# Patient Record
Sex: MALE | Race: BLACK OR AFRICAN AMERICAN | Employment: UNEMPLOYED | URBAN - NONMETROPOLITAN AREA
[De-identification: names, ages, dates, MRNs, and addresses within clinical notes are randomized per-mention and may not be internally consistent; named-entity substitution may affect disease eponyms.]

---

## 2021-03-21 ENCOUNTER — HOSPITAL ENCOUNTER (EMERGENCY)
Age: 61
Discharge: HOME | End: 2021-03-21
Attending: EMERGENCY MEDICINE

## 2021-03-21 VITALS
TEMPERATURE: 98.4 F | OXYGEN SATURATION: 99 % | DIASTOLIC BLOOD PRESSURE: 73 MMHG | WEIGHT: 176 LBS | BODY MASS INDEX: 26.07 KG/M2 | HEIGHT: 69 IN | HEART RATE: 72 BPM | RESPIRATION RATE: 18 BRPM | SYSTOLIC BLOOD PRESSURE: 110 MMHG

## 2021-03-21 DIAGNOSIS — H53.8 BLURRED VISION, BILATERAL: Primary | ICD-10-CM

## 2021-03-21 LAB
GLUCOSE BLD STRIP.AUTO-MCNC: 164 MG/DL (ref 70–110)
PERFORMED BY, TECHID: ABNORMAL

## 2021-03-21 PROCEDURE — 99283 EMERGENCY DEPT VISIT LOW MDM: CPT

## 2021-03-21 PROCEDURE — 82962 GLUCOSE BLOOD TEST: CPT

## 2021-03-21 NOTE — ED PROVIDER NOTES
HPI   Patient reports experiencing a spontaneous, brief, and completely resolved episode of bilateral blurred vision. It happened approximately 30 minutes ago and lasted for several seconds and resolved completely. Patient reports that he became very nervous about it and was briefly diaphoretic which also has resolved. He denies any vision complaints since then. He denies headache, focal weakness, paresthesias, change in speech, ambulation, thought process. There are no other complaints. History reviewed. No pertinent past medical history. History reviewed. No pertinent surgical history. History reviewed. No pertinent family history.     Social History     Socioeconomic History    Marital status: Not on file     Spouse name: Not on file    Number of children: Not on file    Years of education: Not on file    Highest education level: Not on file   Occupational History    Not on file   Social Needs    Financial resource strain: Not on file    Food insecurity     Worry: Not on file     Inability: Not on file    Transportation needs     Medical: Not on file     Non-medical: Not on file   Tobacco Use    Smoking status: Never Smoker    Smokeless tobacco: Never Used   Substance and Sexual Activity    Alcohol use: Not Currently    Drug use: Not Currently    Sexual activity: Not on file   Lifestyle    Physical activity     Days per week: Not on file     Minutes per session: Not on file    Stress: Not on file   Relationships    Social connections     Talks on phone: Not on file     Gets together: Not on file     Attends [employer] service: Not on file     Active member of club or organization: Not on file     Attends meetings of clubs or organizations: Not on file     Relationship status: Not on file    Intimate partner violence     Fear of current or ex partner: Not on file     Emotionally abused: Not on file     Physically abused: Not on file     Forced sexual activity: Not on file   Other Topics Concern    Not on file   Social History Narrative    Not on file         ALLERGIES: Patient has no known allergies. Review of Systems   Constitutional: Negative. HENT: Negative. Eyes: Positive for visual disturbance. Respiratory: Negative. Cardiovascular: Negative. Gastrointestinal: Negative. Endocrine: Negative. Genitourinary: Negative. Musculoskeletal: Negative. Allergic/Immunologic: Negative. Neurological: Negative. Hematological: Negative. Psychiatric/Behavioral: Negative. All other systems reviewed and are negative. Vitals:    03/21/21 1503   BP: 110/73   Pulse: 72   Resp: 18   SpO2: 99%   Weight: 79.8 kg (176 lb)   Height: 5' 9\" (1.753 m)            Physical Exam  Vitals signs and nursing note reviewed. Constitutional:       General: He is not in acute distress. Appearance: Normal appearance. He is normal weight. He is not ill-appearing, toxic-appearing or diaphoretic. HENT:      Head: Normocephalic and atraumatic. Mouth/Throat:      Mouth: Mucous membranes are moist.   Eyes:      Extraocular Movements: Extraocular movements intact. Conjunctiva/sclera: Conjunctivae normal.      Pupils: Pupils are equal, round, and reactive to light. Comments: Bilateral field of vision intact in all 4 quadrants   Neck:      Musculoskeletal: Normal range of motion and neck supple. No neck rigidity. Pulmonary:      Effort: Pulmonary effort is normal. No respiratory distress. Musculoskeletal: Normal range of motion. General: No swelling or tenderness. Skin:     General: Skin is warm and dry. Neurological:      General: No focal deficit present. Mental Status: He is alert and oriented to person, place, and time. Mental status is at baseline. Cranial Nerves: No cranial nerve deficit. Sensory: No sensory deficit. Motor: No weakness.       Coordination: Coordination normal.      Gait: Gait normal.   Psychiatric: Mood and Affect: Mood normal.         Behavior: Behavior normal.          MDM       Unclear source of a brief and resolved episode of bilateral visual blurriness. There are no symptoms or signs to suggest an acute CNS process such as TIA or CVA, mass on the optic chiasm, occipital lobe disturbance. Patient's blood pressure is good and bedside blood glucose is negative for hyperglycemia.   Patient urged to monitor symptoms and if they return consult an optometrist.  Procedures

## 2021-03-21 NOTE — ED TRIAGE NOTES
Patient states he had a blurry vision spell approximately 40minutes ago but has subsided. Denies c/o chest pain, SOB, or dizziness.

## 2021-03-21 NOTE — ED TRIAGE NOTES
Patient states he has not had a comprehensive eye exam in approximately 3 years but will schedule an appointment tomorrow with his eye care provider.

## 2023-07-27 ENCOUNTER — HOSPITAL ENCOUNTER (EMERGENCY)
Age: 63
Discharge: HOME | End: 2023-07-27
Attending: EMERGENCY MEDICINE
Payer: COMMERCIAL

## 2023-07-27 VITALS
OXYGEN SATURATION: 99 % | HEART RATE: 79 BPM | RESPIRATION RATE: 14 BRPM | HEIGHT: 72 IN | DIASTOLIC BLOOD PRESSURE: 90 MMHG | WEIGHT: 198 LBS | TEMPERATURE: 98 F | SYSTOLIC BLOOD PRESSURE: 161 MMHG | BODY MASS INDEX: 26.82 KG/M2

## 2023-07-27 DIAGNOSIS — Z00.00 GENERAL MEDICAL EXAMINATION: Primary | ICD-10-CM

## 2023-07-27 PROCEDURE — 99282 EMERGENCY DEPT VISIT SF MDM: CPT

## 2023-07-27 NOTE — ED PROVIDER NOTES
[facility] EMERGENCY DEPT  EMERGENCY DEPARTMENT ENCOUNTER      Pt Name: Celina Lopez  MRN: [id number]  [address] [facility] [place] 1960  Date of evaluation: 7/27/2023  Provider: Jackie Carpenter MD    [address] [facility]       Chief Complaint   Patient presents with    Fatigue         HISTORY OF PRESENT ILLNESS   (Location/Symptom, Timing/Onset, Context/Setting, Quality, Duration, Modifying Factors, Severity)  Note limiting factors. Celina Lopez is a 70 y.o. male who presents to the emergency department for evaluation of now resolved fatigue. Patient was drinking soda and working in the yard sweating a lot. Patient felt tired after which his wife told him to drink water. He drank water and felt resolution of symptoms. No other alleviating factors attempted. Patient had no subjective medical complaints other than fatigue. The history is provided by the patient and medical records. Nursing Notes were reviewed. REVIEW OF SYSTEMS    (2-9 systems for level 4, 10 or more for level 5)     Review of Systems   All other systems reviewed and are negative. Except as noted above the remainder of the review of systems was reviewed and negative. PAST MEDICAL HISTORY   History reviewed. No pertinent past medical history. SURGICAL HISTORY     History reviewed. No pertinent surgical history. CURRENT MEDICATIONS       Previous Medications    No medications on file       ALLERGIES     Patient has no known allergies. FAMILY HISTORY     History reviewed. No pertinent family history.        SOCIAL HISTORY       Social History     Socioeconomic History    Marital status:      Spouse name: None    Number of children: None    Years of education: None    Highest education level: None   Tobacco Use    Smoking status: Never    Smokeless tobacco: Never   Substance and Sexual Activity    Alcohol use: Not Currently    Drug use: Not Currently       SCREENINGS                               WA Assessment  BP: (!) 161/90  Pulse: 79

## 2023-07-27 NOTE — ED TRIAGE NOTES
States that \" I got dizzy yesterday and I think I got dehydrated\". Has no complaints today. States \" I just want my blood pressure checked\".

## 2023-08-16 ENCOUNTER — HOSPITAL ENCOUNTER (OUTPATIENT)
Age: 63
Discharge: HOME | End: 2023-08-19

## 2023-08-16 ENCOUNTER — OFFICE VISIT (OUTPATIENT)
Facility: CLINIC | Age: 63
End: 2023-08-16
Payer: COMMERCIAL

## 2023-08-16 VITALS
SYSTOLIC BLOOD PRESSURE: 156 MMHG | HEIGHT: 72 IN | BODY MASS INDEX: 27.67 KG/M2 | WEIGHT: 204.3 LBS | HEART RATE: 85 BPM | DIASTOLIC BLOOD PRESSURE: 94 MMHG | TEMPERATURE: 97.4 F | OXYGEN SATURATION: 98 %

## 2023-08-16 DIAGNOSIS — E66.3 OVERWEIGHT (BMI 25.0-29.9): ICD-10-CM

## 2023-08-16 DIAGNOSIS — Z12.11 COLON CANCER SCREENING: ICD-10-CM

## 2023-08-16 DIAGNOSIS — Z11.59 NEED FOR HEPATITIS C SCREENING TEST: ICD-10-CM

## 2023-08-16 DIAGNOSIS — Z11.3 ROUTINE SCREENING FOR STI (SEXUALLY TRANSMITTED INFECTION): ICD-10-CM

## 2023-08-16 DIAGNOSIS — Z00.00 ENCOUNTER FOR HEALTH MAINTENANCE EXAMINATION IN ADULT: Primary | ICD-10-CM

## 2023-08-16 DIAGNOSIS — R73.9 HYPERGLYCEMIA: ICD-10-CM

## 2023-08-16 DIAGNOSIS — R03.0 ELEVATED BLOOD PRESSURE READING: ICD-10-CM

## 2023-08-16 LAB — LABCORP DRAW FEE: NORMAL

## 2023-08-16 PROCEDURE — 99387 INIT PM E/M NEW PAT 65+ YRS: CPT | Performed by: STUDENT IN AN ORGANIZED HEALTH CARE EDUCATION/TRAINING PROGRAM

## 2023-08-16 SDOH — ECONOMIC STABILITY: FOOD INSECURITY: WITHIN THE PAST 12 MONTHS, YOU WORRIED THAT YOUR FOOD WOULD RUN OUT BEFORE YOU GOT MONEY TO BUY MORE.: PATIENT DECLINED

## 2023-08-16 SDOH — ECONOMIC STABILITY: INCOME INSECURITY: HOW HARD IS IT FOR YOU TO PAY FOR THE VERY BASICS LIKE FOOD, HOUSING, MEDICAL CARE, AND HEATING?: PATIENT DECLINED

## 2023-08-16 SDOH — ECONOMIC STABILITY: FOOD INSECURITY: WITHIN THE PAST 12 MONTHS, THE FOOD YOU BOUGHT JUST DIDN'T LAST AND YOU DIDN'T HAVE MONEY TO GET MORE.: PATIENT DECLINED

## 2023-08-16 SDOH — ECONOMIC STABILITY: HOUSING INSECURITY
IN THE LAST 12 MONTHS, WAS THERE A TIME WHEN YOU DID NOT HAVE A STEADY PLACE TO SLEEP OR SLEPT IN A SHELTER (INCLUDING NOW)?: PATIENT REFUSED

## 2023-08-16 ASSESSMENT — PATIENT HEALTH QUESTIONNAIRE - PHQ9
1. LITTLE INTEREST OR PLEASURE IN DOING THINGS: 0
SUM OF ALL RESPONSES TO PHQ QUESTIONS 1-9: 0
SUM OF ALL RESPONSES TO PHQ QUESTIONS 1-9: 0
SUM OF ALL RESPONSES TO PHQ9 QUESTIONS 1 & 2: 0
2. FEELING DOWN, DEPRESSED OR HOPELESS: 0
SUM OF ALL RESPONSES TO PHQ QUESTIONS 1-9: 0
SUM OF ALL RESPONSES TO PHQ QUESTIONS 1-9: 0

## 2023-08-16 NOTE — PATIENT INSTRUCTIONS
- check blood pressure every day  - follow up in 1 month  - find out GI doc name or office  - get labwork

## 2023-08-16 NOTE — PROGRESS NOTES
Mague Oneill presents today for   Chief Complaint   Patient presents with    Establish Care     No concerns at this time. Is someone accompanying this pt? No    Is the patient using any DME equipment during OV? No     Health Maintenance reviewed and discussed and ordered per Provider. Health Maintenance Due   Topic Date Due    COVID-19 Vaccine (1) Never done    Depression Screen  Never done    HIV screen  Never done    Hepatitis C screen  Never done    DTaP/Tdap/Td vaccine (1 - Tdap) Never done    Diabetes screen  Never done    Lipids  Never done    Colorectal Cancer Screen  Never done    Shingles vaccine (1 of 2) Never done    Pneumococcal 65+ years Vaccine (1 - PCV) Never done    Flu vaccine (1) Never done   . Coordination of Care:  1. \"Have you been to the ER, urgent care clinic since your last visit? Hospitalized since your last visit? \" No     2. \"Have you seen or consulted any other health care providers outside of the [address] [place] [address] Avenue since your last visit? \" No    3. For patients aged 43-73: Has the patient had a colonoscopy? Says he had one done 5 years ago but he does not remember where he got it done at or who did it.

## 2023-08-17 LAB
ALBUMIN SERPL-MCNC: 4.3 G/DL (ref 3.9–4.9)
ALBUMIN/GLOB SERPL: 1.8 {RATIO} (ref 1.2–2.2)
ALP SERPL-CCNC: 72 IU/L (ref 44–121)
ALT SERPL-CCNC: 11 IU/L (ref 0–44)
AST SERPL-CCNC: 14 IU/L (ref 0–40)
BASOPHILS # BLD AUTO: 0.1 X10E3/UL (ref 0–0.2)
BASOPHILS NFR BLD AUTO: 1 %
BILIRUB SERPL-MCNC: 0.3 MG/DL (ref 0–1.2)
BUN SERPL-MCNC: 13 MG/DL (ref 8–27)
BUN/CREAT SERPL: 16 (ref 10–24)
CALCIUM SERPL-MCNC: 8.8 MG/DL (ref 8.6–10.2)
CHLORIDE SERPL-SCNC: 103 MMOL/L (ref 96–106)
CHOLEST SERPL-MCNC: 188 MG/DL (ref 100–199)
CO2 SERPL-SCNC: 23 MMOL/L (ref 20–29)
CREAT SERPL-MCNC: 0.83 MG/DL (ref 0.76–1.27)
EGFRCR SERPLBLD CKD-EPI 2021: 97 ML/MIN/1.73
EOSINOPHIL # BLD AUTO: 0.1 X10E3/UL (ref 0–0.4)
EOSINOPHIL NFR BLD AUTO: 1 %
ERYTHROCYTE [DISTWIDTH] IN BLOOD BY AUTOMATED COUNT: 14.5 % (ref 11.6–15.4)
GLOBULIN SER CALC-MCNC: 2.4 G/DL (ref 1.5–4.5)
GLUCOSE SERPL-MCNC: 177 MG/DL (ref 70–99)
HBA1C MFR BLD: 8.2 % (ref 4.8–5.6)
HCT VFR BLD AUTO: 36 % (ref 37.5–51)
HCV IGG SERPL QL IA: NON REACTIVE
HDLC SERPL-MCNC: 38 MG/DL
HGB BLD-MCNC: 11.5 G/DL (ref 13–17.7)
HIV 1+2 AB+HIV1 P24 AG SERPL QL IA: NON REACTIVE
IMM GRANULOCYTES # BLD AUTO: 0 X10E3/UL (ref 0–0.1)
IMM GRANULOCYTES NFR BLD AUTO: 0 %
LDLC SERPL CALC-MCNC: 139 MG/DL (ref 0–99)
LYMPHOCYTES # BLD AUTO: 2.1 X10E3/UL (ref 0.7–3.1)
LYMPHOCYTES NFR BLD AUTO: 24 %
MCH RBC QN AUTO: 23 PG (ref 26.6–33)
MCHC RBC AUTO-ENTMCNC: 31.9 G/DL (ref 31.5–35.7)
MCV RBC AUTO: 72 FL (ref 79–97)
MONOCYTES # BLD AUTO: 0.6 X10E3/UL (ref 0.1–0.9)
MONOCYTES NFR BLD AUTO: 7 %
NEUTROPHILS # BLD AUTO: 6.1 X10E3/UL (ref 1.4–7)
NEUTROPHILS NFR BLD AUTO: 67 %
PLATELET # BLD AUTO: 413 X10E3/UL (ref 150–450)
POTASSIUM SERPL-SCNC: 4.6 MMOL/L (ref 3.5–5.2)
PROT SERPL-MCNC: 6.7 G/DL (ref 6–8.5)
RBC # BLD AUTO: 4.99 X10E6/UL (ref 4.14–5.8)
SODIUM SERPL-SCNC: 140 MMOL/L (ref 134–144)
SPECIMEN STATUS REPORT: NORMAL
TRIGL SERPL-MCNC: 61 MG/DL (ref 0–149)
VLDLC SERPL CALC-MCNC: 11 MG/DL (ref 5–40)
WBC # BLD AUTO: 9.1 X10E3/UL (ref 3.4–10.8)

## 2023-08-18 ENCOUNTER — TELEPHONE (OUTPATIENT)
Facility: CLINIC | Age: 63
End: 2023-08-18

## 2023-08-18 NOTE — TELEPHONE ENCOUNTER
----- Message from Omid Russell MD sent at 8/17/2023  1:16 PM EDT -----  Denise Parry make him a sooner appointment to talk about his new diabetes, preferably tomorrow  ----- Message -----  From: Kobi Gutierrez Incoming Amb Ref Lab Orders To [facility]  Sent: 8/17/2023   6:38 AM EDT  To: Omid Russell MD    Patient is coming in today for an appointment to discuss labs.

## 2023-09-18 ENCOUNTER — OFFICE VISIT (OUTPATIENT)
Facility: CLINIC | Age: 63
End: 2023-09-18
Payer: COMMERCIAL

## 2023-09-18 VITALS
SYSTOLIC BLOOD PRESSURE: 118 MMHG | HEIGHT: 72 IN | DIASTOLIC BLOOD PRESSURE: 81 MMHG | OXYGEN SATURATION: 100 % | BODY MASS INDEX: 26.56 KG/M2 | TEMPERATURE: 98.2 F | WEIGHT: 196.1 LBS | HEART RATE: 81 BPM

## 2023-09-18 DIAGNOSIS — R03.0 WHITE COAT SYNDROME WITHOUT DIAGNOSIS OF HYPERTENSION: ICD-10-CM

## 2023-09-18 DIAGNOSIS — E11.9 TYPE 2 DIABETES MELLITUS WITHOUT COMPLICATION, WITHOUT LONG-TERM CURRENT USE OF INSULIN (HCC): Primary | ICD-10-CM

## 2023-09-18 DIAGNOSIS — Z13.6 ENCOUNTER FOR ABDOMINAL AORTIC ANEURYSM (AAA) SCREENING: ICD-10-CM

## 2023-09-18 DIAGNOSIS — D50.8 IRON DEFICIENCY ANEMIA SECONDARY TO INADEQUATE DIETARY IRON INTAKE: ICD-10-CM

## 2023-09-18 PROCEDURE — 3052F HG A1C>EQUAL 8.0%<EQUAL 9.0%: CPT | Performed by: STUDENT IN AN ORGANIZED HEALTH CARE EDUCATION/TRAINING PROGRAM

## 2023-09-18 PROCEDURE — 99214 OFFICE O/P EST MOD 30 MIN: CPT | Performed by: STUDENT IN AN ORGANIZED HEALTH CARE EDUCATION/TRAINING PROGRAM

## 2023-09-18 PROCEDURE — 1123F ACP DISCUSS/DSCN MKR DOCD: CPT | Performed by: STUDENT IN AN ORGANIZED HEALTH CARE EDUCATION/TRAINING PROGRAM

## 2023-09-18 ASSESSMENT — PATIENT HEALTH QUESTIONNAIRE - PHQ9
1. LITTLE INTEREST OR PLEASURE IN DOING THINGS: 0
SUM OF ALL RESPONSES TO PHQ QUESTIONS 1-9: 0
2. FEELING DOWN, DEPRESSED OR HOPELESS: 0
SUM OF ALL RESPONSES TO PHQ9 QUESTIONS 1 & 2: 0

## 2023-09-18 NOTE — PROGRESS NOTES
Katt Valencia presents today for   Chief Complaint   Patient presents with    1 Month Follow-Up     Blood pressure   Diabetes         Is someone accompanying this pt? No   Is the patient using any DME equipment during OV? No     Health Maintenance reviewed and discussed and ordered per Provider. Health Maintenance Due   Topic Date Due    COVID-19 Vaccine (1) Never done    DTaP/Tdap/Td vaccine (1 - Tdap) Never done    Colorectal Cancer Screen  Never done    Shingles vaccine (1 of 2) Never done    Pneumococcal 65+ years Vaccine (1 - PCV) Never done    AAA screen  Never done    Flu vaccine (1) Never done   . Coordination of Care:  1. \"Have you been to the ER, urgent care clinic since your last visit? Hospitalized since your last visit? \" No    2. \"Have you seen or consulted any other health care providers outside of the [address] [place] [address] since your last visit? \" No    3. For patients aged 43-73: Has the patient had a colonoscopy?  No

## 2024-03-22 ENCOUNTER — APPOINTMENT (OUTPATIENT)
Age: 64
End: 2024-03-22
Attending: EMERGENCY MEDICINE
Payer: COMMERCIAL

## 2024-03-22 ENCOUNTER — HOSPITAL ENCOUNTER (EMERGENCY)
Age: 64
Discharge: ANOTHER HOSPITAL | End: 2024-03-23
Attending: EMERGENCY MEDICINE
Payer: COMMERCIAL

## 2024-03-22 ENCOUNTER — APPOINTMENT (OUTPATIENT)
Age: 64
End: 2024-03-22
Payer: COMMERCIAL

## 2024-03-22 ENCOUNTER — OFFICE VISIT (OUTPATIENT)
Facility: CLINIC | Age: 64
End: 2024-03-22

## 2024-03-22 VITALS — DIASTOLIC BLOOD PRESSURE: 73 MMHG | HEART RATE: 134 BPM | SYSTOLIC BLOOD PRESSURE: 119 MMHG | OXYGEN SATURATION: 85 %

## 2024-03-22 DIAGNOSIS — J96.01 ACUTE RESPIRATORY FAILURE WITH HYPOXIA (HCC): ICD-10-CM

## 2024-03-22 DIAGNOSIS — J96.01 ACUTE HYPOXIC RESPIRATORY FAILURE (HCC): ICD-10-CM

## 2024-03-22 DIAGNOSIS — R00.0 TACHYCARDIA: ICD-10-CM

## 2024-03-22 DIAGNOSIS — R65.21 SEPTIC SHOCK (HCC): ICD-10-CM

## 2024-03-22 DIAGNOSIS — A41.9 SEPTIC SHOCK (HCC): ICD-10-CM

## 2024-03-22 DIAGNOSIS — J18.9 MULTIFOCAL PNEUMONIA: ICD-10-CM

## 2024-03-22 DIAGNOSIS — R06.03 ACUTE RESPIRATORY DISTRESS: Primary | ICD-10-CM

## 2024-03-22 DIAGNOSIS — I42.9 CARDIOMYOPATHY (HCC): Primary | ICD-10-CM

## 2024-03-22 LAB
ALBUMIN SERPL-MCNC: 2.9 G/DL (ref 3.4–5)
ALBUMIN/GLOB SERPL: 0.7 (ref 0.8–1.7)
ALP SERPL-CCNC: 74 U/L (ref 45–117)
ALT SERPL-CCNC: 19 U/L (ref 16–61)
ANION GAP SERPL CALC-SCNC: 11 MMOL/L (ref 3–18)
APPEARANCE UR: CLEAR
APTT PPP: 33.3 SEC (ref 23–36.4)
ARTERIAL PATENCY WRIST A: YES
ARTERIAL PATENCY WRIST A: YES
AST SERPL W P-5'-P-CCNC: 10 U/L (ref 10–38)
BACTERIA URNS QL MICRO: ABNORMAL /HPF
BASE DEFICIT BLDA-SCNC: 5.3 MMOL/L
BASE DEFICIT BLDA-SCNC: 8 MMOL/L
BASOPHILS # BLD: 0 K/UL (ref 0–0.1)
BASOPHILS NFR BLD: 0 % (ref 0–2)
BDY SITE: ABNORMAL
BDY SITE: ABNORMAL
BILIRUB SERPL-MCNC: 1.1 MG/DL (ref 0.2–1)
BILIRUB UR QL: NEGATIVE
BNP SERPL-MCNC: 3945 PG/ML (ref 0–900)
BUN SERPL-MCNC: 14 MG/DL (ref 7–18)
BUN/CREAT SERPL: 14 (ref 12–20)
CA-I BLD-MCNC: 8.8 MG/DL (ref 8.5–10.1)
CHLORIDE SERPL-SCNC: 105 MMOL/L (ref 100–111)
CO2 SERPL-SCNC: 23 MMOL/L (ref 21–32)
COHGB MFR BLD: 0.6 % (ref 1–2)
COHGB MFR BLD: 1.2 % (ref 1–2)
COLOR UR: YELLOW
CREAT SERPL-MCNC: 1.03 MG/DL (ref 0.6–1.3)
DIFFERENTIAL METHOD BLD: ABNORMAL
ECHO AO ROOT DIAM: 3.2 CM
ECHO AO ROOT INDEX: 1.48 CM/M2
ECHO AV AREA PEAK VELOCITY: 1.1 CM2
ECHO AV AREA VTI: 0.8 CM2
ECHO AV AREA/BSA PEAK VELOCITY: 0.5 CM2/M2
ECHO AV AREA/BSA VTI: 0.4 CM2/M2
ECHO AV MEAN GRADIENT: 26 MMHG
ECHO AV MEAN VELOCITY: 2.4 M/S
ECHO AV PEAK GRADIENT: 47 MMHG
ECHO AV PEAK VELOCITY: 3.4 M/S
ECHO AV VELOCITY RATIO: 0.32
ECHO AV VTI: 58.8 CM
ECHO BSA: 2.17 M2
ECHO LA DIAMETER INDEX: 1.99 CM/M2
ECHO LA DIAMETER: 4.3 CM
ECHO LA TO AORTIC ROOT RATIO: 1.34
ECHO LV FRACTIONAL SHORTENING: 18 % (ref 28–44)
ECHO LV INTERNAL DIMENSION DIASTOLE INDEX: 2.36 CM/M2
ECHO LV INTERNAL DIMENSION DIASTOLIC: 5.1 CM (ref 4.2–5.9)
ECHO LV INTERNAL DIMENSION SYSTOLIC INDEX: 1.94 CM/M2
ECHO LV INTERNAL DIMENSION SYSTOLIC: 4.2 CM
ECHO LV IVSD: 0.7 CM (ref 0.6–1)
ECHO LV MASS 2D: 140.5 G (ref 88–224)
ECHO LV MASS INDEX 2D: 65 G/M2 (ref 49–115)
ECHO LV POSTERIOR WALL DIASTOLIC: 0.9 CM (ref 0.6–1)
ECHO LV RELATIVE WALL THICKNESS RATIO: 0.35
ECHO LVOT AREA: 3.1 CM2
ECHO LVOT AV VTI INDEX: 0.26
ECHO LVOT DIAM: 2 CM
ECHO LVOT MEAN GRADIENT: 2 MMHG
ECHO LVOT PEAK GRADIENT: 5 MMHG
ECHO LVOT PEAK VELOCITY: 1.1 M/S
ECHO LVOT STROKE VOLUME INDEX: 21.8 ML/M2
ECHO LVOT SV: 47.1 ML
ECHO LVOT VTI: 15 CM
ECHO MV AREA VTI: 2.2 CM2
ECHO MV LVOT VTI INDEX: 1.43
ECHO MV MAX VELOCITY: 1.4 M/S
ECHO MV MEAN GRADIENT: 3 MMHG
ECHO MV MEAN VELOCITY: 0.8 M/S
ECHO MV PEAK GRADIENT: 7 MMHG
ECHO MV VTI: 21.4 CM
EOSINOPHIL # BLD: 0 K/UL (ref 0–0.4)
EOSINOPHIL NFR BLD: 0 % (ref 0–5)
EPITH CASTS URNS QL MICRO: ABNORMAL /LPF (ref 0–20)
ERYTHROCYTE [DISTWIDTH] IN BLOOD BY AUTOMATED COUNT: 19.4 % (ref 11.6–14.5)
ERYTHROCYTE [DISTWIDTH] IN BLOOD BY AUTOMATED COUNT: 19.6 % (ref 11.6–14.5)
FIO2 ON VENT: 70 %
FIO2 ON VENT: 80 %
FLUAV AG NPH QL IA: NEGATIVE
FLUBV AG NOSE QL IA: NEGATIVE
GAS FLOW.O2 SETTING OXYMISER: 20
GLOBULIN SER CALC-MCNC: 4.2 G/DL (ref 2–4)
GLUCOSE SERPL-MCNC: 244 MG/DL (ref 74–99)
GLUCOSE UR STRIP.AUTO-MCNC: 500 MG/DL
HCO3 BLDA-SCNC: 19 MMOL/L (ref 22–26)
HCO3 BLDA-SCNC: 19 MMOL/L (ref 22–26)
HCT VFR BLD AUTO: 27.7 % (ref 36–48)
HCT VFR BLD AUTO: 29.1 % (ref 36–48)
HGB BLD-MCNC: 8 G/DL (ref 13–16)
HGB BLD-MCNC: 8.5 G/DL (ref 13–16)
HGB UR QL STRIP: NEGATIVE
HYALINE CASTS URNS QL MICRO: ABNORMAL /LPF
IMM GRANULOCYTES # BLD AUTO: 0.2 K/UL (ref 0–0.04)
IMM GRANULOCYTES NFR BLD AUTO: 1 % (ref 0–0.5)
KETONES UR QL STRIP.AUTO: NEGATIVE MG/DL
LACTATE SERPL-SCNC: 2.3 MMOL/L (ref 0.4–2)
LACTATE SERPL-SCNC: 4.9 MMOL/L (ref 0.4–2)
LACTATE SERPL-SCNC: 6.6 MMOL/L (ref 0.4–2)
LEUKOCYTE ESTERASE UR QL STRIP.AUTO: NEGATIVE
LYMPHOCYTES # BLD: 1.1 K/UL (ref 0.9–3.6)
LYMPHOCYTES NFR BLD: 7 % (ref 21–52)
MCH RBC QN AUTO: 17.7 PG (ref 24–34)
MCH RBC QN AUTO: 17.9 PG (ref 24–34)
MCHC RBC AUTO-ENTMCNC: 28.9 G/DL (ref 31–37)
MCHC RBC AUTO-ENTMCNC: 29.2 G/DL (ref 31–37)
MCV RBC AUTO: 61.3 FL (ref 78–100)
MCV RBC AUTO: 61.3 FL (ref 78–100)
METHGB MFR BLD: 0.2 % (ref 0–1.4)
METHGB MFR BLD: 0.2 % (ref 0–1.4)
MONOCYTES # BLD: 1 K/UL (ref 0.05–1.2)
MONOCYTES NFR BLD: 6 % (ref 3–10)
NEUTS SEG # BLD: 14.1 K/UL (ref 1.8–8)
NEUTS SEG NFR BLD: 86 % (ref 40–73)
NITRITE UR QL STRIP.AUTO: NEGATIVE
NRBC # BLD: 0 K/UL (ref 0–0.01)
NRBC # BLD: 0.02 K/UL (ref 0–0.01)
NRBC BLD-RTO: 0 PER 100 WBC
NRBC BLD-RTO: 0.1 PER 100 WBC
OXYHGB MFR BLD: 94.8 % (ref 95–99)
OXYHGB MFR BLD: 98.3 % (ref 95–99)
PCO2 BLDA: 33 MMHG (ref 35–45)
PCO2 BLDA: 42 MMHG (ref 35–45)
PEEP RESPIRATORY: 10
PERFORMED BY:: ABNORMAL
PERFORMED BY:: ABNORMAL
PH BLDA: 7.26 (ref 7.35–7.45)
PH BLDA: 7.38 (ref 7.35–7.45)
PH UR STRIP: 5.5 (ref 5–8)
PLATELET # BLD AUTO: 482 K/UL (ref 135–420)
PLATELET # BLD AUTO: 509 K/UL (ref 135–420)
PMV BLD AUTO: 10.2 FL (ref 9.2–11.8)
PMV BLD AUTO: 10.4 FL (ref 9.2–11.8)
PO2 BLDA: 103 MMHG (ref 80–100)
PO2 BLDA: 171 MMHG (ref 80–100)
POTASSIUM SERPL-SCNC: 3.4 MMOL/L (ref 3.5–5.5)
PROCALCITONIN SERPL-MCNC: 0.07 NG/ML
PROT SERPL-MCNC: 7.1 G/DL (ref 6.4–8.2)
PROT UR STRIP-MCNC: 30 MG/DL
RBC # BLD AUTO: 4.52 M/UL (ref 4.35–5.65)
RBC # BLD AUTO: 4.75 M/UL (ref 4.35–5.65)
RBC #/AREA URNS HPF: ABNORMAL /HPF (ref 0–2)
RBC MORPH BLD: ABNORMAL
RBC MORPH BLD: ABNORMAL
SAO2 % BLD: 96 % (ref 95–99)
SAO2 % BLD: 99 % (ref 95–99)
SAO2% DEVICE SAO2% SENSOR NAME: ABNORMAL
SAO2% DEVICE SAO2% SENSOR NAME: ABNORMAL
SARS-COV-2 RDRP RESP QL NAA+PROBE: NOT DETECTED
SODIUM SERPL-SCNC: 139 MMOL/L (ref 136–145)
SP GR UR REFRACTOMETRY: 1.02 (ref 1–1.03)
SPECIMEN SITE: ABNORMAL
SPECIMEN SITE: ABNORMAL
THERAPEUTIC RANGE: NORMAL SEC (ref 82–109)
TOTAL RATE: 20
TROPONIN I SERPL HS-MCNC: 279 NG/L (ref 0–78)
TROPONIN I SERPL HS-MCNC: 291 NG/L (ref 0–78)
UROBILINOGEN UR QL STRIP.AUTO: 1 EU/DL (ref 0.2–1)
VENTILATION MODE VENT: ABNORMAL
VT SETTING VENT: 500
WBC # BLD AUTO: 16.4 K/UL (ref 4.6–13.2)
WBC # BLD AUTO: 19.7 K/UL (ref 4.6–13.2)
WBC URNS QL MICRO: ABNORMAL /HPF (ref 0–4)

## 2024-03-22 PROCEDURE — 85027 COMPLETE CBC AUTOMATED: CPT

## 2024-03-22 PROCEDURE — 2580000003 HC RX 258: Performed by: EMERGENCY MEDICINE

## 2024-03-22 PROCEDURE — 96367 TX/PROPH/DG ADDL SEQ IV INF: CPT

## 2024-03-22 PROCEDURE — 51702 INSERT TEMP BLADDER CATH: CPT

## 2024-03-22 PROCEDURE — 81001 URINALYSIS AUTO W/SCOPE: CPT

## 2024-03-22 PROCEDURE — 71275 CT ANGIOGRAPHY CHEST: CPT

## 2024-03-22 PROCEDURE — 85730 THROMBOPLASTIN TIME PARTIAL: CPT

## 2024-03-22 PROCEDURE — 85025 COMPLETE CBC W/AUTO DIFF WBC: CPT

## 2024-03-22 PROCEDURE — 93005 ELECTROCARDIOGRAM TRACING: CPT | Performed by: EMERGENCY MEDICINE

## 2024-03-22 PROCEDURE — 80053 COMPREHEN METABOLIC PANEL: CPT

## 2024-03-22 PROCEDURE — 96365 THER/PROPH/DIAG IV INF INIT: CPT

## 2024-03-22 PROCEDURE — 2500000003 HC RX 250 WO HCPCS: Performed by: EMERGENCY MEDICINE

## 2024-03-22 PROCEDURE — 71045 X-RAY EXAM CHEST 1 VIEW: CPT

## 2024-03-22 PROCEDURE — 96366 THER/PROPH/DIAG IV INF ADDON: CPT

## 2024-03-22 PROCEDURE — 6360000002 HC RX W HCPCS

## 2024-03-22 PROCEDURE — 96375 TX/PRO/DX INJ NEW DRUG ADDON: CPT

## 2024-03-22 PROCEDURE — 82803 BLOOD GASES ANY COMBINATION: CPT

## 2024-03-22 PROCEDURE — 2500000003 HC RX 250 WO HCPCS

## 2024-03-22 PROCEDURE — 87635 SARS-COV-2 COVID-19 AMP PRB: CPT

## 2024-03-22 PROCEDURE — 6360000004 HC RX CONTRAST MEDICATION: Performed by: EMERGENCY MEDICINE

## 2024-03-22 PROCEDURE — 84484 ASSAY OF TROPONIN QUANT: CPT

## 2024-03-22 PROCEDURE — 6370000000 HC RX 637 (ALT 250 FOR IP): Performed by: EMERGENCY MEDICINE

## 2024-03-22 PROCEDURE — 6360000002 HC RX W HCPCS: Performed by: EMERGENCY MEDICINE

## 2024-03-22 PROCEDURE — 87804 INFLUENZA ASSAY W/OPTIC: CPT

## 2024-03-22 PROCEDURE — 83605 ASSAY OF LACTIC ACID: CPT

## 2024-03-22 PROCEDURE — 31500 INSERT EMERGENCY AIRWAY: CPT

## 2024-03-22 PROCEDURE — 84145 PROCALCITONIN (PCT): CPT

## 2024-03-22 PROCEDURE — 96376 TX/PRO/DX INJ SAME DRUG ADON: CPT

## 2024-03-22 PROCEDURE — 36600 WITHDRAWAL OF ARTERIAL BLOOD: CPT

## 2024-03-22 PROCEDURE — 94660 CPAP INITIATION&MGMT: CPT

## 2024-03-22 PROCEDURE — 99285 EMERGENCY DEPT VISIT HI MDM: CPT

## 2024-03-22 PROCEDURE — 31720 CLEARANCE OF AIRWAYS: CPT

## 2024-03-22 PROCEDURE — 94640 AIRWAY INHALATION TREATMENT: CPT

## 2024-03-22 PROCEDURE — 87040 BLOOD CULTURE FOR BACTERIA: CPT

## 2024-03-22 PROCEDURE — 96361 HYDRATE IV INFUSION ADD-ON: CPT

## 2024-03-22 PROCEDURE — 94761 N-INVAS EAR/PLS OXIMETRY MLT: CPT

## 2024-03-22 PROCEDURE — 2700000000 HC OXYGEN THERAPY PER DAY

## 2024-03-22 PROCEDURE — 83880 ASSAY OF NATRIURETIC PEPTIDE: CPT

## 2024-03-22 PROCEDURE — 93306 TTE W/DOPPLER COMPLETE: CPT

## 2024-03-22 RX ORDER — HEPARIN SODIUM 1000 [USP'U]/ML
2000 INJECTION, SOLUTION INTRAVENOUS; SUBCUTANEOUS PRN
Status: DISCONTINUED | OUTPATIENT
Start: 2024-03-22 | End: 2024-03-22

## 2024-03-22 RX ORDER — LORAZEPAM 2 MG/ML
1 INJECTION INTRAMUSCULAR ONCE
Status: COMPLETED | OUTPATIENT
Start: 2024-03-22 | End: 2024-03-22

## 2024-03-22 RX ORDER — DEXMEDETOMIDINE HYDROCHLORIDE 4 UG/ML
INJECTION, SOLUTION INTRAVENOUS
Status: COMPLETED
Start: 2024-03-22 | End: 2024-03-22

## 2024-03-22 RX ORDER — IPRATROPIUM BROMIDE AND ALBUTEROL SULFATE 2.5; .5 MG/3ML; MG/3ML
1 SOLUTION RESPIRATORY (INHALATION)
Status: COMPLETED | OUTPATIENT
Start: 2024-03-22 | End: 2024-03-22

## 2024-03-22 RX ORDER — PROPOFOL 10 MG/ML
5-50 INJECTION, EMULSION INTRAVENOUS
Status: COMPLETED | OUTPATIENT
Start: 2024-03-22 | End: 2024-03-23

## 2024-03-22 RX ORDER — ETOMIDATE 2 MG/ML
20 INJECTION INTRAVENOUS
Status: COMPLETED | OUTPATIENT
Start: 2024-03-22 | End: 2024-03-22

## 2024-03-22 RX ORDER — DEXMEDETOMIDINE HYDROCHLORIDE 4 UG/ML
.1-1.5 INJECTION, SOLUTION INTRAVENOUS CONTINUOUS
Status: DISCONTINUED | OUTPATIENT
Start: 2024-03-22 | End: 2024-03-23 | Stop reason: HOSPADM

## 2024-03-22 RX ORDER — ROCURONIUM BROMIDE 10 MG/ML
100 INJECTION, SOLUTION INTRAVENOUS
Status: COMPLETED | OUTPATIENT
Start: 2024-03-22 | End: 2024-03-22

## 2024-03-22 RX ORDER — HEPARIN SODIUM 1000 [USP'U]/ML
4000 INJECTION, SOLUTION INTRAVENOUS; SUBCUTANEOUS ONCE
Status: COMPLETED | OUTPATIENT
Start: 2024-03-22 | End: 2024-03-22

## 2024-03-22 RX ORDER — 0.9 % SODIUM CHLORIDE 0.9 %
30 INTRAVENOUS SOLUTION INTRAVENOUS ONCE
Status: COMPLETED | OUTPATIENT
Start: 2024-03-22 | End: 2024-03-22

## 2024-03-22 RX ORDER — FUROSEMIDE 10 MG/ML
40 INJECTION INTRAMUSCULAR; INTRAVENOUS
Status: COMPLETED | OUTPATIENT
Start: 2024-03-22 | End: 2024-03-22

## 2024-03-22 RX ORDER — ETOMIDATE 2 MG/ML
INJECTION INTRAVENOUS
Status: COMPLETED
Start: 2024-03-22 | End: 2024-03-22

## 2024-03-22 RX ORDER — HEPARIN SODIUM 10000 [USP'U]/100ML
5-30 INJECTION, SOLUTION INTRAVENOUS CONTINUOUS
Status: DISCONTINUED | OUTPATIENT
Start: 2024-03-22 | End: 2024-03-23 | Stop reason: HOSPADM

## 2024-03-22 RX ORDER — 0.9 % SODIUM CHLORIDE 0.9 %
250 INTRAVENOUS SOLUTION INTRAVENOUS ONCE
Status: COMPLETED | OUTPATIENT
Start: 2024-03-22 | End: 2024-03-22

## 2024-03-22 RX ORDER — ROCURONIUM BROMIDE 10 MG/ML
INJECTION, SOLUTION INTRAVENOUS
Status: COMPLETED
Start: 2024-03-22 | End: 2024-03-22

## 2024-03-22 RX ORDER — LORAZEPAM 2 MG/ML
2 INJECTION INTRAMUSCULAR ONCE
Status: COMPLETED | OUTPATIENT
Start: 2024-03-22 | End: 2024-03-22

## 2024-03-22 RX ORDER — PROPOFOL 10 MG/ML
INJECTION, EMULSION INTRAVENOUS
Status: COMPLETED
Start: 2024-03-22 | End: 2024-03-22

## 2024-03-22 RX ORDER — 0.9 % SODIUM CHLORIDE 0.9 %
1000 INTRAVENOUS SOLUTION INTRAVENOUS ONCE
Status: DISCONTINUED | OUTPATIENT
Start: 2024-03-22 | End: 2024-03-22

## 2024-03-22 RX ORDER — DEXMEDETOMIDINE HYDROCHLORIDE 4 UG/ML
.1-1.5 INJECTION, SOLUTION INTRAVENOUS CONTINUOUS
Status: DISCONTINUED | OUTPATIENT
Start: 2024-03-22 | End: 2024-03-22

## 2024-03-22 RX ORDER — LORAZEPAM 2 MG/ML
INJECTION INTRAMUSCULAR
Status: COMPLETED
Start: 2024-03-22 | End: 2024-03-22

## 2024-03-22 RX ORDER — NOREPINEPHRINE BITARTRATE 1 MG/ML
INJECTION, SOLUTION INTRAVENOUS
Status: DISCONTINUED
Start: 2024-03-22 | End: 2024-03-23 | Stop reason: HOSPADM

## 2024-03-22 RX ORDER — HEPARIN SODIUM 1000 [USP'U]/ML
4000 INJECTION, SOLUTION INTRAVENOUS; SUBCUTANEOUS PRN
Status: DISCONTINUED | OUTPATIENT
Start: 2024-03-22 | End: 2024-03-22

## 2024-03-22 RX ADMIN — FUROSEMIDE 40 MG: 10 INJECTION, SOLUTION INTRAMUSCULAR; INTRAVENOUS at 16:01

## 2024-03-22 RX ADMIN — HEPARIN SODIUM 4000 UNITS: 1000 INJECTION INTRAVENOUS; SUBCUTANEOUS at 17:09

## 2024-03-22 RX ADMIN — WATER 125 MG: 1 INJECTION INTRAMUSCULAR; INTRAVENOUS; SUBCUTANEOUS at 14:58

## 2024-03-22 RX ADMIN — ETOMIDATE 20 MG: 2 INJECTION INTRAVENOUS at 19:39

## 2024-03-22 RX ADMIN — PROPOFOL 20 MCG/KG/MIN: 10 INJECTION, EMULSION INTRAVENOUS at 19:53

## 2024-03-22 RX ADMIN — IPRATROPIUM BROMIDE AND ALBUTEROL SULFATE 1 DOSE: .5; 3 SOLUTION RESPIRATORY (INHALATION) at 16:31

## 2024-03-22 RX ADMIN — ETOMIDATE 20 MG: 2 INJECTION, SOLUTION INTRAVENOUS at 19:39

## 2024-03-22 RX ADMIN — DEXMEDETOMIDINE HYDROCHLORIDE 0.2 MCG/KG/HR: 400 INJECTION, SOLUTION INTRAVENOUS at 22:23

## 2024-03-22 RX ADMIN — SODIUM CHLORIDE 1500 MG: 9 INJECTION, SOLUTION INTRAVENOUS at 17:06

## 2024-03-22 RX ADMIN — DEXMEDETOMIDINE HYDROCHLORIDE 0.2 MCG/KG/HR: 4 INJECTION, SOLUTION INTRAVENOUS at 22:23

## 2024-03-22 RX ADMIN — ROCURONIUM BROMIDE 100 MG: 10 INJECTION, SOLUTION INTRAVENOUS at 19:39

## 2024-03-22 RX ADMIN — PIPERACILLIN AND TAZOBACTAM 4500 MG: 4; .5 INJECTION, POWDER, FOR SOLUTION INTRAVENOUS at 16:03

## 2024-03-22 RX ADMIN — LORAZEPAM 1 MG: 2 INJECTION INTRAMUSCULAR at 23:07

## 2024-03-22 RX ADMIN — SODIUM CHLORIDE 2736 ML: 9 INJECTION, SOLUTION INTRAVENOUS at 15:01

## 2024-03-22 RX ADMIN — SODIUM CHLORIDE 5 MCG/MIN: 9 INJECTION, SOLUTION INTRAVENOUS at 23:16

## 2024-03-22 RX ADMIN — IPRATROPIUM BROMIDE AND ALBUTEROL SULFATE 1 DOSE: .5; 3 SOLUTION RESPIRATORY (INHALATION) at 14:41

## 2024-03-22 RX ADMIN — SODIUM CHLORIDE 250 ML: 9 INJECTION, SOLUTION INTRAVENOUS at 20:37

## 2024-03-22 RX ADMIN — LORAZEPAM 1 MG: 2 INJECTION INTRAMUSCULAR; INTRAVENOUS at 23:07

## 2024-03-22 RX ADMIN — LORAZEPAM 2 MG: 2 INJECTION INTRAMUSCULAR at 18:04

## 2024-03-22 RX ADMIN — IOPAMIDOL 95 ML: 755 INJECTION, SOLUTION INTRAVENOUS at 18:31

## 2024-03-22 RX ADMIN — HEPARIN SODIUM 10 UNITS/KG/HR: 10000 INJECTION, SOLUTION INTRAVENOUS at 17:13

## 2024-03-22 ASSESSMENT — PAIN - FUNCTIONAL ASSESSMENT: PAIN_FUNCTIONAL_ASSESSMENT: NONE - DENIES PAIN

## 2024-03-22 ASSESSMENT — PULMONARY FUNCTION TESTS: PIF_VALUE: 27

## 2024-03-22 ASSESSMENT — LIFESTYLE VARIABLES
HOW MANY STANDARD DRINKS CONTAINING ALCOHOL DO YOU HAVE ON A TYPICAL DAY: PATIENT DOES NOT DRINK
HOW OFTEN DO YOU HAVE A DRINK CONTAINING ALCOHOL: NEVER

## 2024-03-22 NOTE — PROGRESS NOTES
aware the patient is being referred to the ED for further evaluation management.  Medical history includes uncontrolled type 2 diabetes with a hemoglobin A1c of 8.2 on blood work 8/16/2023 and normocytic anemia with a hemoglobin of 11 at that time as well.  Had had a colonoscopy approximately 6 years ago but I do not have those results.  Does not endorse any bleeding.  My biggest concern would be acute PE given abrupt onset of symptoms with out any medical history otherwise.  Also consider ACS, malignancy. EKG in office does show ST depression but not in continuous leads, although some artifact present.       I have discussed the diagnosis with the patient and the intended plan as seen in the above orders.  The patient has received an after-visit summary and questions were answered concerning future plans.  I have discussed medication side effects and warnings with the patient as well. I have reviewed the plan of care with the patient, accepted their input and they are in agreement with the treatment goals. Previous lab and imaging results were reviewed by me.       Kamlesh Biggs MD  March 22, 2024

## 2024-03-22 NOTE — PROGRESS NOTES
HHN given with unit dose duoneb x 1 TX.  SPO2 was has increased from 90% to 95% post TX.  Increased air movement with scattered crackles noted post TX.  Patient states breathing \"feels easier\".       03/22/24 1442   Treatment   Treatment Type HHN   $Treatment Type $Inhaled Therapy/Meds   Medications Albuterol/Ipratropium   Pre-Tx Pulse 122   Pre-Tx Resps 20   Breath Sounds Pre-Tx ADIN Diminished   Breath Sounds Pre-Tx LLL Diminished;Expiratory wheezes   Breath Sounds Pre-Tx RUL Diminished   Breath Sounds Pre-Tx RML Diminished   Breath Sounds Pre-Tx RLL Diminished;Expiratory wheezes   Breath Sounds Post-Tx ADIN Diminished   Breath Sounds Post-Tx LLL Diminished;Crackles   Breath Sounds Post-Tx RUL Diminished   Breath Sounds Post-Tx RML Diminished   Breath Sounds Post-Tx RLL Diminished;Crackles   Post-Tx Pulse 121   Post-Tx Resps 24   Delivery Source Mouthpiece   Position Sitting   Treatment Tolerance Well   Duration 8   Is patient on O2? Y   Breath Sounds   Breath Sounds Bilateral Diminished;Expiratory wheezing   Oxygen Therapy/Pulse Ox   O2 Therapy Oxygen   O2 Device Nasal cannula  (4L)   O2 Flow Rate (L/min) 4 L/min   Pulse (!) 122   Respirations 20   SpO2 92 %

## 2024-03-22 NOTE — PROGRESS NOTES
Called to bedside for low SPO2 and increased WOB.  Patient found on NRB mask and diaphoretic.  Patient placed on BIPAP for hypoxemia and increased WOB.  Currently tolerating.  HHN TX given inline.  ABG obtained. CXR reviewed and d/w Dr. Mcqueen. Will continue to monitor.         Latest Reference Range & Units 03/22/24 16:51   Daniel Test -   YES   Oxyhemoglobin 95 - 99 % 94.8 (L)   pH, Arterial 7.35 - 7.45   7.26 (L)   pCO2, Arterial 35 - 45 mmHg 42   pO2, Arterial 80 - 100 mmHg 103 (H)   HCO3, Arterial 22 - 26 mmol/L 19 (L)   O2 Sat, Arterial 95 - 99 % 96   Methemoglobin, Arterial 0 - 1.4 % 0.2   Carboxyhgb, Arterial 1 - 2 % 1.2   FIO2 Arterial % 70   Site -   Right Brachial   (L): Data is abnormally low  (H): Data is abnormally high

## 2024-03-22 NOTE — ED NOTES
Bedside and Verbal shift change report given to Omar Wynne Missy (oncoming nurse) by Porsche (offgoing nurse). Report included the following information Nurse Handoff Report, ED Encounter Summary, ED SBAR, MAR, and Recent Results.

## 2024-03-22 NOTE — ED TRIAGE NOTES
Patient with shortness of breath x 1 week. Seen at PCP and was sent to ED for low oxygen levels       89 % on 4 lpm via NC  MD at bedside  Respiratory notified

## 2024-03-22 NOTE — ED PROVIDER NOTES
[facility] EMERGENCY DEPT  EMERGENCY DEPARTMENT HISTORY AND PHYSICAL EXAM      Date: 3/22/2024  Patient Name: Gigi cMneal  MRN: [id number]  Birthdate 1960  Date of evaluation: 3/22/2024  Provider: Claudio Mcqueen MD   Note Started: 7:54 PM EDT 3/22/24    HISTORY OF PRESENT ILLNESS     Chief Complaint   Patient presents with    Shortness of Breath       History Provided By: Patient    HPI: Gigi Mcneal is a 64 y.o. male presented to the emergency department in acute respiratory distress from his primary care office.  On 4 L nasal cannula tripoding difficulty breathing Limited air movement.  According to patient and primary care provider he has been having shortness of breath cough flulike illness for the past several weeks today he followed up in the office was noted to have oxygen saturations in the 80s and was working to breathe hard so they referred him to the emergency department.    Shortly after arrival patient received a DuoNeb and steroids with improvement.  Activated sepsis protocol given findings and concerns for possible pneumonia.  Unfortunately he also has Rales in bilateral bases so there is also concerns for volume overload so limited fluids initially ordered 30 cc/kg but have limited that to 1 L.    Fortunately initial lactic was obtained but is 6.6.  Initial troponin also elevated there is some ischemic findings on the EKG but likely rate related he has no cardiac history denying charly chest pain initial troponin is 276.  Discussed with cardiology, bedside echo showing reduced EF recommended obtaining formal echo which confirmed EF of 30 to 35% no focal wall motion abnormalities.    Repeat troponin is 294 there are concerns for potential for PE so we \started heparin    Patient work of breathing increased again he became diaphoretic he was placed on nonrebreather, lung sounds severely diminished again additional breathing treatments were given with relief.      After period of doing better he has

## 2024-03-23 ENCOUNTER — APPOINTMENT (OUTPATIENT)
Facility: HOSPITAL | Age: 64
End: 2024-03-23
Attending: INTERNAL MEDICINE
Payer: COMMERCIAL

## 2024-03-23 ENCOUNTER — HOSPITAL ENCOUNTER (INPATIENT)
Facility: HOSPITAL | Age: 64
LOS: 7 days | Discharge: HOME | End: 2024-03-30
Attending: INTERNAL MEDICINE | Admitting: INTERNAL MEDICINE
Payer: COMMERCIAL

## 2024-03-23 VITALS
BODY MASS INDEX: 26.64 KG/M2 | SYSTOLIC BLOOD PRESSURE: 117 MMHG | HEIGHT: 73 IN | DIASTOLIC BLOOD PRESSURE: 89 MMHG | WEIGHT: 201 LBS | TEMPERATURE: 98.8 F | RESPIRATION RATE: 20 BRPM | HEART RATE: 88 BPM | OXYGEN SATURATION: 100 %

## 2024-03-23 DIAGNOSIS — I50.20 HFREF (HEART FAILURE WITH REDUCED EJECTION FRACTION) (HCC): Primary | ICD-10-CM

## 2024-03-23 DIAGNOSIS — I21.4 NSTEMI (NON-ST ELEVATED MYOCARDIAL INFARCTION) (HCC): ICD-10-CM

## 2024-03-23 DIAGNOSIS — I25.10 CORONARY ARTERY DISEASE INVOLVING NATIVE CORONARY ARTERY OF NATIVE HEART WITHOUT ANGINA PECTORIS: ICD-10-CM

## 2024-03-23 DIAGNOSIS — I25.10 CAD (CORONARY ARTERY DISEASE): ICD-10-CM

## 2024-03-23 PROBLEM — J96.01 ACUTE HYPOXIC RESPIRATORY FAILURE (HCC): Status: ACTIVE | Noted: 2024-03-23

## 2024-03-23 PROBLEM — J18.9 MULTIFOCAL PNEUMONIA: Status: ACTIVE | Noted: 2024-03-23

## 2024-03-23 LAB
ANION GAP SERPL CALC-SCNC: 10 MMOL/L (ref 3–18)
APTT PPP: 30.8 SEC (ref 23–36.4)
APTT PPP: 37.1 SEC (ref 23–36.4)
APTT PPP: 46.8 SEC (ref 23–36.4)
B PERT DNA SPEC QL NAA+PROBE: NOT DETECTED
BASOPHILS # BLD: 0 K/UL (ref 0–0.1)
BASOPHILS NFR BLD: 0 % (ref 0–2)
BORDETELLA PARAPERTUSSIS BY PCR: NOT DETECTED
BUN SERPL-MCNC: 18 MG/DL (ref 7–18)
BUN/CREAT SERPL: 17 (ref 12–20)
C PNEUM DNA SPEC QL NAA+PROBE: NOT DETECTED
CALCIUM SERPL-MCNC: 8.4 MG/DL (ref 8.5–10.1)
CHLORIDE SERPL-SCNC: 104 MMOL/L (ref 100–111)
CO2 SERPL-SCNC: 25 MMOL/L (ref 21–32)
CREAT SERPL-MCNC: 1.03 MG/DL (ref 0.6–1.3)
DIFFERENTIAL METHOD BLD: ABNORMAL
EOSINOPHIL # BLD: 0 K/UL (ref 0–0.4)
EOSINOPHIL NFR BLD: 0 % (ref 0–5)
ERYTHROCYTE [DISTWIDTH] IN BLOOD BY AUTOMATED COUNT: 18.7 % (ref 11.6–14.5)
ERYTHROCYTE [DISTWIDTH] IN BLOOD BY AUTOMATED COUNT: 19.1 % (ref 11.6–14.5)
FLUAV SUBTYP SPEC NAA+PROBE: NOT DETECTED
FLUBV RNA SPEC QL NAA+PROBE: NOT DETECTED
GLUCOSE BLD STRIP.AUTO-MCNC: 174 MG/DL (ref 70–110)
GLUCOSE BLD STRIP.AUTO-MCNC: 193 MG/DL (ref 70–110)
GLUCOSE BLD STRIP.AUTO-MCNC: 254 MG/DL (ref 70–110)
GLUCOSE SERPL-MCNC: 249 MG/DL (ref 74–99)
HADV DNA SPEC QL NAA+PROBE: NOT DETECTED
HCOV 229E RNA SPEC QL NAA+PROBE: NOT DETECTED
HCOV HKU1 RNA SPEC QL NAA+PROBE: NOT DETECTED
HCOV NL63 RNA SPEC QL NAA+PROBE: NOT DETECTED
HCOV OC43 RNA SPEC QL NAA+PROBE: NOT DETECTED
HCT VFR BLD AUTO: 24.1 % (ref 36–48)
HCT VFR BLD AUTO: 24.8 % (ref 36–48)
HGB BLD-MCNC: 7 G/DL (ref 13–16)
HGB BLD-MCNC: 7.4 G/DL (ref 13–16)
HMPV RNA SPEC QL NAA+PROBE: NOT DETECTED
HPIV1 RNA SPEC QL NAA+PROBE: NOT DETECTED
HPIV2 RNA SPEC QL NAA+PROBE: NOT DETECTED
HPIV3 RNA SPEC QL NAA+PROBE: NOT DETECTED
HPIV4 RNA SPEC QL NAA+PROBE: NOT DETECTED
IMM GRANULOCYTES # BLD AUTO: 0.1 K/UL (ref 0–0.04)
IMM GRANULOCYTES NFR BLD AUTO: 1 % (ref 0–0.5)
LACTATE SERPL-SCNC: 1.9 MMOL/L (ref 0.4–2)
LACTATE SERPL-SCNC: 2.1 MMOL/L (ref 0.4–2)
LYMPHOCYTES # BLD: 1.1 K/UL (ref 0.9–3.6)
LYMPHOCYTES NFR BLD: 7 % (ref 21–52)
M PNEUMO DNA SPEC QL NAA+PROBE: NOT DETECTED
MAGNESIUM SERPL-MCNC: 2.3 MG/DL (ref 1.6–2.6)
MCH RBC QN AUTO: 17.6 PG (ref 24–34)
MCH RBC QN AUTO: 18.2 PG (ref 24–34)
MCHC RBC AUTO-ENTMCNC: 29 G/DL (ref 31–37)
MCHC RBC AUTO-ENTMCNC: 29.8 G/DL (ref 31–37)
MCV RBC AUTO: 60.6 FL (ref 78–100)
MCV RBC AUTO: 60.9 FL (ref 78–100)
MONOCYTES # BLD: 0.7 K/UL (ref 0.05–1.2)
MONOCYTES NFR BLD: 5 % (ref 3–10)
NEUTS SEG # BLD: 12.9 K/UL (ref 1.8–8)
NEUTS SEG NFR BLD: 88 % (ref 40–73)
NRBC # BLD: 0 K/UL (ref 0–0.01)
NRBC # BLD: 0.02 K/UL (ref 0–0.01)
NRBC BLD-RTO: 0 PER 100 WBC
NRBC BLD-RTO: 0.2 PER 100 WBC
PHOSPHATE SERPL-MCNC: 4.1 MG/DL (ref 2.5–4.9)
PLATELET # BLD AUTO: 352 K/UL (ref 135–420)
PLATELET # BLD AUTO: 394 K/UL (ref 135–420)
PMV BLD AUTO: 10.7 FL (ref 9.2–11.8)
PMV BLD AUTO: 11.3 FL (ref 9.2–11.8)
POTASSIUM SERPL-SCNC: 3.9 MMOL/L (ref 3.5–5.5)
PROCALCITONIN SERPL-MCNC: 2.07 NG/ML
RBC # BLD AUTO: 3.98 M/UL (ref 4.35–5.65)
RBC # BLD AUTO: 4.07 M/UL (ref 4.35–5.65)
RSV RNA SPEC QL NAA+PROBE: NOT DETECTED
RV+EV RNA SPEC QL NAA+PROBE: NOT DETECTED
SARS-COV-2 RNA RESP QL NAA+PROBE: NOT DETECTED
SODIUM SERPL-SCNC: 139 MMOL/L (ref 136–145)
T4 FREE SERPL-MCNC: 1.5 NG/DL (ref 0.7–1.5)
THERAPEUTIC RANGE: ABNORMAL SEC (ref 82–109)
TROPONIN I SERPL HS-MCNC: 2359 NG/L (ref 0–78)
TROPONIN I SERPL HS-MCNC: 2943 NG/L (ref 0–78)
TROPONIN I SERPL HS-MCNC: 3470 NG/L (ref 0–78)
TSH SERPL DL<=0.05 MIU/L-ACNC: 0.17 UIU/ML (ref 0.36–3.74)
WBC # BLD AUTO: 11.5 K/UL (ref 4.6–13.2)
WBC # BLD AUTO: 14.7 K/UL (ref 4.6–13.2)

## 2024-03-23 PROCEDURE — 5A1945Z RESPIRATORY VENTILATION, 24-96 CONSECUTIVE HOURS: ICD-10-PCS | Performed by: INTERNAL MEDICINE

## 2024-03-23 PROCEDURE — 94761 N-INVAS EAR/PLS OXIMETRY MLT: CPT

## 2024-03-23 PROCEDURE — 99291 CRITICAL CARE FIRST HOUR: CPT | Performed by: INTERNAL MEDICINE

## 2024-03-23 PROCEDURE — 84100 ASSAY OF PHOSPHORUS: CPT

## 2024-03-23 PROCEDURE — 6360000002 HC RX W HCPCS: Performed by: PHYSICIAN ASSISTANT

## 2024-03-23 PROCEDURE — 3E043XZ INTRODUCTION OF VASOPRESSOR INTO CENTRAL VEIN, PERCUTANEOUS APPROACH: ICD-10-PCS | Performed by: INTERNAL MEDICINE

## 2024-03-23 PROCEDURE — 2700000000 HC OXYGEN THERAPY PER DAY

## 2024-03-23 PROCEDURE — 0202U NFCT DS 22 TRGT SARS-COV-2: CPT

## 2024-03-23 PROCEDURE — 82962 GLUCOSE BLOOD TEST: CPT

## 2024-03-23 PROCEDURE — 83735 ASSAY OF MAGNESIUM: CPT

## 2024-03-23 PROCEDURE — 80048 BASIC METABOLIC PNL TOTAL CA: CPT

## 2024-03-23 PROCEDURE — 94002 VENT MGMT INPAT INIT DAY: CPT

## 2024-03-23 PROCEDURE — 2000000000 HC ICU R&B

## 2024-03-23 PROCEDURE — 84439 ASSAY OF FREE THYROXINE: CPT

## 2024-03-23 PROCEDURE — 2580000003 HC RX 258: Performed by: PHYSICIAN ASSISTANT

## 2024-03-23 PROCEDURE — 87205 SMEAR GRAM STAIN: CPT

## 2024-03-23 PROCEDURE — 2500000003 HC RX 250 WO HCPCS: Performed by: INTERNAL MEDICINE

## 2024-03-23 PROCEDURE — 82803 BLOOD GASES ANY COMBINATION: CPT

## 2024-03-23 PROCEDURE — 84443 ASSAY THYROID STIM HORMONE: CPT

## 2024-03-23 PROCEDURE — 36592 COLLECT BLOOD FROM PICC: CPT

## 2024-03-23 PROCEDURE — 2500000003 HC RX 250 WO HCPCS: Performed by: PHYSICIAN ASSISTANT

## 2024-03-23 PROCEDURE — 85027 COMPLETE CBC AUTOMATED: CPT

## 2024-03-23 PROCEDURE — 85730 THROMBOPLASTIN TIME PARTIAL: CPT

## 2024-03-23 PROCEDURE — 36600 WITHDRAWAL OF ARTERIAL BLOOD: CPT

## 2024-03-23 PROCEDURE — 84145 PROCALCITONIN (PCT): CPT

## 2024-03-23 PROCEDURE — 84484 ASSAY OF TROPONIN QUANT: CPT

## 2024-03-23 PROCEDURE — 83605 ASSAY OF LACTIC ACID: CPT

## 2024-03-23 PROCEDURE — 85025 COMPLETE CBC W/AUTO DIFF WBC: CPT

## 2024-03-23 PROCEDURE — 6370000000 HC RX 637 (ALT 250 FOR IP): Performed by: PHYSICIAN ASSISTANT

## 2024-03-23 PROCEDURE — 87070 CULTURE OTHR SPECIMN AEROBIC: CPT

## 2024-03-23 PROCEDURE — A4216 STERILE WATER/SALINE, 10 ML: HCPCS | Performed by: PHYSICIAN ASSISTANT

## 2024-03-23 PROCEDURE — 71045 X-RAY EXAM CHEST 1 VIEW: CPT

## 2024-03-23 RX ORDER — DEXMEDETOMIDINE HYDROCHLORIDE 4 UG/ML
.1-1.5 INJECTION, SOLUTION INTRAVENOUS CONTINUOUS
Status: DISCONTINUED | OUTPATIENT
Start: 2024-03-23 | End: 2024-03-25

## 2024-03-23 RX ORDER — SODIUM CHLORIDE 0.9 % (FLUSH) 0.9 %
5-40 SYRINGE (ML) INJECTION PRN
Status: DISCONTINUED | OUTPATIENT
Start: 2024-03-23 | End: 2024-03-30 | Stop reason: HOSPADM

## 2024-03-23 RX ORDER — ACETAMINOPHEN 325 MG/1
650 TABLET ORAL EVERY 6 HOURS PRN
Status: DISCONTINUED | OUTPATIENT
Start: 2024-03-23 | End: 2024-03-30 | Stop reason: HOSPADM

## 2024-03-23 RX ORDER — HEPARIN SODIUM 10000 [USP'U]/100ML
5-30 INJECTION, SOLUTION INTRAVENOUS CONTINUOUS
Status: DISCONTINUED | OUTPATIENT
Start: 2024-03-23 | End: 2024-03-28

## 2024-03-23 RX ORDER — ONDANSETRON 4 MG/1
4 TABLET, ORALLY DISINTEGRATING ORAL EVERY 8 HOURS PRN
Status: DISCONTINUED | OUTPATIENT
Start: 2024-03-23 | End: 2024-03-30 | Stop reason: HOSPADM

## 2024-03-23 RX ORDER — POTASSIUM CHLORIDE 7.45 MG/ML
10 INJECTION INTRAVENOUS PRN
Status: DISCONTINUED | OUTPATIENT
Start: 2024-03-23 | End: 2024-03-30 | Stop reason: HOSPADM

## 2024-03-23 RX ORDER — NOREPINEPHRINE BITARTRATE 0.06 MG/ML
1-100 INJECTION, SOLUTION INTRAVENOUS CONTINUOUS
Status: DISCONTINUED | OUTPATIENT
Start: 2024-03-23 | End: 2024-03-25

## 2024-03-23 RX ORDER — SODIUM CHLORIDE 9 MG/ML
INJECTION, SOLUTION INTRAVENOUS PRN
Status: DISCONTINUED | OUTPATIENT
Start: 2024-03-23 | End: 2024-03-30 | Stop reason: HOSPADM

## 2024-03-23 RX ORDER — ONDANSETRON 2 MG/ML
4 INJECTION INTRAMUSCULAR; INTRAVENOUS EVERY 6 HOURS PRN
Status: DISCONTINUED | OUTPATIENT
Start: 2024-03-23 | End: 2024-03-30 | Stop reason: HOSPADM

## 2024-03-23 RX ORDER — HEPARIN SODIUM 1000 [USP'U]/ML
4000 INJECTION, SOLUTION INTRAVENOUS; SUBCUTANEOUS PRN
Status: DISCONTINUED | OUTPATIENT
Start: 2024-03-23 | End: 2024-03-28

## 2024-03-23 RX ORDER — HEPARIN SODIUM 1000 [USP'U]/ML
2000 INJECTION, SOLUTION INTRAVENOUS; SUBCUTANEOUS PRN
Status: DISCONTINUED | OUTPATIENT
Start: 2024-03-23 | End: 2024-03-28

## 2024-03-23 RX ORDER — ACETAMINOPHEN 650 MG/1
650 SUPPOSITORY RECTAL EVERY 6 HOURS PRN
Status: DISCONTINUED | OUTPATIENT
Start: 2024-03-23 | End: 2024-03-30 | Stop reason: HOSPADM

## 2024-03-23 RX ORDER — FUROSEMIDE 10 MG/ML
40 INJECTION INTRAMUSCULAR; INTRAVENOUS ONCE
Status: COMPLETED | OUTPATIENT
Start: 2024-03-23 | End: 2024-03-23

## 2024-03-23 RX ORDER — SODIUM CHLORIDE 0.9 % (FLUSH) 0.9 %
5-40 SYRINGE (ML) INJECTION EVERY 12 HOURS SCHEDULED
Status: DISCONTINUED | OUTPATIENT
Start: 2024-03-23 | End: 2024-03-30 | Stop reason: HOSPADM

## 2024-03-23 RX ORDER — INSULIN LISPRO 100 [IU]/ML
0-4 INJECTION, SOLUTION INTRAVENOUS; SUBCUTANEOUS EVERY 6 HOURS
Status: DISCONTINUED | OUTPATIENT
Start: 2024-03-23 | End: 2024-03-27

## 2024-03-23 RX ORDER — FENTANYL CITRATE 50 UG/ML
100 INJECTION, SOLUTION INTRAMUSCULAR; INTRAVENOUS
Status: DISCONTINUED | OUTPATIENT
Start: 2024-03-23 | End: 2024-03-25

## 2024-03-23 RX ORDER — MAGNESIUM SULFATE IN WATER 40 MG/ML
2000 INJECTION, SOLUTION INTRAVENOUS PRN
Status: DISCONTINUED | OUTPATIENT
Start: 2024-03-23 | End: 2024-03-30 | Stop reason: HOSPADM

## 2024-03-23 RX ORDER — HEPARIN SODIUM 1000 [USP'U]/ML
4000 INJECTION, SOLUTION INTRAVENOUS; SUBCUTANEOUS ONCE
Status: DISCONTINUED | OUTPATIENT
Start: 2024-03-23 | End: 2024-03-24

## 2024-03-23 RX ORDER — PROPOFOL 10 MG/ML
5-50 INJECTION, EMULSION INTRAVENOUS CONTINUOUS
Status: DISCONTINUED | OUTPATIENT
Start: 2024-03-23 | End: 2024-03-23

## 2024-03-23 RX ORDER — CHLORHEXIDINE GLUCONATE ORAL RINSE 1.2 MG/ML
15 SOLUTION DENTAL 2 TIMES DAILY
Status: DISCONTINUED | OUTPATIENT
Start: 2024-03-23 | End: 2024-03-25

## 2024-03-23 RX ORDER — MIDAZOLAM HYDROCHLORIDE 2 MG/2ML
2 INJECTION, SOLUTION INTRAMUSCULAR; INTRAVENOUS
Status: DISCONTINUED | OUTPATIENT
Start: 2024-03-23 | End: 2024-03-25

## 2024-03-23 RX ORDER — POTASSIUM CHLORIDE 20 MEQ/1
40 TABLET, EXTENDED RELEASE ORAL PRN
Status: DISCONTINUED | OUTPATIENT
Start: 2024-03-23 | End: 2024-03-30 | Stop reason: HOSPADM

## 2024-03-23 RX ORDER — PROPOFOL 10 MG/ML
INJECTION, EMULSION INTRAVENOUS
Status: DISCONTINUED
Start: 2024-03-23 | End: 2024-03-23 | Stop reason: HOSPADM

## 2024-03-23 RX ADMIN — FUROSEMIDE 40 MG: 10 INJECTION, SOLUTION INTRAMUSCULAR; INTRAVENOUS at 03:24

## 2024-03-23 RX ADMIN — DEXMEDETOMIDINE HYDROCHLORIDE 1 MCG/KG/HR: 4 INJECTION, SOLUTION INTRAVENOUS at 23:59

## 2024-03-23 RX ADMIN — VANCOMYCIN HYDROCHLORIDE 1000 MG: 1 INJECTION, POWDER, LYOPHILIZED, FOR SOLUTION INTRAVENOUS at 06:01

## 2024-03-23 RX ADMIN — PIPERACILLIN AND TAZOBACTAM 3375 MG: 3; .375 INJECTION, POWDER, FOR SOLUTION INTRAVENOUS; PARENTERAL at 03:24

## 2024-03-23 RX ADMIN — HEPARIN SODIUM 4000 UNITS: 1000 INJECTION INTRAVENOUS; SUBCUTANEOUS at 09:44

## 2024-03-23 RX ADMIN — SODIUM CHLORIDE, PRESERVATIVE FREE 10 ML: 5 INJECTION INTRAVENOUS at 08:46

## 2024-03-23 RX ADMIN — FAMOTIDINE 20 MG: 10 INJECTION, SOLUTION INTRAVENOUS at 08:24

## 2024-03-23 RX ADMIN — 0.12% CHLORHEXIDINE GLUCONATE 15 ML: 1.2 RINSE ORAL at 08:24

## 2024-03-23 RX ADMIN — INSULIN LISPRO 2 UNITS: 100 INJECTION, SOLUTION INTRAVENOUS; SUBCUTANEOUS at 05:59

## 2024-03-23 RX ADMIN — PROPOFOL 35 MCG/KG/MIN: 10 INJECTION, EMULSION INTRAVENOUS at 06:39

## 2024-03-23 RX ADMIN — PIPERACILLIN AND TAZOBACTAM 3375 MG: 3; .375 INJECTION, POWDER, FOR SOLUTION INTRAVENOUS; PARENTERAL at 11:10

## 2024-03-23 RX ADMIN — 0.12% CHLORHEXIDINE GLUCONATE 15 ML: 1.2 RINSE ORAL at 21:43

## 2024-03-23 RX ADMIN — SODIUM CHLORIDE, PRESERVATIVE FREE 10 ML: 5 INJECTION INTRAVENOUS at 21:39

## 2024-03-23 RX ADMIN — Medication 9 MCG/MIN: at 03:22

## 2024-03-23 RX ADMIN — HEPARIN SODIUM 4000 UNITS: 1000 INJECTION INTRAVENOUS; SUBCUTANEOUS at 17:25

## 2024-03-23 RX ADMIN — HEPARIN SODIUM 13 UNITS/KG/HR: 10000 INJECTION, SOLUTION INTRAVENOUS at 17:20

## 2024-03-23 RX ADMIN — DEXMEDETOMIDINE HYDROCHLORIDE 0.8 MCG/KG/HR: 4 INJECTION, SOLUTION INTRAVENOUS at 19:12

## 2024-03-23 RX ADMIN — PIPERACILLIN AND TAZOBACTAM 3375 MG: 3; .375 INJECTION, POWDER, FOR SOLUTION INTRAVENOUS; PARENTERAL at 19:28

## 2024-03-23 RX ADMIN — DEXMEDETOMIDINE HYDROCHLORIDE 0.2 MCG/KG/HR: 4 INJECTION, SOLUTION INTRAVENOUS at 12:14

## 2024-03-23 RX ADMIN — FAMOTIDINE 20 MG: 10 INJECTION, SOLUTION INTRAVENOUS at 20:53

## 2024-03-23 RX ADMIN — PROPOFOL 35 MCG/KG/MIN: 10 INJECTION, EMULSION INTRAVENOUS at 12:13

## 2024-03-23 RX ADMIN — VANCOMYCIN HYDROCHLORIDE 1000 MG: 1 INJECTION, POWDER, LYOPHILIZED, FOR SOLUTION INTRAVENOUS at 18:04

## 2024-03-23 ASSESSMENT — PULMONARY FUNCTION TESTS
PIF_VALUE: 24
PIF_VALUE: 25
PIF_VALUE: 24
PIF_VALUE: 28

## 2024-03-23 NOTE — PROGRESS NOTES
No SBT at this time-     03/23/24 0729   Weaning Parameters   Spontaneous Breathing Trial Complete No (comment)  (No SBT at this time- High PEEP and FiO2)

## 2024-03-23 NOTE — H&P
[facility]  Pulmonary, Critical Care, and Sleep Medicine    Name: Gigi Mcneal MRN: [id number]   : 1960 Hospital: [facility]   Date: 3/23/2024        Critical Care History and Physical      IMPRESSION:   Acute hypoxic respiratory failure - requiring mechanical ventilation, likely 2/2 multifocal pneumonia vs CHF exacerbation  Septic shock - likely 2/2 multifocal pneumonia  Acute decompensated systolic heart failure - EF 35-40%, elevated proBNP  Acute toxic/metabolic encephalopathy  Severe multifocal pneumonia involving both upper lobes - as seen on CTA chest  Moderate to large bilateral pleural effusions   Mildly elevated troponin - likely demand ischemia  Lactic acidosis - improving  Hx of tobacco abuse  Poor medical compliance      Patient Active Problem List   Diagnosis    Acute hypoxic respiratory failure (HCC)        RECOMMENDATIONS:   Neuro: Titrate sedation to RASS of 0 to -1. PRN for breakthrough sedation needs.  Pulm: Titrate FiO2 for goal SPO2> 90%,VAP prevention bundle, head of the bed at 30' all times.Daily sedation holiday and assessment for weaning with SBT as tolerated.    PRN duonebs. Bronchodilators, pulmonary hygiene care, Aspiration precautions, Keep HOB >30 degrees. CTA chest revealed multifocal pneumonia, (-)PE.  CVS :Actively titrate vasopressors aim MAP >65mmHg, Check cardiac panel, ECHO 3/22/24 revealed EF 35-40%. Lasix 40mg x1  Fluids: Pt received 3L IVF in the ED.  GI: SUP, Trend LFTs, Zofran PRN for N/V, NPO  Renal:  Trend Renal indices, Strict Is/Os, Uribe (+)  Hem/Onc: Monitor for s/o active bleeding.   I/D:Sepsis bundle per hospital protocol, Blood, sputum and Urine cultures drawn and will be followed. Lactic acid ordered- initial and repeat Q4hrs till normalized. Antibiotics:Vancomycin and zosyn. Trend WBCs and temperature curve. RVP pending.  Endocrine: Q6 glucoses, SSI. Check TSH level  Metabolic:  Daily BMP, mag, phos. Trend lytes, replace as

## 2024-03-23 NOTE — PROGRESS NOTES
[facility]   Pharmacy Pharmacokinetic Monitoring Service - Vancomycin     Gigi Mcneal is a 64 y.o. male starting on vancomycin therapy for Sepsis of Unknown Etiology. Pharmacy consulted for monitoring and adjustment.    Target Concentration: Goal AUC/GLORIA 400-600 mg*hr/L    Additional Antimicrobials: Piperacillin/Tazobactam    Pertinent Laboratory Values:      Recent Labs     03/22/24  1432 03/22/24  1640   CREATININE 1.03  --    BUN 14  --    WBC 16.4* 19.7*   PROCAL 0.07  --      Estimated Creatinine Clearance: 80 mL/min (based on SCr of 1.03 mg/dL).    Pertinent Cultures:  Culture Date Source Results   03/22/24 Blood Pending   MRSA Nasal Swab: N/A. Non-respiratory infection    Plan:  Dosing recommendations based on Bayesian software  Start vancomycin 1000 mg IV q12h (Loading dose given 03/22 at [facility])  Anticipated AUC of 513 and trough concentration of 15.3 at steady state  Renal labs as indicated   Vancomycin concentration ordered for  03/25 @ 0400  Pharmacy will continue to monitor patient and adjust therapy as indicated    Thank you for the consult,  HUY LERMA [facility]  3/23/2024

## 2024-03-23 NOTE — ED NOTES
Patient's wife called and updated on patient status. Wife provided [facility] ICU phone number for updates since she is unable to drive this evening.

## 2024-03-23 NOTE — PROGRESS NOTES
attempted to visit  with Gigi Mcneal, who is a 64 y.o., male.   The reason patientcame to hospital is:   Patient Active Problem List    Diagnosis Date Noted    Acute hypoxemic respiratory failure (HCC) 03/23/2024    Multifocal pneumonia 03/23/2024    HFrEF (heart failure with reduced ejection fraction) ([facility]) 03/23/2024   .    Patient is unable to communicate at this time.  offered silent prayer near bedside and left Spiritual Care brochure. Chaplains will continue to follow and will provide pastoral care on an as needed/requested basis.     Pool Quiroz   Staff    Spiritual Health   [phone]

## 2024-03-23 NOTE — PROGRESS NOTES
Pt intubated via glydescope without complication using 8 ETT. Positive color change displayed via CO2 detector, BBS heard upon auscultation, direct visualization of cords achieved. ETT secured at 24 lipline via tube corbett. Pt placed on mechanical ventilation( see flowsheet ). Will continue to monitor and wean as able.ETT LOT NUMBER [id number]

## 2024-03-23 NOTE — ED NOTES
Dr. Mcqueen made aware of drop in patient's blood pressure. Propofol drip decreased to 10 mcg/kg/min. Fluid bolus of 250 mL given per MD. Will continue to monitor BP for improvement post fluid resuscitation.

## 2024-03-23 NOTE — PROGRESS NOTES
2146 received report on the telephone by ABIGAIL Wynne, included SBAR< MAR< Ed summary and transport information.  Patient will be arriving shortly.    65 Y/o male arrived [facility] with acute SOB, gradual onset with nasal congestion and dry cough.   History: uncontrolled diabetes, anemia, ACS malignancy  Admitted 03/23/24   Diagnosis: acute hypoxic respiratory failure on room air was 85%.  Initial lactic acid was 6.6 . Initial troponin was 276, bilateral pleural effusions, hypotension, acute coronary syndrome, multifocal pneumonia, septic shock    Neuro restraints d/t intubation  Respiratory intubated size 8.0 ETT and  24 at the lip, began to have frothy sputum  Cardiac on levophed  GI NPO  OG tube   forte    Lines central line R femoral :  22 G Left AC, 20 G Left fore arm

## 2024-03-23 NOTE — ED NOTES
Assumed care of this patient from Dr. Mtz pending transfer to [facility].  Patient is intubated, his septic shock, multifocal pneumonia, and went into respiratory failure.  Patient on heparin for possible non-STEMI, is sedated and on propofol, and has been given vancomycin.  Blood pressure dropped due to titration of propofol.  I asked RN to change to predex.  But the blood pressure continued to increase and patient needed pressors.  Central line placed right femoral due to possible code and patient not well sedated due to low blood pressures.  Consent given by family.  Levophed added with good improvement in both sedation and blood pressure.  Central Line    Date/Time: 3/23/2024 12:17 AM    Performed by: Ravindra Mccarthy MD  Authorized by: Claudio Mcuqeen MD    Consent:     Consent obtained:  Verbal    Consent given by:  Guardian    Risks, benefits, and alternatives were discussed: yes      Risks discussed:  Arterial puncture, incorrect placement, nerve damage, infection and bleeding    Alternatives discussed:  Alternative treatment  Universal protocol:     Procedure explained and questions answered to patient or proxy's satisfaction: yes      Relevant documents present and verified: yes      Test results available: yes      Imaging studies available: yes      Required blood products, implants, devices, and special equipment available: yes      Site/side marked: yes      Immediately prior to procedure, a time out was called: yes      Patient identity confirmed:  Arm band  Pre-procedure details:     Indication(s): insufficient peripheral access      Hand hygiene: Hand hygiene performed prior to insertion      Sterile barrier technique: All elements of maximal sterile technique followed      Skin preparation:  Chlorhexidine    Skin preparation agent: Skin preparation agent completely dried prior to procedure    Sedation:     Sedation type:  Deep  Anesthesia:     Anesthesia method:

## 2024-03-23 NOTE — PROGRESS NOTES
03/23/24 0306   Ventilator Settings   Resp Rate (Set) (S)  16 bpm   PEEP/CPAP (cmH2O) (S)  8   FiO2  (S)  50 %   Insp Time (sec) 0.9 sec     Changes made post ABG, per BRIE Rich

## 2024-03-23 NOTE — ED NOTES
Patient starting to move tongue around tube. Dr. Mccarthy to the bedside to discuss further sedation with blood pressure where it is currently at. Verbal order given for additional 250 mL NS bolus and increased propofol to 15mcg/kg/min. Will continue to monitor blood pressure, and if it does not maintain above 90 systolic will switch sedation.

## 2024-03-23 NOTE — PROGRESS NOTES
ETT repositioned-     03/23/24 1119   ETT    Placement Date: 03/22/24   Present on Admission/Arrival: No  Placed By: In ED  Placement Verified By: Auscultation;Colorimetric ETCO2 device;Direct visualization  Preoxygenation: Yes  Mask Ventilation: Ventilated by mask (1)  Technique: Video laryngos...   Secured At (S)  28 cm  (Advanced per CXR)   Measured From Lips   ETT Placement Left   Secured By Commercial tube lee   Site Assessment Cool;Dry   Cuff Pressure 27 cm H2O   Tie/Lee Changed No

## 2024-03-24 ENCOUNTER — APPOINTMENT (OUTPATIENT)
Facility: HOSPITAL | Age: 64
End: 2024-03-24
Attending: INTERNAL MEDICINE
Payer: COMMERCIAL

## 2024-03-24 LAB
ANION GAP SERPL CALC-SCNC: 6 MMOL/L (ref 3–18)
APTT PPP: 41.5 SEC (ref 23–36.4)
APTT PPP: 63.7 SEC (ref 23–36.4)
APTT PPP: 78.2 SEC (ref 23–36.4)
APTT PPP: 86.7 SEC (ref 23–36.4)
ARTERIAL PATENCY WRIST A: POSITIVE
ARTERIAL PATENCY WRIST A: POSITIVE
BACTERIA SPEC CULT: NORMAL
BACTERIA SPEC CULT: NORMAL
BASE DEFICIT BLD-SCNC: 0.7 MMOL/L
BASE EXCESS BLD CALC-SCNC: 4 MMOL/L
BASOPHILS # BLD: 0 K/UL (ref 0–0.1)
BASOPHILS NFR BLD: 0 % (ref 0–2)
BDY SITE: ABNORMAL
BDY SITE: ABNORMAL
BUN SERPL-MCNC: 18 MG/DL (ref 7–18)
BUN/CREAT SERPL: 18 (ref 12–20)
CALCIUM SERPL-MCNC: 7.9 MG/DL (ref 8.5–10.1)
CHLORIDE SERPL-SCNC: 107 MMOL/L (ref 100–111)
CO2 SERPL-SCNC: 28 MMOL/L (ref 21–32)
CREAT SERPL-MCNC: 0.98 MG/DL (ref 0.6–1.3)
DIFFERENTIAL METHOD BLD: ABNORMAL
EKG ATRIAL RATE: 119 BPM
EKG ATRIAL RATE: 127 BPM
EKG DIAGNOSIS: NORMAL
EKG DIAGNOSIS: NORMAL
EKG P AXIS: 39 DEGREES
EKG P AXIS: 63 DEGREES
EKG P-R INTERVAL: 120 MS
EKG P-R INTERVAL: 122 MS
EKG Q-T INTERVAL: 320 MS
EKG Q-T INTERVAL: 324 MS
EKG QRS DURATION: 78 MS
EKG QRS DURATION: 88 MS
EKG QTC CALCULATION (BAZETT): 450 MS
EKG QTC CALCULATION (BAZETT): 470 MS
EKG R AXIS: -8 DEGREES
EKG R AXIS: 77 DEGREES
EKG T AXIS: 113 DEGREES
EKG T AXIS: 244 DEGREES
EKG VENTRICULAR RATE: 119 BPM
EKG VENTRICULAR RATE: 127 BPM
EOSINOPHIL # BLD: 0 K/UL (ref 0–0.4)
EOSINOPHIL NFR BLD: 0 % (ref 0–5)
ERYTHROCYTE [DISTWIDTH] IN BLOOD BY AUTOMATED COUNT: 18.6 % (ref 11.6–14.5)
GAS FLOW.O2 O2 DELIVERY SYS: ABNORMAL
GAS FLOW.O2 O2 DELIVERY SYS: ABNORMAL
GAS FLOW.O2 SETTING OXYMISER: 16 BPM
GAS FLOW.O2 SETTING OXYMISER: 20 BPM
GLUCOSE BLD STRIP.AUTO-MCNC: 132 MG/DL (ref 70–110)
GLUCOSE BLD STRIP.AUTO-MCNC: 194 MG/DL (ref 70–110)
GLUCOSE BLD STRIP.AUTO-MCNC: 200 MG/DL (ref 70–110)
GLUCOSE BLD STRIP.AUTO-MCNC: 208 MG/DL (ref 70–110)
GLUCOSE SERPL-MCNC: 205 MG/DL (ref 74–99)
HCO3 BLD-SCNC: 22.3 MMOL/L (ref 22–26)
HCO3 BLD-SCNC: 26.8 MMOL/L (ref 22–26)
HCT VFR BLD AUTO: 22.9 % (ref 36–48)
HCT VFR BLD AUTO: 27 % (ref 36–48)
HGB BLD-MCNC: 6.9 G/DL (ref 13–16)
HGB BLD-MCNC: 8.2 G/DL (ref 13–16)
HISTORY CHECK: NORMAL
IMM GRANULOCYTES # BLD AUTO: 0.1 K/UL (ref 0–0.04)
IMM GRANULOCYTES NFR BLD AUTO: 1 % (ref 0–0.5)
LYMPHOCYTES # BLD: 1.4 K/UL (ref 0.9–3.6)
LYMPHOCYTES NFR BLD: 14 % (ref 21–52)
Lab: NORMAL
Lab: NORMAL
MAGNESIUM SERPL-MCNC: 2.3 MG/DL (ref 1.6–2.6)
MCH RBC QN AUTO: 18.3 PG (ref 24–34)
MCHC RBC AUTO-ENTMCNC: 30.1 G/DL (ref 31–37)
MCV RBC AUTO: 60.7 FL (ref 78–100)
MONOCYTES # BLD: 0.7 K/UL (ref 0.05–1.2)
MONOCYTES NFR BLD: 7 % (ref 3–10)
NEUTS SEG # BLD: 7.8 K/UL (ref 1.8–8)
NEUTS SEG NFR BLD: 78 % (ref 40–73)
NRBC # BLD: 0 K/UL (ref 0–0.01)
NRBC BLD-RTO: 0 PER 100 WBC
O2/TOTAL GAS SETTING VFR VENT: 100 %
O2/TOTAL GAS SETTING VFR VENT: 50 %
PCO2 BLD: 29.7 MMHG (ref 35–45)
PCO2 BLD: 31.8 MMHG (ref 35–45)
PEEP RESPIRATORY: 10 CMH2O
PEEP RESPIRATORY: 8 CMH2O
PH BLD: 7.48 (ref 7.35–7.45)
PH BLD: 7.53 (ref 7.35–7.45)
PHOSPHATE SERPL-MCNC: 2.4 MG/DL (ref 2.5–4.9)
PLATELET # BLD AUTO: 306 K/UL (ref 135–420)
PLATELET COMMENT: ABNORMAL
PMV BLD AUTO: 11.1 FL (ref 9.2–11.8)
PO2 BLD: 204 MMHG (ref 80–100)
PO2 BLD: 407 MMHG (ref 80–100)
POTASSIUM SERPL-SCNC: 3.3 MMOL/L (ref 3.5–5.5)
POTASSIUM SERPL-SCNC: 3.5 MMOL/L (ref 3.5–5.5)
POTASSIUM SERPL-SCNC: 3.7 MMOL/L (ref 3.5–5.5)
RBC # BLD AUTO: 3.77 M/UL (ref 4.35–5.65)
RBC MORPH BLD: ABNORMAL
SAO2 % BLD: 100 % (ref 92–97)
SAO2 % BLD: 99.8 % (ref 92–97)
SERVICE CMNT-IMP: ABNORMAL
SERVICE CMNT-IMP: ABNORMAL
SODIUM SERPL-SCNC: 141 MMOL/L (ref 136–145)
SPECIMEN TYPE: ABNORMAL
SPECIMEN TYPE: ABNORMAL
TROPONIN I SERPL HS-MCNC: 1679 NG/L (ref 0–78)
TROPONIN I SERPL HS-MCNC: 1924 NG/L (ref 0–78)
VENTILATION MODE VENT: ABNORMAL
VENTILATION MODE VENT: ABNORMAL
VT SETTING VENT: 500 ML
VT SETTING VENT: 500 ML
WBC # BLD AUTO: 10 K/UL (ref 4.6–13.2)

## 2024-03-24 PROCEDURE — 94003 VENT MGMT INPAT SUBQ DAY: CPT

## 2024-03-24 PROCEDURE — 85014 HEMATOCRIT: CPT

## 2024-03-24 PROCEDURE — 2000000000 HC ICU R&B

## 2024-03-24 PROCEDURE — 2700000000 HC OXYGEN THERAPY PER DAY

## 2024-03-24 PROCEDURE — 99291 CRITICAL CARE FIRST HOUR: CPT | Performed by: INTERNAL MEDICINE

## 2024-03-24 PROCEDURE — 86900 BLOOD TYPING SEROLOGIC ABO: CPT

## 2024-03-24 PROCEDURE — 6360000002 HC RX W HCPCS: Performed by: PHYSICIAN ASSISTANT

## 2024-03-24 PROCEDURE — 86901 BLOOD TYPING SEROLOGIC RH(D): CPT

## 2024-03-24 PROCEDURE — 2500000003 HC RX 250 WO HCPCS: Performed by: PHYSICIAN ASSISTANT

## 2024-03-24 PROCEDURE — 85018 HEMOGLOBIN: CPT

## 2024-03-24 PROCEDURE — A4216 STERILE WATER/SALINE, 10 ML: HCPCS | Performed by: PHYSICIAN ASSISTANT

## 2024-03-24 PROCEDURE — 94761 N-INVAS EAR/PLS OXIMETRY MLT: CPT

## 2024-03-24 PROCEDURE — 2500000003 HC RX 250 WO HCPCS: Performed by: INTERNAL MEDICINE

## 2024-03-24 PROCEDURE — 85730 THROMBOPLASTIN TIME PARTIAL: CPT

## 2024-03-24 PROCEDURE — P9016 RBC LEUKOCYTES REDUCED: HCPCS

## 2024-03-24 PROCEDURE — 30233N1 TRANSFUSION OF NONAUTOLOGOUS RED BLOOD CELLS INTO PERIPHERAL VEIN, PERCUTANEOUS APPROACH: ICD-10-PCS | Performed by: INTERNAL MEDICINE

## 2024-03-24 PROCEDURE — 84100 ASSAY OF PHOSPHORUS: CPT

## 2024-03-24 PROCEDURE — 80048 BASIC METABOLIC PNL TOTAL CA: CPT

## 2024-03-24 PROCEDURE — 2580000003 HC RX 258: Performed by: PHYSICIAN ASSISTANT

## 2024-03-24 PROCEDURE — 82962 GLUCOSE BLOOD TEST: CPT

## 2024-03-24 PROCEDURE — 86923 COMPATIBILITY TEST ELECTRIC: CPT

## 2024-03-24 PROCEDURE — 84132 ASSAY OF SERUM POTASSIUM: CPT

## 2024-03-24 PROCEDURE — 85025 COMPLETE CBC W/AUTO DIFF WBC: CPT

## 2024-03-24 PROCEDURE — 86850 RBC ANTIBODY SCREEN: CPT

## 2024-03-24 PROCEDURE — 71045 X-RAY EXAM CHEST 1 VIEW: CPT

## 2024-03-24 PROCEDURE — 84484 ASSAY OF TROPONIN QUANT: CPT

## 2024-03-24 PROCEDURE — 36600 WITHDRAWAL OF ARTERIAL BLOOD: CPT

## 2024-03-24 PROCEDURE — 83735 ASSAY OF MAGNESIUM: CPT

## 2024-03-24 PROCEDURE — 36430 TRANSFUSION BLD/BLD COMPNT: CPT

## 2024-03-24 PROCEDURE — 6370000000 HC RX 637 (ALT 250 FOR IP): Performed by: PHYSICIAN ASSISTANT

## 2024-03-24 RX ORDER — SODIUM CHLORIDE 9 MG/ML
INJECTION, SOLUTION INTRAVENOUS PRN
Status: DISCONTINUED | OUTPATIENT
Start: 2024-03-24 | End: 2024-03-26

## 2024-03-24 RX ADMIN — SODIUM CHLORIDE, PRESERVATIVE FREE 10 ML: 5 INJECTION INTRAVENOUS at 20:52

## 2024-03-24 RX ADMIN — HEPARIN SODIUM 4000 UNITS: 1000 INJECTION INTRAVENOUS; SUBCUTANEOUS at 08:18

## 2024-03-24 RX ADMIN — DEXMEDETOMIDINE HYDROCHLORIDE 1 MCG/KG/HR: 4 INJECTION, SOLUTION INTRAVENOUS at 04:10

## 2024-03-24 RX ADMIN — POTASSIUM BICARBONATE 40 MEQ: 782 TABLET, EFFERVESCENT ORAL at 14:40

## 2024-03-24 RX ADMIN — FAMOTIDINE 20 MG: 10 INJECTION, SOLUTION INTRAVENOUS at 20:50

## 2024-03-24 RX ADMIN — MIDAZOLAM 2 MG: 1 INJECTION INTRAMUSCULAR; INTRAVENOUS at 01:13

## 2024-03-24 RX ADMIN — HEPARIN SODIUM 17 UNITS/KG/HR: 10000 INJECTION, SOLUTION INTRAVENOUS at 11:37

## 2024-03-24 RX ADMIN — DEXMEDETOMIDINE HYDROCHLORIDE 1 MCG/KG/HR: 4 INJECTION, SOLUTION INTRAVENOUS at 12:44

## 2024-03-24 RX ADMIN — PIPERACILLIN AND TAZOBACTAM 3375 MG: 3; .375 INJECTION, POWDER, FOR SOLUTION INTRAVENOUS; PARENTERAL at 19:49

## 2024-03-24 RX ADMIN — FAMOTIDINE 20 MG: 10 INJECTION, SOLUTION INTRAVENOUS at 08:41

## 2024-03-24 RX ADMIN — HEPARIN SODIUM 4000 UNITS: 1000 INJECTION INTRAVENOUS; SUBCUTANEOUS at 01:03

## 2024-03-24 RX ADMIN — SODIUM CHLORIDE, PRESERVATIVE FREE 10 ML: 5 INJECTION INTRAVENOUS at 08:42

## 2024-03-24 RX ADMIN — ACETAMINOPHEN 650 MG: 325 TABLET ORAL at 11:49

## 2024-03-24 RX ADMIN — PIPERACILLIN AND TAZOBACTAM 3375 MG: 3; .375 INJECTION, POWDER, FOR SOLUTION INTRAVENOUS; PARENTERAL at 03:45

## 2024-03-24 RX ADMIN — DEXMEDETOMIDINE HYDROCHLORIDE 1 MCG/KG/HR: 4 INJECTION, SOLUTION INTRAVENOUS at 08:24

## 2024-03-24 RX ADMIN — INSULIN LISPRO 1 UNITS: 100 INJECTION, SOLUTION INTRAVENOUS; SUBCUTANEOUS at 19:02

## 2024-03-24 RX ADMIN — DEXMEDETOMIDINE HYDROCHLORIDE 0.8 MCG/KG/HR: 4 INJECTION, SOLUTION INTRAVENOUS at 22:38

## 2024-03-24 RX ADMIN — MIDAZOLAM 2 MG: 1 INJECTION INTRAMUSCULAR; INTRAVENOUS at 06:26

## 2024-03-24 RX ADMIN — VANCOMYCIN HYDROCHLORIDE 1000 MG: 1 INJECTION, POWDER, LYOPHILIZED, FOR SOLUTION INTRAVENOUS at 17:32

## 2024-03-24 RX ADMIN — POTASSIUM BICARBONATE 40 MEQ: 782 TABLET, EFFERVESCENT ORAL at 08:39

## 2024-03-24 RX ADMIN — DEXMEDETOMIDINE HYDROCHLORIDE 1 MCG/KG/HR: 4 INJECTION, SOLUTION INTRAVENOUS at 16:52

## 2024-03-24 RX ADMIN — 0.12% CHLORHEXIDINE GLUCONATE 15 ML: 1.2 RINSE ORAL at 08:42

## 2024-03-24 RX ADMIN — 0.12% CHLORHEXIDINE GLUCONATE 15 ML: 1.2 RINSE ORAL at 20:50

## 2024-03-24 RX ADMIN — PIPERACILLIN AND TAZOBACTAM 3375 MG: 3; .375 INJECTION, POWDER, FOR SOLUTION INTRAVENOUS; PARENTERAL at 11:32

## 2024-03-24 RX ADMIN — VANCOMYCIN HYDROCHLORIDE 1000 MG: 1 INJECTION, POWDER, LYOPHILIZED, FOR SOLUTION INTRAVENOUS at 06:02

## 2024-03-24 ASSESSMENT — PULMONARY FUNCTION TESTS
PIF_VALUE: 22
PIF_VALUE: 21
PIF_VALUE: 18
PIF_VALUE: 24

## 2024-03-24 NOTE — CONSULTS
Comprehensive Nutrition Assessment    Type and Reason for Visit:  Initial, Consult, NPO/Clear Liquid    Nutrition Recommendations/Plan:   Initiate tube feeding regimen:   Formula: Glucerna 1.5  Start at 20 ml/hr  Advance as tolerated by 10 ml q 8 hours  Goal Rate: 65 ml/hr x 20 hours (for anticipation of holding tube feeds for standard nursing care)  Water Flushes: 50 mL q 4 hours (300 mL total)  Modular(s): Prosource once daily  Goal Regimen Provides (with modulars):  2030 kcal, 127g protein, 1288 ml free water, 100% RDIs  Continue to monitor tolerance of EN, weight, labs, and plan of care during admission.      Malnutrition Assessment:  Malnutrition Status:  Insufficient data (at risk - NPO, vent) (03/24/24 0924)    Context:  Acute Illness       Nutrition History and Allergies:   PMHx: tobacco abuse. Wt hx: 196 lb (9/18/23), 201 lb (3/22/24), 202 lb (3/24/24). Limited hx available, remote visit. Photo ID: [id number]". Will attempt NFPE as able. NKFA per chart.    Nutrition Assessment:    Admitted for SOB, resp failure likely due to CHF decompensation; sepsis. Intubated 3/23. Discussed care with Dr. Marci baker to initiate TF. TF consult placed. Will order TF.    Nutrition Related Findings:    Last BM (including prior to admit):  (PTA)  +edema. Pertinent Meds: pepcid, humalog, precedex gtt, heparin Pertinent Labs: POC Glucose 174-194 mg/dl x 24 hrs, K 3.3 L, Phos 2.4 L; 8/16/23: HgA1C 8.2 H Wound Type: None       Current Nutrition Intake & Therapies:    Average Meal Intake: NPO     Diet NPO    Anthropometric Measures:  Height: 182.9 cm (6') (Photo ID)  Ideal Body Weight (IBW): 178 lbs (81 kg)    Admission Body Weight: 91.6 kg (201 lb 15.1 oz)  Current Body Weight: 91.6 kg (201 lb 15.1 oz), 113.5 % IBW.    Current BMI (kg/m2): 27.4  Weight Adjustment For: No Adjustment  BMI Categories: Overweight (BMI 25.0-29.9)    Estimated Daily Nutrient Needs:  Energy Requirements Based On: Formula  Weight Used for Energy Requirements:

## 2024-03-24 NOTE — PROGRESS NOTES
No SBT at this time-     03/24/24 0715   Weaning Parameters   Spontaneous Breathing Trial Complete (S)  No (comment)  (Sedated)

## 2024-03-24 NOTE — PLAN OF CARE
Problem: Safety - Medical Restraint  Goal: Remains free of injury from restraints (Restraint for Interference with Medical Device)  Description: INTERVENTIONS:  1. Determine that other, less restrictive measures have been tried or would not be effective before applying the restraint  2. Evaluate the patient's condition at the time of restraint application  3. Inform patient/family regarding the reason for restraint  4. Q2H: Monitor safety, psychosocial status, comfort, nutrition and hydration  Outcome: Progressing  Flowsheets  Taken 3/24/2024 0800 by Soni Amaro RN  Remains free of injury from restraints (restraint for interference with medical device):   Determine that other, less restrictive measures have been tried or would not be effective before applying the restraint   Evaluate the patient's condition at the time of restraint application   Inform patient/family regarding the reason for restraint   Every 2 hours: Monitor safety, psychosocial status, comfort, nutrition and hydration  Taken 3/24/2024 0600 by Myles Man RN  Remains free of injury from restraints (restraint for interference with medical device): Every 2 hours: Monitor safety, psychosocial status, comfort, nutrition and hydration  Taken 3/24/2024 0400 by Myles Man RN  Remains free of injury from restraints (restraint for interference with medical device): Every 2 hours: Monitor safety, psychosocial status, comfort, nutrition and hydration  Taken 3/24/2024 0200 by Myles Man RN  Remains free of injury from restraints (restraint for interference with medical device): Every 2 hours: Monitor safety, psychosocial status, comfort, nutrition and hydration  Taken 3/24/2024 0000 by Myles Man RN  Remains free of injury from restraints (restraint for interference with medical device): Every 2 hours: Monitor safety, psychosocial status, comfort, nutrition and hydration  Taken 3/23/2024 2200 by Myles Man  RN  Remains free of injury from restraints (restraint for interference with medical device): Every 2 hours: Monitor safety, psychosocial status, comfort, nutrition and hydration  Taken 3/23/2024 2000 by Myles Man RN  Remains free of injury from restraints (restraint for interference with medical device): Every 2 hours: Monitor safety, psychosocial status, comfort, nutrition and hydration     Problem: Discharge Planning  Goal: Discharge to home or other facility with appropriate resources  Outcome: Progressing     Problem: Pain  Goal: Verbalizes/displays adequate comfort level or baseline comfort level  Outcome: Progressing     Problem: Safety - Adult  Goal: Free from fall injury  Outcome: Progressing     Problem: Nutrition Deficit:  Goal: Optimize nutritional status  Outcome: Progressing

## 2024-03-24 NOTE — PLAN OF CARE
Problem: Safety - Medical Restraint  Goal: Remains free of injury from restraints (Restraint for Interference with Medical Device)  Description: INTERVENTIONS:  1. Determine that other, less restrictive measures have been tried or would not be effective before applying the restraint  2. Evaluate the patient's condition at the time of restraint application  3. Inform patient/family regarding the reason for restraint  4. Q2H: Monitor safety, psychosocial status, comfort, nutrition and hydration  3/24/2024 1940 by Soni Amaro RN  Outcome: Progressing  Flowsheets  Taken 3/24/2024 1800  Remains free of injury from restraints (restraint for interference with medical device):   Determine that other, less restrictive measures have been tried or would not be effective before applying the restraint   Evaluate the patient's condition at the time of restraint application   Inform patient/family regarding the reason for restraint   Every 2 hours: Monitor safety, psychosocial status, comfort, nutrition and hydration  Taken 3/24/2024 1600  Remains free of injury from restraints (restraint for interference with medical device):   Determine that other, less restrictive measures have been tried or would not be effective before applying the restraint   Evaluate the patient's condition at the time of restraint application   Inform patient/family regarding the reason for restraint   Every 2 hours: Monitor safety, psychosocial status, comfort, nutrition and hydration  Taken 3/24/2024 1400  Remains free of injury from restraints (restraint for interference with medical device):   Determine that other, less restrictive measures have been tried or would not be effective before applying the restraint   Evaluate the patient's condition at the time of restraint application   Inform patient/family regarding the reason for restraint   Every 2 hours: Monitor safety, psychosocial status, comfort, nutrition and hydration  Taken

## 2024-03-24 NOTE — PROGRESS NOTES
[facility].  Pulmonary, Critical Care, and Sleep Medicine    Name: Gigi Mcneal MRN: [id number]   : 1960 Hospital: [facility]   Date: 3/24/2024  Admission Date: 3/23/2024     Chart and notes reviewed. Data reviewed. I have evaluated all findings.    [x]I have reviewed the flowsheet and previous day’s notes.    [x]The patient is unable to give any meaningful history or review of systems because the patient is:  [x]Intubated [x]Sedated   []Unresponsive      [x]The patient is critically ill on      [x]Mechanical ventilation []Pressors   []BiPAP []         Interval HPI: 64 year old male with a past medical history of tobacco abuse, medical non-compliance, admitted to the unit with septic and/or cardiogenic shock, acute hypoxic respiratory failure requiring mechanical ventilation likely 2/2 multifocal pneumonia vs acute decompensated systolic heart failure. Echo 3/22/24 revealed EF 35-40%. Pt also has an NSTEMI - on heparin gtt.     Subjective 24  Hospital Day: 2  Vent Day: 2  Overnight events: No significant events noted overnight  Mentation/Activity: sedated  Respiratory/ Secretions: stable  Hemodynamics: stable   Urine output, bowel: UOP adequate  Diet: NPO  Need for procedures: None              ROS:Review of systems not obtained due to patient factors.    Events, medications, imaging, and notes from last 24 hours reviewed.     Patient Active Problem List   Diagnosis    Acute hypoxemic respiratory failure (HCC)    Multifocal pneumonia    HFrEF (heart failure with reduced ejection fraction) ([facility])       Vital Signs:  /80   Pulse 86   Temp 98.4 °F (36.9 °C) (Axillary)   Resp 16   Ht 1.854 m (6' 1\")   Wt 91.6 kg (202 lb)   SpO2 100%   BMI 26.65 kg/m²             Temp (24hrs), Av.4 °F (36.9 °C), Min:97.6 °F (36.4 °C), Max:99 °F (37.2 °C)       Intake/Output:   Last shift:      No intake/output data recorded.  Last 3 shifts:  0701 -  1900  In:  prophylaxis.   Pepcid  DVT prophylaxis.   Heparin gtt  Need for Lines, forte assessed.  Palliative care evaluation.  Restraints need.  Attending Non-violent Restraint Reevaluation     I have reevaluated the patient one hour after initiation of intervention. The patient is comfortable, uninjured, but continues to pose an imminent risk of injury to self to themselves and/or serious disruption of medical treatment required to keep patient stable.     The patient's current medical and behavioral conditions that warrant the use intervention include Poor safety judgment:  Poor balance or unsteady gait attempting to get out of bed without assistance and Pulling out devices:  Pulling lines.  Restraint or seclusion will be discontinued at the earliest possible time, regardless of the scheduled expiration of the order.    Based on my evaluation, restraints will be continued: Yes         Total of 11 minutes were spent with the patient at the bedside. This care involved high complexity decision making to assess, manipulate, and support vital system functions, to treat this degreee vital organ system failure and to prevent further life threatening deterioration of the patient’s condition  The services I provided to this patient were to treat and/or prevent clinically significant deterioration that could result in the failure of one or more body systems and/or organ systems due to respiratory distress, hypoxia, cardiac dysrhythmia.       Layla Goff PA-C  03/24/24  Pulmonary, Critical Care Medicine  [facility]

## 2024-03-24 NOTE — PROGRESS NOTES
Hgb 6.9. Type and screen sent.   1200pm PRBC started.  1400pm Rpt K level after K replacement. K-3.5  1500pm prbc completed.  1830pm HR emigdio down to high 30's. Wean Precedex IV gtt to 0.8mcg/kgmin

## 2024-03-24 NOTE — PROGRESS NOTES
03/24/24 0516   Ventilator Settings   Target Vt (S)  480   FiO2  (S)  35 %     Changes made post ABG    Latest Reference Range & Units 03/24/24 05:14   POC pH 7.35 - 7.45   7.53 (H)   POC pCO2 35.0 - 45.0 MMHG 31.8 (L)   POC PO2 80 - 100 MMHG 204 (H)   POC HCO3 22 - 26 MMOL/L 26.8 (H)   POC O2 SAT 92 - 97 % 99.8 (H)   POC Daniel's Test -   Positive   (H): Data is abnormally high  (L): Data is abnormally low

## 2024-03-25 ENCOUNTER — APPOINTMENT (OUTPATIENT)
Facility: HOSPITAL | Age: 64
End: 2024-03-25
Attending: INTERNAL MEDICINE
Payer: COMMERCIAL

## 2024-03-25 LAB
ABO + RH BLD: NORMAL
ANION GAP SERPL CALC-SCNC: 7 MMOL/L (ref 3–18)
APTT PPP: 33.5 SEC (ref 23–36.4)
APTT PPP: 49.4 SEC (ref 23–36.4)
APTT PPP: 65.9 SEC (ref 23–36.4)
ARTERIAL PATENCY WRIST A: POSITIVE
BACTERIA SPEC CULT: NORMAL
BASE EXCESS BLD CALC-SCNC: 1.8 MMOL/L
BASOPHILS # BLD: 0 K/UL (ref 0–0.1)
BASOPHILS NFR BLD: 0 % (ref 0–2)
BDY SITE: ABNORMAL
BLD PROD TYP BPU: NORMAL
BLOOD BANK BLOOD PRODUCT EXPIRATION DATE: NORMAL
BLOOD BANK DISPENSE STATUS: NORMAL
BLOOD BANK ISBT PRODUCT BLOOD TYPE: 5100
BLOOD BANK PRODUCT CODE: NORMAL
BLOOD BANK UNIT TYPE AND RH: NORMAL
BLOOD GROUP ANTIBODIES SERPL: NORMAL
BPU ID: NORMAL
BUN SERPL-MCNC: 17 MG/DL (ref 7–18)
BUN/CREAT SERPL: 20 (ref 12–20)
CALCIUM SERPL-MCNC: 7.7 MG/DL (ref 8.5–10.1)
CALLED TO: NORMAL
CHLORIDE SERPL-SCNC: 108 MMOL/L (ref 100–111)
CK SERPL-CCNC: 165 U/L (ref 39–308)
CO2 SERPL-SCNC: 27 MMOL/L (ref 21–32)
CREAT SERPL-MCNC: 0.87 MG/DL (ref 0.6–1.3)
CROSSMATCH RESULT: NORMAL
DIFFERENTIAL METHOD BLD: ABNORMAL
EOSINOPHIL # BLD: 0 K/UL (ref 0–0.4)
EOSINOPHIL NFR BLD: 0 % (ref 0–5)
ERYTHROCYTE [DISTWIDTH] IN BLOOD BY AUTOMATED COUNT: 21.1 % (ref 11.6–14.5)
GAS FLOW.O2 O2 DELIVERY SYS: ABNORMAL
GAS FLOW.O2 SETTING OXYMISER: 16 BPM
GLUCOSE BLD STRIP.AUTO-MCNC: 186 MG/DL (ref 70–110)
GLUCOSE BLD STRIP.AUTO-MCNC: 192 MG/DL (ref 70–110)
GLUCOSE BLD STRIP.AUTO-MCNC: 221 MG/DL (ref 70–110)
GLUCOSE SERPL-MCNC: 142 MG/DL (ref 74–99)
GRAM STN SPEC: NORMAL
HCO3 BLD-SCNC: 23.1 MMOL/L (ref 22–26)
HCT VFR BLD AUTO: 27.6 % (ref 36–48)
HGB BLD-MCNC: 8.3 G/DL (ref 13–16)
IMM GRANULOCYTES # BLD AUTO: 0 K/UL (ref 0–0.04)
IMM GRANULOCYTES NFR BLD AUTO: 0 % (ref 0–0.5)
IPAP/PIP/HIGH PEEP: 17
LYMPHOCYTES # BLD: 1.5 K/UL (ref 0.9–3.6)
LYMPHOCYTES NFR BLD: 13 % (ref 21–52)
MAGNESIUM SERPL-MCNC: 2.3 MG/DL (ref 1.6–2.6)
MCH RBC QN AUTO: 18.7 PG (ref 24–34)
MCHC RBC AUTO-ENTMCNC: 30.1 G/DL (ref 31–37)
MCV RBC AUTO: 62.3 FL (ref 78–100)
MONOCYTES # BLD: 0.8 K/UL (ref 0.05–1.2)
MONOCYTES NFR BLD: 7 % (ref 3–10)
NEUTS SEG # BLD: 8.8 K/UL (ref 1.8–8)
NEUTS SEG NFR BLD: 79 % (ref 40–73)
NRBC # BLD: 0 K/UL (ref 0–0.01)
NRBC BLD-RTO: 0 PER 100 WBC
O2/TOTAL GAS SETTING VFR VENT: 28 %
PAW @ MEAN EXP FLOW ON VENT: 12 CMH2O
PCO2 BLD: 25 MMHG (ref 35–45)
PEEP RESPIRATORY: 5 CMH2O
PH BLD: 7.57 (ref 7.35–7.45)
PHOSPHATE SERPL-MCNC: 2.3 MG/DL (ref 2.5–4.9)
PLATELET # BLD AUTO: 375 K/UL (ref 135–420)
PMV BLD AUTO: 11.1 FL (ref 9.2–11.8)
PO2 BLD: 94 MMHG (ref 80–100)
POTASSIUM SERPL-SCNC: 3.7 MMOL/L (ref 3.5–5.5)
RBC # BLD AUTO: 4.43 M/UL (ref 4.35–5.65)
RESPIRATORY RATE, POC: 20 (ref 5–40)
SAO2 % BLD: 98.5 % (ref 92–97)
SERVICE CMNT-IMP: ABNORMAL
SERVICE CMNT-IMP: NORMAL
SODIUM SERPL-SCNC: 142 MMOL/L (ref 136–145)
SPECIMEN EXP DATE BLD: NORMAL
SPECIMEN TYPE: ABNORMAL
TROPONIN I SERPL HS-MCNC: 1262 NG/L (ref 0–78)
TROPONIN I SERPL HS-MCNC: 729 NG/L (ref 0–78)
UNIT DIVISION: 0
UNIT ISSUE DATE/TIME: NORMAL
VANCOMYCIN SERPL-MCNC: 9.3 UG/ML (ref 5–40)
VENTILATION MODE VENT: ABNORMAL
VT SETTING VENT: 480 ML
WBC # BLD AUTO: 11.1 K/UL (ref 4.6–13.2)

## 2024-03-25 PROCEDURE — 71045 X-RAY EXAM CHEST 1 VIEW: CPT

## 2024-03-25 PROCEDURE — 82550 ASSAY OF CK (CPK): CPT

## 2024-03-25 PROCEDURE — 99291 CRITICAL CARE FIRST HOUR: CPT | Performed by: INTERNAL MEDICINE

## 2024-03-25 PROCEDURE — A4216 STERILE WATER/SALINE, 10 ML: HCPCS | Performed by: PHYSICIAN ASSISTANT

## 2024-03-25 PROCEDURE — 2580000003 HC RX 258: Performed by: PHYSICIAN ASSISTANT

## 2024-03-25 PROCEDURE — 85025 COMPLETE CBC W/AUTO DIFF WBC: CPT

## 2024-03-25 PROCEDURE — 94003 VENT MGMT INPAT SUBQ DAY: CPT

## 2024-03-25 PROCEDURE — B2111ZZ FLUOROSCOPY OF MULTIPLE CORONARY ARTERIES USING LOW OSMOLAR CONTRAST: ICD-10-PCS | Performed by: INTERNAL MEDICINE

## 2024-03-25 PROCEDURE — 82962 GLUCOSE BLOOD TEST: CPT

## 2024-03-25 PROCEDURE — 6360000002 HC RX W HCPCS: Performed by: INTERNAL MEDICINE

## 2024-03-25 PROCEDURE — 2500000003 HC RX 250 WO HCPCS: Performed by: INTERNAL MEDICINE

## 2024-03-25 PROCEDURE — 2580000003 HC RX 258: Performed by: INTERNAL MEDICINE

## 2024-03-25 PROCEDURE — 2700000000 HC OXYGEN THERAPY PER DAY

## 2024-03-25 PROCEDURE — 2500000003 HC RX 250 WO HCPCS: Performed by: NURSE PRACTITIONER

## 2024-03-25 PROCEDURE — 70450 CT HEAD/BRAIN W/O DYE: CPT

## 2024-03-25 PROCEDURE — 6360000002 HC RX W HCPCS: Performed by: NURSE PRACTITIONER

## 2024-03-25 PROCEDURE — 83735 ASSAY OF MAGNESIUM: CPT

## 2024-03-25 PROCEDURE — 80048 BASIC METABOLIC PNL TOTAL CA: CPT

## 2024-03-25 PROCEDURE — 85730 THROMBOPLASTIN TIME PARTIAL: CPT

## 2024-03-25 PROCEDURE — 6370000000 HC RX 637 (ALT 250 FOR IP): Performed by: NURSE PRACTITIONER

## 2024-03-25 PROCEDURE — 027135Z DILATION OF CORONARY ARTERY, TWO ARTERIES WITH TWO DRUG-ELUTING INTRALUMINAL DEVICES, PERCUTANEOUS APPROACH: ICD-10-PCS | Performed by: INTERNAL MEDICINE

## 2024-03-25 PROCEDURE — 2580000003 HC RX 258: Performed by: NURSE PRACTITIONER

## 2024-03-25 PROCEDURE — B2151ZZ FLUOROSCOPY OF LEFT HEART USING LOW OSMOLAR CONTRAST: ICD-10-PCS | Performed by: INTERNAL MEDICINE

## 2024-03-25 PROCEDURE — 51702 INSERT TEMP BLADDER CATH: CPT

## 2024-03-25 PROCEDURE — 80202 ASSAY OF VANCOMYCIN: CPT

## 2024-03-25 PROCEDURE — 2000000000 HC ICU R&B

## 2024-03-25 PROCEDURE — 72125 CT NECK SPINE W/O DYE: CPT

## 2024-03-25 PROCEDURE — 4A023N7 MEASUREMENT OF CARDIAC SAMPLING AND PRESSURE, LEFT HEART, PERCUTANEOUS APPROACH: ICD-10-PCS | Performed by: INTERNAL MEDICINE

## 2024-03-25 PROCEDURE — 5A09357 ASSISTANCE WITH RESPIRATORY VENTILATION, LESS THAN 24 CONSECUTIVE HOURS, CONTINUOUS POSITIVE AIRWAY PRESSURE: ICD-10-PCS | Performed by: INTERNAL MEDICINE

## 2024-03-25 PROCEDURE — 82803 BLOOD GASES ANY COMBINATION: CPT

## 2024-03-25 PROCEDURE — 2500000003 HC RX 250 WO HCPCS: Performed by: PHYSICIAN ASSISTANT

## 2024-03-25 PROCEDURE — 6370000000 HC RX 637 (ALT 250 FOR IP): Performed by: PHYSICIAN ASSISTANT

## 2024-03-25 PROCEDURE — 84100 ASSAY OF PHOSPHORUS: CPT

## 2024-03-25 PROCEDURE — 94664 DEMO&/EVAL PT USE INHALER: CPT

## 2024-03-25 PROCEDURE — 94761 N-INVAS EAR/PLS OXIMETRY MLT: CPT

## 2024-03-25 PROCEDURE — 84484 ASSAY OF TROPONIN QUANT: CPT

## 2024-03-25 PROCEDURE — 6360000002 HC RX W HCPCS: Performed by: PHYSICIAN ASSISTANT

## 2024-03-25 PROCEDURE — 36600 WITHDRAWAL OF ARTERIAL BLOOD: CPT

## 2024-03-25 RX ORDER — FUROSEMIDE 10 MG/ML
40 INJECTION INTRAMUSCULAR; INTRAVENOUS ONCE
Status: COMPLETED | OUTPATIENT
Start: 2024-03-25 | End: 2024-03-25

## 2024-03-25 RX ORDER — FUROSEMIDE 10 MG/ML
20 INJECTION INTRAMUSCULAR; INTRAVENOUS DAILY
Status: DISCONTINUED | OUTPATIENT
Start: 2024-03-25 | End: 2024-03-29

## 2024-03-25 RX ORDER — POLYETHYLENE GLYCOL 3350 17 G/17G
17 POWDER, FOR SOLUTION ORAL DAILY
Status: DISCONTINUED | OUTPATIENT
Start: 2024-03-25 | End: 2024-03-30 | Stop reason: HOSPADM

## 2024-03-25 RX ORDER — NOREPINEPHRINE BITARTRATE 0.06 MG/ML
1-100 INJECTION, SOLUTION INTRAVENOUS CONTINUOUS
Status: DISCONTINUED | OUTPATIENT
Start: 2024-03-25 | End: 2024-03-26

## 2024-03-25 RX ADMIN — SODIUM CHLORIDE, PRESERVATIVE FREE 10 ML: 5 INJECTION INTRAVENOUS at 08:45

## 2024-03-25 RX ADMIN — DOCUSATE SODIUM 100 MG: 50 LIQUID ORAL at 10:44

## 2024-03-25 RX ADMIN — POLYETHYLENE GLYCOL 3350 17 G: 17 POWDER, FOR SOLUTION ORAL at 12:13

## 2024-03-25 RX ADMIN — Medication 10 MCG/MIN: at 19:35

## 2024-03-25 RX ADMIN — FAMOTIDINE 20 MG: 10 INJECTION, SOLUTION INTRAVENOUS at 21:27

## 2024-03-25 RX ADMIN — 0.12% CHLORHEXIDINE GLUCONATE 15 ML: 1.2 RINSE ORAL at 10:13

## 2024-03-25 RX ADMIN — VANCOMYCIN HYDROCHLORIDE 1500 MG: 1 INJECTION, POWDER, LYOPHILIZED, FOR SOLUTION INTRAVENOUS at 18:47

## 2024-03-25 RX ADMIN — INSULIN LISPRO 1 UNITS: 100 INJECTION, SOLUTION INTRAVENOUS; SUBCUTANEOUS at 19:59

## 2024-03-25 RX ADMIN — PIPERACILLIN AND TAZOBACTAM 3375 MG: 3; .375 INJECTION, POWDER, FOR SOLUTION INTRAVENOUS; PARENTERAL at 02:45

## 2024-03-25 RX ADMIN — SODIUM CHLORIDE, PRESERVATIVE FREE 10 ML: 5 INJECTION INTRAVENOUS at 21:27

## 2024-03-25 RX ADMIN — DEXMEDETOMIDINE HYDROCHLORIDE 0.8 MCG/KG/HR: 4 INJECTION, SOLUTION INTRAVENOUS at 10:12

## 2024-03-25 RX ADMIN — PIPERACILLIN AND TAZOBACTAM 3375 MG: 3; .375 INJECTION, POWDER, FOR SOLUTION INTRAVENOUS; PARENTERAL at 12:02

## 2024-03-25 RX ADMIN — HEPARIN SODIUM 4000 UNITS: 1000 INJECTION INTRAVENOUS; SUBCUTANEOUS at 12:09

## 2024-03-25 RX ADMIN — FUROSEMIDE 40 MG: 10 INJECTION, SOLUTION INTRAMUSCULAR; INTRAVENOUS at 03:31

## 2024-03-25 RX ADMIN — FAMOTIDINE 20 MG: 10 INJECTION, SOLUTION INTRAVENOUS at 10:13

## 2024-03-25 RX ADMIN — VANCOMYCIN HYDROCHLORIDE 1000 MG: 1 INJECTION, POWDER, LYOPHILIZED, FOR SOLUTION INTRAVENOUS at 06:43

## 2024-03-25 RX ADMIN — HEPARIN SODIUM 2000 UNITS: 1000 INJECTION INTRAVENOUS; SUBCUTANEOUS at 03:41

## 2024-03-25 RX ADMIN — POTASSIUM PHOSPHATE, MONOBASIC POTASSIUM PHOSPHATE, DIBASIC 20 MMOL: 224; 236 INJECTION, SOLUTION, CONCENTRATE INTRAVENOUS at 12:13

## 2024-03-25 RX ADMIN — SODIUM CHLORIDE 500 MG: 900 INJECTION INTRAVENOUS at 10:46

## 2024-03-25 RX ADMIN — HEPARIN SODIUM 17 UNITS/KG/HR: 10000 INJECTION, SOLUTION INTRAVENOUS at 04:55

## 2024-03-25 RX ADMIN — DEXMEDETOMIDINE HYDROCHLORIDE 0.8 MCG/KG/HR: 4 INJECTION, SOLUTION INTRAVENOUS at 02:44

## 2024-03-25 RX ADMIN — PIPERACILLIN AND TAZOBACTAM 3375 MG: 3; .375 INJECTION, POWDER, FOR SOLUTION INTRAVENOUS; PARENTERAL at 18:47

## 2024-03-25 RX ADMIN — FUROSEMIDE 20 MG: 10 INJECTION, SOLUTION INTRAMUSCULAR; INTRAVENOUS at 10:44

## 2024-03-25 RX ADMIN — SODIUM CHLORIDE, PRESERVATIVE FREE 10 ML: 5 INJECTION INTRAVENOUS at 10:45

## 2024-03-25 ASSESSMENT — PULMONARY FUNCTION TESTS
PIF_VALUE: 13
PIF_VALUE: 21

## 2024-03-25 NOTE — CARE COORDINATION
03/25/24 1622   /Social Work Whiteboard Notes   /Social Work Whiteboard RED-3/25/24. Patient not medically cleared for discharge/transfer. Intubated/vent. Pending extubation today. IV ABX/wean Levo/TF/IV Lasix. Dispo: Home pending medical condition. VH

## 2024-03-25 NOTE — PLAN OF CARE
Problem: Safety - Medical Restraint  Goal: Remains free of injury from restraints (Restraint for Interference with Medical Device)  Description: INTERVENTIONS:  1. Determine that other, less restrictive measures have been tried or would not be effective before applying the restraint  2. Evaluate the patient's condition at the time of restraint application  3. Inform patient/family regarding the reason for restraint  4. Q2H: Monitor safety, psychosocial status, comfort, nutrition and hydration  3/25/2024 0840 by Iman Kirkland RN  Outcome: Progressing  Flowsheets  Taken 3/24/2024 2357 by Iman Kirkland RN  Remains free of injury from restraints (restraint for interference with medical device):   Determine that other, less restrictive measures have been tried or would not be effective before applying the restraint   Inform patient/family regarding the reason for restraint   Every 2 hours: Monitor safety, psychosocial status, comfort, nutrition and hydration  Taken 3/24/2024 2000 by Carol Hawkins RN  Remains free of injury from restraints (restraint for interference with medical device):   Determine that other, less restrictive measures have been tried or would not be effective before applying the restraint   Evaluate the patient's condition at the time of restraint application   Inform patient/family regarding the reason for restraint   Every 2 hours: Monitor safety, psychosocial status, comfort, nutrition and hydration  3/24/2024 1940 by Soni Amaro RN  Outcome: Progressing  Flowsheets  Taken 3/24/2024 1800  Remains free of injury from restraints (restraint for interference with medical device):   Determine that other, less restrictive measures have been tried or would not be effective before applying the restraint   Evaluate the patient's condition at the time of restraint application   Inform patient/family regarding the reason for restraint   Every 2 hours: Monitor safety, psychosocial

## 2024-03-25 NOTE — PROGRESS NOTES
0333     Abg on vent    Rate 16  Volume 480   Fio2 28%  Peep 5    Ph-7.57  Pco2-25  Po2-93.7  Hco3-23.1  Spo2-98.5    Rate decreased to 14  Volume decreased to 400

## 2024-03-25 NOTE — PROGRESS NOTES
[facility].  Pulmonary, Critical Care, and Sleep Medicine    Name: Gigi Mcneal MRN: [id number]   : 1960 Hospital: [facility]   Date: 3/25/2024  Admission Date: 3/23/2024     Chart and notes reviewed. Data reviewed. I have evaluated all findings.    [x]I have reviewed the flowsheet and previous day’s notes.    [x]The patient is unable to give any meaningful history or review of systems because the patient is:  [x]Intubated [x]Sedated   []Unresponsive      [x]The patient is critically ill on      [x]Mechanical ventilation []Pressors   []BiPAP []         Interval HPI: 64 year old male with a past medical history of tobacco abuse, medical non-compliance, admitted to the unit with septic and/or cardiogenic shock, acute hypoxic respiratory failure requiring mechanical ventilation likely 2/2 multifocal pneumonia vs acute decompensated systolic heart failure. Echo 3/22/24 revealed EF 35-40%. Pt also has an NSTEMI - on heparin gtt.     Subjective 24  Hospital Day: 2  Vent Day: 2  Overnight events: No significant events noted overnight  Mentation/Activity: sedated  Respiratory/ Secretions: stable  Hemodynamics: stable   Urine output, bowel: see I&O  Diet: NPO  Need for procedures: None              ROS:Review of systems not obtained due to patient factors.    Events, medications, imaging, and notes from last 24 hours reviewed.     Patient Active Problem List   Diagnosis    Acute hypoxemic respiratory failure (HCC)    Multifocal pneumonia    HFrEF (heart failure with reduced ejection fraction) ([facility])       Vital Signs:  /76   Pulse 86   Temp 98.9 °F (37.2 °C) (Axillary)   Resp 17   Ht 1.829 m (6') Comment: Photo ID  Wt 91.6 kg (202 lb)   SpO2 100%   BMI 27.40 kg/m²             Temp (24hrs), Av.3 °F (36.8 °C), Min:97.7 °F (36.5 °C), Max:99.1 °F (37.3 °C)       Intake/Output:   Last shift:       190 -  0700  In: 506.9 [I.V.:196.9]  Out: 260  hours, Elevation of head > 45 degree, Daily sedation holiday and SBT evaluation starting at 6.00am.  Stress ulcer prophylaxis.   Pepcid  DVT prophylaxis.   Heparin gtt  Need for Lines, forte assessed.  Palliative care evaluation.  Restraints need.  Attending Non-violent Restraint Reevaluation     I have reevaluated the patient one hour after initiation of intervention. The patient is comfortable, uninjured, but continues to pose an imminent risk of injury to self to themselves and/or serious disruption of medical treatment required to keep patient stable.     The patient's current medical and behavioral conditions that warrant the use intervention include Poor safety judgment:  Poor balance or unsteady gait attempting to get out of bed without assistance and Pulling out devices:  Pulling lines.  Restraint or seclusion will be discontinued at the earliest possible time, regardless of the scheduled expiration of the order.    Based on my evaluation, restraints will be continued: Yes         Total of 11 minutes were spent with the patient at the bedside. This care involved high complexity decision making to assess, manipulate, and support vital system functions, to treat this degreee vital organ system failure and to prevent further life threatening deterioration of the patient’s condition  The services I provided to this patient were to treat and/or prevent clinically significant deterioration that could result in the failure of one or more body systems and/or organ systems due to respiratory distress, hypoxia, cardiac dysrhythmia.       Layla Goff PA-C  03/25/24  Pulmonary, Critical Care Medicine  [facility]

## 2024-03-25 NOTE — CARE COORDINATION
03/25/24 1532   Service Assessment   History Provided By Significant Other;Medical Record   Primary Caregiver Self   Support Systems Spouse/Significant Other;Children   Patient's Healthcare Decision Maker is: Legal Next of Kin   PCP Verified by CM Yes   Last Visit to PCP Within last 6 months   Prior Functional Level Independent in ADLs/IADLs   Current Functional Level Independent in ADLs/IADLs   Can patient return to prior living arrangement Yes   Ability to make needs known: Unable   Family able to assist with home care needs: Yes   Would you like for me to discuss the discharge plan with any other family members/significant others, and if so, who? Yes  (Wife- Yi)   Community Resources None   Social/Functional History   Lives With Significant other   Type of Home House   Home Layout One level   Home Access Level entry   Bathroom Shower/Tub Tub/Shower unit   Bathroom Toilet Standard   Bathroom Equipment None   Bathroom Accessibility Accessible   Receives Help From Family   ADL Assistance Independent   Homemaking Assistance Independent   Homemaking Responsibilities Yes   Ambulation Assistance Independent   Transfer Assistance Independent   Active  Yes   Occupation Full time employment   Type of Occupation [employer]   Discharge Planning   Type of Residence House   Living Arrangements Alone   Current Services Prior To Admission None   Potential Assistance Needed N/A   DME Ordered? No   Potential Assistance Purchasing Medications No   Type of Home Care Services None   Patient expects to be discharged to: House   One/Two Story Residence One story   History of falls? 0   Services At/After Discharge   Transition of Care Consult (CM Consult) N/A   Services At/After Discharge None   Mode of Transport at Discharge Other (see comment)  (Patient daughter will transport)   Confirm Follow Up Transport Self   Condition of Participation: Discharge Planning   The Plan for Transition of Care is related to the following  treatment goals: TBD- Pending medical Recs.     ADONIS Mandujano

## 2024-03-25 NOTE — PLAN OF CARE
Problem: Safety - Medical Restraint  Goal: Remains free of injury from restraints (Restraint for Interference with Medical Device)  Description: INTERVENTIONS:  1. Determine that other, less restrictive measures have been tried or would not be effective before applying the restraint  2. Evaluate the patient's condition at the time of restraint application  3. Inform patient/family regarding the reason for restraint  4. Q2H: Monitor safety, psychosocial status, comfort, nutrition and hydration  3/25/2024 1452 by Emelyn Eduardo RN  Outcome: Progressing     Problem: Pain  Goal: Verbalizes/displays adequate comfort level or baseline comfort level  3/25/2024 1452 by Emelyn Eduardo RN  Outcome: Progressing     Problem: Discharge Planning  Goal: Discharge to home or other facility with appropriate resources  3/25/2024 1452 by Emelyn Eduardo RN  Outcome: Progressing     Problem: Safety - Adult  Goal: Free from fall injury  3/25/2024 1452 by Emelyn Eduardo, RN  Outcome: Progressing     Problem: Nutrition Deficit:  Goal: Optimize nutritional status  3/25/2024 1452 by Emelyn Eduardo, RN  Outcome: Progressing

## 2024-03-25 NOTE — PROGRESS NOTES
PCCM Update:  Medical update provided to family (Wife and daughter) at bedside. Stated understanding and had no additional questions.     IBRAHIMA Martinez-BC  03/25/24  Pulmonary, Critical Care Medicine  [facility]

## 2024-03-25 NOTE — PROGRESS NOTES
[facility]   Pharmacy Pharmacokinetic Monitoring Service - Vancomycin    Indication: CAP  Goal AUC/GLORIA: 400-600  Day of Therapy: 4  Additional Antimicrobials: pip-tazo    Pertinent Laboratory Values:   Wt Readings from Last 1 Encounters:   03/24/24 91.6 kg (202 lb)     Temp Readings from Last 1 Encounters:   03/25/24 99.1 °F (37.3 °C) (Axillary)     Estimated Creatinine Clearance: 92 mL/min (based on SCr of 0.87 mg/dL).    Recent Labs     03/24/24  0620 03/25/24  0130   CREATININE 0.98 0.87   BUN 18 17   WBC 10.0 11.1     Pertinent Cultures:  Date Source Results   3/22 blood NGTD   3/23 ET aspirate NGTD   MRSA Nasal Swab: ordered    Assessment:  Date Current Dose Level (mg/L) Timing of Level (h) AUC/GLORIA   3/22 1,500 mg x1 - - -   3/23 1,000 mg q12h - - -   3/24 1,000 mg q12h - - -   3/25 1,000 mg q12h  1,500 mg q12h 9.3 8 339  493   Note: Serum concentrations collected for AUC dosing may appear elevated if collected in close proximity to the dose administered, this is not necessarily an indication of toxicity    Plan:  Administer an additional 500 mg now  Increase dose from 1,000 mg q12h to 1,500 mg q12h  No level ordered at this time  Pharmacy will continue to monitor patient and adjust therapy as indicated    Thank you for the consult,  Cuong To CHESTER  3/25/2024

## 2024-03-25 NOTE — PROGRESS NOTES
PCC Update:  Patient attempted to get OOB. Patient stated he had to use the bathroom and was attempting to do so. In an attempt to get OOB, he knocked over the IV pole while still connected to his IV sites and fell on the floor while heparin gtt and vasopressor also still infusing. Patient reported he did not hit his head, however, in light of recent extubation, risk for bleeding with heparin gtt, and recent weaning off sedative, patient was sent for CT head and CT cervical spine. CT's reviewed without signs of bleed. Heparin gtt safe to be restarted. D/w RN.       IBRAHIMA Martinez-BC  03/25/24  Pulmonary, Critical Care Medicine  [facility]

## 2024-03-25 NOTE — INTERDISCIPLINARY ROUNDS
[facility] [place] [facility]  Pulmonary, Critical Care, and Sleep Medicine  Interdisciplinary and Ventilator Weaning Rounds    Patient discussed in morning walking rounds and interdisciplinary rounds.    ICU admission day: 3/23/24     Overnight events:   Lasix 40 mg IV x- great urine output   Decrease Vt and rate due to alkalosis       Assessments and best practice:  Ventilator  Ventilator Day(s): 3  Vent Settings  FiO2 : 28 %  Resp Rate (Set): 14 bpm  PEEP/CPAP (cmH2O): 5  Insp Time (sec): 0.9 sec  Insp Rise Time (%): 50 %  Flow Sensitivity: 3 L/min  Disconnect Sensitivity (%): 75 %   VAP bundle, aim to keep peak plateau pressure 25-30cm H2O  Weaning assessed and documented   Patient doesmeet criteria for SBT.   Patient is not on sedation holiday,but can SBT on Precedex gtt, awake and following commands   Plan to wean with PS 7.  Outcome: TBD   Final plan: remain on ventilator if SBT not passed   Glycemic control- SSI   Diet  TF     Stress ulcer prophylaxis.  Pepcid  DVT prophylaxis.  Heparin gtt  Need for Lines, forte assessed.  Yes  Restraint Reevaluation     Yes  I have reevaluated the patient one hour after initiation of intervention. The patient is comfortable, uninjured, but continues to pose an imminent risk of injury to self to themselves and/or serious disruption of medical treatment required to keep patient stable. The patient's current medical and behavioral conditions that warrant the use intervention include Behavioral management problems and Pulling out devices:  Pulling tubes.  Restraint or seclusion will be discontinued at the earliest possible time, regardless of the scheduled expiration of the order. Based on my evaluation, restraints will be continued: Yes  Disposition regarding transferring out of the ICU  RED      Family contact/MPOA:   Yi Kimble Spouse [phone]     Betsy Malloy Brother/Sister   [phone]   korin kimble Child   [phone]     Family updated: will update family

## 2024-03-26 PROBLEM — Z87.891 HISTORY OF TOBACCO ABUSE: Status: ACTIVE | Noted: 2024-03-26

## 2024-03-26 PROBLEM — I21.4 NSTEMI (NON-ST ELEVATED MYOCARDIAL INFARCTION) (HCC): Status: ACTIVE | Noted: 2024-03-26

## 2024-03-26 PROBLEM — D64.9 ANEMIA: Status: ACTIVE | Noted: 2024-03-26

## 2024-03-26 PROBLEM — Z91.199 MEDICAL NON-COMPLIANCE: Status: ACTIVE | Noted: 2024-03-26

## 2024-03-26 PROBLEM — E87.6 HYPOKALEMIA: Status: ACTIVE | Noted: 2024-03-26

## 2024-03-26 LAB
ANION GAP SERPL CALC-SCNC: 7 MMOL/L (ref 3–18)
APTT PPP: 85.2 SEC (ref 23–36.4)
APTT PPP: 85.7 SEC (ref 23–36.4)
BASOPHILS # BLD: 0 K/UL (ref 0–0.1)
BASOPHILS NFR BLD: 0 % (ref 0–2)
BUN SERPL-MCNC: 14 MG/DL (ref 7–18)
BUN/CREAT SERPL: 18 (ref 12–20)
CALCIUM SERPL-MCNC: 8 MG/DL (ref 8.5–10.1)
CHLORIDE SERPL-SCNC: 105 MMOL/L (ref 100–111)
CO2 SERPL-SCNC: 28 MMOL/L (ref 21–32)
CREAT SERPL-MCNC: 0.8 MG/DL (ref 0.6–1.3)
DIFFERENTIAL METHOD BLD: ABNORMAL
EKG ATRIAL RATE: 106 BPM
EKG DIAGNOSIS: NORMAL
EKG P AXIS: 40 DEGREES
EKG P-R INTERVAL: 124 MS
EKG Q-T INTERVAL: 364 MS
EKG QRS DURATION: 96 MS
EKG QTC CALCULATION (BAZETT): 483 MS
EKG R AXIS: 42 DEGREES
EKG T AXIS: 37 DEGREES
EKG VENTRICULAR RATE: 106 BPM
EOSINOPHIL # BLD: 0.1 K/UL (ref 0–0.4)
EOSINOPHIL NFR BLD: 1 % (ref 0–5)
ERYTHROCYTE [DISTWIDTH] IN BLOOD BY AUTOMATED COUNT: 21.5 % (ref 11.6–14.5)
GLUCOSE BLD STRIP.AUTO-MCNC: 109 MG/DL (ref 70–110)
GLUCOSE BLD STRIP.AUTO-MCNC: 131 MG/DL (ref 70–110)
GLUCOSE BLD STRIP.AUTO-MCNC: 162 MG/DL (ref 70–110)
GLUCOSE BLD STRIP.AUTO-MCNC: 180 MG/DL (ref 70–110)
GLUCOSE SERPL-MCNC: 178 MG/DL (ref 74–99)
HCT VFR BLD AUTO: 28.4 % (ref 36–48)
HGB BLD-MCNC: 8.8 G/DL (ref 13–16)
IMM GRANULOCYTES # BLD AUTO: 0.1 K/UL (ref 0–0.04)
IMM GRANULOCYTES NFR BLD AUTO: 1 % (ref 0–0.5)
LYMPHOCYTES # BLD: 1.8 K/UL (ref 0.9–3.6)
LYMPHOCYTES NFR BLD: 14 % (ref 21–52)
MAGNESIUM SERPL-MCNC: 2.1 MG/DL (ref 1.6–2.6)
MCH RBC QN AUTO: 19.2 PG (ref 24–34)
MCHC RBC AUTO-ENTMCNC: 31 G/DL (ref 31–37)
MCV RBC AUTO: 62 FL (ref 78–100)
MONOCYTES # BLD: 0.8 K/UL (ref 0.05–1.2)
MONOCYTES NFR BLD: 6 % (ref 3–10)
NEUTS SEG # BLD: 9.7 K/UL (ref 1.8–8)
NEUTS SEG NFR BLD: 78 % (ref 40–73)
NRBC # BLD: 0 K/UL (ref 0–0.01)
NRBC BLD-RTO: 0 PER 100 WBC
PHOSPHATE SERPL-MCNC: 2.7 MG/DL (ref 2.5–4.9)
PLATELET # BLD AUTO: 393 K/UL (ref 135–420)
PMV BLD AUTO: 10.7 FL (ref 9.2–11.8)
POTASSIUM SERPL-SCNC: 2.9 MMOL/L (ref 3.5–5.5)
POTASSIUM SERPL-SCNC: 3.3 MMOL/L (ref 3.5–5.5)
POTASSIUM SERPL-SCNC: 3.5 MMOL/L (ref 3.5–5.5)
RBC # BLD AUTO: 4.58 M/UL (ref 4.35–5.65)
SODIUM SERPL-SCNC: 140 MMOL/L (ref 136–145)
TROPONIN I SERPL HS-MCNC: 680 NG/L (ref 0–78)
WBC # BLD AUTO: 12.4 K/UL (ref 4.6–13.2)

## 2024-03-26 PROCEDURE — 2580000003 HC RX 258: Performed by: PHYSICIAN ASSISTANT

## 2024-03-26 PROCEDURE — 92526 ORAL FUNCTION THERAPY: CPT

## 2024-03-26 PROCEDURE — 93005 ELECTROCARDIOGRAM TRACING: CPT | Performed by: INTERNAL MEDICINE

## 2024-03-26 PROCEDURE — 97530 THERAPEUTIC ACTIVITIES: CPT

## 2024-03-26 PROCEDURE — 6360000002 HC RX W HCPCS: Performed by: INTERNAL MEDICINE

## 2024-03-26 PROCEDURE — 2500000003 HC RX 250 WO HCPCS: Performed by: PHYSICIAN ASSISTANT

## 2024-03-26 PROCEDURE — 94761 N-INVAS EAR/PLS OXIMETRY MLT: CPT

## 2024-03-26 PROCEDURE — 84484 ASSAY OF TROPONIN QUANT: CPT

## 2024-03-26 PROCEDURE — 1100000003 HC PRIVATE W/ TELEMETRY

## 2024-03-26 PROCEDURE — 85730 THROMBOPLASTIN TIME PARTIAL: CPT

## 2024-03-26 PROCEDURE — 80048 BASIC METABOLIC PNL TOTAL CA: CPT

## 2024-03-26 PROCEDURE — A4216 STERILE WATER/SALINE, 10 ML: HCPCS | Performed by: PHYSICIAN ASSISTANT

## 2024-03-26 PROCEDURE — 97162 PT EVAL MOD COMPLEX 30 MIN: CPT

## 2024-03-26 PROCEDURE — 82962 GLUCOSE BLOOD TEST: CPT

## 2024-03-26 PROCEDURE — 2580000003 HC RX 258: Performed by: INTERNAL MEDICINE

## 2024-03-26 PROCEDURE — 99222 1ST HOSP IP/OBS MODERATE 55: CPT | Performed by: INTERNAL MEDICINE

## 2024-03-26 PROCEDURE — 6360000002 HC RX W HCPCS: Performed by: NURSE PRACTITIONER

## 2024-03-26 PROCEDURE — 99291 CRITICAL CARE FIRST HOUR: CPT | Performed by: INTERNAL MEDICINE

## 2024-03-26 PROCEDURE — 99232 SBSQ HOSP IP/OBS MODERATE 35: CPT

## 2024-03-26 PROCEDURE — 97166 OT EVAL MOD COMPLEX 45 MIN: CPT

## 2024-03-26 PROCEDURE — 92610 EVALUATE SWALLOWING FUNCTION: CPT

## 2024-03-26 PROCEDURE — 85025 COMPLETE CBC W/AUTO DIFF WBC: CPT

## 2024-03-26 PROCEDURE — 6370000000 HC RX 637 (ALT 250 FOR IP)

## 2024-03-26 PROCEDURE — 6370000000 HC RX 637 (ALT 250 FOR IP): Performed by: NURSE PRACTITIONER

## 2024-03-26 PROCEDURE — 84100 ASSAY OF PHOSPHORUS: CPT

## 2024-03-26 PROCEDURE — 84132 ASSAY OF SERUM POTASSIUM: CPT

## 2024-03-26 PROCEDURE — 83735 ASSAY OF MAGNESIUM: CPT

## 2024-03-26 PROCEDURE — 93010 ELECTROCARDIOGRAM REPORT: CPT | Performed by: INTERNAL MEDICINE

## 2024-03-26 PROCEDURE — 6370000000 HC RX 637 (ALT 250 FOR IP): Performed by: STUDENT IN AN ORGANIZED HEALTH CARE EDUCATION/TRAINING PROGRAM

## 2024-03-26 PROCEDURE — 6360000002 HC RX W HCPCS: Performed by: PHYSICIAN ASSISTANT

## 2024-03-26 RX ORDER — POTASSIUM CHLORIDE 29.8 MG/ML
20 INJECTION INTRAVENOUS
Status: COMPLETED | OUTPATIENT
Start: 2024-03-26 | End: 2024-03-26

## 2024-03-26 RX ORDER — POTASSIUM CHLORIDE 20 MEQ/1
40 TABLET, EXTENDED RELEASE ORAL ONCE
Status: COMPLETED | OUTPATIENT
Start: 2024-03-26 | End: 2024-03-26

## 2024-03-26 RX ORDER — ASPIRIN 81 MG/1
81 TABLET, CHEWABLE ORAL DAILY
Status: DISCONTINUED | OUTPATIENT
Start: 2024-03-26 | End: 2024-03-30 | Stop reason: HOSPADM

## 2024-03-26 RX ADMIN — FAMOTIDINE 20 MG: 10 INJECTION, SOLUTION INTRAVENOUS at 08:23

## 2024-03-26 RX ADMIN — POTASSIUM CHLORIDE 10 MEQ: 7.45 INJECTION INTRAVENOUS at 05:16

## 2024-03-26 RX ADMIN — PIPERACILLIN AND TAZOBACTAM 3375 MG: 3; .375 INJECTION, POWDER, FOR SOLUTION INTRAVENOUS; PARENTERAL at 03:30

## 2024-03-26 RX ADMIN — POTASSIUM BICARBONATE 40 MEQ: 782 TABLET, EFFERVESCENT ORAL at 14:27

## 2024-03-26 RX ADMIN — HEPARIN SODIUM 21 UNITS/KG/HR: 10000 INJECTION, SOLUTION INTRAVENOUS at 09:00

## 2024-03-26 RX ADMIN — HEPARIN SODIUM 21 UNITS/KG/HR: 10000 INJECTION, SOLUTION INTRAVENOUS at 21:53

## 2024-03-26 RX ADMIN — FUROSEMIDE 20 MG: 10 INJECTION, SOLUTION INTRAMUSCULAR; INTRAVENOUS at 08:23

## 2024-03-26 RX ADMIN — VANCOMYCIN HYDROCHLORIDE 1500 MG: 1 INJECTION, POWDER, LYOPHILIZED, FOR SOLUTION INTRAVENOUS at 07:21

## 2024-03-26 RX ADMIN — POTASSIUM CHLORIDE 20 MEQ: 29.8 INJECTION, SOLUTION INTRAVENOUS at 08:00

## 2024-03-26 RX ADMIN — POTASSIUM CHLORIDE 20 MEQ: 29.8 INJECTION, SOLUTION INTRAVENOUS at 07:00

## 2024-03-26 RX ADMIN — SODIUM CHLORIDE, PRESERVATIVE FREE 10 ML: 5 INJECTION INTRAVENOUS at 22:34

## 2024-03-26 RX ADMIN — HEPARIN SODIUM 4000 UNITS: 1000 INJECTION INTRAVENOUS; SUBCUTANEOUS at 03:06

## 2024-03-26 RX ADMIN — FAMOTIDINE 20 MG: 10 INJECTION, SOLUTION INTRAVENOUS at 22:34

## 2024-03-26 RX ADMIN — POLYETHYLENE GLYCOL 3350 17 G: 17 POWDER, FOR SOLUTION ORAL at 08:23

## 2024-03-26 RX ADMIN — SODIUM CHLORIDE, PRESERVATIVE FREE 10 ML: 5 INJECTION INTRAVENOUS at 08:25

## 2024-03-26 RX ADMIN — POTASSIUM CHLORIDE 10 MEQ: 7.45 INJECTION INTRAVENOUS at 06:16

## 2024-03-26 RX ADMIN — ASPIRIN 81 MG CHEWABLE TABLET 81 MG: 81 TABLET CHEWABLE at 13:34

## 2024-03-26 RX ADMIN — METOPROLOL TARTRATE 12.5 MG: 25 TABLET, FILM COATED ORAL at 22:34

## 2024-03-26 RX ADMIN — POTASSIUM CHLORIDE 40 MEQ: 1500 TABLET, EXTENDED RELEASE ORAL at 10:44

## 2024-03-26 RX ADMIN — DOCUSATE SODIUM 100 MG: 50 LIQUID ORAL at 08:23

## 2024-03-26 NOTE — PLAN OF CARE
Problem: Occupational Therapy - Adult  Goal: By Discharge: Performs self-care activities at highest level of function for planned discharge setting.  See evaluation for individualized goals.  Outcome: Progressing  Note: Occupational Therapy Goals:  Initiated 3/26/2024 to be met within 7-10 days.    1.  Patient will perform grooming standing with good balance with supervision/set-up.   2.  Patient will perform bathing with modified independence.  3.  Patient will perform upper body dressing  with modified independence.  4.  Patient will perform lower body dressing with supervision/set-up.  4.  Patient will perform toilet transfers with supervision/set-up.  5.  Patient will perform all aspects of toileting with supervision/set-up.  6.  Patient will participate in upper extremity therapeutic exercise/activities with independence for 8-10 minutes to increase strength/endurance for ADLs.    7.  Patient will utilize energy conservation techniques during functional activities with verbal and visual cues.    PLOF: Independent with ADLs, lives at home with wife     OCCUPATIONAL THERAPY EVALUATION    Patient: Gigi Mcneal (64 y.o. male)  Date: 3/26/2024  Primary Diagnosis: Acute hypoxic respiratory failure (HCC) [J96.01]       Precautions: Aspiration Risk, General Precautions,    ASSESSMENT :    Patient alert and cleared to participate by RN for OT evaluation. Patient seen supine in bed. Pt requires SBA for bed mobility to the EOB in preparation for ADLs. Pt performed sit<>stand and stand pivot transfer to the BSC with CGA using a RW,  bpm. Pt requires CGA for toileting task while standing. Pt /74, 120 HR, 97% O2 sitting on the BSC. Pt able to don/doff sock seated EOB with CGA and able to don gown with SBA. Pt left in bed semi-reclined, RN notified, and call bell within reach.    DEFICITS/IMPAIRMENTS:  Performance deficits / Impairments: Decreased functional mobility ;Decreased ADL status;Decreased

## 2024-03-26 NOTE — INTERDISCIPLINARY ROUNDS
[facility]  Pulmonary, Critical Care, and Sleep Medicine  Interdisciplinary and Ventilator Weaning Rounds    Patient discussed in morning walking rounds and interdisciplinary rounds.    ICU admission day: 3/23/24     Overnight events:   No acute events overnight       Assessments and best practice:  Ventilator  N/A, extubated 3/25/24   Glycemic control- SSI   Diet  NPO, pending SLP     Stress ulcer prophylaxis.  Pepcid  DVT prophylaxis.  Heparin gtt  Need for Lines, forte assessed.  Yes  Restraint Reevaluation   Patient does not require restraints   Disposition regarding transferring out of the ICU  RED      Family contact/MPOA:   Yi Mcneal Spouse [phone]     Betsy Malloy Brother/Sister   [phone]   korin mcneal Child   [phone]     Family updated: will update family today     Palliative consult within 3 days of admission to ICU-  Ethics Guidance: 21 days      Daily Plans:  PT/OT/SLP  Replete with more potassium, 40 mEq   Consult cardiology   D/c abx   Monitor off Levophed     GREER time 5 minutes        PAOLA Gray - NP  03/26/24  Pulmonary, Critical Care Medicine  [facility]

## 2024-03-26 NOTE — CARE COORDINATION
3/26/24    CM reviewed chart.   Patient not medically cleared for discharge/transfer from ICU.  Extubated on room air.  Electrolyte repletion. Cardio following.   Speech recommends diet.    CM spoke with wife Yi Mcneal @ [phone] about physical therapy recommendations for ARU. Offered star rated list [facility]. Wife would like submission to  ARU if not accepted will consider other facilities.    CM notified Samara MEDINA via phone @ ext. [phone], reviewing for acceptance.     Dispo: home vs. ARU/rehab.    Anne-Marie Villa  Care Management  [phone] office

## 2024-03-26 NOTE — PLAN OF CARE
110/79, RR 19-28,     DEFICITS/IMPAIRMENTS:    , Body Structures, Functions, Activity Limitations Requiring Skilled Therapeutic Intervention: Decreased functional mobility ;Decreased endurance;Decreased strength;Decreased safe awareness;Decreased balance    Patient will benefit from skilled intervention to address the above impairments.  Patient's rehabilitation potential: Therapy Prognosis: Good.  Factors which may influence rehabilitation potential include:  []         None noted  []         Mental ability/status  [x]         Medical condition  []         Home/family situation and support systems  []         Safety awareness  []         Pain tolerance/management  []         Other:      PLAN :  Recommendations and Planned Interventions:   [x]           Bed Mobility Training             [x]    Neuromuscular Re-Education  [x]           Transfer Training                   []    Orthotic/Prosthetic Training  [x]           Gait Training                          []    Modalities  [x]           Therapeutic Exercises           []    Edema Management/Control  [x]           Therapeutic Activities            [x]    Family Training/Education  [x]           Patient Education  []           Other (comment):    Frequency/Duration: Patient will be followed by physical therapy 1-2 times per day/3-5 days per week to address goals.    Further Equipment Recommendations for Discharge: rolling walker    AM-PAC Inpatient Mobility Raw Score : 13     This [facility] score should be considered in conjunction with interdisciplinary team recommendations to determine the most appropriate discharge setting. Patient's social support, diagnosis, medical stability, and prior level of function should also be taken into consideration.    Current research shows that an AM-PAC score of 17 (13 without stairs) or less is not associated with a discharge to the patient's home setting. Based on above AM-PAC score and current functional mobility deficits, it is  recommended that the patient have 5-7 sessions per week of Physical Therapy at d/c to increase the patient's independence.  Currently, this patient demonstrates the potential endurance, and/or tolerance for 3 hours of therapy each day at d/c.     SUBJECTIVE:   Patient stated “my legs gave out.”    OBJECTIVE DATA SUMMARY:   No past medical history on file.No past surgical history on file.    Home Situation:  Social/Functional History  Lives With: Significant other  Type of Home: House  Home Layout: One level  Home Access: Level entry  Bathroom Shower/Tub: Tub/Shower unit  Bathroom Toilet: Standard  Bathroom Equipment: None  Bathroom Accessibility: Accessible  Home Equipment: None  Receives Help From: Family  ADL Assistance: Independent  Homemaking Assistance: Independent  Homemaking Responsibilities: Yes  Ambulation Assistance: Independent  Transfer Assistance: Independent  Active : Yes  Occupation: Full time employment  Type of Occupation: [employer]    Critical Behavior:  Orientation  Overall Orientation Status: Within Normal Limits  Cognition  Overall Cognitive Status: WNL    Strength (BLE):    Strength: Generally decreased, functional    Range Of Motion (BLE):  AROM: Within functional limits  PROM: Within functional limits    Functional Mobility:  Bed Mobility:    Bed Mobility Training  Bed Mobility Training: Yes  Supine to Sit: Minimum assistance  Sit to Supine: Minimum assistance  Scooting: Maximum assistance  Transfers:    Transfer Training  Transfer Training: Yes  Sit to Stand: Minimum assistance  Stand to Sit: Minimum assistance  Balance:     Balance  Sitting: Impaired  Sitting - Static: Good (unsupported)  Sitting - Dynamic: Good (unsupported)  Standing: Impaired  Standing - Static: Fair  Standing - Dynamic: Fair    Pain:  Pain level pre-treatment: 0/10   Pain level post-treatment: 0/10     Activity Tolerance:   Activity Tolerance: Patient tolerated treatment well;Patient tolerated evaluation without

## 2024-03-26 NOTE — PROGRESS NOTES
ARU/IPR REFERRAL CONTACT NOTE  [facility]      Thank you for the opportunity to review this patient's case for admission to [facility].    Based on our pre-admission screening:     [x ] Our Team/Medical Director is following this case.   Comments:Referral received. Will review case with the team.       Again, Thank you for this referral. Should you have any questions please do not hesitate to call.     Sincerely,  Samara Howard    Clinical Liaison  [facility]  [phone]

## 2024-03-26 NOTE — PROGRESS NOTES
[facility] - Consult Note    Cardiology consultation request from Debbie Gonzalez for evaluation and management/treatment of elevated troponin    Date of  Admission: 3/23/2024  2:41 AM   Primary Care Physician:  Kamlesh Biggs MD    Attending Cardiologist: Dr. Lucero    Seen and independently examined.  Chart reviewed including interpreting previous EKGs, chest x-ray and his echocardiogram which was done at [facility].  Patient transferred to [facility] for acute hypoxic respiratory failure requiring intubation.  He was extubated yesterday.  Initially felt to be due to a pneumonia, however based on symptom onset, suspect acute systolic heart failure the culprit.  He had more of an insidious onset over 4 to 6 weeks with orthopnea, leg swelling and dyspnea on exertion.  Echocardiogram demonstrated a reduced LVEF around 35% with at least moderate aortic stenosis, possibly severe in the setting of reduced EF.  He is feeling much better today.  He is on no supplemental oxygen, his blood pressures are still little on the soft side.  Troponin level peaked at 3000 and is trending downward.  Suspect a type II MI, but given his depressed ejection fraction and new onset heart failure, coronary disease will need to be excluded.  Will attempt to start a low-dose of metoprolol this p.m. as long as his blood pressure is stable.  Would start low-dose aspirin as well.  Microcytic anemia noted as well with unknown etiology at this point.  Will tentatively plan on a cardiac catheterization toward the end of the week either Thursday or Friday.    Jad Lucero MD     Assessment:     Patient Active Problem List    Diagnosis Date Noted    Acute hypoxemic respiratory failure (HCC) 03/23/2024    Multifocal pneumonia 03/23/2024    HFrEF (heart failure with reduced ejection fraction) ([facility]) 03/23/2024       - Acute hypoxic respiratory failure requiring intubation. Extubated 3/25/2024. Likely secondary to CHF exacerbation    -

## 2024-03-26 NOTE — PROGRESS NOTES
attended the interdisciplinary rounds for Gigi Mcneal, who is a 64 y.o.,male. Patient's Primary Language is: English.   According to the patient's EMR [employer] Affiliation is: Denominational.     The reason the Patient came to the hospital is:   Patient Active Problem List    Diagnosis Date Noted    Acute hypoxemic respiratory failure (HCC) 03/23/2024    Multifocal pneumonia 03/23/2024    HFrEF (heart failure with reduced ejection fraction) ([facility]) 03/23/2024          Plan:   participated and listen to the recommendations of the IDR team.    Chaplains will continue to follow and will provide pastoral care on an as needed/requested basis.     recommends bedside caregivers page  on duty if patient shows signs of acute spiritual or emotional distress.     Pool Quiroz  Staff   Spiritual Health   [phone]

## 2024-03-26 NOTE — PLAN OF CARE
Problem: SLP Adult - Impaired Swallowing  Goal: By Discharge: Advance to least restrictive diet without signs or symptoms of aspiration for planned discharge setting.  See evaluation for individualized goals.  Description:     Patient will:  1. Tolerate PO trials with 0 s/s overt distress in 4/5 trials  2. Utilize compensatory swallow strategies/maneuvers (decrease bite/sip, size/rate, alt. liq/sol) with min cues in 4/5 trials  3. Perform oral-motor/laryngeal exercises to increase oropharyngeal swallow function with min cues  4. Complete an objective swallow study (i.e., MBSS) to assess swallow integrity, r/o aspiration, and determine of safest LRD, min A as indicated/ordered by MD     Rec:     Regular diet with thin liquids  Aspiration precautions  HOB >45 during po intake, remain >30 for 30-45 minutes after po   Small bites/sips; alternate liquid/solid with slow feeding rate   Oral care TID  Meds per pt preference    Outcome: Progressing   SPEECH LANGUAGE PATHOLOGY BEDSIDE SWALLOW EVALUATION/TREATMENT    Patient: Gigi Mcneal (64 y.o. male)  Date: 3/26/2024  Primary Diagnosis: Acute hypoxic respiratory failure (HCC) [J96.01]       Precautions: Aspiration  PLOF: As per H&P  ASSESSMENT:  Based on the objective data described below, the patient presents with min pharyngeal dysphagia s/p extubation yesterday PM. Pt tolerated reg solids and thin liquids +/- straw without any overt s/sx of aspiration. Throat clear/congested cough at baseline prior to PO and x 1/12 thin liquid trials. Laryngeal elevation was functional/timely to palpation. Mastication was functional/timely with positive oral clearance. Recommend reg solid diet with thin liquids, aspiration precautions, oral care TID, and meds as tolerated.     TREATMENT:  Skilled therapy initiated; Educated patient on aspiration precautions and importance of compensatory swallow techniques to decrease aspiration risk (decrease rate of intake & sip/bite size, upright    Bed alarm activated    COMMUNICATION/EDUCATION:   [x]            Aspiration precautions; swallow safety; compensatory techniques provided via demonstration, verbalization and teach back of comprehension  [x]         Patient/family have participated as able in goal setting and plan of care.  []            Patient/family agree to work toward stated goals and plan of care.  []            Patient understands intent and goals of therapy, neutral about participation.  []            Patient unable to participate in goal setting/plan of care secondary to cognition, hearing/vision deficits; education ongoing with interdisciplinary staff   []            Handout regarding diet recommendations and thickener instructions provided.  [x]         Posted safety precautions in patient's room.    Thank you for this referral.    Naomy Ceballos M.S., CCC-SLP/L  Speech-Language Pathologist

## 2024-03-26 NOTE — PROGRESS NOTES
[facility]  ICU Progress Note      Name: Ggii Mcneal   : 1960   MRN: [id number]   Date: 3/26/2024 7:38 AM     [x]I have reviewed the flowsheet and previous day’s notes.  Events overnight reviewed and discussed with nursing staff. Vital signs and records reviewed.    Interval HPI:  64M with PMH of tobacco use, non-compliance was admitted to ICU with multifactorial shock (septic +/- cardiogenic) as well as acute hypoxemic respiratory failure requiring intubation. Echo revealed reduced EF and troponins were elevated, so he is being treated for NSTEMI. Was extubated successfully on 3/25    Subjective:    Had a mechanical fall overnight when he got up to use the bathroom. No LoC, no head trauma per his report. CT head and neck obtained STAT and did not show evidence of bleeding.                   ROS:Constitutional: negative  Eyes: negative  Ears, nose, mouth, throat, and face: negative  Respiratory: negative  Cardiovascular: negative  Gastrointestinal: negative  Genitourinary:negative  Integument/breast: negative  Musculoskeletal:negative  Neurological: negative  Behavioral/Psych: negative    Medication Review:  Pressors - NE @8  Sedation - n/a  Antibiotics - zosyn stop date 3/30, vancomycin  Pain - tylenol  GI/ DVT - pepcid, IV heparin covers  Others (other gtts) - N/a    Safety Bundles: VAP Bundle/ CAUTI/ Severe Sepsis Protocol/ Electrolyte Replacement Protocol    Vital Signs:    /73   Pulse 93   Temp 98.4 °F (36.9 °C) (Axillary)   Resp 15   Ht 1.829 m (6') Comment: Photo ID  Wt 91.6 kg (202 lb)   SpO2 97%   BMI 27.40 kg/m²             Temp (24hrs), Av.8 °F (37.1 °C), Min:98.4 °F (36.9 °C), Max:99.8 °F (37.7 °C)       Intake/Output:   Last shift:      No intake/output data recorded.  Last 3 shifts: 1901 -  0700  In: 1755.2 [I.V.:905.2]  Out: 4995 [Urine:4995]    Intake/Output Summary (Last 24 hours) at 3/26/2024 0738  Last data filed at 3/26/2024 0000  Gross

## 2024-03-26 NOTE — PROGRESS NOTES
[facility]  Progress Note    Patient: Gigi Mcneal Age: 64 y.o. : 1960 MR#: [id number] SSN: [id number]  Date: 3/26/2024        Assessment/Plan:     Hospital Problems             Last Modified POA    * (Principal) Acute hypoxemic respiratory failure (HCC) 3/23/2024 Yes    Multifocal pneumonia 3/23/2024 Yes    HFrEF (heart failure with reduced ejection fraction) ([facility]) 3/23/2024 Yes    NSTEMI (non-ST elevated myocardial infarction) ([facility]) 3/26/2024 Yes    Hypokalemia 3/26/2024 Yes    Anemia 3/26/2024 Yes    History of tobacco abuse 3/26/2024 Yes    Medical non-compliance 3/26/2024 Yes     Assessment:  Acute hypoxic respiratory failure requiring intubation likely secondary to multifocal pneumonia versus CHF exacerbation-extubated 3/25/2024  Severe multifocal pneumonia involving both upper lobes  Acute HFrEF-echo with EF 35 to 40%, elevated proBNP  Septic shock secondary to multifocal pneumonia  Acute toxic versus metabolic encephalopathy  Moderate to large bilateral pleural effusions  NSTEMI type I versus type II-on heparin drip  Hypokalemia- replaced   Lactic acidosis-resolved  Anemia - Hgb 8.8   History of tobacco abuse  Poor medical compliance  Obesity      Plan:   - Lasix 20 mg IV Daily   - ASA, BB   - Plan for cardiac cath later in the week with cardiology, either Thursday or Friday, Keep NPO accordingly   - Abx discontinued with cultures negative at 72 hours, monitor temp curve and CBC   - Aspiration Precaution    - Cardiac monitoring   - Iron panel, Vit B 12, Ferritin in the AM- follow up   - Monitor for signs of active bleeding, currently Hgb stable at 8.8   - Cardiology consulted- appreciate assistance     PT/OT/IS     DVT Prophylaxis:  []Lovenox  []Hep SQ  []SCDs  []Coumadin   [x]On Heparin gtt []PO anticoagulant    Anticipated discharge: > 2 days     Subjective:     Pt s/e @ bedside. No major events overnight. Pt offers no complaints at this time. Daughter at

## 2024-03-26 NOTE — PLAN OF CARE
Problem: Safety - Medical Restraint  Goal: Remains free of injury from restraints (Restraint for Interference with Medical Device)  Description: INTERVENTIONS:  1. Determine that other, less restrictive measures have been tried or would not be effective before applying the restraint  2. Evaluate the patient's condition at the time of restraint application  3. Inform patient/family regarding the reason for restraint  4. Q2H: Monitor safety, psychosocial status, comfort, nutrition and hydration  Outcome: Progressing     Problem: Discharge Planning  Goal: Discharge to home or other facility with appropriate resources  Outcome: Progressing     Problem: Pain  Goal: Verbalizes/displays adequate comfort level or baseline comfort level  Outcome: Progressing     Problem: Safety - Adult  Goal: Free from fall injury  Outcome: Progressing     Problem: Nutrition Deficit:  Goal: Optimize nutritional status  Outcome: Progressing

## 2024-03-27 PROBLEM — I25.10 CORONARY ARTERY DISEASE INVOLVING NATIVE CORONARY ARTERY OF NATIVE HEART WITHOUT ANGINA PECTORIS: Status: ACTIVE | Noted: 2024-03-27

## 2024-03-27 LAB
ANION GAP SERPL CALC-SCNC: 5 MMOL/L (ref 3–18)
APTT PPP: 94.8 SEC (ref 23–36.4)
BASOPHILS # BLD: 0 K/UL (ref 0–0.1)
BASOPHILS NFR BLD: 0 % (ref 0–2)
BUN SERPL-MCNC: 9 MG/DL (ref 7–18)
BUN/CREAT SERPL: 12 (ref 12–20)
CALCIUM SERPL-MCNC: 8.2 MG/DL (ref 8.5–10.1)
CHLORIDE SERPL-SCNC: 109 MMOL/L (ref 100–111)
CO2 SERPL-SCNC: 26 MMOL/L (ref 21–32)
CREAT SERPL-MCNC: 0.74 MG/DL (ref 0.6–1.3)
DIFFERENTIAL METHOD BLD: ABNORMAL
EOSINOPHIL # BLD: 0.1 K/UL (ref 0–0.4)
EOSINOPHIL NFR BLD: 1 % (ref 0–5)
ERYTHROCYTE [DISTWIDTH] IN BLOOD BY AUTOMATED COUNT: 21.6 % (ref 11.6–14.5)
FERRITIN SERPL-MCNC: 97 NG/ML (ref 8–388)
FOLATE SERPL-MCNC: 5.9 NG/ML (ref 3.1–17.5)
GLUCOSE BLD STRIP.AUTO-MCNC: 91 MG/DL (ref 70–110)
GLUCOSE BLD STRIP.AUTO-MCNC: 99 MG/DL (ref 70–110)
GLUCOSE SERPL-MCNC: 110 MG/DL (ref 74–99)
HCT VFR BLD AUTO: 27.7 % (ref 36–48)
HGB BLD-MCNC: 8.3 G/DL (ref 13–16)
IMM GRANULOCYTES # BLD AUTO: 0.1 K/UL (ref 0–0.04)
IMM GRANULOCYTES NFR BLD AUTO: 1 % (ref 0–0.5)
IRON SATN MFR SERPL: 5 % (ref 20–50)
IRON SERPL-MCNC: 17 UG/DL (ref 50–175)
LYMPHOCYTES # BLD: 1.2 K/UL (ref 0.9–3.6)
LYMPHOCYTES NFR BLD: 15 % (ref 21–52)
MAGNESIUM SERPL-MCNC: 2.3 MG/DL (ref 1.6–2.6)
MCH RBC QN AUTO: 18.9 PG (ref 24–34)
MCHC RBC AUTO-ENTMCNC: 30 G/DL (ref 31–37)
MCV RBC AUTO: 63.1 FL (ref 78–100)
MONOCYTES # BLD: 0.7 K/UL (ref 0.05–1.2)
MONOCYTES NFR BLD: 8 % (ref 3–10)
NEUTS SEG # BLD: 6.3 K/UL (ref 1.8–8)
NEUTS SEG NFR BLD: 76 % (ref 40–73)
NRBC # BLD: 0 K/UL (ref 0–0.01)
NRBC BLD-RTO: 0 PER 100 WBC
PHOSPHATE SERPL-MCNC: 3.2 MG/DL (ref 2.5–4.9)
PLATELET # BLD AUTO: 378 K/UL (ref 135–420)
PMV BLD AUTO: 10.7 FL (ref 9.2–11.8)
POTASSIUM SERPL-SCNC: 3.2 MMOL/L (ref 3.5–5.5)
POTASSIUM SERPL-SCNC: 3.6 MMOL/L (ref 3.5–5.5)
RBC # BLD AUTO: 4.39 M/UL (ref 4.35–5.65)
SODIUM SERPL-SCNC: 140 MMOL/L (ref 136–145)
TIBC SERPL-MCNC: 326 UG/DL (ref 250–450)
TROPONIN I SERPL HS-MCNC: 392 NG/L (ref 0–78)
VIT B12 SERPL-MCNC: 477 PG/ML (ref 211–911)
WBC # BLD AUTO: 8.4 K/UL (ref 4.6–13.2)

## 2024-03-27 PROCEDURE — 99233 SBSQ HOSP IP/OBS HIGH 50: CPT | Performed by: INTERNAL MEDICINE

## 2024-03-27 PROCEDURE — 85025 COMPLETE CBC W/AUTO DIFF WBC: CPT

## 2024-03-27 PROCEDURE — 82607 VITAMIN B-12: CPT

## 2024-03-27 PROCEDURE — 83735 ASSAY OF MAGNESIUM: CPT

## 2024-03-27 PROCEDURE — 83550 IRON BINDING TEST: CPT

## 2024-03-27 PROCEDURE — 6360000002 HC RX W HCPCS: Performed by: PHYSICIAN ASSISTANT

## 2024-03-27 PROCEDURE — 2500000003 HC RX 250 WO HCPCS: Performed by: PHYSICIAN ASSISTANT

## 2024-03-27 PROCEDURE — 6370000000 HC RX 637 (ALT 250 FOR IP): Performed by: INTERNAL MEDICINE

## 2024-03-27 PROCEDURE — 82962 GLUCOSE BLOOD TEST: CPT

## 2024-03-27 PROCEDURE — 2580000003 HC RX 258: Performed by: PHYSICIAN ASSISTANT

## 2024-03-27 PROCEDURE — 84100 ASSAY OF PHOSPHORUS: CPT

## 2024-03-27 PROCEDURE — 94761 N-INVAS EAR/PLS OXIMETRY MLT: CPT

## 2024-03-27 PROCEDURE — 84484 ASSAY OF TROPONIN QUANT: CPT

## 2024-03-27 PROCEDURE — 84132 ASSAY OF SERUM POTASSIUM: CPT

## 2024-03-27 PROCEDURE — 6360000002 HC RX W HCPCS: Performed by: NURSE PRACTITIONER

## 2024-03-27 PROCEDURE — 83540 ASSAY OF IRON: CPT

## 2024-03-27 PROCEDURE — 82728 ASSAY OF FERRITIN: CPT

## 2024-03-27 PROCEDURE — 99232 SBSQ HOSP IP/OBS MODERATE 35: CPT | Performed by: HOSPITALIST

## 2024-03-27 PROCEDURE — 80048 BASIC METABOLIC PNL TOTAL CA: CPT

## 2024-03-27 PROCEDURE — 85730 THROMBOPLASTIN TIME PARTIAL: CPT

## 2024-03-27 PROCEDURE — 6370000000 HC RX 637 (ALT 250 FOR IP)

## 2024-03-27 PROCEDURE — 92526 ORAL FUNCTION THERAPY: CPT

## 2024-03-27 PROCEDURE — 36415 COLL VENOUS BLD VENIPUNCTURE: CPT

## 2024-03-27 PROCEDURE — 6370000000 HC RX 637 (ALT 250 FOR IP): Performed by: FAMILY MEDICINE

## 2024-03-27 PROCEDURE — A4216 STERILE WATER/SALINE, 10 ML: HCPCS | Performed by: PHYSICIAN ASSISTANT

## 2024-03-27 PROCEDURE — 82746 ASSAY OF FOLIC ACID SERUM: CPT

## 2024-03-27 PROCEDURE — 6370000000 HC RX 637 (ALT 250 FOR IP): Performed by: NURSE PRACTITIONER

## 2024-03-27 PROCEDURE — 1100000003 HC PRIVATE W/ TELEMETRY

## 2024-03-27 RX ADMIN — FAMOTIDINE 20 MG: 10 INJECTION, SOLUTION INTRAVENOUS at 08:12

## 2024-03-27 RX ADMIN — FAMOTIDINE 20 MG: 10 INJECTION, SOLUTION INTRAVENOUS at 20:15

## 2024-03-27 RX ADMIN — HEPARIN SODIUM 21 UNITS/KG/HR: 10000 INJECTION, SOLUTION INTRAVENOUS at 10:14

## 2024-03-27 RX ADMIN — METOPROLOL TARTRATE 25 MG: 25 TABLET, FILM COATED ORAL at 20:16

## 2024-03-27 RX ADMIN — SODIUM CHLORIDE, PRESERVATIVE FREE 10 ML: 5 INJECTION INTRAVENOUS at 08:12

## 2024-03-27 RX ADMIN — POTASSIUM BICARBONATE 50 MEQ: 978 TABLET, EFFERVESCENT ORAL at 08:11

## 2024-03-27 RX ADMIN — ASPIRIN 81 MG CHEWABLE TABLET 81 MG: 81 TABLET CHEWABLE at 08:12

## 2024-03-27 RX ADMIN — POTASSIUM BICARBONATE 50 MEQ: 978 TABLET, EFFERVESCENT ORAL at 10:35

## 2024-03-27 RX ADMIN — DOCUSATE SODIUM 100 MG: 50 LIQUID ORAL at 08:12

## 2024-03-27 RX ADMIN — POLYETHYLENE GLYCOL 3350 17 G: 17 POWDER, FOR SOLUTION ORAL at 08:12

## 2024-03-27 RX ADMIN — METOPROLOL TARTRATE 12.5 MG: 25 TABLET, FILM COATED ORAL at 08:12

## 2024-03-27 RX ADMIN — SODIUM CHLORIDE, PRESERVATIVE FREE 10 ML: 5 INJECTION INTRAVENOUS at 22:29

## 2024-03-27 RX ADMIN — FUROSEMIDE 20 MG: 10 INJECTION, SOLUTION INTRAMUSCULAR; INTRAVENOUS at 08:12

## 2024-03-27 NOTE — FLOWSHEET NOTE
Patient has been stable for most of the morning while on a vent. Patient appeared to verbalize nurses instructions as evidence of patient not attempting to pull at ETT.   Blood pressure was closely monitor after Levophed was stopped after morning rounds. Blood pressure declined to SBP 80's. Levophed was restarted at 2mcg and titrated  up to 4mcg.    At 1430 patient was extubated by RT and restraints were removed. Pt. Was given verbal of the next phase of his recovery.     1500 Staff arrives to bedside after hearing a large noise to find patient OOB in the floor with IV polls x2 on the floor. Patient was position on his left side with head not touching the floor. Patient denies hitting his head.   Patient also had a BM while on the floor of loose soft dark brown stool. Patient was assisted on his feet and assisted back to bed by male staff(physicians) and staff provided hygiene care.   Heparin was immediately stopped until a CT scan can r/o any head trauma.     Levophed was increased to 6mcg was back in bed then 10mcg for SBP <80.  Patient responded well to Levophed with SBP improving >110s.     Patient was escorted by nurse to CT for scan on monitor. While in CT Levophed was increased to 12mcg for drop in blood pressure after medication was stopped while transferring on to table d/t IV tubing position.     Patient tolerated scan well and was escorted back to room.     Patient c/o chest pains at shift change. Troponins were drawn and EKG was performed. Nurse informed attending on the unit.     SBAR report was given to on coming nurse Florence HERRERA

## 2024-03-27 NOTE — PROGRESS NOTES
ARU/IPR REFERRAL CONTACT NOTE  [facility]      Thank you for the opportunity to review this patient's case for admission to [facility].    Based on our pre-admission screening:     [ x] Our Team/Medical Director is following this case.   Comments:Met with patient at the bedside. Pt confirmed he is open to ARU.     Initiated prior authorization with [facility] plan.     Again, Thank you for this referral. Should you have any questions please do not hesitate to call.     Sincerely,  Samara Howard    Clinical Liaison  [facility]  [phone]

## 2024-03-27 NOTE — CARE COORDINATION
03/27/24 1456   /Social Work Whiteboard Notes   /Social Work Whiteboard RED-3/27/24.Patient not medically cleared for discharge/may transfer from ICU.On room air. Electrolyte repletion. Cardio following. Dispo: Home vs ARU.        Anne-Marie Villa  Care Management  [phone] office

## 2024-03-27 NOTE — PROGRESS NOTES
heparin (porcine) injection 2,000 Units  2,000 Units IntraVENous PRN    heparin 25,000 units in dextrose 5% 250 mL (premix) infusion  5-30 Units/kg/hr IntraVENous Continuous         Intake/Output Summary (Last 24 hours) at 3/27/2024 1210  Last data filed at 3/27/2024 1200  Gross per 24 hour   Intake 212.3 ml   Output 1125 ml   Net -912.7 ml       Physical Exam:  General:  alert, appears stated age, and cooperative  Neck:  no JVD  Lungs:  clear to auscultation bilaterally  Heart:  regular rate and rhythm, S1, S2 normal, no murmur, click, rub or gallop  Abdomen:  abdomen is soft without significant tenderness, masses, organomegaly or guarding  Extremities:  extremities normal, atraumatic, no cyanosis or edema    Visit Vitals  BP (!) 135/58   Pulse (!) 133   Temp 98.6 °F (37 °C) (Axillary)   Resp 30   Ht 1.829 m (6')   Wt 91.6 kg (202 lb)   SpO2 95%   BMI 27.40 kg/m²       Data Review:     Labs: Results:       Chemistry Recent Labs     03/25/24  0130 03/26/24  0330 03/26/24  1200 03/26/24  1630 03/27/24  0500    140  --   --  140   K 3.7 2.9* 3.3* 3.5 3.2*    105  --   --  109   CO2 27 28  --   --  26   BUN 17 14  --   --  9   MG 2.3 2.1  --   --  2.3   PHOS 2.3* 2.7  --   --  3.2      CBC w/Diff Recent Labs     03/25/24  0130 03/26/24  0330 03/27/24  0500   WBC 11.1 12.4 8.4   RBC 4.43 4.58 4.39   HGB 8.3* 8.8* 8.3*   HCT 27.6* 28.4* 27.7*    393 378      Cardiac Enzymes Lab Results   Component Value Date/Time    TROPHS 392 03/27/2024 05:00 AM      Coagulation Recent Labs     03/26/24  1630 03/27/24  0513   APTT 85.7* 94.8*       Lipid Panel Lab Results   Component Value Date/Time    CHOL 188 08/16/2023 12:00 AM    HDL 38 08/16/2023 12:00 AM      BNP No results found for: \"BNP\", \"BNPPOC\"    No results found for: \"BNP\"   Liver Enzymes Lab Results   Component Value Date    ALT 19 03/22/2024    AST 10 03/22/2024    ALKPHOS 74 03/22/2024    BILITOT 1.1 (H) 03/22/2024      Thyroid Studies No results

## 2024-03-27 NOTE — PLAN OF CARE
Problem: Discharge Planning  Goal: Discharge to home or other facility with appropriate resources  3/27/2024 1004 by Graham Richardson RN  Outcome: Progressing  3/27/2024 0732 by Iman Kirkland, RN  Outcome: Progressing  Flowsheets (Taken 3/27/2024 0200)  Discharge to home or other facility with appropriate resources:   Identify barriers to discharge with patient and caregiver   Refer to discharge planning if patient needs post-hospital services based on physician order or complex needs related to functional status, cognitive ability or social support system     Problem: Pain  Goal: Verbalizes/displays adequate comfort level or baseline comfort level  3/27/2024 1004 by Graham Richardson RN  Outcome: Progressing  3/27/2024 0732 by Imna Kirkland, RN  Outcome: Progressing     Problem: Safety - Adult  Goal: Free from fall injury  3/27/2024 1004 by Graham Richardson RN  Outcome: Progressing  3/27/2024 0732 by Iman Kirkland, RN  Outcome: Progressing     Problem: Nutrition Deficit:  Goal: Optimize nutritional status  3/27/2024 1004 by Graham Richardson RN  Outcome: Progressing  3/27/2024 0732 by Iman Kirkland, RN  Outcome: Progressing

## 2024-03-27 NOTE — PLAN OF CARE
Problem: SLP Adult - Impaired Swallowing  Goal: By Discharge: Advance to least restrictive diet without signs or symptoms of aspiration for planned discharge setting.  See evaluation for individualized goals.  Description: Patient will:  1. Tolerate PO trials with 0 s/s overt distress in 4/5 trials  2. Utilize compensatory swallow strategies/maneuvers (decrease bite/sip, size/rate, alt. liq/sol) with min cues in 4/5 trials  3. Perform oral-motor/laryngeal exercises to increase oropharyngeal swallow function with min cues  4. Complete an objective swallow study (i.e., MBSS) to assess swallow integrity, r/o aspiration, and determine of safest LRD, min A as indicated/ordered by MD     Rec:     Regular diet with thin liquids  Aspiration precautions  HOB >45 during po intake, remain >30 for 30-45 minutes after po   Small bites/sips; alternate liquid/solid with slow feeding rate   Oral care TID  Meds per pt preference  Outcome: Progressing   SPEECH LANGUAGE PATHOLOGY DYSPHAGIA TREATMENT    Patient: Gigi Mcneal (64 y.o. male)  Date: 3/27/2024  Diagnosis: Acute hypoxic respiratory failure (HCC) [J96.01] Acute hypoxemic respiratory failure (HCC)  Procedure(s) (LRB):  Left heart cath (N/A)    Precautions: Standard, aspiration  PLOF: As per H&P      ASSESSMENT:  Pt was seen for follow up dysphagia management. Pt tolerated reg solid, puree, and thin liquids +/- straw consecutive swallows without any overt s/sx of aspiration. Mastication was appropriate with timely a-p transit. Positive oral clearance observed across all trials. Pt safe for continuation of reg solid diet with thin liquids, aspiration precautions, oral care TID, and meds as tolerated. ST will continue to follow x 1-2 visits to ensure safety of diet tolerance.    Progression toward goals:  []         Improving appropriately and progressing toward goals  [x]         Improving slowly and progressing toward goals  []         Not making progress toward goals and  in no apparent distress sitting up in chair  [x]              Patient left in no apparent distress in bed  [x]              Call bell left within reach  [x]              Nursing notified  []              Family present  []              Caregiver present  []              Bed alarm activated    COMMUNICATION/EDUCATION:   [x]            Aspiration precautions; swallow safety; compensatory techniques provided via demonstration, verbalization and teach back of comprehension  [x]         Patient/family have participated as able in goal setting and plan of care.  []            Patient/family agree to work toward stated goals and plan of care.  []            Patient understands intent and goals of therapy, neutral about participation.  []            Patient unable to participate in goal setting/plan of care secondary to cognition, hearing/vision deficits; education ongoing with interdisciplinary staff   []            Handout regarding diet recommendations and thickener instructions provided.  [x]         Posted safety precautions in patient's room.    Thank you for this referral.    Naomy Ceballos M.S., CCC-SLP/L  Speech-Language Pathologist

## 2024-03-27 NOTE — PLAN OF CARE
Problem: Safety - Medical Restraint  Goal: Remains free of injury from restraints (Restraint for Interference with Medical Device)  Description: INTERVENTIONS:  1. Determine that other, less restrictive measures have been tried or would not be effective before applying the restraint  2. Evaluate the patient's condition at the time of restraint application  3. Inform patient/family regarding the reason for restraint  4. Q2H: Monitor safety, psychosocial status, comfort, nutrition and hydration  Outcome: Completed     Problem: Discharge Planning  Goal: Discharge to home or other facility with appropriate resources  Outcome: Progressing  Flowsheets (Taken 3/27/2024 0200)  Discharge to home or other facility with appropriate resources:   Identify barriers to discharge with patient and caregiver   Refer to discharge planning if patient needs post-hospital services based on physician order or complex needs related to functional status, cognitive ability or social support system     Problem: Pain  Goal: Verbalizes/displays adequate comfort level or baseline comfort level  Outcome: Progressing     Problem: Safety - Adult  Goal: Free from fall injury  Outcome: Progressing     Problem: Nutrition Deficit:  Goal: Optimize nutritional status  Outcome: Progressing

## 2024-03-27 NOTE — PROGRESS NOTES
[facility]  Progress Note    Patient: Gigi Mcneal Age: 64 y.o. : 1960 MR#: [id number] SSN: [id number]  Date/Time: 3/27/2024     Subjective: Patient sitting on the side of the bed, feels much better.  Shortness of breath improving.  No chest pain or chest tightness.      Assessment/Plan:   Acute hypoxic respiratory failure requiring intubation likely secondary to multifocal pneumonia versus CHF exacerbation-extubated 3/25/2024  Severe multifocal pneumonia involving both upper lobes  Acute HFrEF-echo with EF 35 to 40%, elevated proBNP  Acute debility, possible critical care myopathy  Septic shock secondary to multifocal pneumonia  Acute toxic versus metabolic encephalopathy  Moderate to large bilateral pleural effusions  NSTEMI type I versus type II-on heparin drip  Hypokalemia- replaced   Lactic acidosis-resolved  Anemia - Hgb 8.8   History of tobacco abuse  Poor medical compliance  Obesity        Plan:   -Continue Lasix 20 mg IV Daily, more than 2 L negative balance since admission  -Continue ASA, BB and IV heparin drip per cardiology  - Plan for cardiac cath later in the week with cardiology, either Thursday or Friday, Keep NPO accordingly   - Abx discontinued with cultures negative at 72 hours, monitor temp curve and CBC   - Aspiration Precaution    - Cardiac monitoring   - Iron panel, Vit B 12, Ferritin in the AM- follow up   - Monitor for signs of active bleeding, currently Hgb stable at 8.8   -Will replace potassium and monitor  PT/OT eval and treatment  Further plan based on hospital course    Discussed with RN at the bedside, Uribe and right femoral central line will be removed today.    Discussed with patient and also with the wife over the phone and explained in detail about my above plan care.  Both of them understood and agreed with my plan.  Obtained permission from the patient to talk to the wife before calling.    Disposition: Acute rehab once  Immature Granulocyte 0.1 (H) 0.00 - 0.04 K/UL    Differential Type AUTOMATED     Basic Metabolic Panel    Collection Time: 03/27/24  5:00 AM   Result Value Ref Range    Sodium 140 136 - 145 mmol/L    Potassium 3.2 (L) 3.5 - 5.5 mmol/L    Chloride 109 100 - 111 mmol/L    CO2 26 21 - 32 mmol/L    Anion Gap 5 3.0 - 18 mmol/L    Glucose 110 (H) 74 - 99 mg/dL    BUN 9 7.0 - 18 MG/DL    Creatinine 0.74 0.6 - 1.3 MG/DL    Bun/Cre Ratio 12 12 - 20      Est, Glom Filt Rate >90 >60 ml/min/1.73m2    Calcium 8.2 (L) 8.5 - 10.1 MG/DL   Magnesium    Collection Time: 03/27/24  5:00 AM   Result Value Ref Range    Magnesium 2.3 1.6 - 2.6 mg/dL   Phosphorus    Collection Time: 03/27/24  5:00 AM   Result Value Ref Range    Phosphorus 3.2 2.5 - 4.9 MG/DL   Troponin    Collection Time: 03/27/24  5:00 AM   Result Value Ref Range    Troponin, High Sensitivity 392 (HH) 0 - 78 ng/L   Vitamin B12 & Folate    Collection Time: 03/27/24  5:00 AM   Result Value Ref Range    Vitamin B-12 477 211 - 911 pg/mL    Folate 5.9 3.10 - 17.50 ng/mL   Ferritin    Collection Time: 03/27/24  5:00 AM   Result Value Ref Range    Ferritin 97 8 - 388 NG/ML   Iron and TIBC    Collection Time: 03/27/24  5:00 AM   Result Value Ref Range    Iron 17 (L) 50 - 175 ug/dL    TIBC 326 250 - 450 ug/dL    Iron % Saturation 5 (L) 20 - 50 %   APTT    Collection Time: 03/27/24  5:13 AM   Result Value Ref Range    APTT 94.8 (H) 23.0 - 36.4 SEC   POCT Glucose    Collection Time: 03/27/24  5:32 AM   Result Value Ref Range    POC Glucose 91 70 - 110 mg/dL   POCT Glucose    Collection Time: 03/27/24 11:17 AM   Result Value Ref Range    POC Glucose 99 70 - 110 mg/dL       Signed By: Trish Valencia MD     March 27, 2024      Disclaimer: Sections of this note are dictated using utilizing voice recognition software.  Minor typographical errors may be present. If questions arise, please do not hesitate to contact me or call our department.

## 2024-03-28 LAB
ANION GAP SERPL CALC-SCNC: 7 MMOL/L (ref 3–18)
APTT PPP: 63.3 SEC (ref 23–36.4)
BACTERIA SPEC CULT: NORMAL
BACTERIA SPEC CULT: NORMAL
BASOPHILS # BLD: 0 K/UL (ref 0–0.1)
BASOPHILS NFR BLD: 0 % (ref 0–2)
BUN SERPL-MCNC: 10 MG/DL (ref 7–18)
BUN/CREAT SERPL: 12 (ref 12–20)
CALCIUM SERPL-MCNC: 8.4 MG/DL (ref 8.5–10.1)
CHLORIDE SERPL-SCNC: 109 MMOL/L (ref 100–111)
CO2 SERPL-SCNC: 26 MMOL/L (ref 21–32)
CREAT SERPL-MCNC: 0.84 MG/DL (ref 0.6–1.3)
DIFFERENTIAL METHOD BLD: ABNORMAL
ECHO BSA: 2.17 M2
EOSINOPHIL # BLD: 0.2 K/UL (ref 0–0.4)
EOSINOPHIL NFR BLD: 2 % (ref 0–5)
ERYTHROCYTE [DISTWIDTH] IN BLOOD BY AUTOMATED COUNT: 22.4 % (ref 11.6–14.5)
GLUCOSE SERPL-MCNC: 117 MG/DL (ref 74–99)
HCT VFR BLD AUTO: 30 % (ref 36–48)
HGB BLD-MCNC: 8.9 G/DL (ref 13–16)
IMM GRANULOCYTES # BLD AUTO: 0.1 K/UL (ref 0–0.04)
IMM GRANULOCYTES NFR BLD AUTO: 1 % (ref 0–0.5)
LYMPHOCYTES # BLD: 1.7 K/UL (ref 0.9–3.6)
LYMPHOCYTES NFR BLD: 19 % (ref 21–52)
Lab: NORMAL
Lab: NORMAL
MAGNESIUM SERPL-MCNC: 2.5 MG/DL (ref 1.6–2.6)
MCH RBC QN AUTO: 18.9 PG (ref 24–34)
MCHC RBC AUTO-ENTMCNC: 29.7 G/DL (ref 31–37)
MCV RBC AUTO: 63.7 FL (ref 78–100)
MONOCYTES # BLD: 0.9 K/UL (ref 0.05–1.2)
MONOCYTES NFR BLD: 10 % (ref 3–10)
NEUTS SEG # BLD: 6 K/UL (ref 1.8–8)
NEUTS SEG NFR BLD: 68 % (ref 40–73)
NRBC # BLD: 0 K/UL (ref 0–0.01)
NRBC BLD-RTO: 0 PER 100 WBC
PHOSPHATE SERPL-MCNC: 3.3 MG/DL (ref 2.5–4.9)
PLATELET # BLD AUTO: 413 K/UL (ref 135–420)
PLATELET COMMENT: ABNORMAL
PMV BLD AUTO: 11.1 FL (ref 9.2–11.8)
POTASSIUM SERPL-SCNC: 3.4 MMOL/L (ref 3.5–5.5)
POTASSIUM SERPL-SCNC: 3.4 MMOL/L (ref 3.5–5.5)
RBC # BLD AUTO: 4.71 M/UL (ref 4.35–5.65)
RBC MORPH BLD: ABNORMAL
SODIUM SERPL-SCNC: 142 MMOL/L (ref 136–145)
WBC # BLD AUTO: 8.9 K/UL (ref 4.6–13.2)

## 2024-03-28 PROCEDURE — 6360000004 HC RX CONTRAST MEDICATION: Performed by: INTERNAL MEDICINE

## 2024-03-28 PROCEDURE — 6370000000 HC RX 637 (ALT 250 FOR IP)

## 2024-03-28 PROCEDURE — 99152 MOD SED SAME PHYS/QHP 5/>YRS: CPT | Performed by: INTERNAL MEDICINE

## 2024-03-28 PROCEDURE — A4216 STERILE WATER/SALINE, 10 ML: HCPCS | Performed by: PHYSICIAN ASSISTANT

## 2024-03-28 PROCEDURE — C1725 CATH, TRANSLUMIN NON-LASER: HCPCS | Performed by: INTERNAL MEDICINE

## 2024-03-28 PROCEDURE — C1769 GUIDE WIRE: HCPCS | Performed by: INTERNAL MEDICINE

## 2024-03-28 PROCEDURE — 6370000000 HC RX 637 (ALT 250 FOR IP): Performed by: INTERNAL MEDICINE

## 2024-03-28 PROCEDURE — C9600 PERC DRUG-EL COR STENT SING: HCPCS | Performed by: INTERNAL MEDICINE

## 2024-03-28 PROCEDURE — 2709999900 HC NON-CHARGEABLE SUPPLY: Performed by: INTERNAL MEDICINE

## 2024-03-28 PROCEDURE — 83735 ASSAY OF MAGNESIUM: CPT

## 2024-03-28 PROCEDURE — 6360000002 HC RX W HCPCS: Performed by: NURSE PRACTITIONER

## 2024-03-28 PROCEDURE — 99232 SBSQ HOSP IP/OBS MODERATE 35: CPT | Performed by: HOSPITALIST

## 2024-03-28 PROCEDURE — 6360000002 HC RX W HCPCS: Performed by: PHYSICIAN ASSISTANT

## 2024-03-28 PROCEDURE — 93458 L HRT ARTERY/VENTRICLE ANGIO: CPT | Performed by: INTERNAL MEDICINE

## 2024-03-28 PROCEDURE — 2140000001 HC CVICU INTERMEDIATE R&B

## 2024-03-28 PROCEDURE — 84100 ASSAY OF PHOSPHORUS: CPT

## 2024-03-28 PROCEDURE — 99232 SBSQ HOSP IP/OBS MODERATE 35: CPT | Performed by: INTERNAL MEDICINE

## 2024-03-28 PROCEDURE — 92928 PRQ TCAT PLMT NTRAC ST 1 LES: CPT | Performed by: INTERNAL MEDICINE

## 2024-03-28 PROCEDURE — 6360000002 HC RX W HCPCS: Performed by: INTERNAL MEDICINE

## 2024-03-28 PROCEDURE — 6370000000 HC RX 637 (ALT 250 FOR IP): Performed by: PHYSICIAN ASSISTANT

## 2024-03-28 PROCEDURE — C9601 PERC DRUG-EL COR STENT BRAN: HCPCS | Performed by: INTERNAL MEDICINE

## 2024-03-28 PROCEDURE — 36415 COLL VENOUS BLD VENIPUNCTURE: CPT

## 2024-03-28 PROCEDURE — 84132 ASSAY OF SERUM POTASSIUM: CPT

## 2024-03-28 PROCEDURE — 2580000003 HC RX 258: Performed by: PHYSICIAN ASSISTANT

## 2024-03-28 PROCEDURE — C1713 ANCHOR/SCREW BN/BN,TIS/BN: HCPCS | Performed by: INTERNAL MEDICINE

## 2024-03-28 PROCEDURE — 76000 FLUOROSCOPY <1 HR PHYS/QHP: CPT | Performed by: INTERNAL MEDICINE

## 2024-03-28 PROCEDURE — 99153 MOD SED SAME PHYS/QHP EA: CPT | Performed by: INTERNAL MEDICINE

## 2024-03-28 PROCEDURE — C1887 CATHETER, GUIDING: HCPCS | Performed by: INTERNAL MEDICINE

## 2024-03-28 PROCEDURE — 85730 THROMBOPLASTIN TIME PARTIAL: CPT

## 2024-03-28 PROCEDURE — 2580000003 HC RX 258: Performed by: INTERNAL MEDICINE

## 2024-03-28 PROCEDURE — 2500000003 HC RX 250 WO HCPCS: Performed by: PHYSICIAN ASSISTANT

## 2024-03-28 PROCEDURE — 80048 BASIC METABOLIC PNL TOTAL CA: CPT

## 2024-03-28 PROCEDURE — 85025 COMPLETE CBC W/AUTO DIFF WBC: CPT

## 2024-03-28 PROCEDURE — 6360000002 HC RX W HCPCS: Performed by: FAMILY MEDICINE

## 2024-03-28 PROCEDURE — 2500000003 HC RX 250 WO HCPCS: Performed by: INTERNAL MEDICINE

## 2024-03-28 PROCEDURE — C1874 STENT, COATED/COV W/DEL SYS: HCPCS | Performed by: INTERNAL MEDICINE

## 2024-03-28 PROCEDURE — C1894 INTRO/SHEATH, NON-LASER: HCPCS | Performed by: INTERNAL MEDICINE

## 2024-03-28 DEVICE — STENT ONYXNG25022UX ONYX 2.50X22RX
Type: IMPLANTABLE DEVICE | Status: FUNCTIONAL
Brand: ONYX FRONTIER™

## 2024-03-28 DEVICE — STENT ONYXNG25012UX ONYX 2.50X12RX
Type: IMPLANTABLE DEVICE | Status: FUNCTIONAL
Brand: ONYX FRONTIER™

## 2024-03-28 RX ORDER — BIVALIRUDIN 250 MG/5ML
INJECTION, POWDER, LYOPHILIZED, FOR SOLUTION INTRAVENOUS PRN
Status: DISCONTINUED | OUTPATIENT
Start: 2024-03-28 | End: 2024-03-28 | Stop reason: HOSPADM

## 2024-03-28 RX ORDER — SODIUM CHLORIDE 9 MG/ML
INJECTION, SOLUTION INTRAVENOUS CONTINUOUS PRN
Status: COMPLETED | OUTPATIENT
Start: 2024-03-28 | End: 2024-03-28

## 2024-03-28 RX ORDER — HEPARIN SODIUM 1000 [USP'U]/ML
INJECTION, SOLUTION INTRAVENOUS; SUBCUTANEOUS PRN
Status: DISCONTINUED | OUTPATIENT
Start: 2024-03-28 | End: 2024-03-28 | Stop reason: HOSPADM

## 2024-03-28 RX ORDER — ATORVASTATIN CALCIUM 40 MG/1
40 TABLET, FILM COATED ORAL NIGHTLY
Status: DISCONTINUED | OUTPATIENT
Start: 2024-03-28 | End: 2024-03-30 | Stop reason: HOSPADM

## 2024-03-28 RX ORDER — MIDAZOLAM HYDROCHLORIDE 1 MG/ML
INJECTION INTRAMUSCULAR; INTRAVENOUS PRN
Status: DISCONTINUED | OUTPATIENT
Start: 2024-03-28 | End: 2024-03-28 | Stop reason: HOSPADM

## 2024-03-28 RX ORDER — POTASSIUM CHLORIDE 7.45 MG/ML
10 INJECTION INTRAVENOUS
Status: COMPLETED | OUTPATIENT
Start: 2024-03-28 | End: 2024-03-28

## 2024-03-28 RX ORDER — FENTANYL CITRATE 50 UG/ML
INJECTION, SOLUTION INTRAMUSCULAR; INTRAVENOUS PRN
Status: DISCONTINUED | OUTPATIENT
Start: 2024-03-28 | End: 2024-03-28 | Stop reason: HOSPADM

## 2024-03-28 RX ORDER — VERAPAMIL HYDROCHLORIDE 2.5 MG/ML
INJECTION, SOLUTION INTRAVENOUS PRN
Status: DISCONTINUED | OUTPATIENT
Start: 2024-03-28 | End: 2024-03-28 | Stop reason: HOSPADM

## 2024-03-28 RX ADMIN — POTASSIUM CHLORIDE 10 MEQ: 7.46 INJECTION, SOLUTION INTRAVENOUS at 06:52

## 2024-03-28 RX ADMIN — POTASSIUM CHLORIDE 10 MEQ: 7.46 INJECTION, SOLUTION INTRAVENOUS at 05:16

## 2024-03-28 RX ADMIN — TICAGRELOR 90 MG: 90 TABLET ORAL at 22:16

## 2024-03-28 RX ADMIN — FUROSEMIDE 20 MG: 10 INJECTION, SOLUTION INTRAMUSCULAR; INTRAVENOUS at 08:44

## 2024-03-28 RX ADMIN — FAMOTIDINE 20 MG: 10 INJECTION, SOLUTION INTRAVENOUS at 21:30

## 2024-03-28 RX ADMIN — HEPARIN SODIUM 21 UNITS/KG/HR: 10000 INJECTION, SOLUTION INTRAVENOUS at 00:27

## 2024-03-28 RX ADMIN — ASPIRIN 81 MG CHEWABLE TABLET 81 MG: 81 TABLET CHEWABLE at 08:43

## 2024-03-28 RX ADMIN — SODIUM CHLORIDE, PRESERVATIVE FREE 10 ML: 5 INJECTION INTRAVENOUS at 08:45

## 2024-03-28 RX ADMIN — SODIUM CHLORIDE, PRESERVATIVE FREE 10 ML: 5 INJECTION INTRAVENOUS at 21:32

## 2024-03-28 RX ADMIN — FAMOTIDINE 20 MG: 10 INJECTION, SOLUTION INTRAVENOUS at 08:44

## 2024-03-28 RX ADMIN — METOPROLOL TARTRATE 25 MG: 25 TABLET, FILM COATED ORAL at 21:30

## 2024-03-28 RX ADMIN — ATORVASTATIN CALCIUM 40 MG: 40 TABLET, FILM COATED ORAL at 21:30

## 2024-03-28 RX ADMIN — METOPROLOL TARTRATE 25 MG: 25 TABLET, FILM COATED ORAL at 08:44

## 2024-03-28 RX ADMIN — HEPARIN SODIUM 4000 UNITS: 1000 INJECTION INTRAVENOUS; SUBCUTANEOUS at 03:51

## 2024-03-28 RX ADMIN — POTASSIUM BICARBONATE 40 MEQ: 782 TABLET, EFFERVESCENT ORAL at 21:28

## 2024-03-28 NOTE — PROGRESS NOTES
Bedside shift change report given to ABIGAIL Noguera and ABIGAIL Pena (oncoming nurse) by ABIGAIL HIDALGO and ABIGAIL Sepulveda (offgoing nurse). Report included the following information Nurse Handoff Report, Med Rec Status, and Cardiac Rhythm NSR .      2130: medicated as per MAR, potassium replaced per protocol.    2300: In bed, all needs at bedside, no complains at this time.    0115: asleep on bed, dressing @ right radial dry and intact.    0400: vitals signs checked, reassessment done, Nil complaints.    0645: noted to have fresh blood with clots from the rectum about 100mls-paged hospitalist on call.    0655: DR Das replied and will order protonix and will endorse to the next team.    Bedside shift change report given to ABIGAIL Ventura (oncoming nurse) by ABIGAIL Noguera and ABIGAIL Pena (offgoing nurse). Report included the following information Nurse Handoff Report, Cardiac Rhythm NSR, and Alarm Parameters.

## 2024-03-28 NOTE — PROGRESS NOTES
ARU/IPR REFERRAL CONTACT NOTE  [facility]      Thank you for the opportunity to review this patient's case for admission to [facility].    Based on our pre-admission screening:     [ x] Our Team/Medical Director is following this case.   Comments: Received call from insurance stating that medical director for insurance reviewed the case and has denied due to pt can receive services at a lower level of care.     Peer to peer option is available by calling [phone] by Monday 4/1/2024 @5pm  Please use reference number [id number]  Provider notified via perfect serve    Again, Thank you for this referral. Should you have any questions please do not hesitate to call.     Sincerely,  Samara Howard    Clinical Liaison  [facility]  [phone]

## 2024-03-28 NOTE — PROGRESS NOTES
[facility]  -  Progress Note      Patient: Gigi Mcneal MRN: [id number]  [id number]: [id number]    YOB: 1960  Age: 64 y.o.  Sex: male      Admit Date: 3/23/2024    Assessment:     - Acute hypoxic respiratory failure requiring intubation. Extubated 3/25/2024. Likely secondary to CHF exacerbation    - Acute HFrEF: New diagnosis. Echo 3/22/2024 EF 35-40%, moderate global hypokinesis, moderated AS, mild to moderate MR. CXR 3/25/2024 with layering pleural effusions and diffuse bilateral pulmonary opacities. NT pro bnp 3,945.  - Moderate Aortic Stenosis  - Elevated troponin, peaked at 3,400. Now trending down. Likely type II, but cannot rule out ischemia.  - Anemia: Transfused 1 unit of PRBC 3/24/2024  - Tobacco use     Primary cardiologist: None on file. Initial consult, Dr. Lucero    Plan:     With his presentation, and abnormal troponin, will proceed with coronary angiography.  All questions answered.  He agrees with the plan.    Subjective:     No new complaints.     Objective:      Patient Vitals for the past 8 hrs:   Pulse Resp BP SpO2   03/28/24 0730 96 22 -- 100 %   03/28/24 0700 76 17 120/88 99 %   03/28/24 0630 77 17 -- 99 %   03/28/24 0621 78 17 -- 99 %   03/28/24 0600 79 18 (!) 122/90 99 %   03/28/24 0551 82 19 -- 99 %   03/28/24 0530 74 15 -- 98 %   03/28/24 0521 76 16 -- 98 %   03/28/24 0500 73 16 117/85 98 %   03/28/24 0451 79 18 -- 99 %   03/28/24 0430 80 17 -- 98 %   03/28/24 0421 76 16 -- 99 %   03/28/24 0400 78 18 122/86 99 %   03/28/24 0351 79 24 -- 99 %   03/28/24 0330 85 28 -- --   03/28/24 0308 -- 20 -- 98 %   03/28/24 0307 77 18 -- 98 %   03/28/24 0306 78 19 -- 98 %   03/28/24 0300 82 15 126/76 97 %         No data found.      Intake/Output Summary (Last 24 hours) at 3/28/2024 1052  Last data filed at 3/28/2024 1046  Gross per 24 hour   Intake 439.14 ml   Output 967 ml   Net -527.86 ml       Physical Exam:  General:  alert, appears stated age, and cooperative  Neck:

## 2024-03-28 NOTE — FLOWSHEET NOTE
Pt awake, resting in bed.  Oriented x3, forgetful at times. Follows simple commands.VSS.    2200  Pt calls for assist to get up to bedside commode. Has liquid stool, small, appears mostly clear/yellowish.    0100    Pt resting in bed, no changes, denies pain. AM labwork completed.      0300   Reminded pt he was NPO after midnight for possible cardiac cath In then am.

## 2024-03-28 NOTE — PROGRESS NOTES
1150- report taken from Eli in the cath lab and Kwame in ICU. Pt had 1 stent placed in LAD. 2 mg versed, 50 mcg fentanyl, 3000 units heparin, and loading dose of brilinta given in cath lab.           1213- Pt arrived on unit from cath lab. Pt up to toilet before nurse arrive at bedside. Positive bowl out put. PT vitals stable, no skin issues identified. Pt on monitor and educated on PCI wound care and bed rest till 1440.  Pt daughter at bedside. Pt denies SOB, chest pain, dizziness, and paresthesias. Cap refill in right hand less than 3 sec.     1605- Pt in bed alert and oriented times 4 with bed locked and low and call bell in reach. Needs addressed, denies pain. Cath site without redness, bruising or hematoma.    1945-Bedside and Verbal shift change report given to Chuckie RN (oncoming nurse) by Kelton RN (offgoing nurse). Report included the following information Nurse Handoff Report, Adult Overview, Surgery Report, Intake/Output, MAR, Recent Results, Cardiac Rhythm NSR, and Neuro Assessment.

## 2024-03-28 NOTE — PROGRESS NOTES
0932: Attempted PT treatment. Off the floor for cardiac cath. Will follow up as able.   1404: 2nd PT attempt. Now on bedrest post procedure until 1700. Will follow up.

## 2024-03-28 NOTE — PROGRESS NOTES
Comprehensive Nutrition Assessment    Type and Reason for Visit:  Reassess    Nutrition Recommendations/Plan:   Continue current diet as tolerated.  Continue to monitor tolerance of PO, weight, labs, and plan of care during admission.         Malnutrition Assessment:  Malnutrition Status:  Insufficient data (s/p ext, off unit) (03/28/24 1147)    Context:  Acute Illness       Nutrition Assessment:    Admitted for SOB, resp failure likely due to CHF decompensation; sepsis. Intubated 3/23, s/p extubation 3/25. SLP following, advanced diet to regular, thin liquids 3/26. Pt was NPO for cardiac cath, noted PO diet was advanced. Currently off unit. Will continue to monitor PO intake.    Nutrition Related Findings:    Last BM (including prior to admit): 03/27/24  +edema. Pertinent Meds: colace, lasix, glycolax Pertinent Labs: POC Glucose 91-99 mg/dl x 24 hrs, K 3.4 L (trending down), Phos 3.3 wnl Wound Type: None       Current Nutrition Intake & Therapies:    Average Meal Intake:  (PO diet just advanced)  Average Supplements Intake: None Ordered  ADULT DIET; Regular; 4 carb choices (60 gm/meal); Low Fat/Low Chol/High Fiber/2 gm Na    Anthropometric Measures:  Height: 182.9 cm (6') (Photo ID)  Ideal Body Weight (IBW): 178 lbs (81 kg)    Admission Body Weight: 91.6 kg (201 lb 15.1 oz)  Current Body Weight: 91.6 kg (201 lb 15.1 oz), 113.5 % IBW.    Current BMI (kg/m2): 27.4  BMI Categories: Overweight (BMI 25.0-29.9)    Estimated Daily Nutrient Needs:  Energy Requirements Based On: Kcal/kg  Weight Used for Energy Requirements: Admission  Energy (kcal/day): 9372-5835 (22 kcal/kg x 1.1-1.2 CHF)  Weight Used for Protein Requirements: Admission  Protein (g/day): 101-128 (1.1-1.4)  Method Used for Fluid Requirements: Other (Comment)  Fluid (ml/day): 4216-1085    Nutrition Diagnosis:   Predicted inadequate energy intake related to acute injury/trauma, increase demand for energy/nutrients as evidenced by  (s/p ext, PO diet just

## 2024-03-28 NOTE — PROGRESS NOTES
[facility]  Progress Note    Patient: Gigi Mcneal Age: 64 y.o. : 1960 MR#: [id number] SSN: [id number]  Date/Time: 3/28/2024     Subjective: Patient feeling good, denies any chest pain chest tightness.  Seen in cath holding.  Daughter at the bedside.      Assessment/Plan:   Acute hypoxic respiratory failure requiring intubation likely secondary to multifocal pneumonia versus CHF exacerbation-extubated 3/25/2024  Severe multifocal pneumonia involving both upper lobes  CAD post LAD stent placement 3/28/2024  Acute HFrEF-echo with EF 35 to 40%, elevated proBNP  Acute debility, possible critical care myopathy  Septic shock secondary to multifocal pneumonia  Acute toxic versus metabolic encephalopathy  Moderate to large bilateral pleural effusions  NSTEMI type I versus type II-on heparin drip  Hypokalemia- replaced   Lactic acidosis-resolved  Anemia - Hgb 8.8   History of tobacco abuse  Poor medical compliance  Obesity        Plan:   -Continue Lasix 20 mg IV Daily, more than 2 L negative balance since admission  -Continue ASA, BB and Brilinta per cardiology  - Abx discontinued with cultures negative at 72 hours, monitor temp curve and CBC   - Aspiration Precaution    - Cardiac monitoring   - Monitor for signs of active bleeding, currently Hgb stable at 8.8   -Will replace potassium and monitor  PT/OT eval and treatment  Further plan based on hospital course    Insurance declined ARU, patient will need to go to SNF    Discussed with patient and also with daughter at the bedside and explained in detail about my above plan care.  Both of them understood and agreed with my plan.    Disposition: SNF versus home health, anticipate discharge 3/29/2024    Case discussed with:  [x]Patient  [x]Family  [x]Nursing  []Case Management  DVT Prophylaxis:  []Lovenox  [x]Hep IV  []SCDs  []Coumadin   []Eliquis/Xarelto     Objective:   VS: /83   Pulse 78   Temp 98.3 °F (36.8 °C)  MG/DL    Bun/Cre Ratio 12 12 - 20      Est, Glom Filt Rate >90 >60 ml/min/1.73m2    Calcium 8.4 (L) 8.5 - 10.1 MG/DL   Magnesium    Collection Time: 03/28/24  2:30 AM   Result Value Ref Range    Magnesium 2.5 1.6 - 2.6 mg/dL   Phosphorus    Collection Time: 03/28/24  2:30 AM   Result Value Ref Range    Phosphorus 3.3 2.5 - 4.9 MG/DL   APTT    Collection Time: 03/28/24  2:30 AM   Result Value Ref Range    APTT 63.3 (H) 23.0 - 36.4 SEC   Cardiac procedure    Collection Time: 03/28/24 10:50 AM   Result Value Ref Range    Body Surface Area 2.17 m2       Signed By: Trish Valencia MD     March 28, 2024      Disclaimer: Sections of this note are dictated using utilizing voice recognition software.  Minor typographical errors may be present. If questions arise, please do not hesitate to contact me or call our department.

## 2024-03-28 NOTE — PROGRESS NOTES
1049: Verbal report received from Samia on Gigi Mcneal being received from Cath Lab for ordered procedure. Report consisted of patient's Situation, Background, Assessment and Recommendations (SBAR). Information from the following report(s) Nurse Handoff Report, Adult Overview, Surgery Report, and MAR was reviewed with the receiving nurse. Pt A&O x 4, no c/o pain. Opportunity for questions and clarification provided.  Procedure: Cardiac Cath  Intervention: Yes  Site: Right, Arm    1230: Verbal report given to Chava Jones RN on Gigi Mcneal  being transferred to CVT Stepdown for ordered procedure. Report consisted of patient's Situation, Background, Assessment and Recommendations (SBAR). Information from the following report(s) Nurse Handoff Report, Surgery Report, Intake/Output, and MAR was reviewed with the receiving nurse. Opportunity for questions and clarification was provided.      1245: D-Stat removed from right wrist per protocol. No bleeding or hematoma noted, site covered with hemostatic dressing and tegaderm. Patient denies pain, pulse palpable and brisk cap refill distal to site.  Cath site instructions and post care including s/s of bleeding and methods of bleeding prevention reviewed with patient who verbalized understanding.    1300: Patient transported to inpatient room by transport.

## 2024-03-29 ENCOUNTER — APPOINTMENT (OUTPATIENT)
Facility: HOSPITAL | Age: 64
End: 2024-03-29
Attending: INTERNAL MEDICINE
Payer: COMMERCIAL

## 2024-03-29 LAB
ABO + RH BLD: NORMAL
ANION GAP SERPL CALC-SCNC: 6 MMOL/L (ref 3–18)
BASOPHILS # BLD: 0 K/UL (ref 0–0.1)
BASOPHILS NFR BLD: 0 % (ref 0–2)
BLOOD GROUP ANTIBODIES SERPL: NORMAL
BUN SERPL-MCNC: 10 MG/DL (ref 7–18)
BUN/CREAT SERPL: 12 (ref 12–20)
CALCIUM SERPL-MCNC: 8.3 MG/DL (ref 8.5–10.1)
CHLORIDE SERPL-SCNC: 109 MMOL/L (ref 100–111)
CO2 SERPL-SCNC: 26 MMOL/L (ref 21–32)
CREAT SERPL-MCNC: 0.85 MG/DL (ref 0.6–1.3)
DIFFERENTIAL METHOD BLD: ABNORMAL
ECHO AV AREA PEAK VELOCITY: 1 CM2
ECHO AV AREA VTI: 0.9 CM2
ECHO AV AREA/BSA PEAK VELOCITY: 0.5 CM2/M2
ECHO AV AREA/BSA VTI: 0.4 CM2/M2
ECHO AV MEAN GRADIENT: 25 MMHG
ECHO AV MEAN VELOCITY: 2.4 M/S
ECHO AV PEAK GRADIENT: 42 MMHG
ECHO AV PEAK VELOCITY: 3.2 M/S
ECHO AV VELOCITY RATIO: 0.31
ECHO AV VTI: 66.6 CM
ECHO BSA: 2.16 M2
ECHO LV EDV A2C: 118 ML
ECHO LV EDV A4C: 133 ML
ECHO LV EDV BP: 125 ML (ref 67–155)
ECHO LV EDV INDEX A4C: 62 ML/M2
ECHO LV EDV INDEX BP: 58 ML/M2
ECHO LV EDV NDEX A2C: 55 ML/M2
ECHO LV EJECTION FRACTION A2C: 45 %
ECHO LV EJECTION FRACTION A4C: 38 %
ECHO LV EJECTION FRACTION BIPLANE: 40 % (ref 55–100)
ECHO LV ESV A2C: 65 ML
ECHO LV ESV A4C: 83 ML
ECHO LV ESV BP: 75 ML (ref 22–58)
ECHO LV ESV INDEX A2C: 30 ML/M2
ECHO LV ESV INDEX A4C: 39 ML/M2
ECHO LV ESV INDEX BP: 35 ML/M2
ECHO LV FRACTIONAL SHORTENING: 17 % (ref 28–44)
ECHO LV INTERNAL DIMENSION DIASTOLE INDEX: 2.24 CM/M2
ECHO LV INTERNAL DIMENSION DIASTOLIC: 4.8 CM (ref 4.2–5.9)
ECHO LV INTERNAL DIMENSION SYSTOLIC INDEX: 1.87 CM/M2
ECHO LV INTERNAL DIMENSION SYSTOLIC: 4 CM
ECHO LV IVSD: 1.1 CM (ref 0.6–1)
ECHO LV MASS 2D: 194 G (ref 88–224)
ECHO LV MASS INDEX 2D: 90.6 G/M2 (ref 49–115)
ECHO LV POSTERIOR WALL DIASTOLIC: 1.1 CM (ref 0.6–1)
ECHO LV RELATIVE WALL THICKNESS RATIO: 0.46
ECHO LVOT AREA: 3.1 CM2
ECHO LVOT AV VTI INDEX: 0.28
ECHO LVOT DIAM: 2 CM
ECHO LVOT MEAN GRADIENT: 2 MMHG
ECHO LVOT PEAK GRADIENT: 4 MMHG
ECHO LVOT PEAK VELOCITY: 1 M/S
ECHO LVOT STROKE VOLUME INDEX: 27.7 ML/M2
ECHO LVOT SV: 59.3 ML
ECHO LVOT VTI: 18.9 CM
ECHO TV REGURGITANT MAX VELOCITY: 1.97 M/S
ECHO TV REGURGITANT PEAK GRADIENT: 16 MMHG
EKG ATRIAL RATE: 78 BPM
EKG DIAGNOSIS: NORMAL
EKG P AXIS: 43 DEGREES
EKG P-R INTERVAL: 124 MS
EKG Q-T INTERVAL: 392 MS
EKG QRS DURATION: 96 MS
EKG QTC CALCULATION (BAZETT): 443 MS
EKG R AXIS: 29 DEGREES
EKG T AXIS: 58 DEGREES
EKG VENTRICULAR RATE: 77 BPM
EOSINOPHIL # BLD: 0.2 K/UL (ref 0–0.4)
EOSINOPHIL NFR BLD: 2 % (ref 0–5)
ERYTHROCYTE [DISTWIDTH] IN BLOOD BY AUTOMATED COUNT: 22.6 % (ref 11.6–14.5)
GLUCOSE SERPL-MCNC: 108 MG/DL (ref 74–99)
HCT VFR BLD AUTO: 28.1 % (ref 36–48)
HCT VFR BLD AUTO: 30 % (ref 36–48)
HCT VFR BLD AUTO: 31 % (ref 36–48)
HGB BLD-MCNC: 8.3 G/DL (ref 13–16)
HGB BLD-MCNC: 8.9 G/DL (ref 13–16)
HGB BLD-MCNC: 9 G/DL (ref 13–16)
IMM GRANULOCYTES # BLD AUTO: 0.1 K/UL (ref 0–0.04)
IMM GRANULOCYTES NFR BLD AUTO: 1 % (ref 0–0.5)
LYMPHOCYTES # BLD: 1.5 K/UL (ref 0.9–3.6)
LYMPHOCYTES NFR BLD: 17 % (ref 21–52)
MAGNESIUM SERPL-MCNC: 2.4 MG/DL (ref 1.6–2.6)
MCH RBC QN AUTO: 18.7 PG (ref 24–34)
MCHC RBC AUTO-ENTMCNC: 29 G/DL (ref 31–37)
MCV RBC AUTO: 64.3 FL (ref 78–100)
MONOCYTES # BLD: 0.7 K/UL (ref 0.05–1.2)
MONOCYTES NFR BLD: 8 % (ref 3–10)
NEUTS SEG # BLD: 6.3 K/UL (ref 1.8–8)
NEUTS SEG NFR BLD: 72 % (ref 40–73)
NRBC # BLD: 0 K/UL (ref 0–0.01)
NRBC BLD-RTO: 0 PER 100 WBC
PHOSPHATE SERPL-MCNC: 3.4 MG/DL (ref 2.5–4.9)
PLATELET # BLD AUTO: 495 K/UL (ref 135–420)
PMV BLD AUTO: 10.5 FL (ref 9.2–11.8)
POTASSIUM SERPL-SCNC: 3.2 MMOL/L (ref 3.5–5.5)
RBC # BLD AUTO: 4.82 M/UL (ref 4.35–5.65)
SODIUM SERPL-SCNC: 141 MMOL/L (ref 136–145)
SPECIMEN EXP DATE BLD: NORMAL
WBC # BLD AUTO: 8.7 K/UL (ref 4.6–13.2)

## 2024-03-29 PROCEDURE — 93321 DOPPLER ECHO F-UP/LMTD STD: CPT | Performed by: INTERNAL MEDICINE

## 2024-03-29 PROCEDURE — 85025 COMPLETE CBC W/AUTO DIFF WBC: CPT

## 2024-03-29 PROCEDURE — 80048 BASIC METABOLIC PNL TOTAL CA: CPT

## 2024-03-29 PROCEDURE — C9113 INJ PANTOPRAZOLE SODIUM, VIA: HCPCS | Performed by: STUDENT IN AN ORGANIZED HEALTH CARE EDUCATION/TRAINING PROGRAM

## 2024-03-29 PROCEDURE — 36415 COLL VENOUS BLD VENIPUNCTURE: CPT

## 2024-03-29 PROCEDURE — 6370000000 HC RX 637 (ALT 250 FOR IP): Performed by: INTERNAL MEDICINE

## 2024-03-29 PROCEDURE — 6360000002 HC RX W HCPCS: Performed by: NURSE PRACTITIONER

## 2024-03-29 PROCEDURE — 6370000000 HC RX 637 (ALT 250 FOR IP): Performed by: PHYSICIAN ASSISTANT

## 2024-03-29 PROCEDURE — 92526 ORAL FUNCTION THERAPY: CPT

## 2024-03-29 PROCEDURE — 93005 ELECTROCARDIOGRAM TRACING: CPT | Performed by: INTERNAL MEDICINE

## 2024-03-29 PROCEDURE — 85014 HEMATOCRIT: CPT

## 2024-03-29 PROCEDURE — 2580000003 HC RX 258: Performed by: STUDENT IN AN ORGANIZED HEALTH CARE EDUCATION/TRAINING PROGRAM

## 2024-03-29 PROCEDURE — 83735 ASSAY OF MAGNESIUM: CPT

## 2024-03-29 PROCEDURE — 2140000001 HC CVICU INTERMEDIATE R&B

## 2024-03-29 PROCEDURE — 84100 ASSAY OF PHOSPHORUS: CPT

## 2024-03-29 PROCEDURE — 6360000004 HC RX CONTRAST MEDICATION: Performed by: FAMILY MEDICINE

## 2024-03-29 PROCEDURE — 93325 DOPPLER ECHO COLOR FLOW MAPG: CPT | Performed by: INTERNAL MEDICINE

## 2024-03-29 PROCEDURE — 85018 HEMOGLOBIN: CPT

## 2024-03-29 PROCEDURE — 93308 TTE F-UP OR LMTD: CPT | Performed by: INTERNAL MEDICINE

## 2024-03-29 PROCEDURE — 97530 THERAPEUTIC ACTIVITIES: CPT

## 2024-03-29 PROCEDURE — 86900 BLOOD TYPING SEROLOGIC ABO: CPT

## 2024-03-29 PROCEDURE — 94761 N-INVAS EAR/PLS OXIMETRY MLT: CPT

## 2024-03-29 PROCEDURE — 6360000002 HC RX W HCPCS: Performed by: STUDENT IN AN ORGANIZED HEALTH CARE EDUCATION/TRAINING PROGRAM

## 2024-03-29 PROCEDURE — 2580000003 HC RX 258: Performed by: PHYSICIAN ASSISTANT

## 2024-03-29 PROCEDURE — 99232 SBSQ HOSP IP/OBS MODERATE 35: CPT | Performed by: INTERNAL MEDICINE

## 2024-03-29 PROCEDURE — 6370000000 HC RX 637 (ALT 250 FOR IP)

## 2024-03-29 PROCEDURE — 93010 ELECTROCARDIOGRAM REPORT: CPT | Performed by: INTERNAL MEDICINE

## 2024-03-29 PROCEDURE — 93321 DOPPLER ECHO F-UP/LMTD STD: CPT

## 2024-03-29 PROCEDURE — A4216 STERILE WATER/SALINE, 10 ML: HCPCS | Performed by: STUDENT IN AN ORGANIZED HEALTH CARE EDUCATION/TRAINING PROGRAM

## 2024-03-29 PROCEDURE — 99232 SBSQ HOSP IP/OBS MODERATE 35: CPT | Performed by: FAMILY MEDICINE

## 2024-03-29 PROCEDURE — 86901 BLOOD TYPING SEROLOGIC RH(D): CPT

## 2024-03-29 PROCEDURE — 86850 RBC ANTIBODY SCREEN: CPT

## 2024-03-29 RX ORDER — FUROSEMIDE 20 MG/1
20 TABLET ORAL DAILY
Status: DISCONTINUED | OUTPATIENT
Start: 2024-03-30 | End: 2024-03-30 | Stop reason: HOSPADM

## 2024-03-29 RX ORDER — METOPROLOL SUCCINATE 25 MG/1
25 TABLET, EXTENDED RELEASE ORAL DAILY
Status: DISCONTINUED | OUTPATIENT
Start: 2024-03-29 | End: 2024-03-30 | Stop reason: HOSPADM

## 2024-03-29 RX ORDER — FUROSEMIDE 20 MG/1
20 TABLET ORAL DAILY
Status: DISCONTINUED | OUTPATIENT
Start: 2024-03-29 | End: 2024-03-29

## 2024-03-29 RX ADMIN — PERFLUTREN 2 ML: 6.52 INJECTION, SUSPENSION INTRAVENOUS at 11:33

## 2024-03-29 RX ADMIN — METOPROLOL TARTRATE 25 MG: 25 TABLET, FILM COATED ORAL at 09:01

## 2024-03-29 RX ADMIN — POTASSIUM BICARBONATE 40 MEQ: 782 TABLET, EFFERVESCENT ORAL at 09:27

## 2024-03-29 RX ADMIN — ATORVASTATIN CALCIUM 40 MG: 40 TABLET, FILM COATED ORAL at 20:29

## 2024-03-29 RX ADMIN — SODIUM CHLORIDE, PRESERVATIVE FREE 10 ML: 5 INJECTION INTRAVENOUS at 20:34

## 2024-03-29 RX ADMIN — TICAGRELOR 90 MG: 90 TABLET ORAL at 20:29

## 2024-03-29 RX ADMIN — FUROSEMIDE 20 MG: 10 INJECTION, SOLUTION INTRAMUSCULAR; INTRAVENOUS at 09:00

## 2024-03-29 RX ADMIN — PANTOPRAZOLE SODIUM 40 MG: 40 INJECTION, POWDER, FOR SOLUTION INTRAVENOUS at 20:29

## 2024-03-29 RX ADMIN — PANTOPRAZOLE SODIUM 40 MG: 40 INJECTION, POWDER, FOR SOLUTION INTRAVENOUS at 09:00

## 2024-03-29 RX ADMIN — ASPIRIN 81 MG CHEWABLE TABLET 81 MG: 81 TABLET CHEWABLE at 09:01

## 2024-03-29 RX ADMIN — SODIUM CHLORIDE, PRESERVATIVE FREE 10 ML: 5 INJECTION INTRAVENOUS at 09:04

## 2024-03-29 RX ADMIN — SACUBITRIL AND VALSARTAN 1 TABLET: 24; 26 TABLET, FILM COATED ORAL at 20:29

## 2024-03-29 ASSESSMENT — PAIN SCALES - GENERAL
PAINLEVEL_OUTOF10: 0

## 2024-03-29 NOTE — PLAN OF CARE
Problem: Occupational Therapy - Adult  Goal: By Discharge: Performs self-care activities at highest level of function for planned discharge setting.  See evaluation for individualized goals.  Description: Occupational Therapy Goals:  Initiated 3/26/2024 to be met within 7-10 days.     1.  Patient will perform grooming standing with good balance with supervision/set-up.   2.  Patient will perform bathing with modified independence.  3.  Patient will perform upper body dressing  with modified independence.  4.  Patient will perform lower body dressing with supervision/set-up.  4.  Patient will perform toilet transfers with supervision/set-up.  5.  Patient will perform all aspects of toileting with supervision/set-up.  6.  Patient will participate in upper extremity therapeutic exercise/activities with independence for 8-10 minutes to increase strength/endurance for ADLs.    7.  Patient will utilize energy conservation techniques during functional activities with verbal and visual cues.     PLOF: Independent with ADLs, lives at home with wife    Outcome: Progressing   OCCUPATIONAL THERAPY TREATMENT    Patient: Gigi Mcneal (64 y.o. male)  Date: 3/29/2024  Diagnosis: Acute hypoxic respiratory failure (HCC) [J96.01] Acute hypoxemic respiratory failure (HCC)  Procedure(s) (LRB):  Left heart cath (N/A)  Coronary angiography (N/A)  Left ventriculography (N/A)  Insert stent syeda coronary (N/A) 1 Day Post-Op  Precautions: Aspiration Risk, General Precautions,  ,  ,  ,  ,  ,  ,      Chart, occupational therapy assessment, plan of care, and goals were reviewed.  ASSESSMENT:  Pt presented supine in bed upon entry, family at bedside, and agreeable to wok with OT. He transitioned to EOB Supervision in prep for functional tasks. Once sitting, he demo G balance and was able to nasir his socks Supervision with leg cross method. STS transfer SBA without support. He maneuvered into BR ~ 15 ft SBA without AD and sat on toliet w/ SBA. Pt

## 2024-03-29 NOTE — PROGRESS NOTES
[id number]  [phone]    GASTROENTEROLOGY CONSULT      Impression:   Pt is s/p cardiac stent x2 yesterday and recently started on dual anti-platelet therapy. After the procedure yesterday, he noted several episodes of painless rectal bleeding which were self-limiting. Has since had 2 BMs that were brown without evidence of blood. Hgb is stable at 9 which is slightly improved from a few days ago.    Likely etiology is diverticular bleed given characteristics of painless, self-limiting. Risk of rebleeding is hard to quantify-some patients with diverticular bleeding never have any recurrence but there is a small subset of patients with diverticular bleeding that can have recurrent, unpredictable, large-volume bleeding.    Last colonoscopy >10yr ago according to the pt        Plan:     - No plan for inpatient endoscopic evaluation at this time as pt's hgb is stable and bleeding has stopped  - If overt bleeding is seen again with drop in Hgb, will plan to evaluate for EGD/colonoscopy as inpatient  - Needs outpatient FU for colonoscopy for screening purposes (last colo was >10yr ago according to pt) and to evaluate bleeding once antiplatelet therapy can be interrupted - perhaps after 6mo. This was discussed with pt and his daughter at bedside.  - Indefinite High-fiber diet/fiber supplementation  - Avoid nonaspirin NSAIDs for pain control-okay to use Tylenol up to 2000 mg/day      Chief Complaint: GIB      HPI:  Gigi Mcneal is a 64 y.o. male who I am being asked to see in consultation for an opinion regarding GIB. Pt seen resting in bed, daughter at bedside. Several episodes of BRBPR seen yesterday, stopped and were followed by 2 normal BMs. Denies ABD pain and discomfort yesterday and today.     PMH:   No past medical history on file.    PSH:   Past Surgical History:   Procedure Laterality Date    CARDIAC PROCEDURE N/A 3/28/2024    Left heart cath performed by Butch Guaman MD at [facility] CARDIAC CATH LAB    CARDIAC

## 2024-03-29 NOTE — PLAN OF CARE
indicated at this time. Please re-consult if needed.     Progression toward goals:  [x]         Goals met/approximated  []         Not making progress/Not appropriate - will d/c POC     PLAN:  Recommendations and Planned Interventions:  Maximum therapeutic gains met; safest, least restrictive diet achieved. D/C ST intervention at this time.      SUBJECTIVE:   Patient stated “Thanks for checking in.”    OBJECTIVE:   Daily Assessment:  Baseline Assessment  Temperature Spikes Noted: No  O2 Device: None (Room air)  Communication Observation: Functional  Follows Directions: Complex  Current Diet : regular solid   Current Liquid Diet : thin liquids  Dentition: Adequate  Patient Positioning: Upright in bed  Baseline Vocal Quality: Normal  Volitional Cough: Strong    Orientation:  Person, Place, Time, and Situation    Dysphagia Treatment:  Oral Assessment:  Oral Motor   Labial: No impairment  Dentition: Natural  Oral Hygiene: Clean, Moist  Lingual: No impairment  Velum: No Impairment  Mandible: No impairment  Gag: No Impairment        P.O. Trials:  PO Trials  Neuromuscular Estim Used: No  Assessment Method(s): Observation, Palpation  Patient Position: 60 at HOB  Vocal Quality: No Impairment  Consistency Presented: Regular, Thin  How Presented: Self-fed/presented, Straw, Successive Swallows, Cup/sip  Bolus Acceptance: No impairment  Bolus Formation/Control: No impairment  Propulsion: No impairment  Oral Residue: None  Initiation of Swallow: No impairment  Laryngeal Elevation: Functional  Aspiration Signs/Symptoms: None  Pharyngeal Phase Characteristics: No impairment, issues, or problems  Effective Modifications: Alternate liquids/solids, Small sips and bites  Cues for Modifications: Minimal         PAIN:  Start of Tx: 0  End of Tx: 0     After treatment:   []              Patient left in no apparent distress sitting up in chair  [x]              Patient left in no apparent distress in bed  [x]              Call bell left  within reach  [x]              Nursing notified  []              Caregiver present  []              Bed alarm activated    COMMUNICATION/EDUCATION:   [x]            Aspiration precautions; swallow safety; compensatory techniques provided via demonstration, verbalization and teach back of comprehension  [x]         Patient/family have participated as able in goal setting and plan of care.  []            Patient/family agree to work toward stated goals and plan of care.  []            Patient understands intent and goals of therapy, neutral about participation.  []            Patient unable to participate in goal setting/plan of care secondary to cognition, hearing/vision deficits; education ongoing with interdisciplinary staff   []            Handout regarding diet recommendations and thickener instructions provided.  [x]         Posted safety precautions in patient's room.    Thank you for this referral.    Naomy Ceballos M.S., CCC-SLP/L  Speech-Language Pathologist

## 2024-03-29 NOTE — SIGNIFICANT EVENT
RN reporting new onset hematochezia. X1 BRBPR stool this AM.    Suspect new lower GI bleed in setting of Brillenta.    Plan:  - Started IV PPI BID   - Day team to consider need for GI inpatient consult  - Trend H/H Q8hr; Type and Screen  - Unable to hold ASA/Brillenta given recent PCI    Ben Das MD  6:57 AM

## 2024-03-29 NOTE — PROGRESS NOTES
[facility]  Progress Note    Patient: Gigi Mcneal Age: 64 y.o. : 1960 MR#: [id number] SSN: [id number]      Subjective/24-hour events:     Has had 2 episodes of BRBPR overnight.  Specifically denies chest pain, no shortness of breath currently.    Assessment:   GI bleeding  Acute hypoxic respiratory failure requiring intubation likely secondary to multifocal pneumonia versus CHF exacerbation-extubated 3/25/2024  Severe multifocal pneumonia involving both upper lobes  CAD post LAD stent placement 3/28/2024  Acute HFrEF-echo with EF 35 to 40%, elevated proBNP  Acute debility, possible critical care myopathy  Septic shock secondary to multifocal pneumonia  Acute toxic versus metabolic encephalopathy  Moderate to large bilateral pleural effusions  NSTEMI type I versus type II-on heparin drip  Hypokalemia- replaced   Lactic acidosis-resolved  Anemia - Hgb 8.8   History of tobacco abuse  Poor medical compliance  Obesity      Plan:   Monitor hemoglobin, transfuse as needed.  GI evaluation given BRBPR overnight.  Discussed with GI by phone, recommendations appreciated in advance.  DAPT being continued given stent placement 3/28.  Continue statin/Lasix/metoprolol/Entresto otherwise.  Mobilize.  Continue supportive care otherwise.  Follow.    Case discussed with:  [x]Patient  []Family  [x] Nursing  []Case Management  DVT Prophylaxis:  []Lovenox  []Hep SQ  []SCDs  []Coumadin   []On Heparin gtt []PO anticoagulant    Objective:   VS: /75   Pulse 79   Temp 97.5 °F (36.4 °C) (Tympanic)   Resp 14   Ht 1.829 m (6')   Wt 91.6 kg (202 lb)   SpO2 97%   BMI 27.40 kg/m²      Tmax/24hrs: Temp (24hrs), Av.4 °F (36.9 °C), Min:97.5 °F (36.4 °C), Max:98.7 °F (37.1 °C)    Intake/Output Summary (Last 24 hours) at 3/29/2024 1954  Last data filed at 3/29/2024 0650  Gross per 24 hour   Intake --   Output 200 ml   Net -200 ml       Gen:  In NAD.  Lungs: Clear, no wheezes  Effort  nonlabored.  CV: RRR.  Abdomen: Soft, NTTP.  Extremities: Warm, no pitting edema or ischemia.  Neuro:  Awake and alert, moves extremities spontaneously.    Current Facility-Administered Medications   Medication Dose Route Frequency    pantoprazole (PROTONIX) 40 mg in sodium chloride (PF) 0.9 % 10 mL injection  40 mg IntraVENous Q12H    metoprolol succinate (TOPROL XL) extended release tablet 25 mg  25 mg Oral Daily    sacubitril-valsartan (ENTRESTO) 24-26 MG per tablet 1 tablet  1 tablet Oral BID    [START ON 3/30/2024] furosemide (LASIX) tablet 20 mg  20 mg Oral Daily    atorvastatin (LIPITOR) tablet 40 mg  40 mg Oral Nightly    ticagrelor (BRILINTA) tablet 90 mg  90 mg Oral BID    aspirin chewable tablet 81 mg  81 mg Oral Daily    polyethylene glycol (GLYCOLAX) packet 17 g  17 g Oral Daily    docusate (COLACE) 50 MG/5ML liquid 100 mg  100 mg Oral Daily    sodium chloride flush 0.9 % injection 5-40 mL  5-40 mL IntraVENous 2 times per day    sodium chloride flush 0.9 % injection 5-40 mL  5-40 mL IntraVENous PRN    0.9 % sodium chloride infusion   IntraVENous PRN    potassium chloride (KLOR-CON M) extended release tablet 40 mEq  40 mEq Oral PRN    Or    potassium bicarb-citric acid (EFFER-K) effervescent tablet 40 mEq  40 mEq Oral PRN    Or    potassium chloride 10 mEq/100 mL IVPB (Peripheral Line)  10 mEq IntraVENous PRN    magnesium sulfate 2000 mg in 50 mL IVPB premix  2,000 mg IntraVENous PRN    ondansetron (ZOFRAN-ODT) disintegrating tablet 4 mg  4 mg Oral Q8H PRN    Or    ondansetron (ZOFRAN) injection 4 mg  4 mg IntraVENous Q6H PRN    acetaminophen (TYLENOL) tablet 650 mg  650 mg Oral Q6H PRN    Or    acetaminophen (TYLENOL) suppository 650 mg  650 mg Rectal Q6H PRN        Labs:    Recent Results (from the past 24 hour(s))   CBC with Auto Differential    Collection Time: 03/29/24  5:22 AM   Result Value Ref Range    WBC 8.7 4.6 - 13.2 K/uL    RBC 4.82 4.35 - 5.65 M/uL    Hemoglobin 9.0 (L) 13.0 - 16.0 g/dL

## 2024-03-29 NOTE — PROGRESS NOTES
[facility] - Progress Note    Admit Date: 3/23/2024  Attending Cardiologist: Dr. Guaman    Assessment:     Patient Active Problem List    Diagnosis Date Noted    Coronary artery disease involving native coronary artery of native heart without angina pectoris 03/27/2024    NSTEMI (non-ST elevated myocardial infarction) ([facility]) 03/26/2024    Hypokalemia 03/26/2024    Anemia 03/26/2024    History of tobacco abuse 03/26/2024    Medical non-compliance 03/26/2024    Acute hypoxemic respiratory failure ([facility]) 03/23/2024    Multifocal pneumonia 03/23/2024    HFrEF (heart failure with reduced ejection fraction) ([facility]) 03/23/2024     - NSTEMI: s/p LHC with PCI to LAD and first diagonal branch.    - Acute hypoxic respiratory failure requiring intubation. Extubated 3/25/2024. Likely secondary to CHF exacerbation    - Acute HFrEF: New diagnosis. Echo 3/22/2024 EF 35-40%, moderate global hypokinesis, moderated AS, mild to moderate MR. Repeat limited echo 3/29 unchanged. CXR 3/25/2024 with layering pleural effusions and diffuse bilateral pulmonary opacities. NT pro bnp 3,945. Appears euvolemic.   - Moderate Aortic Stenosis  - Anemia: Transfused 1 unit of PRBC 3/24/2024  - Tobacco use     Primary cardiologist: None on file. Initial consult, Dr. Lucero    Plan:     Addendum: Independently seen and evaluated.  Agree with below.  From cardiac standpoint, he appears quite stable.  He will need to be on DAPT for at least 12 months.  Would continue beta-blocker.  Will try to maintain electrolytes within normal limits.  With his known diminished LV function, will continue beta-blocker, consider Entresto.    - Doing well post cath yesterday. New onset hematochezia this AM, will need to continue DAPT with aspirin and Brilinta with recent PCI. Defer further workup to primary team.   - Continue BB (change to toprol XL) and high potency statin    - GDMT: will start entresto today. Can add spironolactone tomorrow if BP and renal  function remains stable.   - Change Lasix to 20 mg PO tomorrow. K 3.2 this morning, replaced per protocol.     Subjective:     No new complaints.     Objective:      Patient Vitals for the past 8 hrs:   Temp Pulse Resp BP SpO2   03/29/24 1200 -- 81 22 119/76 99 %   03/29/24 1155 -- -- -- (!) 135/58 --   03/29/24 1154 -- -- -- (!) 135/58 --   03/29/24 1100 98.1 °F (36.7 °C) 72 20 109/73 97 %   03/29/24 0800 -- 68 17 (!) 120/91 97 %   03/29/24 0741 98.5 °F (36.9 °C) 76 18 (!) 128/93 98 %         Patient Vitals for the past 96 hrs:   Weight   03/29/24 1155 91.6 kg (202 lb)   03/29/24 1154 91.6 kg (202 lb)       TELE: normal sinus rhythm               Current Facility-Administered Medications   Medication Dose Route Frequency    pantoprazole (PROTONIX) 40 mg in sodium chloride (PF) 0.9 % 10 mL injection  40 mg IntraVENous Q12H    atorvastatin (LIPITOR) tablet 40 mg  40 mg Oral Nightly    ticagrelor (BRILINTA) tablet 90 mg  90 mg Oral BID    metoprolol tartrate (LOPRESSOR) tablet 25 mg  25 mg Oral BID    aspirin chewable tablet 81 mg  81 mg Oral Daily    furosemide (LASIX) injection 20 mg  20 mg IntraVENous Daily    polyethylene glycol (GLYCOLAX) packet 17 g  17 g Oral Daily    docusate (COLACE) 50 MG/5ML liquid 100 mg  100 mg Oral Daily    sodium chloride flush 0.9 % injection 5-40 mL  5-40 mL IntraVENous 2 times per day    sodium chloride flush 0.9 % injection 5-40 mL  5-40 mL IntraVENous PRN    0.9 % sodium chloride infusion   IntraVENous PRN    potassium chloride (KLOR-CON M) extended release tablet 40 mEq  40 mEq Oral PRN    Or    potassium bicarb-citric acid (EFFER-K) effervescent tablet 40 mEq  40 mEq Oral PRN    Or    potassium chloride 10 mEq/100 mL IVPB (Peripheral Line)  10 mEq IntraVENous PRN    magnesium sulfate 2000 mg in 50 mL IVPB premix  2,000 mg IntraVENous PRN    ondansetron (ZOFRAN-ODT) disintegrating tablet 4 mg  4 mg Oral Q8H PRN    Or    ondansetron (ZOFRAN) injection 4 mg  4 mg IntraVENous Q6H PRN

## 2024-03-29 NOTE — PLAN OF CARE
Problem: Discharge Planning  Goal: Discharge to home or other facility with appropriate resources  3/28/2024 2054 by Chuckie Garcia RN  Outcome: Progressing  3/28/2024 1605 by Chava Jones RN  Outcome: Progressing     Problem: Pain  Goal: Verbalizes/displays adequate comfort level or baseline comfort level  3/28/2024 2054 by Chuckie Garcia RN  Outcome: Progressing  3/28/2024 1605 by Chava Jones RN  Outcome: Progressing     Problem: Safety - Adult  Goal: Free from fall injury  3/28/2024 1605 by Chava Jones RN  Outcome: Progressing     Problem: Nutrition Deficit:  Goal: Optimize nutritional status  3/28/2024 2054 by Chuckie Garcia RN  Outcome: Progressing  3/28/2024 1605 by Chava Jones RN  Outcome: Progressing  Flowsheets (Taken 3/28/2024 1144 by Dockweiler, Megan, TRISH)  Nutrient intake appropriate for improving, restoring, or maintaining nutritional needs:   Monitor oral intake, labs, and treatment plans   Assess nutritional status and recommend course of action   Recommend appropriate diets, oral nutritional supplements, and vitamin/mineral supplements     Problem: Skin/Tissue Integrity  Goal: Absence of new skin breakdown  Description: 1.  Monitor for areas of redness and/or skin breakdown  2.  Assess vascular access sites hourly  3.  Every 4-6 hours minimum:  Change oxygen saturation probe site  4.  Every 4-6 hours:  If on nasal continuous positive airway pressure, respiratory therapy assess nares and determine need for appliance change or resting period.  Outcome: Progressing

## 2024-03-30 VITALS
WEIGHT: 202 LBS | HEART RATE: 82 BPM | HEIGHT: 72 IN | TEMPERATURE: 98.7 F | BODY MASS INDEX: 27.36 KG/M2 | OXYGEN SATURATION: 98 % | RESPIRATION RATE: 12 BRPM | DIASTOLIC BLOOD PRESSURE: 72 MMHG | SYSTOLIC BLOOD PRESSURE: 100 MMHG

## 2024-03-30 LAB
HCT VFR BLD AUTO: 31.3 % (ref 36–48)
HGB BLD-MCNC: 9.2 G/DL (ref 13–16)
POTASSIUM SERPL-SCNC: 3.5 MMOL/L (ref 3.5–5.5)

## 2024-03-30 PROCEDURE — C9113 INJ PANTOPRAZOLE SODIUM, VIA: HCPCS | Performed by: STUDENT IN AN ORGANIZED HEALTH CARE EDUCATION/TRAINING PROGRAM

## 2024-03-30 PROCEDURE — A4216 STERILE WATER/SALINE, 10 ML: HCPCS | Performed by: STUDENT IN AN ORGANIZED HEALTH CARE EDUCATION/TRAINING PROGRAM

## 2024-03-30 PROCEDURE — 99232 SBSQ HOSP IP/OBS MODERATE 35: CPT | Performed by: INTERNAL MEDICINE

## 2024-03-30 PROCEDURE — 2580000003 HC RX 258: Performed by: STUDENT IN AN ORGANIZED HEALTH CARE EDUCATION/TRAINING PROGRAM

## 2024-03-30 PROCEDURE — 84132 ASSAY OF SERUM POTASSIUM: CPT

## 2024-03-30 PROCEDURE — 6360000002 HC RX W HCPCS: Performed by: STUDENT IN AN ORGANIZED HEALTH CARE EDUCATION/TRAINING PROGRAM

## 2024-03-30 PROCEDURE — 36415 COLL VENOUS BLD VENIPUNCTURE: CPT

## 2024-03-30 PROCEDURE — 97116 GAIT TRAINING THERAPY: CPT

## 2024-03-30 PROCEDURE — 2580000003 HC RX 258: Performed by: PHYSICIAN ASSISTANT

## 2024-03-30 PROCEDURE — 97530 THERAPEUTIC ACTIVITIES: CPT

## 2024-03-30 PROCEDURE — 6370000000 HC RX 637 (ALT 250 FOR IP)

## 2024-03-30 PROCEDURE — 85018 HEMOGLOBIN: CPT

## 2024-03-30 PROCEDURE — 85014 HEMATOCRIT: CPT

## 2024-03-30 PROCEDURE — 94761 N-INVAS EAR/PLS OXIMETRY MLT: CPT

## 2024-03-30 PROCEDURE — 6370000000 HC RX 637 (ALT 250 FOR IP): Performed by: INTERNAL MEDICINE

## 2024-03-30 RX ORDER — ASPIRIN 81 MG/1
81 TABLET, CHEWABLE ORAL DAILY
Qty: 30 TABLET | Refills: 3 | Status: SHIPPED | OUTPATIENT
Start: 2024-03-31

## 2024-03-30 RX ORDER — ATORVASTATIN CALCIUM 40 MG/1
40 TABLET, FILM COATED ORAL NIGHTLY
Qty: 30 TABLET | Refills: 3 | Status: SHIPPED | OUTPATIENT
Start: 2024-03-30

## 2024-03-30 RX ORDER — METOPROLOL SUCCINATE 25 MG/1
25 TABLET, EXTENDED RELEASE ORAL DAILY
Qty: 30 TABLET | Refills: 3 | Status: SHIPPED | OUTPATIENT
Start: 2024-03-31

## 2024-03-30 RX ORDER — FUROSEMIDE 20 MG/1
20 TABLET ORAL DAILY
Qty: 60 TABLET | Refills: 3 | Status: SHIPPED | OUTPATIENT
Start: 2024-03-31

## 2024-03-30 RX ORDER — PANTOPRAZOLE SODIUM 40 MG/1
40 TABLET, DELAYED RELEASE ORAL
Status: DISCONTINUED | OUTPATIENT
Start: 2024-03-30 | End: 2024-03-30 | Stop reason: HOSPADM

## 2024-03-30 RX ORDER — PANTOPRAZOLE SODIUM 40 MG/1
40 TABLET, DELAYED RELEASE ORAL DAILY
Qty: 30 TABLET | Refills: 0 | Status: SHIPPED | OUTPATIENT
Start: 2024-03-30 | End: 2024-04-29

## 2024-03-30 RX ADMIN — TICAGRELOR 90 MG: 90 TABLET ORAL at 09:00

## 2024-03-30 RX ADMIN — SACUBITRIL AND VALSARTAN 1 TABLET: 24; 26 TABLET, FILM COATED ORAL at 09:01

## 2024-03-30 RX ADMIN — PANTOPRAZOLE SODIUM 40 MG: 40 INJECTION, POWDER, FOR SOLUTION INTRAVENOUS at 09:00

## 2024-03-30 RX ADMIN — FUROSEMIDE 20 MG: 20 TABLET ORAL at 09:01

## 2024-03-30 RX ADMIN — ASPIRIN 81 MG CHEWABLE TABLET 81 MG: 81 TABLET CHEWABLE at 09:01

## 2024-03-30 RX ADMIN — SODIUM CHLORIDE, PRESERVATIVE FREE 10 ML: 5 INJECTION INTRAVENOUS at 09:03

## 2024-03-30 RX ADMIN — METOPROLOL SUCCINATE 25 MG: 25 TABLET, EXTENDED RELEASE ORAL at 09:00

## 2024-03-30 ASSESSMENT — PAIN SCALES - GENERAL
PAINLEVEL_OUTOF10: 0

## 2024-03-30 NOTE — CARE COORDINATION
03/30/24 1146   /Social Work Whiteboard Notes   /Social Work Whiteboard GREEN 3/30 Home with no CM needs. ARU declined. Pts dtr to transport pt home       SW spoke with pt who reported that he worked with PT and has been ambulating. Pt stated his dtr will provide transportation home. Pt denied any questions or concerns for this writer.     Pt with no CM needs identified.     ADONIS Nick  Case Management Department

## 2024-03-30 NOTE — PROGRESS NOTES
0750 Received report from ABIGAIL Pena/Chuckie HAYES. Patient alert and oriented x 4.    1030 Patient ambulates to the bathroom independently.     1230 Patient for discharge today. Waiting for ride to home.     1440 Discharge instruction given including new medications and side effects. Written materials provided. Advised to call MD office on Monday for follow-up.

## 2024-03-30 NOTE — PROGRESS NOTES
[facility]  -  Progress Note      Patient: Gigi Mcneal MRN: [id number]  SSN: [id number]    YOB: 1960  Age: 64 y.o.  Sex: male      Admit Date: 3/23/2024    Assessment:     - NSTEMI: s/p LHC with PCI to LAD and first diagonal branch.    - Acute hypoxic respiratory failure requiring intubation. Extubated 3/25/2024. Likely secondary to CHF exacerbation    - Acute HFrEF: New diagnosis. Echo 3/22/2024 EF 35-40%, moderate global hypokinesis, moderated AS, mild to moderate MR. Repeat limited echo 3/29 unchanged. CXR 3/25/2024 with layering pleural effusions and diffuse bilateral pulmonary opacities. NT pro bnp 3,945. Appears euvolemic.   - Moderate Aortic Stenosis  - Anemia: Transfused 1 unit of PRBC 3/24/2024  - Tobacco use     Primary cardiologist: None on file. Initial consult, Dr. Lucero    Plan:     His cardiac status is stable.  Would continue DAPT for at least 12 months.  No significant changes in his hemoglobin thus far.  Would make sure that his electrolytes are well replaced.    Continue baby aspirin, Brilinta, Lipitor 40, Toprol-XL 25, and Entresto.  Continue Lasix.  Make sure his potassium is greater than 3.8 and magnesium greater than 1.8.    Subjective:     No new complaints.     Objective:      Patient Vitals for the past 8 hrs:   Temp Pulse Resp BP SpO2   03/30/24 0900 -- 82 -- 107/73 --   03/30/24 0700 98.6 °F (37 °C) -- -- -- --   03/30/24 0328 98.3 °F (36.8 °C) 69 12 112/80 98 %         Patient Vitals for the past 96 hrs:   Weight   03/29/24 1155 91.6 kg (202 lb)   03/29/24 1154 91.6 kg (202 lb)         Intake/Output Summary (Last 24 hours) at 3/30/2024 0945  Last data filed at 3/30/2024 0903  Gross per 24 hour   Intake 250 ml   Output 625 ml   Net -375 ml       Physical Exam:  General:  alert, appears stated age, and cooperative  Neck:  no JVD  Lungs:  clear to auscultation bilaterally  Heart:  regular rate and rhythm  Abdomen:  no guarding or

## 2024-03-30 NOTE — PROGRESS NOTES
.Bedside shift change report given to ABIGAIL Noguera and ABIGAIL Pena (oncoming nurse) by ABIGAIL Ventura (offgoing nurse). Report included the following information Nurse Handoff Report, Cardiac Rhythm NSR, and Alarm Parameters.      2030:vital signs checked, medicated as per MAR, asssessment done.    2300: re vitals done, no complains at this time, all needs within reach.    0100: asleep on bed, fall precautions implemented.    0330: re vitals done, no complains at this time, no bleeding episodes noted.    Bedside shift change report given to ABIGAIL Ya (oncoming nurse) by ABIGAIL Noguera and ABIGAIL Pena (offgoing nurse). Report included the following information Nurse Handoff Report, Cardiac Rhythm NSR, and Alarm Parameters.

## 2024-03-30 NOTE — PROGRESS NOTES
Patient is eating BF. Looks well. Denies any pain. Lucid. No melena or abdominal pain. No nausea or vomiting.  PE: /73   Pulse 82   Temp 98.6 °F (37 °C) (Oral)   Resp 12   Ht 1.829 m (6')   Wt 91.6 kg (202 lb)   SpO2 98%   BMI 27.40 kg/m²   Abdomen benign exam  Neuro non focal  Pallor noted.    Labs reviewed.    A/P: In absence of active bleeding GI intervention not recommended given recent stents. Monitor H/h and transfuse as needed. Supplement Iron and B12 if deficient. Continue PPI. Will need colonoscopy as out patient at 6 months.     Call us as needed. Will sign off.    CAROL Russell MD

## 2024-03-30 NOTE — PLAN OF CARE
Problem: Discharge Planning  Goal: Discharge to home or other facility with appropriate resources  Outcome: Progressing  Flowsheets (Taken 3/29/2024 0800 by Audrey Hutchison RN)  Discharge to home or other facility with appropriate resources:   Identify barriers to discharge with patient and caregiver   Identify discharge learning needs (meds, wound care, etc)     Problem: Pain  Goal: Verbalizes/displays adequate comfort level or baseline comfort level  Outcome: Progressing     Problem: Safety - Adult  Goal: Free from fall injury  Outcome: Progressing     Problem: Nutrition Deficit:  Goal: Optimize nutritional status  Outcome: Progressing     Problem: Occupational Therapy - Adult  Goal: By Discharge: Performs self-care activities at highest level of function for planned discharge setting.  See evaluation for individualized goals.  Description: Occupational Therapy Goals:  Initiated 3/26/2024 to be met within 7-10 days.     1.  Patient will perform grooming standing with good balance with supervision/set-up.   2.  Patient will perform bathing with modified independence.  3.  Patient will perform upper body dressing  with modified independence.  4.  Patient will perform lower body dressing with supervision/set-up.  4.  Patient will perform toilet transfers with supervision/set-up.  5.  Patient will perform all aspects of toileting with supervision/set-up.  6.  Patient will participate in upper extremity therapeutic exercise/activities with independence for 8-10 minutes to increase strength/endurance for ADLs.    7.  Patient will utilize energy conservation techniques during functional activities with verbal and visual cues.     PLOF: Independent with ADLs, lives at home with wife    3/29/2024 1320 by Monique Eisenberg OTA  Outcome: Progressing     Problem: Skin/Tissue Integrity  Goal: Absence of new skin breakdown  Description: 1.  Monitor for areas of redness and/or skin breakdown  2.  Assess vascular access

## 2024-03-30 NOTE — DISCHARGE INSTRUCTIONS
HEART CATHETERIZATION/ANGIOGRAPHY DISCHARGE INSTRUCTIONS    Check puncture site frequently for swelling or bleeding. If there is any bleeding, lie down and apply pressure over the area with a clean towel or washcloth. Notify your doctor for any redness, swelling, drainage, or oozing from the puncture site. Notify your doctor for any fever or chills.  If the extremity becomes cold, numb, or painful call Dr. Guaman at [phone].  Activity should be limited for the next 48 hours. Climb stairs as little as possible and avoid any stooping, bending, or strenuous activity for 48 hours. No heavy lifting (anything over 10 pounds) for 3 days.  You may resume your usual diet. Drink more fluids than usual.  Have a responsible person drive you home and stay with you for at least 24 hours after your heart catheterization/angiography.  You may remove bandage from your Right Arm in 24 hours. You may shower in 24 hours. No tub baths, hot tubs, or swimming for 1 week. Do not place any lotions, creams, powders, or ointments over puncture site for 1 week. You may place a clean band-aid over the puncture site each day for 5 days. Change daily.  I have read the above instructions and have had the opportunity to ask questions.      Patient: ________________________   Date: 3/30/2024    Witness: _______________________   Date: 3/30/2024       DASH Diet: Care Instructions  Your Care Instructions     The DASH diet is an eating plan that can help lower your blood pressure. DASH stands for Dietary Approaches to Stop Hypertension. Hypertension is high blood pressure.  The DASH diet focuses on eating foods that are high in calcium, potassium, and magnesium. These nutrients can lower blood pressure. The foods that are highest in these nutrients are fruits, vegetables, low-fat dairy products, nuts, seeds, and legumes. But taking calcium, potassium, and magnesium supplements instead of eating foods that are high in those nutrients does not have  need to.     Manage other health problems such as diabetes and high blood pressure.     Limit or avoid alcohol. Ask your doctor how much alcohol, if any, is safe for you.     If you think you may have a problem with alcohol or drug use, talk to your doctor.     Avoid infections such as COVID-19, colds, and the flu. Get the flu vaccine every year. Get a pneumococcal vaccine shot. If you have had one before, ask your doctor whether you need another dose. Stay up to date on your COVID-19 vaccines.   When should you call for help?   Call 911  if you have symptoms of sudden heart failure such as:    You have severe trouble breathing.     You cough up pink, foamy mucus.     You have a new irregular or rapid heartbeat.   Call your doctor now or seek immediate medical care if:    You have new or increased shortness of breath.     You are dizzy or lightheaded, or you feel like you may faint.     You have sudden weight gain, such as more than 2 to 3 pounds in a day or 5 pounds in a week. (Your doctor may suggest a different range of weight gain.)     You have increased swelling in your legs, ankles, or feet.     You are suddenly so tired or weak that you cannot do your usual activities.   Watch closely for changes in your health, and be sure to contact your doctor if you develop new symptoms.  Where can you learn more?  Go to https://www.[employer].net/patientEd and enter E597 to learn more about \"Heart Failure: Care Instructions.\"  Current as of: June 24, 2023               Content Version: 14.0  © 2006-2024 [employer].   Care instructions adapted under license by [employer]. If you have questions about a medical condition or this instruction, always ask your healthcare professional. [employer] disclaims any warranty or liability for your use of this information.

## 2024-03-30 NOTE — PROGRESS NOTES
Bedside and Verbal shift change report given to ABIGAIL Pena and ABIGAIL Noguera (oncoming nurse) by Audrey (offgoing nurse). Report included the following information Nurse Handoff Report, Index, Intake/Output, MAR, Recent Results, Cardiac Rhythm sinus rhythm, Neuro Assessment, and Event Log.

## 2024-03-30 NOTE — PROGRESS NOTES
[facility]               [address] [place] [address]                [phone]      3/30/2024      RE: Gigi Mcneal      To Whom it May Concern:      This is to certify that Gigi Mcneal was admitted to [facility] from 3/23/2024-3/30/2024.  Please excuse him for any absence from work during the aforementioned time period.  He will need to be seen by his PCP on outpatient follow-up prior to being cleared to return to work.      Please feel free to contact my office if you have any questions or concerns.  Thank you for your assistance in this matter.    Sincerely,        MD Basim Murray [facility]  [facility]  [phone]

## 2024-03-30 NOTE — PLAN OF CARE
Problem: Physical Therapy - Adult  Goal: By Discharge: Performs mobility at highest level of function for planned discharge setting.  See evaluation for individualized goals.  Description: Physical Therapy Goals  Initiated 3/26/2024 and to be accomplished within 7 day(s)  1.  Patient will move from supine to sit and sit to supine , scoot up and down, and roll side to side in bed with independence.    2.  Patient will transfer from bed to chair and chair to bed with supervision/set-up using the least restrictive device.  3.  Patient will perform sit to stand with supervision/set-up.  4.  Patient will ambulate with minimal assistance/contact guard assist for 100 feet with the least restrictive device.       PLOF: Independent. Lives with wife in a single story house with no GILBERT.   Outcome: Progressing     PHYSICAL THERAPY TREATMENT    Patient: Gigi Mcneal (64 y.o. male)  Date: 3/30/2024  Diagnosis: Acute hypoxic respiratory failure (HCC) [J96.01] Acute hypoxemic respiratory failure (HCC)  Procedure(s) (LRB):  Left heart cath (N/A)  Coronary angiography (N/A)  Left ventriculography (N/A)  Insert stent syeda coronary (N/A) 2 Days Post-Op  Precautions: Aspiration Risk, General Precautions,  ,  ,  ,  ,  ,  ,      ASSESSMENT:  Pt continues to present with decreased activity tolerance and generalized weakness; however, is progressing slowly, as evidenced by ability to ambulate an increased distance this session with no AD and SBA progressing to supervision. Pt demo no LOB and reported no symptomatic complaints. Pt in chair at end of session, educated on further exercises to complete when standing next, pt verbalized understanding. Pt encouraged to ambulate again with nursing later, verbalized understanding and RN aware.    Progression toward goals:   [x]      Improving appropriately and progressing toward goals  []      Improving slowly and progressing toward goals  []      Not making progress toward goals and plan of care

## 2024-04-01 ENCOUNTER — TELEPHONE (OUTPATIENT)
Facility: CLINIC | Age: 64
End: 2024-04-01

## 2024-04-11 ENCOUNTER — OFFICE VISIT (OUTPATIENT)
Facility: CLINIC | Age: 64
End: 2024-04-11

## 2024-04-11 ENCOUNTER — HOSPITAL ENCOUNTER (OUTPATIENT)
Age: 64
Discharge: HOME | End: 2024-04-11
Payer: COMMERCIAL

## 2024-04-11 VITALS
OXYGEN SATURATION: 100 % | RESPIRATION RATE: 18 BRPM | WEIGHT: 176.9 LBS | DIASTOLIC BLOOD PRESSURE: 66 MMHG | HEIGHT: 72 IN | SYSTOLIC BLOOD PRESSURE: 98 MMHG | TEMPERATURE: 96.2 F | BODY MASS INDEX: 23.96 KG/M2 | HEART RATE: 81 BPM

## 2024-04-11 DIAGNOSIS — I25.10 CORONARY ARTERY DISEASE INVOLVING NATIVE CORONARY ARTERY OF NATIVE HEART WITHOUT ANGINA PECTORIS: ICD-10-CM

## 2024-04-11 DIAGNOSIS — K62.5 RECTAL BLEEDING: ICD-10-CM

## 2024-04-11 DIAGNOSIS — Z12.11 SCREEN FOR COLON CANCER: ICD-10-CM

## 2024-04-11 DIAGNOSIS — Z09 HOSPITAL DISCHARGE FOLLOW-UP: Primary | ICD-10-CM

## 2024-04-11 DIAGNOSIS — E87.6 HYPOKALEMIA: ICD-10-CM

## 2024-04-11 DIAGNOSIS — I50.20 HFREF (HEART FAILURE WITH REDUCED EJECTION FRACTION) (HCC): ICD-10-CM

## 2024-04-11 DIAGNOSIS — I21.4 NSTEMI (NON-ST ELEVATED MYOCARDIAL INFARCTION) (HCC): ICD-10-CM

## 2024-04-11 LAB
ANION GAP SERPL CALC-SCNC: 9 MMOL/L (ref 3–18)
BUN SERPL-MCNC: 14 MG/DL (ref 7–18)
BUN/CREAT SERPL: 15 (ref 12–20)
CA-I BLD-MCNC: 8.9 MG/DL (ref 8.5–10.1)
CHLORIDE SERPL-SCNC: 108 MMOL/L (ref 100–111)
CO2 SERPL-SCNC: 24 MMOL/L (ref 21–32)
CREAT SERPL-MCNC: 0.94 MG/DL (ref 0.6–1.3)
ERYTHROCYTE [DISTWIDTH] IN BLOOD BY AUTOMATED COUNT: 22.4 % (ref 11.6–14.5)
GLUCOSE SERPL-MCNC: 163 MG/DL (ref 74–99)
HCT VFR BLD AUTO: 29.2 % (ref 36–48)
HGB BLD-MCNC: 8.6 G/DL (ref 13–16)
MCH RBC QN AUTO: 18.9 PG (ref 24–34)
MCHC RBC AUTO-ENTMCNC: 29.5 G/DL (ref 31–37)
MCV RBC AUTO: 64.2 FL (ref 78–100)
NRBC # BLD: 0 K/UL (ref 0–0.01)
NRBC BLD-RTO: 0 PER 100 WBC
PLATELET # BLD AUTO: 677 K/UL (ref 135–420)
PMV BLD AUTO: 9.5 FL (ref 9.2–11.8)
POTASSIUM SERPL-SCNC: 3.3 MMOL/L (ref 3.5–5.5)
RBC # BLD AUTO: 4.55 M/UL (ref 4.35–5.65)
SODIUM SERPL-SCNC: 141 MMOL/L (ref 136–145)
WBC # BLD AUTO: 10.5 K/UL (ref 4.6–13.2)

## 2024-04-11 PROCEDURE — 36415 COLL VENOUS BLD VENIPUNCTURE: CPT

## 2024-04-11 PROCEDURE — 85027 COMPLETE CBC AUTOMATED: CPT

## 2024-04-11 PROCEDURE — 80048 BASIC METABOLIC PNL TOTAL CA: CPT

## 2024-04-11 RX ORDER — PANTOPRAZOLE SODIUM 40 MG/1
40 TABLET, DELAYED RELEASE ORAL DAILY
Qty: 30 TABLET | Refills: 0 | Status: SHIPPED | OUTPATIENT
Start: 2024-04-11 | End: 2024-05-11

## 2024-04-11 RX ORDER — FUROSEMIDE 20 MG/1
10 TABLET ORAL DAILY
Qty: 60 TABLET | Refills: 3 | COMMUNITY
Start: 2024-04-11

## 2024-04-11 RX ORDER — PANTOPRAZOLE SODIUM 40 MG/1
40 TABLET, DELAYED RELEASE ORAL DAILY
Qty: 30 TABLET | Refills: 0 | Status: SHIPPED | OUTPATIENT
Start: 2024-04-11 | End: 2024-04-11

## 2024-04-11 ASSESSMENT — PATIENT HEALTH QUESTIONNAIRE - PHQ9
SUM OF ALL RESPONSES TO PHQ QUESTIONS 1-9: 0
1. LITTLE INTEREST OR PLEASURE IN DOING THINGS: NOT AT ALL
SUM OF ALL RESPONSES TO PHQ QUESTIONS 1-9: 0
SUM OF ALL RESPONSES TO PHQ9 QUESTIONS 1 & 2: 0
2. FEELING DOWN, DEPRESSED OR HOPELESS: NOT AT ALL

## 2024-04-11 NOTE — PROGRESS NOTES
Chief Complaint   Patient presents with    Follow-Up from Hospital     Patient states he did have a dizzy spell this morning  \"Have you been to the ER, urgent care clinic since your last visit?  Hospitalized since your last visit?\"    YES - When: approximately 3/30/2024 ago.  Where and Why: NSTEMI.    “Have you seen or consulted any other health care providers outside of [facility] since your last visit?”    NO  Patient has appt with Dr Araujo and Dr. Guaman next month    “Have you had a colorectal cancer screening such as a colonoscopy/FIT/Cologuard?    NO          
(HCC)    NSTEMI (non-ST elevated myocardial infarction) (HCC)    Hypokalemia    Anemia    History of tobacco abuse    Medical non-compliance    Coronary artery disease involving native coronary artery of native heart without angina pectoris       Medications listed as ordered at the time of discharge from hospital     Medication List            Accurate as of April 11, 2024  1:40 PM. If you have any questions, ask your nurse or doctor.                CHANGE how you take these medications      furosemide 20 MG tablet  Commonly known as: LASIX  What changed: how much to take  Changed by: Kamlesh Biggs MD            CONTINUE taking these medications      aspirin 81 MG chewable tablet  Take 1 tablet by mouth daily     atorvastatin 40 MG tablet  Commonly known as: LIPITOR  Take 1 tablet by mouth nightly     metoprolol succinate 25 MG extended release tablet  Commonly known as: TOPROL XL  Take 1 tablet by mouth daily     pantoprazole 40 MG tablet  Commonly known as: PROTONIX  Take 1 tablet by mouth daily     sacubitril-valsartan 24-26 MG per tablet  Commonly known as: ENTRESTO  Take 1 tablet by mouth 2 times daily     ticagrelor 90 MG Tabs tablet  Commonly known as: BRILINTA  Take 1 tablet by mouth 2 times daily               Where to Get Your Medications        These medications were sent to [facility] [address] [place] [address] [phone] -  [phone] [address] [facility] [address]      Phone: [phone]   pantoprazole 40 MG tablet  sacubitril-valsartan 24-26 MG per tablet  ticagrelor 90 MG Tabs tablet           Medications marked \"taking\" at this time  Outpatient Medications Marked as Taking for the 4/11/24 encounter (Office Visit) with Kamlesh Biggs MD   Medication Sig Dispense Refill    sacubitril-valsartan (ENTRESTO) 24-26 MG per tablet Take 1 tablet by mouth 2 times daily 60 tablet 0    ticagrelor (BRILINTA) 90 MG TABS tablet Take 1 tablet by mouth 2 times daily 60

## 2024-04-17 DIAGNOSIS — D50.8 IRON DEFICIENCY ANEMIA SECONDARY TO INADEQUATE DIETARY IRON INTAKE: Primary | ICD-10-CM

## 2024-04-17 RX ORDER — FERROUS SULFATE 325(65) MG
325 TABLET ORAL
Qty: 90 TABLET | Refills: 3 | Status: SHIPPED | OUTPATIENT
Start: 2024-04-17

## 2024-04-17 NOTE — PROGRESS NOTES
3:31 PM  Patient's potassium is low.  This is chronic for him.  Will continue to monitor for now.  Encouraged potassium rich foods.  Reports it is difficult for him to take the big potassium supplement so would like to try through diet first.  In addition, he does have microcytic anemia which would be consistent with iron deficiency.  Will recommend he take iron supplement every day.  He can take it every other day if he starts getting constipated.  His platelets are high so would likely recheck blood work in a month to make sure they are coming down as he was just hospitalized, as I suspect it is elevated although it being an acute phase reactant.  Kamlesh Biggs MD

## 2024-04-24 ENCOUNTER — TELEPHONE (OUTPATIENT)
Age: 64
End: 2024-04-24

## 2024-04-24 NOTE — TELEPHONE ENCOUNTER
Referral for screening colonoscopy. Please review and advise.      Referral  Referral # [id number]  Referral Information    Referral # Creation Date Referral Status Status Update    [id number] 04/11/2024 In Progress 04/24/2024: Status History     Status Reason Referral Type Referral Reasons Referral Class   none Eval and Treat Specialty Services Required Internal     To Specialty To Provider To Location/Place of Service To Department   Gastroenterology Catalino Mcelroy MD [facility] - GASTROENTEROLOGY     To Vendor Referred By By Location/Place of Service By Department   none Kamlesh Biggs MD [facility]     Priority Start Date Expiration Date Referral Entered By   Routine 04/11/2024 04/11/2025 Gita Souza LPN     Visits Requested Visits Authorized Visits Completed Visits Scheduled   2 2       Coverages    Payor Plan Auth. Required? Covered? Member # Authorized From Expires Auth # Precert. # Comment   [facility] PLU -- Covered [id number] 3/1/2024 -- -- -- --     Referral Information    Referral # Creation Date Referral Status Status Update    [id number] 04/11/2024 In Progress 04/24/2024: Status History     Status Reason Referral Type Referral Reasons Referral Class   none Eval and Treat Specialty Services Required Internal     To Specialty To Provider To Location/Place of Service To Department   Gastroenterology Catalino Mcelroy MD none [facility] - GASTROENTEROLOGY     To Vendor Referred By By Location/Place of Service By Department   none Kamlesh Biggs MD [facility]     Priority Start Date Expiration Date Referral Entered By   Routine 04/11/2024 04/11/2025 Gita Souza LPN     Visits Requested Visits Authorized Visits Completed Visits Scheduled   2 2       Procedure Information    Service

## 2024-05-07 ENCOUNTER — TELEPHONE (OUTPATIENT)
Facility: CLINIC | Age: 64
End: 2024-05-07

## 2024-05-07 NOTE — TELEPHONE ENCOUNTER
Received a call from [facility] regarding patients FMLA paperwork. Number 8 on the form was not filled out, they are wanting to know if you are permitting an extension for patient to be out of work. New form was received and is on providers desk.     [phone] ext [phone]  Fax number [phone]

## 2024-05-17 ENCOUNTER — TELEPHONE (OUTPATIENT)
Facility: CLINIC | Age: 64
End: 2024-05-17

## 2024-05-21 ENCOUNTER — OFFICE VISIT (OUTPATIENT)
Age: 64
End: 2024-05-21
Payer: COMMERCIAL

## 2024-05-21 VITALS
SYSTOLIC BLOOD PRESSURE: 102 MMHG | HEART RATE: 69 BPM | DIASTOLIC BLOOD PRESSURE: 70 MMHG | OXYGEN SATURATION: 94 % | BODY MASS INDEX: 23.43 KG/M2 | HEIGHT: 72 IN | WEIGHT: 173 LBS

## 2024-05-21 DIAGNOSIS — J96.01 ACUTE HYPOXEMIC RESPIRATORY FAILURE (HCC): ICD-10-CM

## 2024-05-21 DIAGNOSIS — I50.20 HFREF (HEART FAILURE WITH REDUCED EJECTION FRACTION) (HCC): ICD-10-CM

## 2024-05-21 DIAGNOSIS — I42.9 CARDIOMYOPATHY, UNSPECIFIED TYPE (HCC): Primary | ICD-10-CM

## 2024-05-21 DIAGNOSIS — J18.9 MULTIFOCAL PNEUMONIA: ICD-10-CM

## 2024-05-21 DIAGNOSIS — I25.10 CORONARY ARTERY DISEASE INVOLVING NATIVE CORONARY ARTERY OF NATIVE HEART WITHOUT ANGINA PECTORIS: ICD-10-CM

## 2024-05-21 DIAGNOSIS — R00.2 PALPITATIONS: ICD-10-CM

## 2024-05-21 DIAGNOSIS — I21.4 NSTEMI (NON-ST ELEVATED MYOCARDIAL INFARCTION) (HCC): ICD-10-CM

## 2024-05-21 PROCEDURE — 99214 OFFICE O/P EST MOD 30 MIN: CPT | Performed by: INTERNAL MEDICINE

## 2024-05-21 PROCEDURE — G8427 DOCREV CUR MEDS BY ELIG CLIN: HCPCS | Performed by: INTERNAL MEDICINE

## 2024-05-21 PROCEDURE — 1123F ACP DISCUSS/DSCN MKR DOCD: CPT | Performed by: INTERNAL MEDICINE

## 2024-05-21 PROCEDURE — G8420 CALC BMI NORM PARAMETERS: HCPCS | Performed by: INTERNAL MEDICINE

## 2024-05-21 PROCEDURE — 93000 ELECTROCARDIOGRAM COMPLETE: CPT | Performed by: INTERNAL MEDICINE

## 2024-05-21 PROCEDURE — 3017F COLORECTAL CA SCREEN DOC REV: CPT | Performed by: INTERNAL MEDICINE

## 2024-05-21 PROCEDURE — 1036F TOBACCO NON-USER: CPT | Performed by: INTERNAL MEDICINE

## 2024-05-21 ASSESSMENT — PATIENT HEALTH QUESTIONNAIRE - PHQ9
SUM OF ALL RESPONSES TO PHQ QUESTIONS 1-9: 0
1. LITTLE INTEREST OR PLEASURE IN DOING THINGS: NOT AT ALL
2. FEELING DOWN, DEPRESSED OR HOPELESS: NOT AT ALL
SUM OF ALL RESPONSES TO PHQ9 QUESTIONS 1 & 2: 0
SUM OF ALL RESPONSES TO PHQ QUESTIONS 1-9: 0

## 2024-05-21 NOTE — PROGRESS NOTES
Gigi Mcneal presents today for   Chief Complaint   Patient presents with    Follow-up     Post cath       Gigi Mcneal preferred language for health care discussion is english/other.    Is someone accompanying this pt? no    Is the patient using any DME equipment during OV? no    Depression Screening:  Depression: Not at risk (4/11/2024)    PHQ-2     PHQ-2 Score: 0        Learning Assessment:  Who is the primary learner? Patient    What is the preferred language for health care of the primary learner? ENGLISH    How does the primary learner prefer to learn new concepts? DEMONSTRATION    Answered By patient    Relationship to Learner SELF           Pt currently taking Anticoagulant therapy? no    Pt currently taking Antiplatelet therapy ? Aspirin   brilinta      Coordination of Care:  1. Have you been to the ER, urgent care clinic since your last visit? Hospitalized since your last visit? no    2. Have you seen or consulted any other health care providers outside of the [facility] since your last visit? Include any pap smears or colon screening. no    
(176 lb 14.4 oz)   03/29/24 91.6 kg (202 lb)         EKG: normal sinus rhythm, nonspecific ST and T waves changes, unchanged from previous tracings.     Butch Guaman MD   5/21/2024   
No

## 2024-05-25 ENCOUNTER — APPOINTMENT (OUTPATIENT)
Age: 64
DRG: 812 | End: 2024-05-25
Payer: COMMERCIAL

## 2024-05-25 ENCOUNTER — HOSPITAL ENCOUNTER (INPATIENT)
Age: 64
LOS: 2 days | Discharge: HOME | DRG: 812 | End: 2024-05-27
Attending: FAMILY MEDICINE | Admitting: INTERNAL MEDICINE
Payer: COMMERCIAL

## 2024-05-25 DIAGNOSIS — R42 DIZZINESS: ICD-10-CM

## 2024-05-25 DIAGNOSIS — E87.6 HYPOKALEMIA: ICD-10-CM

## 2024-05-25 DIAGNOSIS — D64.9 ANEMIA, UNSPECIFIED TYPE: Primary | ICD-10-CM

## 2024-05-25 LAB
ALBUMIN SERPL-MCNC: 2.8 G/DL (ref 3.4–5)
ALBUMIN/GLOB SERPL: 0.8 (ref 0.8–1.7)
ALP SERPL-CCNC: 57 U/L (ref 45–117)
ALT SERPL-CCNC: 12 U/L (ref 16–61)
ANION GAP SERPL CALC-SCNC: 9 MMOL/L (ref 3–18)
AST SERPL W P-5'-P-CCNC: 11 U/L (ref 10–38)
BASOPHILS # BLD: 0.2 K/UL (ref 0–0.1)
BASOPHILS NFR BLD: 2 % (ref 0–2)
BILIRUB SERPL-MCNC: 0.6 MG/DL (ref 0.2–1)
BNP SERPL-MCNC: 1607 PG/ML (ref 0–900)
BUN SERPL-MCNC: 7 MG/DL (ref 7–18)
BUN/CREAT SERPL: 9 (ref 12–20)
CA-I BLD-MCNC: 8.2 MG/DL (ref 8.5–10.1)
CHLORIDE SERPL-SCNC: 111 MMOL/L (ref 100–111)
CO2 SERPL-SCNC: 24 MMOL/L (ref 21–32)
COLLECT DATE STL: NORMAL
CREAT SERPL-MCNC: 0.76 MG/DL (ref 0.6–1.3)
DIFFERENTIAL METHOD BLD: ABNORMAL
EOSINOPHIL # BLD: 0.1 K/UL (ref 0–0.4)
EOSINOPHIL NFR BLD: 1 % (ref 0–5)
ERYTHROCYTE [DISTWIDTH] IN BLOOD BY AUTOMATED COUNT: 19.8 % (ref 11.6–14.5)
GLOBULIN SER CALC-MCNC: 3.3 G/DL (ref 2–4)
GLUCOSE SERPL-MCNC: 114 MG/DL (ref 74–99)
HCT VFR BLD AUTO: 22.2 % (ref 36–48)
HEMOCCULT SP1 STL QL: NEGATIVE
HGB BLD-MCNC: 6.2 G/DL (ref 13–16)
IMM GRANULOCYTES # BLD AUTO: 0 K/UL
IMM GRANULOCYTES NFR BLD AUTO: 0 %
LYMPHOCYTES # BLD: 1 K/UL (ref 0.9–3.6)
LYMPHOCYTES NFR BLD: 10 % (ref 21–52)
MAGNESIUM SERPL-MCNC: 2.1 MG/DL (ref 1.6–2.6)
MCH RBC QN AUTO: 16.8 PG (ref 24–34)
MCHC RBC AUTO-ENTMCNC: 27.9 G/DL (ref 31–37)
MCV RBC AUTO: 60.2 FL (ref 78–100)
MONOCYTES # BLD: 0.3 K/UL (ref 0.05–1.2)
MONOCYTES NFR BLD: 3 % (ref 3–10)
NEUTS SEG # BLD: 7.9 K/UL (ref 1.8–8)
NEUTS SEG NFR BLD: 84 % (ref 40–73)
NRBC # BLD: 0.02 K/UL (ref 0–0.01)
NRBC BLD-RTO: 0.2 PER 100 WBC
PLATELET # BLD AUTO: 408 K/UL (ref 135–420)
PMV BLD AUTO: 10.6 FL (ref 9.2–11.8)
POTASSIUM SERPL-SCNC: 2.5 MMOL/L (ref 3.5–5.5)
PROT SERPL-MCNC: 6.1 G/DL (ref 6.4–8.2)
RBC # BLD AUTO: 3.69 M/UL (ref 4.35–5.65)
RBC MORPH BLD: ABNORMAL
SODIUM SERPL-SCNC: 144 MMOL/L (ref 136–145)
TROPONIN I SERPL HS-MCNC: 23 NG/L (ref 0–78)
TSH SERPL DL<=0.05 MIU/L-ACNC: 0.35 UIU/ML (ref 0.36–3.74)
WBC # BLD AUTO: 9.5 K/UL (ref 4.6–13.2)

## 2024-05-25 PROCEDURE — 93005 ELECTROCARDIOGRAM TRACING: CPT | Performed by: FAMILY MEDICINE

## 2024-05-25 PROCEDURE — 85025 COMPLETE CBC W/AUTO DIFF WBC: CPT

## 2024-05-25 PROCEDURE — 96365 THER/PROPH/DIAG IV INF INIT: CPT

## 2024-05-25 PROCEDURE — 6360000002 HC RX W HCPCS: Performed by: FAMILY MEDICINE

## 2024-05-25 PROCEDURE — 83735 ASSAY OF MAGNESIUM: CPT

## 2024-05-25 PROCEDURE — 96361 HYDRATE IV INFUSION ADD-ON: CPT

## 2024-05-25 PROCEDURE — 80053 COMPREHEN METABOLIC PANEL: CPT

## 2024-05-25 PROCEDURE — 86901 BLOOD TYPING SEROLOGIC RH(D): CPT

## 2024-05-25 PROCEDURE — 86850 RBC ANTIBODY SCREEN: CPT

## 2024-05-25 PROCEDURE — 96376 TX/PRO/DX INJ SAME DRUG ADON: CPT

## 2024-05-25 PROCEDURE — 71045 X-RAY EXAM CHEST 1 VIEW: CPT

## 2024-05-25 PROCEDURE — 84484 ASSAY OF TROPONIN QUANT: CPT

## 2024-05-25 PROCEDURE — 99285 EMERGENCY DEPT VISIT HI MDM: CPT

## 2024-05-25 PROCEDURE — 83880 ASSAY OF NATRIURETIC PEPTIDE: CPT

## 2024-05-25 PROCEDURE — 85610 PROTHROMBIN TIME: CPT

## 2024-05-25 PROCEDURE — 6370000000 HC RX 637 (ALT 250 FOR IP): Performed by: FAMILY MEDICINE

## 2024-05-25 PROCEDURE — 82272 OCCULT BLD FECES 1-3 TESTS: CPT

## 2024-05-25 PROCEDURE — 2580000003 HC RX 258: Performed by: FAMILY MEDICINE

## 2024-05-25 PROCEDURE — 1100000000 HC RM PRIVATE

## 2024-05-25 PROCEDURE — 84443 ASSAY THYROID STIM HORMONE: CPT

## 2024-05-25 PROCEDURE — 36415 COLL VENOUS BLD VENIPUNCTURE: CPT

## 2024-05-25 PROCEDURE — 86900 BLOOD TYPING SEROLOGIC ABO: CPT

## 2024-05-25 RX ORDER — 0.9 % SODIUM CHLORIDE 0.9 %
1000 INTRAVENOUS SOLUTION INTRAVENOUS ONCE
Status: COMPLETED | OUTPATIENT
Start: 2024-05-25 | End: 2024-05-26

## 2024-05-25 RX ORDER — SODIUM CHLORIDE 9 MG/ML
INJECTION, SOLUTION INTRAVENOUS PRN
Status: COMPLETED | OUTPATIENT
Start: 2024-05-25 | End: 2024-05-26

## 2024-05-25 RX ORDER — POTASSIUM CHLORIDE 7.45 MG/ML
10 INJECTION INTRAVENOUS
Status: COMPLETED | OUTPATIENT
Start: 2024-05-25 | End: 2024-05-26

## 2024-05-25 RX ORDER — POTASSIUM CHLORIDE 750 MG/1
40 TABLET, EXTENDED RELEASE ORAL ONCE
Status: COMPLETED | OUTPATIENT
Start: 2024-05-25 | End: 2024-05-25

## 2024-05-25 RX ADMIN — SODIUM CHLORIDE 1000 ML: 9 INJECTION, SOLUTION INTRAVENOUS at 22:14

## 2024-05-25 RX ADMIN — POTASSIUM CHLORIDE 10 MEQ: 7.46 INJECTION, SOLUTION INTRAVENOUS at 23:22

## 2024-05-25 RX ADMIN — POTASSIUM CHLORIDE 10 MEQ: 7.46 INJECTION, SOLUTION INTRAVENOUS at 22:17

## 2024-05-25 RX ADMIN — POTASSIUM CHLORIDE 40 MEQ: 750 TABLET, EXTENDED RELEASE ORAL at 22:12

## 2024-05-25 ASSESSMENT — PAIN - FUNCTIONAL ASSESSMENT
PAIN_FUNCTIONAL_ASSESSMENT: NONE - DENIES PAIN
PAIN_FUNCTIONAL_ASSESSMENT: NONE - DENIES PAIN

## 2024-05-25 NOTE — ED PROVIDER NOTES
software.  Please disregard these errors.  Please excuse any errors that have escaped final proofreading.    Dr. Naa Vinson DO  (Electronically signed)    (Please note that portions of this note were completed with a voice recognition program.  Efforts were made to edit the dictations but occasionally words are mis-transcribed.)       Naa Vinson DO  05/26/24 0039

## 2024-05-26 PROBLEM — I25.10 CAD (CORONARY ARTERY DISEASE): Status: ACTIVE | Noted: 2024-05-26

## 2024-05-26 PROBLEM — E11.9 DIABETES (HCC): Status: ACTIVE | Noted: 2024-05-26

## 2024-05-26 LAB
ALBUMIN SERPL-MCNC: 2.5 G/DL (ref 3.4–5)
ALBUMIN/GLOB SERPL: 0.9 (ref 0.8–1.7)
ALP SERPL-CCNC: 50 U/L (ref 45–117)
ALT SERPL-CCNC: 13 U/L (ref 16–61)
ANION GAP SERPL CALC-SCNC: 6 MMOL/L (ref 3–18)
AST SERPL W P-5'-P-CCNC: 15 U/L (ref 10–38)
BASOPHILS # BLD: 0 K/UL (ref 0–0.1)
BASOPHILS NFR BLD: 0 % (ref 0–2)
BILIRUB SERPL-MCNC: 1.2 MG/DL (ref 0.2–1)
BUN SERPL-MCNC: 7 MG/DL (ref 7–18)
BUN/CREAT SERPL: 11 (ref 12–20)
CA-I BLD-MCNC: 8.2 MG/DL (ref 8.5–10.1)
CHLORIDE SERPL-SCNC: 113 MMOL/L (ref 100–111)
CO2 SERPL-SCNC: 24 MMOL/L (ref 21–32)
CREAT SERPL-MCNC: 0.61 MG/DL (ref 0.6–1.3)
DIFFERENTIAL METHOD BLD: ABNORMAL
EKG ATRIAL RATE: 74 BPM
EKG DIAGNOSIS: NORMAL
EKG P AXIS: 45 DEGREES
EKG P-R INTERVAL: 138 MS
EKG Q-T INTERVAL: 396 MS
EKG QRS DURATION: 84 MS
EKG QTC CALCULATION (BAZETT): 439 MS
EKG R AXIS: 16 DEGREES
EKG T AXIS: 12 DEGREES
EKG VENTRICULAR RATE: 74 BPM
EOSINOPHIL # BLD: 0.1 K/UL (ref 0–0.4)
EOSINOPHIL NFR BLD: 2 % (ref 0–5)
ERYTHROCYTE [DISTWIDTH] IN BLOOD BY AUTOMATED COUNT: 21.3 % (ref 11.6–14.5)
GLOBULIN SER CALC-MCNC: 2.8 G/DL (ref 2–4)
GLUCOSE BLD STRIP.AUTO-MCNC: 136 MG/DL (ref 70–110)
GLUCOSE SERPL-MCNC: 101 MG/DL (ref 74–99)
HCT VFR BLD AUTO: 22.5 % (ref 36–48)
HCT VFR BLD AUTO: 26 % (ref 36–48)
HGB BLD-MCNC: 6.7 G/DL (ref 13–16)
HGB BLD-MCNC: 8 G/DL (ref 13–16)
IMM GRANULOCYTES # BLD AUTO: 0 K/UL (ref 0–0.04)
IMM GRANULOCYTES NFR BLD AUTO: 0 % (ref 0–0.5)
INR PPP: 1 (ref 0.9–1.1)
LYMPHOCYTES # BLD: 2 K/UL (ref 0.9–3.6)
LYMPHOCYTES NFR BLD: 23 % (ref 21–52)
MAGNESIUM SERPL-MCNC: 1.9 MG/DL (ref 1.6–2.6)
MCH RBC QN AUTO: 18.5 PG (ref 24–34)
MCH RBC QN AUTO: 19.8 PG (ref 24–34)
MCHC RBC AUTO-ENTMCNC: 29.8 G/DL (ref 31–37)
MCHC RBC AUTO-ENTMCNC: 30.8 G/DL (ref 31–37)
MCV RBC AUTO: 62 FL (ref 78–100)
MONOCYTES # BLD: 0.7 K/UL (ref 0.05–1.2)
MONOCYTES NFR BLD: 9 % (ref 3–10)
NEUTS SEG # BLD: 5.6 K/UL (ref 1.8–8)
NEUTS SEG NFR BLD: 66 % (ref 40–73)
NRBC # BLD: 0 K/UL (ref 0–0.01)
NRBC BLD-RTO: 0 PER 100 WBC
PERFORMED BY:: ABNORMAL
PLATELET # BLD AUTO: 348 K/UL (ref 135–420)
PMV BLD AUTO: 11.1 FL (ref 9.2–11.8)
POTASSIUM SERPL-SCNC: 3.4 MMOL/L (ref 3.5–5.5)
PROT SERPL-MCNC: 5.3 G/DL (ref 6.4–8.2)
PROTHROMBIN TIME: 13.7 SEC (ref 11.9–14.7)
RBC # BLD AUTO: 3.63 M/UL (ref 4.35–5.65)
SODIUM SERPL-SCNC: 143 MMOL/L (ref 136–145)
WBC # BLD AUTO: 8.5 K/UL (ref 4.6–13.2)

## 2024-05-26 PROCEDURE — 30233N1 TRANSFUSION OF NONAUTOLOGOUS RED BLOOD CELLS INTO PERIPHERAL VEIN, PERCUTANEOUS APPROACH: ICD-10-PCS | Performed by: INTERNAL MEDICINE

## 2024-05-26 PROCEDURE — 6370000000 HC RX 637 (ALT 250 FOR IP): Performed by: NURSE PRACTITIONER

## 2024-05-26 PROCEDURE — 1100000000 HC RM PRIVATE

## 2024-05-26 PROCEDURE — 85014 HEMATOCRIT: CPT

## 2024-05-26 PROCEDURE — P9016 RBC LEUKOCYTES REDUCED: HCPCS

## 2024-05-26 PROCEDURE — 85025 COMPLETE CBC W/AUTO DIFF WBC: CPT

## 2024-05-26 PROCEDURE — 83735 ASSAY OF MAGNESIUM: CPT

## 2024-05-26 PROCEDURE — 85018 HEMOGLOBIN: CPT

## 2024-05-26 PROCEDURE — 80053 COMPREHEN METABOLIC PANEL: CPT

## 2024-05-26 PROCEDURE — 94761 N-INVAS EAR/PLS OXIMETRY MLT: CPT

## 2024-05-26 PROCEDURE — 2580000003 HC RX 258: Performed by: NURSE PRACTITIONER

## 2024-05-26 PROCEDURE — 6360000002 HC RX W HCPCS: Performed by: NURSE PRACTITIONER

## 2024-05-26 PROCEDURE — 36430 TRANSFUSION BLD/BLD COMPNT: CPT

## 2024-05-26 PROCEDURE — 2580000003 HC RX 258: Performed by: FAMILY MEDICINE

## 2024-05-26 PROCEDURE — 82962 GLUCOSE BLOOD TEST: CPT

## 2024-05-26 RX ORDER — FERROUS SULFATE 325(65) MG
325 TABLET ORAL
Status: DISCONTINUED | OUTPATIENT
Start: 2024-05-26 | End: 2024-05-26

## 2024-05-26 RX ORDER — ASPIRIN 81 MG/1
81 TABLET, CHEWABLE ORAL DAILY
Status: DISCONTINUED | OUTPATIENT
Start: 2024-05-26 | End: 2024-05-27 | Stop reason: HOSPADM

## 2024-05-26 RX ORDER — PANTOPRAZOLE SODIUM 40 MG/1
40 TABLET, DELAYED RELEASE ORAL DAILY
Status: DISCONTINUED | OUTPATIENT
Start: 2024-05-26 | End: 2024-05-27 | Stop reason: HOSPADM

## 2024-05-26 RX ORDER — SODIUM CHLORIDE 9 MG/ML
INJECTION, SOLUTION INTRAVENOUS PRN
Status: DISCONTINUED | OUTPATIENT
Start: 2024-05-26 | End: 2024-05-27 | Stop reason: HOSPADM

## 2024-05-26 RX ORDER — ACETAMINOPHEN 325 MG/1
650 TABLET ORAL EVERY 6 HOURS PRN
Status: DISCONTINUED | OUTPATIENT
Start: 2024-05-26 | End: 2024-05-27 | Stop reason: HOSPADM

## 2024-05-26 RX ORDER — ONDANSETRON 4 MG/1
4 TABLET, ORALLY DISINTEGRATING ORAL EVERY 8 HOURS PRN
Status: DISCONTINUED | OUTPATIENT
Start: 2024-05-26 | End: 2024-05-27 | Stop reason: HOSPADM

## 2024-05-26 RX ORDER — SODIUM CHLORIDE 9 MG/ML
INJECTION, SOLUTION INTRAVENOUS CONTINUOUS
Status: DISCONTINUED | OUTPATIENT
Start: 2024-05-26 | End: 2024-05-26

## 2024-05-26 RX ORDER — ACETAMINOPHEN 650 MG/1
650 SUPPOSITORY RECTAL EVERY 6 HOURS PRN
Status: DISCONTINUED | OUTPATIENT
Start: 2024-05-26 | End: 2024-05-27 | Stop reason: HOSPADM

## 2024-05-26 RX ORDER — POTASSIUM CHLORIDE 750 MG/1
20 TABLET, EXTENDED RELEASE ORAL 2 TIMES DAILY
Status: DISCONTINUED | OUTPATIENT
Start: 2024-05-26 | End: 2024-05-27 | Stop reason: HOSPADM

## 2024-05-26 RX ORDER — ATORVASTATIN CALCIUM 40 MG/1
40 TABLET, FILM COATED ORAL NIGHTLY
Status: DISCONTINUED | OUTPATIENT
Start: 2024-05-26 | End: 2024-05-27 | Stop reason: HOSPADM

## 2024-05-26 RX ORDER — POTASSIUM CHLORIDE 7.45 MG/ML
10 INJECTION INTRAVENOUS ONCE
Status: COMPLETED | OUTPATIENT
Start: 2024-05-26 | End: 2024-05-26

## 2024-05-26 RX ORDER — DEXTROSE MONOHYDRATE 100 MG/ML
INJECTION, SOLUTION INTRAVENOUS CONTINUOUS PRN
Status: DISCONTINUED | OUTPATIENT
Start: 2024-05-26 | End: 2024-05-27 | Stop reason: HOSPADM

## 2024-05-26 RX ORDER — ONDANSETRON 2 MG/ML
4 INJECTION INTRAMUSCULAR; INTRAVENOUS EVERY 6 HOURS PRN
Status: DISCONTINUED | OUTPATIENT
Start: 2024-05-26 | End: 2024-05-27 | Stop reason: HOSPADM

## 2024-05-26 RX ORDER — SODIUM CHLORIDE 0.9 % (FLUSH) 0.9 %
5-40 SYRINGE (ML) INJECTION PRN
Status: DISCONTINUED | OUTPATIENT
Start: 2024-05-26 | End: 2024-05-27 | Stop reason: HOSPADM

## 2024-05-26 RX ORDER — METOPROLOL SUCCINATE 25 MG/1
25 TABLET, EXTENDED RELEASE ORAL DAILY
Status: DISCONTINUED | OUTPATIENT
Start: 2024-05-26 | End: 2024-05-27 | Stop reason: HOSPADM

## 2024-05-26 RX ORDER — SODIUM CHLORIDE 0.9 % (FLUSH) 0.9 %
5-40 SYRINGE (ML) INJECTION EVERY 12 HOURS SCHEDULED
Status: DISCONTINUED | OUTPATIENT
Start: 2024-05-26 | End: 2024-05-27 | Stop reason: HOSPADM

## 2024-05-26 RX ORDER — FUROSEMIDE 20 MG/1
10 TABLET ORAL DAILY
Status: DISCONTINUED | OUTPATIENT
Start: 2024-05-26 | End: 2024-05-27 | Stop reason: HOSPADM

## 2024-05-26 RX ORDER — POLYETHYLENE GLYCOL 3350 17 G/17G
17 POWDER, FOR SOLUTION ORAL DAILY PRN
Status: DISCONTINUED | OUTPATIENT
Start: 2024-05-26 | End: 2024-05-27 | Stop reason: HOSPADM

## 2024-05-26 RX ADMIN — SACUBITRIL AND VALSARTAN 1 TABLET: 24; 26 TABLET, FILM COATED ORAL at 08:55

## 2024-05-26 RX ADMIN — SACUBITRIL AND VALSARTAN 1 TABLET: 24; 26 TABLET, FILM COATED ORAL at 20:52

## 2024-05-26 RX ADMIN — FERROUS SULFATE TAB 325 MG (65 MG ELEMENTAL FE) 325 MG: 325 (65 FE) TAB at 10:12

## 2024-05-26 RX ADMIN — POTASSIUM CHLORIDE 20 MEQ: 750 TABLET, EXTENDED RELEASE ORAL at 08:54

## 2024-05-26 RX ADMIN — POTASSIUM CHLORIDE 20 MEQ: 750 TABLET, EXTENDED RELEASE ORAL at 20:51

## 2024-05-26 RX ADMIN — METOPROLOL SUCCINATE 25 MG: 25 TABLET, EXTENDED RELEASE ORAL at 08:55

## 2024-05-26 RX ADMIN — ASPIRIN 81 MG 81 MG: 81 TABLET ORAL at 08:54

## 2024-05-26 RX ADMIN — SODIUM CHLORIDE: 9 INJECTION, SOLUTION INTRAVENOUS at 02:10

## 2024-05-26 RX ADMIN — SODIUM CHLORIDE 500 ML: 9 INJECTION, SOLUTION INTRAVENOUS at 00:30

## 2024-05-26 RX ADMIN — PANTOPRAZOLE SODIUM 40 MG: 40 TABLET, DELAYED RELEASE ORAL at 08:55

## 2024-05-26 RX ADMIN — ATORVASTATIN CALCIUM 40 MG: 40 TABLET, FILM COATED ORAL at 20:51

## 2024-05-26 RX ADMIN — SODIUM CHLORIDE, PRESERVATIVE FREE 10 ML: 5 INJECTION INTRAVENOUS at 20:54

## 2024-05-26 RX ADMIN — POTASSIUM CHLORIDE 10 MEQ: 7.46 INJECTION, SOLUTION INTRAVENOUS at 04:30

## 2024-05-26 RX ADMIN — POTASSIUM CHLORIDE 10 MEQ: 7.46 INJECTION, SOLUTION INTRAVENOUS at 03:28

## 2024-05-26 RX ADMIN — TICAGRELOR 90 MG: 90 TABLET ORAL at 08:55

## 2024-05-26 RX ADMIN — SODIUM CHLORIDE, PRESERVATIVE FREE 10 ML: 5 INJECTION INTRAVENOUS at 11:09

## 2024-05-26 RX ADMIN — FUROSEMIDE 10 MG: 20 TABLET ORAL at 08:55

## 2024-05-26 RX ADMIN — TICAGRELOR 90 MG: 90 TABLET ORAL at 20:52

## 2024-05-26 RX ADMIN — POTASSIUM CHLORIDE 10 MEQ: 7.46 INJECTION, SOLUTION INTRAVENOUS at 02:29

## 2024-05-26 ASSESSMENT — PAIN SCALES - GENERAL
PAINLEVEL_OUTOF10: 0

## 2024-05-26 NOTE — H&P
History and Physical      Subjective:     Gigi Mcneal is a 64 y.o. male  has a past medical history of CAD (coronary artery disease), Heart failure ([facility]), and NSTEMI (non-ST elevated myocardial infarction) ([facility]).    Patient seen at bedside in the emergency department.  Patient states he came to the emergency room tonight for some lightheadedness.  Patient states she saw his cardiologist last week for the same lightheadedness only with positional change and he put him on a Holter monitor.  Patient was still on the Holter monitor when coming into the ER.  Patient has a recent history of admission approximately 8 weeks ago for an NSTEMI with 2 stent placements due to 95 to 99% blockages of the LAD vessels.  Patient also has congestive heart failure recently diagnosed with a 35 to 45% EF.  While in the hospital in March of this year at [facility] patient also suffered from a GI bleed and low hemoglobin.  While in the emergency room today patient's hemoglobin was low at 6.21 unit of blood was ordered.  Patient states the last time he saw blood in his stool was 4 days ago on Monday of last week.  Patient complains of no abdominal pain no nausea vomiting patient states his stools are loose and brown no evidence of blood at this time.  Patient has no complaints at this time.  States he only had the intermittent dizziness with position change and then it went away.  No chest pain no shortness of breath no abdominal pain no nausea vomiting was reported.  And there is no dizziness since before coming to the emergency room.  Patient will be admitted to the hospitalist group will continue to monitor the unit of blood that is running obtain an H&H after infusion give more blood if needed.  Address his hypokalemia with additional potassium.  Patient stable at this time for admission.      Past Medical History:   Diagnosis Date    CAD (coronary artery disease)     Heart failure ([facility])     NSTEMI (non-ST elevated

## 2024-05-26 NOTE — ED TRIAGE NOTES
TRANSFER - OUT REPORT:    Verbal report given to ABIGAIL Ceron on Gigi Mcneal  being transferred to ICU for routine progression of patient care       Report consisted of patient's Situation, Background, Assessment and   Recommendations(SBAR).     Information from the following report(s) ED SBAR was reviewed with the receiving nurse.    Sina Fall Assessment:    Presents to emergency department  because of falls (Syncope, seizure, or loss of consciousness): No  Age > 70: No  Altered Mental Status, Intoxication with alcohol or substance confusion (Disorientation, impaired judgment, poor safety awaremess, or inability to follow instructions): No  Impaired Mobility: Ambulates or transfers with assistive devices or assistance; Unable to ambulate or transer.: No  Nursing Judgement: No          Lines:   Peripheral IV 05/25/24 Right;Anterior Forearm (Active)       Peripheral IV 05/25/24 Right;Anterior Basilic (Active)        Opportunity for questions and clarification was provided.      Patient transported with:  Monitor

## 2024-05-26 NOTE — ED TRIAGE NOTES
Pt reports sitting in his chair at home and got dizzy. Denies LOC, chest pain, shortness of breath. Denies injury.

## 2024-05-26 NOTE — FLOWSHEET NOTE
05/26/24 0250   A: Assess, Prevent, and Manage Pain   Pain Assessment Numeric (0-10)   Pain Level 0   O2 Device None (Room air)   C: Choice of Sedation and Analgesia   Childress Agitation Sedation Scale (RASS) 0   D: Delirium - Assess, Prevent, and Manage   Acute Onset or Fluctuating Mental Status No   B: Both Spontaneous Awakening and Breathing Trials   Was Patient Receiving Mechanical Ventilation No   SpO2 100 %   E: Exercise and Early Mobility   Activity In bed   Documentation of an Acceptable Level of Exercise/Mobilization Performed Stand at side of bed   F: Family Engagement and Empowerment   How did you engage the family? No family available currently   How did the family participate? No family available currently

## 2024-05-26 NOTE — PROGRESS NOTES
0650 -  Accepted care of pt from JASWINDER Harmon RN. PT resting in bed with eyes open. A/O x3. Orders to go to floor. Will transport to 242  0700 -  Pt transported via w/c to 242. Stable.   0720 -  Pt sat up for Breakfast. NO complaints at this time.   0815 -  Dr. Sanabria at bedside.  0835 -  1 unit of PRBC's started. Pt stable.   0850 -  Tolerating blood transfusion well. No distress noted.   0858 -  Morning med pass and education complete.   1015 -  Pt tolerating PRBC's well. No complaints. Sister called and conversed with patient. Updated by nurse upon patients request.   1105 -  prbc's complete. Pt stable VSS WNLS  1115 -  Pt up to BR. Gait is stable. Voided 160ml of clear yellow urine.   1125 -  Pt sitting up eating lunch  1150 -  pt ate 100% of lunch.  1200 -  Pt up to BR  1215 -  Lab in to draw post transfusion H/H.   1233 -  Repeat h/h 8.0 and 26.0. JOSE CARLOS Dowling NP aware.  1318 - Family at bedside. Stable  1420 -  IVF's discontinued per MD orders. Pt stable.   1545 -  Family leaving bedside. Pt pleasant and conversant. No complaints. VSS  1700 -  Resting in bed. No distress noted.   1850 - Report to YAZMIN harmon RN. Stable

## 2024-05-26 NOTE — CONSENT
Informed Consent for Blood Component Transfusion Note    I have discussed with the patient the rationale for blood component transfusion; its benefits in treating or preventing fatigue, organ damage, or death; and its risk which includes mild transfusion reactions, rare risk of blood borne infection, or more serious but rare reactions. I have discussed the alternatives to transfusion, including the risk and consequences of not receiving transfusion. The patient had an opportunity to ask questions and had agreed to proceed with transfusion of blood components.    Electronically signed by Maria C Rosenthal DNP, HEBR, EDUARDO-C   on 5/26/24 at 7:02 AM EDT

## 2024-05-26 NOTE — PROGRESS NOTES
-0155 Pt received from ED, via stretcher, for admission to ICU, bed #7. Pt is A&OX4, able to make needs known. Pt denies pain/discomfort/nausea/vomiting/dizziness/light-headedness/abdominal tenderness. Lungs CTA, no SOB noted. Skin is wam and dry. No reddened/opened areas noted. 2+ pitting edema note of (B) lower legs/feet. Pt states edema is new with symptoms. Pt using urinal at bedside. Pt able to stand and transfer to bed, gait steady. Admitting orders given. Swallow eval done. Pt given 1/2 sandwich and diet soda, as requested. PRBCs infusing. Cardiac monitor previously applied was removed, by Pt and states he gave it to his wife to take home. No family present. CBWR.    -0415 Vital signs taken. No voiced needs noted. Urinal remains at BS, no urine noted as of yet. CBWR.    -0630 Pt to be transferred to Med/Surg this morning, to room 242, per supervisor.    -0651 Pt voided in urinal, which was emptied. CBWR.    -0655 Bedside and Verbal shift change report given to SHAYNE Marin RN (oncoming nurse) by MILY Cantu RN (offgoing nurse). Report included the following information Nurse Handoff Report, ED Encounter Summary, ED SBAR, Adult Overview, Intake/Output, MAR, Recent Results, Cardiac Rhythm SR, Quality Measures, and Event Log.

## 2024-05-27 VITALS
SYSTOLIC BLOOD PRESSURE: 120 MMHG | HEART RATE: 72 BPM | RESPIRATION RATE: 16 BRPM | HEIGHT: 73 IN | DIASTOLIC BLOOD PRESSURE: 81 MMHG | OXYGEN SATURATION: 100 % | TEMPERATURE: 98.7 F | WEIGHT: 175.25 LBS | BODY MASS INDEX: 23.23 KG/M2

## 2024-05-27 LAB
ABO + RH BLD: NORMAL
ALBUMIN SERPL-MCNC: 2.3 G/DL (ref 3.4–5)
ALBUMIN/GLOB SERPL: 0.8 (ref 0.8–1.7)
ALP SERPL-CCNC: 49 U/L (ref 45–117)
ALT SERPL-CCNC: 13 U/L (ref 16–61)
ANION GAP SERPL CALC-SCNC: 5 MMOL/L (ref 3–18)
AST SERPL W P-5'-P-CCNC: 12 U/L (ref 10–38)
BASOPHILS # BLD: 0 K/UL (ref 0–0.1)
BASOPHILS NFR BLD: 0 % (ref 0–2)
BILIRUB SERPL-MCNC: 0.8 MG/DL (ref 0.2–1)
BLD PROD TYP BPU: NORMAL
BLD PROD TYP BPU: NORMAL
BLOOD BANK BLOOD PRODUCT EXPIRATION DATE: NORMAL
BLOOD BANK BLOOD PRODUCT EXPIRATION DATE: NORMAL
BLOOD BANK DISPENSE STATUS: NORMAL
BLOOD BANK DISPENSE STATUS: NORMAL
BLOOD BANK ISBT PRODUCT BLOOD TYPE: 5100
BLOOD BANK ISBT PRODUCT BLOOD TYPE: 5100
BLOOD BANK UNIT TYPE AND RH: NORMAL
BLOOD BANK UNIT TYPE AND RH: NORMAL
BLOOD GROUP ANTIBODIES SERPL: NEGATIVE
BPU ID: NORMAL
BPU ID: NORMAL
BUN SERPL-MCNC: 9 MG/DL (ref 7–18)
BUN/CREAT SERPL: 14 (ref 12–20)
CA-I BLD-MCNC: 8.3 MG/DL (ref 8.5–10.1)
CHLORIDE SERPL-SCNC: 113 MMOL/L (ref 100–111)
CO2 SERPL-SCNC: 25 MMOL/L (ref 21–32)
CREAT SERPL-MCNC: 0.63 MG/DL (ref 0.6–1.3)
CROSSMATCH RESULT: NORMAL
CROSSMATCH RESULT: NORMAL
DIFFERENTIAL METHOD BLD: ABNORMAL
EOSINOPHIL # BLD: 0.2 K/UL (ref 0–0.4)
EOSINOPHIL NFR BLD: 3 % (ref 0–5)
ERYTHROCYTE [DISTWIDTH] IN BLOOD BY AUTOMATED COUNT: 23.5 % (ref 11.6–14.5)
GLOBULIN SER CALC-MCNC: 2.8 G/DL (ref 2–4)
GLUCOSE SERPL-MCNC: 105 MG/DL (ref 74–99)
HCT VFR BLD AUTO: 24.4 % (ref 36–48)
HGB BLD-MCNC: 7.4 G/DL (ref 13–16)
IMM GRANULOCYTES # BLD AUTO: 0.1 K/UL (ref 0–0.04)
IMM GRANULOCYTES NFR BLD AUTO: 1 % (ref 0–0.5)
LYMPHOCYTES # BLD: 1.8 K/UL (ref 0.9–3.6)
LYMPHOCYTES NFR BLD: 20 % (ref 21–52)
MAGNESIUM SERPL-MCNC: 2 MG/DL (ref 1.6–2.6)
MCH RBC QN AUTO: 19.6 PG (ref 24–34)
MCHC RBC AUTO-ENTMCNC: 30.3 G/DL (ref 31–37)
MCV RBC AUTO: 64.6 FL (ref 78–100)
MONOCYTES # BLD: 0.7 K/UL (ref 0.05–1.2)
MONOCYTES NFR BLD: 8 % (ref 3–10)
NEUTS SEG # BLD: 6.2 K/UL (ref 1.8–8)
NEUTS SEG NFR BLD: 68 % (ref 40–73)
NRBC # BLD: 0.03 K/UL (ref 0–0.01)
NRBC BLD-RTO: 0.3 PER 100 WBC
PHOSPHATE SERPL-MCNC: 4.2 MG/DL (ref 2.5–4.9)
PLATELET # BLD AUTO: 336 K/UL (ref 135–420)
PMV BLD AUTO: 10.5 FL (ref 9.2–11.8)
POTASSIUM SERPL-SCNC: 3.3 MMOL/L (ref 3.5–5.5)
PROT SERPL-MCNC: 5.1 G/DL (ref 6.4–8.2)
RBC # BLD AUTO: 3.78 M/UL (ref 4.35–5.65)
SODIUM SERPL-SCNC: 143 MMOL/L (ref 136–145)
SPECIMEN EXP DATE BLD: NORMAL
TRANSFUSION STATUS PATIENT QL: NORMAL
TRANSFUSION STATUS PATIENT QL: NORMAL
UNIT DIVISION: 0
UNIT DIVISION: 0
UNIT ISSUE DATE/TIME: NORMAL
UNIT ISSUE DATE/TIME: NORMAL
WBC # BLD AUTO: 9.1 K/UL (ref 4.6–13.2)

## 2024-05-27 PROCEDURE — 83735 ASSAY OF MAGNESIUM: CPT

## 2024-05-27 PROCEDURE — 36415 COLL VENOUS BLD VENIPUNCTURE: CPT

## 2024-05-27 PROCEDURE — 85025 COMPLETE CBC W/AUTO DIFF WBC: CPT

## 2024-05-27 PROCEDURE — 2580000003 HC RX 258: Performed by: NURSE PRACTITIONER

## 2024-05-27 PROCEDURE — 80053 COMPREHEN METABOLIC PANEL: CPT

## 2024-05-27 PROCEDURE — 6370000000 HC RX 637 (ALT 250 FOR IP): Performed by: NURSE PRACTITIONER

## 2024-05-27 PROCEDURE — 94761 N-INVAS EAR/PLS OXIMETRY MLT: CPT

## 2024-05-27 PROCEDURE — 84100 ASSAY OF PHOSPHORUS: CPT

## 2024-05-27 RX ORDER — PANTOPRAZOLE SODIUM 40 MG/1
40 TABLET, DELAYED RELEASE ORAL 2 TIMES DAILY
Qty: 60 TABLET | Refills: 3 | Status: SHIPPED | OUTPATIENT
Start: 2024-05-27

## 2024-05-27 RX ADMIN — POTASSIUM CHLORIDE 20 MEQ: 750 TABLET, EXTENDED RELEASE ORAL at 08:38

## 2024-05-27 RX ADMIN — PANTOPRAZOLE SODIUM 40 MG: 40 TABLET, DELAYED RELEASE ORAL at 08:38

## 2024-05-27 RX ADMIN — SODIUM CHLORIDE, PRESERVATIVE FREE 10 ML: 5 INJECTION INTRAVENOUS at 08:43

## 2024-05-27 RX ADMIN — METOPROLOL SUCCINATE 25 MG: 25 TABLET, EXTENDED RELEASE ORAL at 08:38

## 2024-05-27 RX ADMIN — SACUBITRIL AND VALSARTAN 1 TABLET: 24; 26 TABLET, FILM COATED ORAL at 08:38

## 2024-05-27 RX ADMIN — TICAGRELOR 90 MG: 90 TABLET ORAL at 08:38

## 2024-05-27 RX ADMIN — ASPIRIN 81 MG 81 MG: 81 TABLET ORAL at 08:38

## 2024-05-27 RX ADMIN — FUROSEMIDE 10 MG: 20 TABLET ORAL at 08:38

## 2024-05-27 ASSESSMENT — PAIN SCALES - GENERAL
PAINLEVEL_OUTOF10: 0
PAINLEVEL_OUTOF10: 0

## 2024-05-27 NOTE — PLAN OF CARE
Problem: Discharge Planning  Goal: Discharge to home or other facility with appropriate resources  5/26/2024 0804 by Winsome Marin, RN  Outcome: Progressing  Flowsheets (Taken 5/26/2024 0800)  Discharge to home or other facility with appropriate resources:   Identify barriers to discharge with patient and caregiver   Identify discharge learning needs (meds, wound care, etc)   Arrange for needed discharge resources and transportation as appropriate  5/26/2024 0235 by Elizabeth Cantu RN  Outcome: Progressing     Problem: Safety - Adult  Goal: Free from fall injury  5/26/2024 0804 by Winsome Marin, RN  Outcome: Progressing  5/26/2024 0235 by Elizabeth Cantu RN  Outcome: Progressing     Problem: Cardiovascular - Adult  Goal: Maintains optimal cardiac output and hemodynamic stability  5/26/2024 0804 by Winsome Marin, RN  Outcome: Progressing  Flowsheets (Taken 5/26/2024 0800)  Maintains optimal cardiac output and hemodynamic stability:   Monitor blood pressure and heart rate   Monitor urine output and notify Licensed Independent Practitioner for values outside of normal range   Assess for signs of decreased cardiac output   Administer fluid and/or volume expanders as ordered   Administer vasoactive medications as ordered  5/26/2024 0238 by Elizabeth Cantu RN  Outcome: Progressing  Goal: Absence of cardiac dysrhythmias or at baseline  5/26/2024 0804 by Winsome Marin, RN  Outcome: Progressing  Flowsheets (Taken 5/26/2024 0800)  Absence of cardiac dysrhythmias or at baseline:   Monitor cardiac rate and rhythm   Assess for signs of decreased cardiac output   Administer antiarrhythmia medication and electrolyte replacement as ordered  5/26/2024 0238 by Elizabeth Cantu RN  Outcome: Progressing     Problem: Metabolic/Fluid and Electrolytes - Adult  Goal: Electrolytes maintained within normal limits  5/26/2024 0804 by Winsome Marin, RN  Outcome: Progressing  Flowsheets (Taken 5/26/2024 0800)  Electrolytes maintained 
  Problem: Discharge Planning  Goal: Discharge to home or other facility with appropriate resources  5/27/2024 0805 by Liana Arana RN  Outcome: Progressing  5/26/2024 1960 by Elizabeth Cantu RN  Outcome: Progressing     Problem: Safety - Adult  Goal: Free from fall injury  5/27/2024 0805 by Liana Arana RN  Outcome: Progressing  5/26/2024 1960 by Elizabeth Cantu RN  Outcome: Progressing     Problem: Cardiovascular - Adult  Goal: Maintains optimal cardiac output and hemodynamic stability  5/27/2024 0805 by Liana Arana RN  Outcome: Progressing  Flowsheets (Taken 5/27/2024 0715)  Maintains optimal cardiac output and hemodynamic stability: Monitor blood pressure and heart rate  5/26/2024 1960 by Elizabeth Cantu RN  Outcome: Progressing  Goal: Absence of cardiac dysrhythmias or at baseline  5/27/2024 0805 by Liana Arana RN  Outcome: Progressing  Flowsheets (Taken 5/27/2024 0715)  Absence of cardiac dysrhythmias or at baseline: Monitor cardiac rate and rhythm  5/26/2024 1960 by Elizabeth Cantu RN  Outcome: Progressing     Problem: Metabolic/Fluid and Electrolytes - Adult  Goal: Electrolytes maintained within normal limits  5/27/2024 0805 by Liana Arana RN  Outcome: Progressing  5/26/2024 1960 by Elizabeth Cantu RN  Outcome: Progressing  Goal: Hemodynamic stability and optimal renal function maintained  5/27/2024 0805 by Liana Arana RN  Outcome: Progressing  5/26/2024 1960 by Elizabeth Cantu RN  Outcome: Progressing  Goal: Glucose maintained within prescribed range  5/27/2024 0805 by Liana Arana RN  Outcome: Progressing  5/26/2024 1960 by Elizabeth Cantu RN  Outcome: Progressing     Problem: Hematologic - Adult  Goal: Maintains hematologic stability  5/27/2024 0805 by Liana Arana RN  Outcome: Progressing  5/26/2024 1960 by Elizabeth Cantu RN  Outcome: Progressing     Problem: Pain  Goal: Verbalizes/displays adequate comfort level 
  Problem: Discharge Planning  Goal: Discharge to home or other facility with appropriate resources  5/27/2024 0957 by Liana Arana RN  Outcome: Completed  5/27/2024 0805 by Liana Arana RN  Outcome: Progressing     Problem: Safety - Adult  Goal: Free from fall injury  5/27/2024 0957 by Liana Arana RN  Outcome: Completed  5/27/2024 0805 by Liana Arana RN  Outcome: Progressing     Problem: Cardiovascular - Adult  Goal: Maintains optimal cardiac output and hemodynamic stability  5/27/2024 0957 by Liana Arana RN  Outcome: Completed  5/27/2024 0805 by Liana Arana RN  Outcome: Progressing  Flowsheets (Taken 5/27/2024 0715)  Maintains optimal cardiac output and hemodynamic stability: Monitor blood pressure and heart rate  Goal: Absence of cardiac dysrhythmias or at baseline  5/27/2024 0957 by Liana Arana RN  Outcome: Completed  5/27/2024 0805 by Liana Arana RN  Outcome: Progressing  Flowsheets (Taken 5/27/2024 0715)  Absence of cardiac dysrhythmias or at baseline: Monitor cardiac rate and rhythm     Problem: Metabolic/Fluid and Electrolytes - Adult  Goal: Electrolytes maintained within normal limits  5/27/2024 0957 by Liana Arana RN  Outcome: Completed  5/27/2024 0805 by Liana Arana RN  Outcome: Progressing  Goal: Hemodynamic stability and optimal renal function maintained  5/27/2024 0957 by Liana Arana RN  Outcome: Completed  5/27/2024 0805 by Liana Arana RN  Outcome: Progressing  Goal: Glucose maintained within prescribed range  5/27/2024 0957 by Liana Arana RN  Outcome: Completed  5/27/2024 0805 by Liana Arana RN  Outcome: Progressing     Problem: Hematologic - Adult  Goal: Maintains hematologic stability  5/27/2024 0957 by Liana Arana RN  Outcome: Completed  5/27/2024 0805 by Liana Arana RN  Outcome: Progressing     Problem: Pain  Goal: Verbalizes/displays adequate comfort level or baseline 
  Problem: Discharge Planning  Goal: Discharge to home or other facility with appropriate resources  5/27/2024 0957 by Liana Arana RN  Outcome: Completed  5/27/2024 0805 by Liana Arana RN  Outcome: Progressing  5/26/2024 1960 by Elizabeth Cantu RN  Outcome: Progressing     Problem: Safety - Adult  Goal: Free from fall injury  5/27/2024 0957 by Liana Arana RN  Outcome: Completed  5/27/2024 0805 by Liana Arana RN  Outcome: Progressing  5/26/2024 1960 by Elizabeth Cantu RN  Outcome: Progressing     Problem: Cardiovascular - Adult  Goal: Maintains optimal cardiac output and hemodynamic stability  5/27/2024 0957 by Liana Arana RN  Outcome: Completed  5/27/2024 0805 by Liana Arana RN  Outcome: Progressing  Flowsheets (Taken 5/27/2024 0715)  Maintains optimal cardiac output and hemodynamic stability: Monitor blood pressure and heart rate  5/26/2024 1960 by Elizabeth Cantu RN  Outcome: Progressing  Goal: Absence of cardiac dysrhythmias or at baseline  5/27/2024 0957 by Liana Arana RN  Outcome: Completed  5/27/2024 0805 by Liana Arana RN  Outcome: Progressing  Flowsheets (Taken 5/27/2024 0715)  Absence of cardiac dysrhythmias or at baseline: Monitor cardiac rate and rhythm  5/26/2024 1960 by Elizabeth Cantu RN  Outcome: Progressing     Problem: Metabolic/Fluid and Electrolytes - Adult  Goal: Electrolytes maintained within normal limits  5/27/2024 0957 by Liana Arana RN  Outcome: Completed  5/27/2024 0805 by Liana Arana RN  Outcome: Progressing  5/26/2024 1960 by Elizabeth Cantu RN  Outcome: Progressing  Goal: Hemodynamic stability and optimal renal function maintained  5/27/2024 0957 by Liana Arana RN  Outcome: Completed  5/27/2024 0805 by Liana Arana RN  Outcome: Progressing  5/26/2024 1960 by Elizabeth Cantu RN  Outcome: Progressing  Goal: Glucose maintained within prescribed range  5/27/2024 0957 by 
  Problem: Discharge Planning  Goal: Discharge to home or other facility with appropriate resources  Outcome: Progressing     Problem: Safety - Adult  Goal: Free from fall injury  Outcome: Progressing     
  Problem: Discharge Planning  Goal: Discharge to home or other facility with appropriate resources  Outcome: Progressing     Problem: Safety - Adult  Goal: Free from fall injury  Outcome: Progressing     Problem: Cardiovascular - Adult  Goal: Maintains optimal cardiac output and hemodynamic stability  Outcome: Progressing  Goal: Absence of cardiac dysrhythmias or at baseline  Outcome: Progressing     Problem: Metabolic/Fluid and Electrolytes - Adult  Goal: Electrolytes maintained within normal limits  Outcome: Progressing  Goal: Hemodynamic stability and optimal renal function maintained  Outcome: Progressing  Goal: Glucose maintained within prescribed range  Outcome: Progressing     Problem: Hematologic - Adult  Goal: Maintains hematologic stability  Outcome: Progressing     
signs and symptoms of bleeding or hemorrhage   Monitor labs for bleeding or clotting disorders   Administer blood products/factors as ordered     Problem: Pain  Goal: Verbalizes/displays adequate comfort level or baseline comfort level  5/26/2024 1960 by Elizabeth Cantu, RN  Outcome: Progressing  5/26/2024 0804 by Winsome Marin, RN  Flowsheets (Taken 5/26/2024 0804)  Verbalizes/displays adequate comfort level or baseline comfort level:   Encourage patient to monitor pain and request assistance   Assess pain using appropriate pain scale   Administer analgesics based on type and severity of pain and evaluate response   Implement non-pharmacological measures as appropriate and evaluate response   Consider cultural and social influences on pain and pain management   Notify Licensed Independent Practitioner if interventions unsuccessful or patient reports new pain

## 2024-05-27 NOTE — PROGRESS NOTES
-1850 Bedside and Verbal shift change report given to MILY Cantu RN (oncoming nurse) by SHAYNE Marin RN (offgoing nurse). Report included the following information Nurse Handoff Report, Adult Overview, Intake/Output, Cardiac Rhythm SR, Quality Measures, and Event Log.      -1915 Care assumed and Pt assessed. A&OX4, able to make needs known. No voiced needs at this time. Denies any pain/discomfort. Lungs are CTA, BS + in all quadrants. Skin is warm and dry. Trace edema persists of lower legs and feet, + pedal pulses palpable. CBWR.    -2355 Vital signs taken. Requested soda and snack, which was given. CBWR    0400 Pt easily aroused. Once awake, no voiced needs at this time. Vital signs taken. CBWR.    -0620 Lab results reviewed with Provider. No new orders given at this time. CBWR.    -0650 Bedside and Verbal shift change report given to WADE Arana RN (oncoming nurse) by MILY Cantu RN (offgoing nurse). Report included the following information Nurse Handoff Report, Adult Overview, Intake/Output, MAR, Recent Results, Cardiac Rhythm SR, Quality Measures, and Event Log.

## 2024-05-27 NOTE — PROGRESS NOTES
0700 -  Assumed care of patient. In bed alert and oriented x4. Denied anemia symptoms or blood in stool. He denied questions, concerns or complaints.     0715 - VSS. Assessment completed. Breakfast tray set up. CBWR.    Discharge paperwork completed.     1040 - PIV x2 and tele monitor discontinued. Discharge paperwork reviewed with patient, wife and sister. Denied questions.     1057 - Patient leaving 2 south via wheelchair by nursing supervisor.

## 2024-05-27 NOTE — DISCHARGE SUMMARY
MD Kwame; Registered Nurse: Liana Arana RN    Disposition:    Home    Follow Up   Kamlesh Biggs MD    Most Recent Labs:  Recent Results (from the past 24 hour(s))   Hemoglobin and Hematocrit    Collection Time: 05/26/24 12:15 PM   Result Value Ref Range    Hemoglobin 8.0 (L) 13.0 - 16.0 g/dL    Hematocrit 26.0 (L) 36.0 - 48.0 %    MCH 19.8 (L) 24.0 - 34.0 PG    MCHC 30.8 (L) 31.0 - 37.0 g/dL   Comprehensive Metabolic Panel w/ Reflex to MG    Collection Time: 05/27/24  4:35 AM   Result Value Ref Range    Sodium 143 136 - 145 mmol/L    Potassium 3.3 (L) 3.5 - 5.5 mmol/L    Chloride 113 (H) 100 - 111 mmol/L    CO2 25 21 - 32 mmol/L    Anion Gap 5 3.0 - 18.0 mmol/L    Glucose 105 (H) 74 - 99 mg/dL    BUN 9 7 - 18 mg/dL    Creatinine 0.63 0.60 - 1.30 mg/dL    BUN/Creatinine Ratio 14 12 - 20      Est, Glom Filt Rate >90 >60 ml/min/1.73m2    Calcium 8.3 (L) 8.5 - 10.1 mg/dL    Total Bilirubin 0.8 0.2 - 1.0 mg/dL    AST 12 10 - 38 U/L    ALT 13 (L) 16 - 61 U/L    Alk Phosphatase 49 45 - 117 U/L    Total Protein 5.1 (L) 6.4 - 8.2 g/dL    Albumin 2.3 (L) 3.4 - 5.0 g/dL    Globulin 2.8 2.0 - 4.0 g/dL    Albumin/Globulin Ratio 0.8 0.8 - 1.7     CBC with Auto Differential    Collection Time: 05/27/24  4:35 AM   Result Value Ref Range    WBC 9.1 4.6 - 13.2 K/uL    RBC 3.78 (L) 4.35 - 5.65 M/uL    Hemoglobin 7.4 (L) 13.0 - 16.0 g/dL    Hematocrit 24.4 (L) 36.0 - 48.0 %    MCV 64.6 (L) 78.0 - 100.0 FL    MCH 19.6 (L) 24.0 - 34.0 PG    MCHC 30.3 (L) 31.0 - 37.0 g/dL    RDW 23.5 (H) 11.6 - 14.5 %    Platelets 336 135 - 420 K/uL    MPV 10.5 9.2 - 11.8 FL    Nucleated RBCs 0.3 (H) 0.0  WBC    nRBC 0.03 (H) 0.00 - 0.01 K/uL    Neutrophils % 68 40 - 73 %    Lymphocytes % 20 (L) 21 - 52 %    Monocytes % 8 3 - 10 %    Eosinophils % 3 0 - 5 %    Basophils % 0 0 - 2 %    Immature Granulocytes % 1 (H) 0 - 0.5 %    Neutrophils Absolute 6.2 1.8 - 8.0 K/UL    Lymphocytes Absolute 1.8 0.9 - 3.6 K/UL    Monocytes Absolute 0.7

## 2024-05-28 ENCOUNTER — TELEPHONE (OUTPATIENT)
Facility: CLINIC | Age: 64
End: 2024-05-28

## 2024-05-28 DIAGNOSIS — I21.4 NSTEMI (NON-ST ELEVATED MYOCARDIAL INFARCTION) (HCC): ICD-10-CM

## 2024-05-28 DIAGNOSIS — I25.10 CORONARY ARTERY DISEASE INVOLVING NATIVE CORONARY ARTERY OF NATIVE HEART WITHOUT ANGINA PECTORIS: ICD-10-CM

## 2024-05-28 RX ORDER — TICAGRELOR 90 MG/1
90 TABLET ORAL 2 TIMES DAILY
Qty: 60 TABLET | Refills: 0 | Status: SHIPPED | OUTPATIENT
Start: 2024-05-28

## 2024-05-28 RX ORDER — SACUBITRIL AND VALSARTAN 24; 26 MG/1; MG/1
1 TABLET, FILM COATED ORAL 2 TIMES DAILY
Qty: 60 TABLET | Refills: 0 | Status: SHIPPED | OUTPATIENT
Start: 2024-05-28

## 2024-05-28 NOTE — TELEPHONE ENCOUNTER
Care Transitions Initial Follow Up Call    Outreach made within 2 business days of discharge: Yes    Patient: Gigi Mcneal Patient : 1960   MRN: [id number]  Reason for Admission: There are no discharge diagnoses documented for the most recent discharge.  Discharge Date: 24       Spoke with: none, number disconnected, phone never rang just beeped.    Scheduled appointment with PCP within 7-14 days    Follow Up  Future Appointments   Date Time Provider Department Center   6/3/2024  8:30 AM [facility] ECHO GE 95 [facility] BS AMB   6/3/2024  1:00 PM Kamlesh Biggs MD [facility] BS AMB   2024  9:00 AM Afsaneh Fowler APRN - BRIE [facility] BS AMB   2024  9:00 AM Kamlesh Biggs MD [facility] BS [facility]   2024 10:00 AM Butch Guaman MD [facility] BS AMB       Tiffany Peter LPN

## 2024-06-08 ENCOUNTER — APPOINTMENT (OUTPATIENT)
Age: 64
End: 2024-06-08

## 2024-06-08 ENCOUNTER — HOSPITAL ENCOUNTER (INPATIENT)
Facility: HOSPITAL | Age: 64
LOS: 8 days | Discharge: HOME | DRG: 375 | End: 2024-06-16
Attending: STUDENT IN AN ORGANIZED HEALTH CARE EDUCATION/TRAINING PROGRAM | Admitting: HOSPITALIST

## 2024-06-08 ENCOUNTER — HOSPITAL ENCOUNTER (EMERGENCY)
Age: 64
Discharge: ANOTHER HOSPITAL | End: 2024-06-08
Attending: EMERGENCY MEDICINE

## 2024-06-08 VITALS
SYSTOLIC BLOOD PRESSURE: 114 MMHG | WEIGHT: 163 LBS | HEART RATE: 78 BPM | OXYGEN SATURATION: 100 % | RESPIRATION RATE: 16 BRPM | TEMPERATURE: 98.6 F | HEIGHT: 73 IN | BODY MASS INDEX: 21.6 KG/M2 | DIASTOLIC BLOOD PRESSURE: 73 MMHG

## 2024-06-08 DIAGNOSIS — I25.10 CORONARY ARTERY DISEASE INVOLVING NATIVE CORONARY ARTERY OF NATIVE HEART WITHOUT ANGINA PECTORIS: Primary | ICD-10-CM

## 2024-06-08 DIAGNOSIS — K92.2 GASTROINTESTINAL HEMORRHAGE, UNSPECIFIED GASTROINTESTINAL HEMORRHAGE TYPE: Primary | ICD-10-CM

## 2024-06-08 DIAGNOSIS — D64.9 SEVERE ANEMIA: ICD-10-CM

## 2024-06-08 DIAGNOSIS — E87.6 HYPOKALEMIA: ICD-10-CM

## 2024-06-08 LAB
ALBUMIN SERPL-MCNC: 2.5 G/DL (ref 3.4–5)
ALBUMIN SERPL-MCNC: 2.7 G/DL (ref 3.4–5)
ALBUMIN/GLOB SERPL: 0.8 (ref 0.8–1.7)
ALBUMIN/GLOB SERPL: 0.9 (ref 0.8–1.7)
ALP SERPL-CCNC: 53 U/L (ref 45–117)
ALP SERPL-CCNC: 58 U/L (ref 45–117)
ALT SERPL-CCNC: 11 U/L (ref 16–61)
ALT SERPL-CCNC: 13 U/L (ref 16–61)
ANION GAP SERPL CALC-SCNC: 8 MMOL/L (ref 3–18)
ANION GAP SERPL CALC-SCNC: 9 MMOL/L (ref 3–18)
AST SERPL W P-5'-P-CCNC: 11 U/L (ref 10–38)
AST SERPL-CCNC: 11 U/L (ref 10–38)
BASOPHILS # BLD: 0.1 K/UL (ref 0–0.1)
BASOPHILS NFR BLD: 1 % (ref 0–2)
BILIRUB SERPL-MCNC: 0.8 MG/DL (ref 0.2–1)
BILIRUB SERPL-MCNC: 1.8 MG/DL (ref 0.2–1)
BNP SERPL-MCNC: 667 PG/ML (ref 0–900)
BUN SERPL-MCNC: 13 MG/DL (ref 7–18)
BUN SERPL-MCNC: 14 MG/DL (ref 7–18)
BUN/CREAT SERPL: 19 (ref 12–20)
BUN/CREAT SERPL: 21 (ref 12–20)
CA-I BLD-MCNC: 8.3 MG/DL (ref 8.5–10.1)
CALCIUM SERPL-MCNC: 8.2 MG/DL (ref 8.5–10.1)
CHLORIDE SERPL-SCNC: 108 MMOL/L (ref 100–111)
CHLORIDE SERPL-SCNC: 109 MMOL/L (ref 100–111)
CO2 SERPL-SCNC: 23 MMOL/L (ref 21–32)
CO2 SERPL-SCNC: 24 MMOL/L (ref 21–32)
CREAT SERPL-MCNC: 0.68 MG/DL (ref 0.6–1.3)
CREAT SERPL-MCNC: 0.7 MG/DL (ref 0.6–1.3)
DIFFERENTIAL METHOD BLD: ABNORMAL
EOSINOPHIL # BLD: 0 K/UL (ref 0–0.4)
EOSINOPHIL NFR BLD: 0 % (ref 0–5)
ERYTHROCYTE [DISTWIDTH] IN BLOOD BY AUTOMATED COUNT: 26.3 % (ref 11.6–14.5)
ERYTHROCYTE [DISTWIDTH] IN BLOOD BY AUTOMATED COUNT: ABNORMAL % (ref 11.6–14.5)
GLOBULIN SER CALC-MCNC: 3 G/DL (ref 2–4)
GLOBULIN SER CALC-MCNC: 3.1 G/DL (ref 2–4)
GLUCOSE SERPL-MCNC: 113 MG/DL (ref 74–99)
GLUCOSE SERPL-MCNC: 87 MG/DL (ref 74–99)
HCT VFR BLD AUTO: 19.5 % (ref 36–48)
HCT VFR BLD AUTO: 21.4 % (ref 36–48)
HEMOCCULT SP1 STL QL: POSITIVE
HGB BLD-MCNC: 5.6 G/DL (ref 13–16)
HGB BLD-MCNC: 6.5 G/DL (ref 13–16)
IMM GRANULOCYTES # BLD AUTO: 0 K/UL (ref 0–0.04)
IMM GRANULOCYTES NFR BLD AUTO: 0 % (ref 0–0.5)
INR PPP: 1 (ref 0.9–1.1)
INR PPP: 1.1 (ref 0.9–1.1)
LYMPHOCYTES # BLD: 2.1 K/UL (ref 0.9–3.6)
LYMPHOCYTES NFR BLD: 21 % (ref 21–52)
MAGNESIUM SERPL-MCNC: 2.1 MG/DL (ref 1.6–2.6)
MCH RBC QN AUTO: 19.7 PG (ref 24–34)
MCH RBC QN AUTO: 21.8 PG (ref 24–34)
MCHC RBC AUTO-ENTMCNC: 28.7 G/DL (ref 31–37)
MCHC RBC AUTO-ENTMCNC: 30.4 G/DL (ref 31–37)
MCV RBC AUTO: 68.7 FL (ref 78–100)
MCV RBC AUTO: 71.8 FL (ref 78–100)
MONOCYTES # BLD: 0.8 K/UL (ref 0.05–1.2)
MONOCYTES NFR BLD: 8 % (ref 3–10)
NEUTS SEG # BLD: 7.1 K/UL (ref 1.8–8)
NEUTS SEG NFR BLD: 70 % (ref 40–73)
NRBC # BLD: 0 K/UL (ref 0–0.01)
NRBC # BLD: 0 K/UL (ref 0–0.01)
NRBC BLD-RTO: 0 PER 100 WBC
NRBC BLD-RTO: 0 PER 100 WBC
PLATELET # BLD AUTO: 515 K/UL (ref 135–420)
PLATELET # BLD AUTO: 525 K/UL (ref 135–420)
PMV BLD AUTO: 10.1 FL (ref 9.2–11.8)
PMV BLD AUTO: 9.5 FL (ref 9.2–11.8)
POTASSIUM SERPL-SCNC: 2.8 MMOL/L (ref 3.5–5.5)
POTASSIUM SERPL-SCNC: 2.9 MMOL/L (ref 3.5–5.5)
PROT SERPL-MCNC: 5.5 G/DL (ref 6.4–8.2)
PROT SERPL-MCNC: 5.8 G/DL (ref 6.4–8.2)
PROTHROMBIN TIME: 13.7 SEC (ref 11.9–14.9)
PROTHROMBIN TIME: 14 SEC (ref 11.9–14.9)
RBC # BLD AUTO: 2.84 M/UL (ref 4.35–5.65)
RBC # BLD AUTO: 2.98 M/UL (ref 4.35–5.65)
RBC MORPH BLD: ABNORMAL
SODIUM SERPL-SCNC: 140 MMOL/L (ref 136–145)
SODIUM SERPL-SCNC: 141 MMOL/L (ref 136–145)
TROPONIN I SERPL HS-MCNC: 17 NG/L (ref 0–78)
TROPONIN I SERPL HS-MCNC: 17 NG/L (ref 0–78)
WBC # BLD AUTO: 10.1 K/UL (ref 4.6–13.2)
WBC # BLD AUTO: 12.8 K/UL (ref 4.6–13.2)

## 2024-06-08 PROCEDURE — 86901 BLOOD TYPING SEROLOGIC RH(D): CPT

## 2024-06-08 PROCEDURE — 36430 TRANSFUSION BLD/BLD COMPNT: CPT

## 2024-06-08 PROCEDURE — 2580000003 HC RX 258: Performed by: HOSPITALIST

## 2024-06-08 PROCEDURE — 36415 COLL VENOUS BLD VENIPUNCTURE: CPT

## 2024-06-08 PROCEDURE — 86850 RBC ANTIBODY SCREEN: CPT

## 2024-06-08 PROCEDURE — 86923 COMPATIBILITY TEST ELECTRIC: CPT

## 2024-06-08 PROCEDURE — 85610 PROTHROMBIN TIME: CPT

## 2024-06-08 PROCEDURE — 83880 ASSAY OF NATRIURETIC PEPTIDE: CPT

## 2024-06-08 PROCEDURE — 71045 X-RAY EXAM CHEST 1 VIEW: CPT

## 2024-06-08 PROCEDURE — 6370000000 HC RX 637 (ALT 250 FOR IP): Performed by: EMERGENCY MEDICINE

## 2024-06-08 PROCEDURE — A4216 STERILE WATER/SALINE, 10 ML: HCPCS | Performed by: HOSPITALIST

## 2024-06-08 PROCEDURE — P9059 PLASMA, FRZ BETWEEN 8-24HOUR: HCPCS

## 2024-06-08 PROCEDURE — 84484 ASSAY OF TROPONIN QUANT: CPT

## 2024-06-08 PROCEDURE — 85025 COMPLETE CBC W/AUTO DIFF WBC: CPT

## 2024-06-08 PROCEDURE — P9016 RBC LEUKOCYTES REDUCED: HCPCS

## 2024-06-08 PROCEDURE — 93005 ELECTROCARDIOGRAM TRACING: CPT | Performed by: EMERGENCY MEDICINE

## 2024-06-08 PROCEDURE — 2580000003 HC RX 258: Performed by: EMERGENCY MEDICINE

## 2024-06-08 PROCEDURE — 80053 COMPREHEN METABOLIC PANEL: CPT

## 2024-06-08 PROCEDURE — 6370000000 HC RX 637 (ALT 250 FOR IP): Performed by: HOSPITALIST

## 2024-06-08 PROCEDURE — 85027 COMPLETE CBC AUTOMATED: CPT

## 2024-06-08 PROCEDURE — 99285 EMERGENCY DEPT VISIT HI MDM: CPT

## 2024-06-08 PROCEDURE — 82272 OCCULT BLD FECES 1-3 TESTS: CPT

## 2024-06-08 PROCEDURE — 86900 BLOOD TYPING SEROLOGIC ABO: CPT

## 2024-06-08 PROCEDURE — 6360000002 HC RX W HCPCS: Performed by: HOSPITALIST

## 2024-06-08 PROCEDURE — 96365 THER/PROPH/DIAG IV INF INIT: CPT

## 2024-06-08 PROCEDURE — C9113 INJ PANTOPRAZOLE SODIUM, VIA: HCPCS | Performed by: HOSPITALIST

## 2024-06-08 PROCEDURE — 6360000002 HC RX W HCPCS: Performed by: EMERGENCY MEDICINE

## 2024-06-08 PROCEDURE — 83735 ASSAY OF MAGNESIUM: CPT

## 2024-06-08 PROCEDURE — 1100000000 HC RM PRIVATE

## 2024-06-08 RX ORDER — SODIUM CHLORIDE 9 MG/ML
INJECTION, SOLUTION INTRAVENOUS PRN
Status: DISCONTINUED | OUTPATIENT
Start: 2024-06-08 | End: 2024-06-14

## 2024-06-08 RX ORDER — POTASSIUM CHLORIDE 750 MG/1
40 TABLET, EXTENDED RELEASE ORAL ONCE
Status: COMPLETED | OUTPATIENT
Start: 2024-06-08 | End: 2024-06-08

## 2024-06-08 RX ORDER — ACETAMINOPHEN 325 MG/1
650 TABLET ORAL EVERY 6 HOURS PRN
Status: DISCONTINUED | OUTPATIENT
Start: 2024-06-08 | End: 2024-06-16 | Stop reason: HOSPADM

## 2024-06-08 RX ORDER — SODIUM CHLORIDE 9 MG/ML
INJECTION, SOLUTION INTRAVENOUS PRN
Status: COMPLETED | OUTPATIENT
Start: 2024-06-08 | End: 2024-06-08

## 2024-06-08 RX ORDER — ACETAMINOPHEN 650 MG/1
650 SUPPOSITORY RECTAL EVERY 6 HOURS PRN
Status: DISCONTINUED | OUTPATIENT
Start: 2024-06-08 | End: 2024-06-16 | Stop reason: HOSPADM

## 2024-06-08 RX ORDER — POTASSIUM CHLORIDE 7.45 MG/ML
10 INJECTION INTRAVENOUS PRN
Status: DISCONTINUED | OUTPATIENT
Start: 2024-06-08 | End: 2024-06-16 | Stop reason: HOSPADM

## 2024-06-08 RX ORDER — SODIUM CHLORIDE 9 MG/ML
INJECTION, SOLUTION INTRAVENOUS PRN
Status: DISCONTINUED | OUTPATIENT
Start: 2024-06-08 | End: 2024-06-08 | Stop reason: HOSPADM

## 2024-06-08 RX ORDER — POTASSIUM CHLORIDE 7.45 MG/ML
10 INJECTION INTRAVENOUS ONCE
Status: COMPLETED | OUTPATIENT
Start: 2024-06-08 | End: 2024-06-08

## 2024-06-08 RX ORDER — POTASSIUM CHLORIDE 20 MEQ/1
40 TABLET, EXTENDED RELEASE ORAL PRN
Status: DISCONTINUED | OUTPATIENT
Start: 2024-06-08 | End: 2024-06-16 | Stop reason: HOSPADM

## 2024-06-08 RX ORDER — 0.9 % SODIUM CHLORIDE 0.9 %
100 INTRAVENOUS SOLUTION INTRAVENOUS ONCE
Status: COMPLETED | OUTPATIENT
Start: 2024-06-08 | End: 2024-06-08

## 2024-06-08 RX ORDER — ENOXAPARIN SODIUM 100 MG/ML
40 INJECTION SUBCUTANEOUS DAILY
Status: CANCELLED | OUTPATIENT
Start: 2024-06-09

## 2024-06-08 RX ORDER — POLYETHYLENE GLYCOL 3350 17 G/17G
17 POWDER, FOR SOLUTION ORAL DAILY PRN
Status: DISCONTINUED | OUTPATIENT
Start: 2024-06-08 | End: 2024-06-16 | Stop reason: HOSPADM

## 2024-06-08 RX ORDER — SODIUM CHLORIDE 9 MG/ML
INJECTION, SOLUTION INTRAVENOUS PRN
Status: DISCONTINUED | OUTPATIENT
Start: 2024-06-08 | End: 2024-06-16 | Stop reason: HOSPADM

## 2024-06-08 RX ORDER — FERROUS SULFATE 325(65) MG
325 TABLET ORAL
Status: DISCONTINUED | OUTPATIENT
Start: 2024-06-09 | End: 2024-06-16 | Stop reason: HOSPADM

## 2024-06-08 RX ORDER — METOPROLOL SUCCINATE 25 MG/1
25 TABLET, EXTENDED RELEASE ORAL DAILY
Status: DISCONTINUED | OUTPATIENT
Start: 2024-06-09 | End: 2024-06-14

## 2024-06-08 RX ORDER — MAGNESIUM SULFATE IN WATER 40 MG/ML
2000 INJECTION, SOLUTION INTRAVENOUS PRN
Status: DISCONTINUED | OUTPATIENT
Start: 2024-06-08 | End: 2024-06-16 | Stop reason: HOSPADM

## 2024-06-08 RX ORDER — SODIUM CHLORIDE 0.9 % (FLUSH) 0.9 %
5-40 SYRINGE (ML) INJECTION PRN
Status: DISCONTINUED | OUTPATIENT
Start: 2024-06-08 | End: 2024-06-16 | Stop reason: HOSPADM

## 2024-06-08 RX ORDER — ATORVASTATIN CALCIUM 40 MG/1
40 TABLET, FILM COATED ORAL NIGHTLY
Status: DISCONTINUED | OUTPATIENT
Start: 2024-06-08 | End: 2024-06-16 | Stop reason: HOSPADM

## 2024-06-08 RX ORDER — ONDANSETRON 2 MG/ML
4 INJECTION INTRAMUSCULAR; INTRAVENOUS EVERY 6 HOURS PRN
Status: DISCONTINUED | OUTPATIENT
Start: 2024-06-08 | End: 2024-06-16 | Stop reason: HOSPADM

## 2024-06-08 RX ORDER — ONDANSETRON 4 MG/1
4 TABLET, ORALLY DISINTEGRATING ORAL EVERY 8 HOURS PRN
Status: DISCONTINUED | OUTPATIENT
Start: 2024-06-08 | End: 2024-06-16 | Stop reason: HOSPADM

## 2024-06-08 RX ORDER — SODIUM CHLORIDE 0.9 % (FLUSH) 0.9 %
5-40 SYRINGE (ML) INJECTION EVERY 12 HOURS SCHEDULED
Status: DISCONTINUED | OUTPATIENT
Start: 2024-06-08 | End: 2024-06-16 | Stop reason: HOSPADM

## 2024-06-08 RX ADMIN — POTASSIUM CHLORIDE 40 MEQ: 750 TABLET, EXTENDED RELEASE ORAL at 14:19

## 2024-06-08 RX ADMIN — SODIUM CHLORIDE: 9 INJECTION, SOLUTION INTRAVENOUS at 15:32

## 2024-06-08 RX ADMIN — ATORVASTATIN CALCIUM 40 MG: 40 TABLET, FILM COATED ORAL at 22:14

## 2024-06-08 RX ADMIN — POTASSIUM CHLORIDE 10 MEQ: 7.46 INJECTION, SOLUTION INTRAVENOUS at 14:13

## 2024-06-08 RX ADMIN — POTASSIUM CHLORIDE 10 MEQ: 7.46 INJECTION, SOLUTION INTRAVENOUS at 23:06

## 2024-06-08 RX ADMIN — SODIUM CHLORIDE 100 ML: 9 INJECTION, SOLUTION INTRAVENOUS at 14:13

## 2024-06-08 RX ADMIN — SODIUM CHLORIDE, PRESERVATIVE FREE 10 ML: 5 INJECTION INTRAVENOUS at 22:15

## 2024-06-08 RX ADMIN — PANTOPRAZOLE SODIUM 40 MG: 40 INJECTION, POWDER, FOR SOLUTION INTRAVENOUS at 22:15

## 2024-06-08 ASSESSMENT — LIFESTYLE VARIABLES
HOW OFTEN DO YOU HAVE A DRINK CONTAINING ALCOHOL: NEVER
HOW MANY STANDARD DRINKS CONTAINING ALCOHOL DO YOU HAVE ON A TYPICAL DAY: PATIENT DOES NOT DRINK

## 2024-06-08 ASSESSMENT — PAIN SCALES - GENERAL
PAINLEVEL_OUTOF10: 0

## 2024-06-08 ASSESSMENT — PAIN - FUNCTIONAL ASSESSMENT: PAIN_FUNCTIONAL_ASSESSMENT: 0-10

## 2024-06-08 NOTE — ED PROVIDER NOTES
[facility] EMERGENCY DEPT  EMERGENCY DEPARTMENT ENCOUNTER       Pt Name: Gigi Mcneal  MRN: [id number]  Birthdate 1960  Date of evaluation: 6/8/2024  Provider: Ravindra Mccarthy MD   PCP: Kamlesh Biggs MD  Note Started: 3:50 PM 6/8/24     CHIEF COMPLAINT       Chief Complaint   Patient presents with    Dizziness    Hypotension        HISTORY OF PRESENT ILLNESS: 1 or more elements      History From: Patient and Patient's Wife  History limited by: Nothing     Gigi Mcneal is a 64 y.o. male who presents to ED via EMS for dizziness which patient reports started this morning.  He describes dizziness as lightheadedness.  He reports similar dizziness in the past associated with low blood count and patient requiring blood transfusion.  He has no headache, chest or abdomen pain.  He is on iron and stools are dark, last BM last night which was same color.  EMS reported low blood pressure at scene, systolic in the 90s and IV initiated.  Patient had 100 cc normal saline given, EMS reported patient reported history of CHF.     Nursing Notes were all reviewed and agreed with or any disagreements were addressed in the HPI.     REVIEW OF SYSTEMS      Review of Systems     Positives and Pertinent negatives as per HPI.    PAST HISTORY     Past Medical History:  Past Medical History:   Diagnosis Date    CAD (coronary artery disease)     Heart failure (HCC)     NSTEMI (non-ST elevated myocardial infarction) ([facility])        Past Surgical History:  Past Surgical History:   Procedure Laterality Date    CARDIAC PROCEDURE N/A 3/28/2024    Left heart cath performed by Butch Guaman MD at [facility] CARDIAC CATH LAB    CARDIAC PROCEDURE N/A 3/28/2024    Coronary angiography performed by Butch Guaman MD at [facility] CARDIAC CATH LAB    CARDIAC PROCEDURE N/A 3/28/2024    Left ventriculography performed by Butch Guaman MD at [facility] CARDIAC CATH LAB    CARDIAC PROCEDURE N/A 3/28/2024    Insert stent syeda coronary performed by Butch Guaman MD at [facility] CARDIAC

## 2024-06-08 NOTE — ED NOTES
Yi Mcneal @ [phone], patient's wife, requests to be called when patient transports to [facility].

## 2024-06-08 NOTE — ED NOTES
TRANSFER - OUT REPORT:    Verbal report given to Lucia Juárez RN on Gigi Mcneal  being transferred to [facility], [address] [place] [address], Room 461 for routine progression of patient care       Report consisted of patient's Situation, Background, Assessment and   Recommendations(SBAR).     Information from the following report(s) Nurse Handoff Report, ED Encounter Summary, ED SBAR, Intake/Output, MAR, Recent Results, and Cardiac Rhythm SR  was reviewed with the receiving nurse.    [place] Fall Assessment:    Presents to emergency department  because of falls (Syncope, seizure, or loss of consciousness): No  Age > 70: No  Altered Mental Status, Intoxication with alcohol or substance confusion (Disorientation, impaired judgment, poor safety awaremess, or inability to follow instructions): No  Impaired Mobility: Ambulates or transfers with assistive devices or assistance; Unable to ambulate or transer.: No             Lines:   Peripheral IV 06/08/24 Left;Posterior Forearm (Active)   Site Assessment Clean, dry & intact 06/08/24 1222   Line Status Sluggish blood return 06/08/24 1222   Phlebitis Assessment No symptoms 06/08/24 1222   Infiltration Assessment 0 06/08/24 1222       Peripheral IV 06/08/24 Right Antecubital (Active)   Site Assessment Clean, dry & intact 06/08/24 1340   Line Status Blood return noted 06/08/24 1340   Phlebitis Assessment No symptoms 06/08/24 1340   Infiltration Assessment 0 06/08/24 1340        Opportunity for questions and clarification was provided.      Patient transported with:  Monitor

## 2024-06-08 NOTE — ED NOTES
1452: Still waiting on a bed     [address] [address]    Report # [id number]     [facility] tbd    [address] 60[address]90 will minutes

## 2024-06-08 NOTE — ED NOTES
Patient received by Medical Transport Team for transport to inpatient bed.  Patient in stable condition at time of transport.  Personal belongings accompanying patient to inpatient bed.  Medical transport team received report on patient's condition and concerns at time of departure.   Transport team denies further questions or concerns related to patient at time of departure.  Patient has FFP infusing at time of transfer.

## 2024-06-08 NOTE — ED TRIAGE NOTES
Patient arrives via EMS for c/o dizziness that began this morning.  Patient noted to be hypotensive on medics arrival.  100 ml NS given via IV per EMS prior to arrival.

## 2024-06-08 NOTE — SIGNIFICANT EVENT
Called by ER doctor for consult for admit with GIB. Patient noted to be lightheaded and fatigued, with + FOBT, and notable anemia. Patiet also with hypokalemia. Agree with admission for suspected GIB, blood loss anemia, hypokalemia.     Jeff Judge MD   6/8/2024   [facility]

## 2024-06-08 NOTE — ED NOTES
Nursing staff at [facility], [address] ,  advised that patient is transporting presently to [facility] and has FFP infusing.  [facility] aware that patient has not had H/H redraw and will require on arrival.

## 2024-06-09 LAB
ABO + RH BLD: NORMAL
ANION GAP SERPL CALC-SCNC: 5 MMOL/L (ref 3–18)
BLD PROD TYP BPU: NORMAL
BLD PROD TYP BPU: NORMAL
BLOOD BANK BLOOD PRODUCT EXPIRATION DATE: NORMAL
BLOOD BANK BLOOD PRODUCT EXPIRATION DATE: NORMAL
BLOOD BANK DISPENSE STATUS: NORMAL
BLOOD BANK DISPENSE STATUS: NORMAL
BLOOD BANK ISBT PRODUCT BLOOD TYPE: 5100
BLOOD BANK ISBT PRODUCT BLOOD TYPE: 9500
BLOOD BANK PRODUCT CODE: NORMAL
BLOOD BANK UNIT TYPE AND RH: NORMAL
BLOOD BANK UNIT TYPE AND RH: NORMAL
BLOOD GROUP ANTIBODIES SERPL: NEGATIVE
BPU ID: NORMAL
BPU ID: NORMAL
BUN SERPL-MCNC: 12 MG/DL (ref 7–18)
BUN/CREAT SERPL: 16 (ref 12–20)
CALCIUM SERPL-MCNC: 7.9 MG/DL (ref 8.5–10.1)
CHLORIDE SERPL-SCNC: 112 MMOL/L (ref 100–111)
CO2 SERPL-SCNC: 23 MMOL/L (ref 21–32)
CREAT SERPL-MCNC: 0.73 MG/DL (ref 0.6–1.3)
CROSSMATCH RESULT: NORMAL
EKG ATRIAL RATE: 79 BPM
EKG DIAGNOSIS: NORMAL
EKG P AXIS: 53 DEGREES
EKG P-R INTERVAL: 130 MS
EKG Q-T INTERVAL: 386 MS
EKG QRS DURATION: 82 MS
EKG QTC CALCULATION (BAZETT): 442 MS
EKG R AXIS: 27 DEGREES
EKG T AXIS: 40 DEGREES
EKG VENTRICULAR RATE: 79 BPM
GLUCOSE SERPL-MCNC: 98 MG/DL (ref 74–99)
HCT VFR BLD AUTO: 20.3 % (ref 36–48)
HCT VFR BLD AUTO: 21.7 % (ref 36–48)
HCT VFR BLD AUTO: 23.1 % (ref 36–48)
HGB BLD-MCNC: 6 G/DL (ref 13–16)
HGB BLD-MCNC: 6.8 G/DL (ref 13–16)
HGB BLD-MCNC: 7.3 G/DL (ref 13–16)
HISTORY CHECK: NORMAL
POTASSIUM SERPL-SCNC: 3.2 MMOL/L (ref 3.5–5.5)
SODIUM SERPL-SCNC: 140 MMOL/L (ref 136–145)
SPECIMEN EXP DATE BLD: NORMAL
TRANSFUSION STATUS PATIENT QL: NORMAL
TRANSFUSION STATUS PATIENT QL: NORMAL
UNIT DIVISION: 0
UNIT DIVISION: 0
UNIT ISSUE DATE/TIME: NORMAL
UNIT ISSUE DATE/TIME: NORMAL

## 2024-06-09 PROCEDURE — 85014 HEMATOCRIT: CPT

## 2024-06-09 PROCEDURE — C9113 INJ PANTOPRAZOLE SODIUM, VIA: HCPCS | Performed by: HOSPITALIST

## 2024-06-09 PROCEDURE — 36430 TRANSFUSION BLD/BLD COMPNT: CPT

## 2024-06-09 PROCEDURE — 85018 HEMOGLOBIN: CPT

## 2024-06-09 PROCEDURE — P9016 RBC LEUKOCYTES REDUCED: HCPCS

## 2024-06-09 PROCEDURE — 80048 BASIC METABOLIC PNL TOTAL CA: CPT

## 2024-06-09 PROCEDURE — 6360000002 HC RX W HCPCS: Performed by: HOSPITALIST

## 2024-06-09 PROCEDURE — A4216 STERILE WATER/SALINE, 10 ML: HCPCS | Performed by: HOSPITALIST

## 2024-06-09 PROCEDURE — 6370000000 HC RX 637 (ALT 250 FOR IP): Performed by: INTERNAL MEDICINE

## 2024-06-09 PROCEDURE — 2580000003 HC RX 258: Performed by: HOSPITALIST

## 2024-06-09 PROCEDURE — 99233 SBSQ HOSP IP/OBS HIGH 50: CPT | Performed by: INTERNAL MEDICINE

## 2024-06-09 PROCEDURE — 97165 OT EVAL LOW COMPLEX 30 MIN: CPT

## 2024-06-09 PROCEDURE — 6370000000 HC RX 637 (ALT 250 FOR IP): Performed by: HOSPITALIST

## 2024-06-09 PROCEDURE — 1100000000 HC RM PRIVATE

## 2024-06-09 PROCEDURE — 30233N1 TRANSFUSION OF NONAUTOLOGOUS RED BLOOD CELLS INTO PERIPHERAL VEIN, PERCUTANEOUS APPROACH: ICD-10-PCS | Performed by: HOSPITALIST

## 2024-06-09 PROCEDURE — 94761 N-INVAS EAR/PLS OXIMETRY MLT: CPT

## 2024-06-09 RX ORDER — POTASSIUM CHLORIDE 20 MEQ/1
20 TABLET, EXTENDED RELEASE ORAL 3 TIMES DAILY
Status: DISCONTINUED | OUTPATIENT
Start: 2024-06-09 | End: 2024-06-16 | Stop reason: HOSPADM

## 2024-06-09 RX ORDER — SODIUM CHLORIDE 9 MG/ML
INJECTION, SOLUTION INTRAVENOUS PRN
Status: DISCONTINUED | OUTPATIENT
Start: 2024-06-09 | End: 2024-06-14

## 2024-06-09 RX ADMIN — POTASSIUM CHLORIDE 10 MEQ: 7.46 INJECTION, SOLUTION INTRAVENOUS at 01:51

## 2024-06-09 RX ADMIN — POTASSIUM CHLORIDE 10 MEQ: 7.46 INJECTION, SOLUTION INTRAVENOUS at 00:33

## 2024-06-09 RX ADMIN — POTASSIUM CHLORIDE 10 MEQ: 7.46 INJECTION, SOLUTION INTRAVENOUS at 04:49

## 2024-06-09 RX ADMIN — FERROUS SULFATE TAB 325 MG (65 MG ELEMENTAL FE) 325 MG: 325 (65 FE) TAB at 08:58

## 2024-06-09 RX ADMIN — PANTOPRAZOLE SODIUM 40 MG: 40 INJECTION, POWDER, FOR SOLUTION INTRAVENOUS at 20:54

## 2024-06-09 RX ADMIN — ATORVASTATIN CALCIUM 40 MG: 40 TABLET, FILM COATED ORAL at 20:53

## 2024-06-09 RX ADMIN — SODIUM CHLORIDE, PRESERVATIVE FREE 10 ML: 5 INJECTION INTRAVENOUS at 08:59

## 2024-06-09 RX ADMIN — POTASSIUM CHLORIDE 20 MEQ: 1500 TABLET, EXTENDED RELEASE ORAL at 20:53

## 2024-06-09 RX ADMIN — POTASSIUM CHLORIDE 20 MEQ: 1500 TABLET, EXTENDED RELEASE ORAL at 12:33

## 2024-06-09 RX ADMIN — SODIUM CHLORIDE, PRESERVATIVE FREE 10 ML: 5 INJECTION INTRAVENOUS at 20:54

## 2024-06-09 RX ADMIN — POTASSIUM CHLORIDE 10 MEQ: 7.46 INJECTION, SOLUTION INTRAVENOUS at 06:18

## 2024-06-09 RX ADMIN — POTASSIUM CHLORIDE 10 MEQ: 7.46 INJECTION, SOLUTION INTRAVENOUS at 03:26

## 2024-06-09 RX ADMIN — METOPROLOL SUCCINATE 25 MG: 25 TABLET, EXTENDED RELEASE ORAL at 08:58

## 2024-06-09 RX ADMIN — PANTOPRAZOLE SODIUM 40 MG: 40 INJECTION, POWDER, FOR SOLUTION INTRAVENOUS at 08:59

## 2024-06-09 ASSESSMENT — PAIN SCALES - GENERAL
PAINLEVEL_OUTOF10: 0

## 2024-06-09 NOTE — H&P
History and Physical      Chief complaint Dizziness    Subjective     HPI: Gigi Mcneal is a 64 y.o. male with a past medical history significant for CAD status post DE stent placement 3/28/2024, CHF with reduced EF, GI bleed and anemia who presented to the ED at [facility] with the above chief complaint.  History is gathered from the patient and after reviewing medical records. Patient was admitted here 3/23-3/30 for acute hypoxic respiratory failure secondary to pneumonia versus acute heart failure with reduced EF and underwent drug-eluting stent placement on 3/28/2024 on dual antiplatelet with aspirin and Brilinta. Patient had 2 episodes of GI bleeding and was seen and evaluated by GI. Bleeding stopped by its own no endoscopic interventions performed. Patient was admitted again at [facility] 5/25-5/27 for evaluation of GI bleed and dizziness and noted to have hemoglobin of 6.2. He was transfused with 1 unit PRBC and started on twice daily Protonix and iron. Patient came to the hospital again today for evaluation of dizziness and dark stool with some blood mixed with it.  He was noted to low blood pressure 95/65 with orthostatic vitals positive.  Dark stool with maroon-colored guaiac positive stool.  Hemoglobin 5.6 and potassium 2.8. Patient transfused with 1 unit PRBC and 1 unit FFP and potassium replaced and transferred to [facility] for GI evaluation. Currently patient states that he feels fine. Has dizziness when he gets up.  Denies passing out.  Denies any chest pain shortness of breath or leg swelling.  No abdominal pain nausea or vomiting.  Denies any urinary complaint.  No bleeding from any other site. Denies smoking or alcohol use.  He was seen by cardiology 3 weeks ago .    Vital signs stable.  Workup serum sodium 141 potassium 2.8 bicarb 24 BUN 13 creatinine 0.7.  High-sensitivity troponin 17 NT proBNP 667.  LFTs normal.  WBC 10.1 H&H 5.6/19.5 platelets 525 PT

## 2024-06-09 NOTE — CONSENT
Informed Consent for Blood Component Transfusion Note    I have discussed with the patient the rationale for blood component transfusion; its benefits in treating or preventing fatigue, organ damage, or death; and its risk which includes mild transfusion reactions, rare risk of blood borne infection, or more serious but rare reactions. I have discussed the alternatives to transfusion, including the risk and consequences of not receiving transfusion. The patient had an opportunity to ask questions and had agreed to proceed with transfusion of blood components.    Electronically signed by Evie Moss MD on 6/8/24 at 10:53 PM EDT

## 2024-06-09 NOTE — PLAN OF CARE
Problem: Chronic Conditions and Co-morbidities  Goal: Patient's chronic conditions and co-morbidity symptoms are monitored and maintained or improved  6/9/2024 1126 by Afsaneh Lopez RN  Outcome: Progressing  6/9/2024 0320 by Ciera Upton RN  Outcome: Progressing     Problem: Pain  Goal: Verbalizes/displays adequate comfort level or baseline comfort level  6/9/2024 1126 by Afsaneh Lopez RN  Outcome: Progressing  Flowsheets (Taken 6/9/2024 0832)  Verbalizes/displays adequate comfort level or baseline comfort level:   Encourage patient to monitor pain and request assistance   Assess pain using appropriate pain scale   Administer analgesics based on type and severity of pain and evaluate response   Implement non-pharmacological measures as appropriate and evaluate response   Consider cultural and social influences on pain and pain management  6/9/2024 0320 by Ciera Upton RN  Outcome: Progressing     Problem: Safety - Adult  Goal: Free from fall injury  6/9/2024 1126 by Afsaneh Lopez RN  Outcome: Progressing  6/9/2024 0320 by Ciera Upton RN  Outcome: Progressing

## 2024-06-09 NOTE — PLAN OF CARE
Problem: Chronic Conditions and Co-morbidities  Goal: Patient's chronic conditions and co-morbidity symptoms are monitored and maintained or improved  6/9/2024 1939 by Ciera Upton RN  Outcome: Progressing  6/9/2024 1126 by Afsaneh Lopez RN  Outcome: Progressing     Problem: Pain  Goal: Verbalizes/displays adequate comfort level or baseline comfort level  6/9/2024 1939 by Ciera Upton RN  Outcome: Progressing  6/9/2024 1126 by Afsaneh Lopez RN  Outcome: Progressing  Flowsheets (Taken 6/9/2024 0832)  Verbalizes/displays adequate comfort level or baseline comfort level:   Encourage patient to monitor pain and request assistance   Assess pain using appropriate pain scale   Administer analgesics based on type and severity of pain and evaluate response   Implement non-pharmacological measures as appropriate and evaluate response   Consider cultural and social influences on pain and pain management     Problem: Safety - Adult  Goal: Free from fall injury  6/9/2024 1939 by Ciera Upton RN  Outcome: Progressing  6/9/2024 1126 by Afsaneh Lopez RN  Outcome: Progressing

## 2024-06-10 ENCOUNTER — TELEPHONE (OUTPATIENT)
Age: 64
End: 2024-06-10

## 2024-06-10 LAB
HCT VFR BLD AUTO: 23.9 % (ref 36–48)
HGB BLD-MCNC: 7.6 G/DL (ref 13–16)
MAGNESIUM SERPL-MCNC: 2.2 MG/DL (ref 1.6–2.6)

## 2024-06-10 PROCEDURE — 1100000000 HC RM PRIVATE

## 2024-06-10 PROCEDURE — 94761 N-INVAS EAR/PLS OXIMETRY MLT: CPT

## 2024-06-10 PROCEDURE — 6370000000 HC RX 637 (ALT 250 FOR IP): Performed by: INTERNAL MEDICINE

## 2024-06-10 PROCEDURE — C9113 INJ PANTOPRAZOLE SODIUM, VIA: HCPCS | Performed by: HOSPITALIST

## 2024-06-10 PROCEDURE — 99255 IP/OBS CONSLTJ NEW/EST HI 80: CPT | Performed by: INTERNAL MEDICINE

## 2024-06-10 PROCEDURE — 6360000002 HC RX W HCPCS: Performed by: HOSPITALIST

## 2024-06-10 PROCEDURE — 85014 HEMATOCRIT: CPT

## 2024-06-10 PROCEDURE — 6370000000 HC RX 637 (ALT 250 FOR IP): Performed by: HOSPITALIST

## 2024-06-10 PROCEDURE — 99232 SBSQ HOSP IP/OBS MODERATE 35: CPT | Performed by: INTERNAL MEDICINE

## 2024-06-10 PROCEDURE — 83735 ASSAY OF MAGNESIUM: CPT

## 2024-06-10 PROCEDURE — 2580000003 HC RX 258: Performed by: HOSPITALIST

## 2024-06-10 PROCEDURE — 2580000003 HC RX 258: Performed by: INTERNAL MEDICINE

## 2024-06-10 PROCEDURE — 97161 PT EVAL LOW COMPLEX 20 MIN: CPT

## 2024-06-10 PROCEDURE — A4216 STERILE WATER/SALINE, 10 ML: HCPCS | Performed by: HOSPITALIST

## 2024-06-10 PROCEDURE — 36415 COLL VENOUS BLD VENIPUNCTURE: CPT

## 2024-06-10 PROCEDURE — P9016 RBC LEUKOCYTES REDUCED: HCPCS

## 2024-06-10 PROCEDURE — 36430 TRANSFUSION BLD/BLD COMPNT: CPT

## 2024-06-10 PROCEDURE — 85018 HEMOGLOBIN: CPT

## 2024-06-10 RX ORDER — MIDODRINE HYDROCHLORIDE 5 MG/1
5 TABLET ORAL
Status: COMPLETED | OUTPATIENT
Start: 2024-06-10 | End: 2024-06-12

## 2024-06-10 RX ORDER — SODIUM CHLORIDE, SODIUM LACTATE, POTASSIUM CHLORIDE, CALCIUM CHLORIDE 600; 310; 30; 20 MG/100ML; MG/100ML; MG/100ML; MG/100ML
INJECTION, SOLUTION INTRAVENOUS CONTINUOUS
Status: DISCONTINUED | OUTPATIENT
Start: 2024-06-10 | End: 2024-06-12

## 2024-06-10 RX ADMIN — PANTOPRAZOLE SODIUM 40 MG: 40 INJECTION, POWDER, FOR SOLUTION INTRAVENOUS at 08:47

## 2024-06-10 RX ADMIN — POTASSIUM CHLORIDE 20 MEQ: 1500 TABLET, EXTENDED RELEASE ORAL at 08:48

## 2024-06-10 RX ADMIN — MIDODRINE HYDROCHLORIDE 5 MG: 5 TABLET ORAL at 14:34

## 2024-06-10 RX ADMIN — PANTOPRAZOLE SODIUM 40 MG: 40 INJECTION, POWDER, FOR SOLUTION INTRAVENOUS at 21:12

## 2024-06-10 RX ADMIN — POTASSIUM CHLORIDE 20 MEQ: 1500 TABLET, EXTENDED RELEASE ORAL at 21:11

## 2024-06-10 RX ADMIN — SODIUM CHLORIDE, PRESERVATIVE FREE 10 ML: 5 INJECTION INTRAVENOUS at 08:47

## 2024-06-10 RX ADMIN — SODIUM CHLORIDE, PRESERVATIVE FREE 10 ML: 5 INJECTION INTRAVENOUS at 21:13

## 2024-06-10 RX ADMIN — FERROUS SULFATE TAB 325 MG (65 MG ELEMENTAL FE) 325 MG: 325 (65 FE) TAB at 09:06

## 2024-06-10 RX ADMIN — ATORVASTATIN CALCIUM 40 MG: 40 TABLET, FILM COATED ORAL at 21:11

## 2024-06-10 RX ADMIN — MIDODRINE HYDROCHLORIDE 5 MG: 5 TABLET ORAL at 17:15

## 2024-06-10 RX ADMIN — SODIUM CHLORIDE, POTASSIUM CHLORIDE, SODIUM LACTATE AND CALCIUM CHLORIDE: 600; 310; 30; 20 INJECTION, SOLUTION INTRAVENOUS at 14:33

## 2024-06-10 RX ADMIN — POTASSIUM CHLORIDE 20 MEQ: 1500 TABLET, EXTENDED RELEASE ORAL at 12:24

## 2024-06-10 ASSESSMENT — PAIN SCALES - GENERAL
PAINLEVEL_OUTOF10: 0

## 2024-06-10 NOTE — TELEPHONE ENCOUNTER
----- Message from Luba Hartman MA sent at 6/10/2024 10:17 AM EDT -----    ----- Message -----  From: Butch Guaman MD  Sent: 6/5/2024  12:07 PM EDT  To: Luba Hartman MA    1. Problem rhythm was normal sinus rhythm.  No sustained arrhythmias identified.  2. Occasional PACs.  Majority isolated, 1 episode of PAC triplet.  3. Symptoms reported correspond to sinus rhythm.  Monitor looks normal.  No significant dysrhythmia.  ----- Message -----  From: Luba Hartman MA  Sent: 6/4/2024  12:35 PM EDT  To: Butch Guaman MD    Per your last note \"  From cardiac standpoint, he has not had any significant episodes of chest pains or shortness of breath.  He does have occasional episodes of dizziness especially when he stands up.  He has been active physically without significant limitations.  His blood pressure is well-controlled at 102/70.  His rhythm remains stable sinus at 69 bpm.  There is no evidence of decompensated CHF noted at this time.  He does complain of some palpitations especially when he lays down.  He is quite concerned.  1 week event monitor will be obtained.  His target LDL is less than 70.  He continues on Lipitor 40 mg daily.  He continues on baby aspirin and Brilinta.  I will see him back in 3 months.  He will have a repeat echocardiogram prior to his visit.  He will see my CHF nurse practitioner within about a month.  If he has significant hypotensive episodes, his Entresto may need to be tapered.

## 2024-06-10 NOTE — TELEPHONE ENCOUNTER
----- Message from Luba Hartman MA sent at 6/10/2024 10:17 AM EDT -----    ----- Message -----  From: Butch Guaman MD  Sent: 6/5/2024  12:06 PM EDT  To: Luba Hartman MA    Please notify the patient that his LV function has improved to near normal levels 50-55% EF.  No significant pulmonary hypertension.  ----- Message -----  From: Luba Hartman MA  Sent: 6/4/2024  12:35 PM EDT  To: Butch Guaman MD    Per your last note \"  From cardiac standpoint, he has not had any significant episodes of chest pains or shortness of breath.  He does have occasional episodes of dizziness especially when he stands up.  He has been active physically without significant limitations.  His blood pressure is well-controlled at 102/70.  His rhythm remains stable sinus at 69 bpm.  There is no evidence of decompensated CHF noted at this time.  He does complain of some palpitations especially when he lays down.  He is quite concerned.  1 week event monitor will be obtained.  His target LDL is less than 70.  He continues on Lipitor 40 mg daily.  He continues on baby aspirin and Brilinta.  I will see him back in 3 months.  He will have a repeat echocardiogram prior to his visit.  He will see my CHF nurse practitioner within about a month.  If he has significant hypotensive episodes, his Entresto may need to be tapered.

## 2024-06-10 NOTE — TELEPHONE ENCOUNTER
Verbal order and read back per Butch Guaman MD:    1. Problem rhythm was normal sinus rhythm.  No sustained arrhythmias identified.   2. Occasional PACs.  Majority isolated, 1 episode of PAC triplet.   3. Symptoms reported correspond to sinus rhythm.   Monitor looks normal.  No significant dysrhythmia.           Patient is currently admitted. I did call and leave a voice message, I'll call again in a few days.

## 2024-06-10 NOTE — PLAN OF CARE
Problem: Chronic Conditions and Co-morbidities  Goal: Patient's chronic conditions and co-morbidity symptoms are monitored and maintained or improved  Outcome: Progressing  Flowsheets (Taken 6/10/2024 0843)  Care Plan - Patient's Chronic Conditions and Co-Morbidity Symptoms are Monitored and Maintained or Improved: Monitor and assess patient's chronic conditions and comorbid symptoms for stability, deterioration, or improvement     Problem: Pain  Goal: Verbalizes/displays adequate comfort level or baseline comfort level  Outcome: Progressing     Problem: Safety - Adult  Goal: Free from fall injury  Outcome: Progressing  Flowsheets (Taken 6/10/2024 1114)  Free From Fall Injury: Instruct family/caregiver on patient safety

## 2024-06-10 NOTE — TELEPHONE ENCOUNTER
Patient is currently admitted. I did call and leave a voice message, I'll call again in a few days.

## 2024-06-11 ENCOUNTER — APPOINTMENT (OUTPATIENT)
Facility: HOSPITAL | Age: 64
DRG: 375 | End: 2024-06-11
Attending: STUDENT IN AN ORGANIZED HEALTH CARE EDUCATION/TRAINING PROGRAM

## 2024-06-11 PROBLEM — D62 ACUTE BLOOD LOSS ANEMIA: Status: ACTIVE | Noted: 2024-06-11

## 2024-06-11 LAB
ABO + RH BLD: NORMAL
ANION GAP SERPL CALC-SCNC: 6 MMOL/L (ref 3–18)
BASOPHILS # BLD: 0 K/UL (ref 0–0.1)
BASOPHILS NFR BLD: 1 % (ref 0–2)
BLD PROD TYP BPU: NORMAL
BLD PROD TYP BPU: NORMAL
BLOOD BANK BLOOD PRODUCT EXPIRATION DATE: NORMAL
BLOOD BANK BLOOD PRODUCT EXPIRATION DATE: NORMAL
BLOOD BANK DISPENSE STATUS: NORMAL
BLOOD BANK DISPENSE STATUS: NORMAL
BLOOD BANK ISBT PRODUCT BLOOD TYPE: 5100
BLOOD BANK ISBT PRODUCT BLOOD TYPE: 5100
BLOOD BANK PRODUCT CODE: NORMAL
BLOOD BANK PRODUCT CODE: NORMAL
BLOOD BANK UNIT TYPE AND RH: NORMAL
BLOOD BANK UNIT TYPE AND RH: NORMAL
BLOOD GROUP ANTIBODIES SERPL: NORMAL
BPU ID: NORMAL
BPU ID: NORMAL
BUN SERPL-MCNC: 9 MG/DL (ref 7–18)
BUN/CREAT SERPL: 12 (ref 12–20)
CALCIUM SERPL-MCNC: 7.9 MG/DL (ref 8.5–10.1)
CALLED TO: NORMAL
CHLORIDE SERPL-SCNC: 114 MMOL/L (ref 100–111)
CO2 SERPL-SCNC: 19 MMOL/L (ref 21–32)
CREAT SERPL-MCNC: 0.76 MG/DL (ref 0.6–1.3)
CROSSMATCH RESULT: NORMAL
CROSSMATCH RESULT: NORMAL
DIFFERENTIAL METHOD BLD: ABNORMAL
EOSINOPHIL # BLD: 0.3 K/UL (ref 0–0.4)
EOSINOPHIL NFR BLD: 3 % (ref 0–5)
ERYTHROCYTE [DISTWIDTH] IN BLOOD BY AUTOMATED COUNT: 25.2 % (ref 11.6–14.5)
GLUCOSE SERPL-MCNC: 91 MG/DL (ref 74–99)
HCT VFR BLD AUTO: 24.5 % (ref 36–48)
HGB BLD-MCNC: 7.6 G/DL (ref 13–16)
IMM GRANULOCYTES # BLD AUTO: 0 K/UL (ref 0–0.04)
IMM GRANULOCYTES NFR BLD AUTO: 0 % (ref 0–0.5)
LYMPHOCYTES # BLD: 1.8 K/UL (ref 0.9–3.6)
LYMPHOCYTES NFR BLD: 21 % (ref 21–52)
MCH RBC QN AUTO: 23.2 PG (ref 24–34)
MCHC RBC AUTO-ENTMCNC: 31 G/DL (ref 31–37)
MCV RBC AUTO: 74.9 FL (ref 78–100)
MONOCYTES # BLD: 0.7 K/UL (ref 0.05–1.2)
MONOCYTES NFR BLD: 8 % (ref 3–10)
NEUTS SEG # BLD: 5.9 K/UL (ref 1.8–8)
NEUTS SEG NFR BLD: 67 % (ref 40–73)
NRBC # BLD: 0 K/UL (ref 0–0.01)
NRBC BLD-RTO: 0 PER 100 WBC
PLATELET # BLD AUTO: 466 K/UL (ref 135–420)
PMV BLD AUTO: 10 FL (ref 9.2–11.8)
POTASSIUM SERPL-SCNC: 3.5 MMOL/L (ref 3.5–5.5)
RBC # BLD AUTO: 3.27 M/UL (ref 4.35–5.65)
SODIUM SERPL-SCNC: 139 MMOL/L (ref 136–145)
SPECIMEN EXP DATE BLD: NORMAL
UNIT DIVISION: 0
UNIT DIVISION: 0
UNIT ISSUE DATE/TIME: NORMAL
UNIT ISSUE DATE/TIME: NORMAL
WBC # BLD AUTO: 8.7 K/UL (ref 4.6–13.2)

## 2024-06-11 PROCEDURE — 6370000000 HC RX 637 (ALT 250 FOR IP): Performed by: STUDENT IN AN ORGANIZED HEALTH CARE EDUCATION/TRAINING PROGRAM

## 2024-06-11 PROCEDURE — 1100000000 HC RM PRIVATE

## 2024-06-11 PROCEDURE — 6360000004 HC RX CONTRAST MEDICATION: Performed by: INTERNAL MEDICINE

## 2024-06-11 PROCEDURE — C9113 INJ PANTOPRAZOLE SODIUM, VIA: HCPCS | Performed by: HOSPITALIST

## 2024-06-11 PROCEDURE — 74174 CTA ABD&PLVS W/CONTRAST: CPT

## 2024-06-11 PROCEDURE — 94761 N-INVAS EAR/PLS OXIMETRY MLT: CPT

## 2024-06-11 PROCEDURE — 6360000002 HC RX W HCPCS: Performed by: HOSPITALIST

## 2024-06-11 PROCEDURE — 36415 COLL VENOUS BLD VENIPUNCTURE: CPT

## 2024-06-11 PROCEDURE — 99232 SBSQ HOSP IP/OBS MODERATE 35: CPT | Performed by: INTERNAL MEDICINE

## 2024-06-11 PROCEDURE — 6370000000 HC RX 637 (ALT 250 FOR IP): Performed by: HOSPITALIST

## 2024-06-11 PROCEDURE — 6370000000 HC RX 637 (ALT 250 FOR IP)

## 2024-06-11 PROCEDURE — 80048 BASIC METABOLIC PNL TOTAL CA: CPT

## 2024-06-11 PROCEDURE — 85025 COMPLETE CBC W/AUTO DIFF WBC: CPT

## 2024-06-11 PROCEDURE — 6370000000 HC RX 637 (ALT 250 FOR IP): Performed by: INTERNAL MEDICINE

## 2024-06-11 PROCEDURE — 2580000003 HC RX 258: Performed by: HOSPITALIST

## 2024-06-11 RX ORDER — POLYETHYLENE GLYCOL 3350, SODIUM CHLORIDE, SODIUM BICARBONATE, POTASSIUM CHLORIDE 420; 11.2; 5.72; 1.48 G/4L; G/4L; G/4L; G/4L
4000 POWDER, FOR SOLUTION ORAL ONCE
Status: DISCONTINUED | OUTPATIENT
Start: 2024-06-11 | End: 2024-06-11

## 2024-06-11 RX ORDER — POLYETHYLENE GLYCOL 3350, SODIUM CHLORIDE, SODIUM BICARBONATE, POTASSIUM CHLORIDE 420; 11.2; 5.72; 1.48 G/4L; G/4L; G/4L; G/4L
4000 POWDER, FOR SOLUTION ORAL ONCE
Status: DISCONTINUED | OUTPATIENT
Start: 2024-06-11 | End: 2024-06-11 | Stop reason: SDUPTHER

## 2024-06-11 RX ORDER — ASPIRIN 81 MG/1
81 TABLET, CHEWABLE ORAL DAILY
Status: DISCONTINUED | OUTPATIENT
Start: 2024-06-11 | End: 2024-06-16 | Stop reason: HOSPADM

## 2024-06-11 RX ADMIN — POTASSIUM CHLORIDE 20 MEQ: 1500 TABLET, EXTENDED RELEASE ORAL at 14:42

## 2024-06-11 RX ADMIN — POTASSIUM CHLORIDE 20 MEQ: 1500 TABLET, EXTENDED RELEASE ORAL at 20:33

## 2024-06-11 RX ADMIN — POLYETHYLENE GLYCOL-3350 AND ELECTROLYTES 2000 ML: 236; 6.74; 5.86; 2.97; 22.74 POWDER, FOR SOLUTION ORAL at 17:39

## 2024-06-11 RX ADMIN — SODIUM CHLORIDE, PRESERVATIVE FREE 10 ML: 5 INJECTION INTRAVENOUS at 20:33

## 2024-06-11 RX ADMIN — SODIUM CHLORIDE, PRESERVATIVE FREE 10 ML: 5 INJECTION INTRAVENOUS at 08:13

## 2024-06-11 RX ADMIN — MIDODRINE HYDROCHLORIDE 5 MG: 5 TABLET ORAL at 11:51

## 2024-06-11 RX ADMIN — PANTOPRAZOLE SODIUM 40 MG: 40 INJECTION, POWDER, FOR SOLUTION INTRAVENOUS at 20:33

## 2024-06-11 RX ADMIN — PANTOPRAZOLE SODIUM 40 MG: 40 INJECTION, POWDER, FOR SOLUTION INTRAVENOUS at 08:13

## 2024-06-11 RX ADMIN — ASPIRIN 81 MG CHEWABLE TABLET 81 MG: 81 TABLET CHEWABLE at 11:51

## 2024-06-11 RX ADMIN — ATORVASTATIN CALCIUM 40 MG: 40 TABLET, FILM COATED ORAL at 20:33

## 2024-06-11 RX ADMIN — MIDODRINE HYDROCHLORIDE 5 MG: 5 TABLET ORAL at 17:03

## 2024-06-11 RX ADMIN — MIDODRINE HYDROCHLORIDE 5 MG: 5 TABLET ORAL at 08:11

## 2024-06-11 RX ADMIN — FERROUS SULFATE TAB 325 MG (65 MG ELEMENTAL FE) 325 MG: 325 (65 FE) TAB at 08:11

## 2024-06-11 RX ADMIN — IOPAMIDOL 100 ML: 755 INJECTION, SOLUTION INTRAVENOUS at 18:54

## 2024-06-11 RX ADMIN — POTASSIUM CHLORIDE 20 MEQ: 1500 TABLET, EXTENDED RELEASE ORAL at 08:11

## 2024-06-11 ASSESSMENT — PAIN SCALES - GENERAL
PAINLEVEL_OUTOF10: 0

## 2024-06-11 NOTE — PLAN OF CARE
Problem: Chronic Conditions and Co-morbidities  Goal: Patient's chronic conditions and co-morbidity symptoms are monitored and maintained or improved  6/11/2024 0827 by Luba Tompkins RN  Outcome: Progressing  Flowsheets  Taken 6/11/2024 0810 by Luba Tompkins RN  Care Plan - Patient's Chronic Conditions and Co-Morbidity Symptoms are Monitored and Maintained or Improved: Monitor and assess patient's chronic conditions and comorbid symptoms for stability, deterioration, or improvement  Taken 6/10/2024 2010 by Kelly Biggs RN  Care Plan - Patient's Chronic Conditions and Co-Morbidity Symptoms are Monitored and Maintained or Improved: Monitor and assess patient's chronic conditions and comorbid symptoms for stability, deterioration, or improvement  6/10/2024 1953 by Robinson Lopez RN  Outcome: Progressing  Flowsheets (Taken 6/10/2024 0843)  Care Plan - Patient's Chronic Conditions and Co-Morbidity Symptoms are Monitored and Maintained or Improved: Monitor and assess patient's chronic conditions and comorbid symptoms for stability, deterioration, or improvement     Problem: Pain  Goal: Verbalizes/displays adequate comfort level or baseline comfort level  6/11/2024 0827 by Luba Tompkins RN  Outcome: Progressing  6/10/2024 1953 by Robinson Lopez RN  Outcome: Progressing     Problem: Safety - Adult  Goal: Free from fall injury  6/11/2024 0827 by Luba Tompkins RN  Outcome: Progressing  6/10/2024 1953 by Robinson Lopez RN  Outcome: Progressing  Flowsheets (Taken 6/10/2024 1114)  Free From Fall Injury: Instruct family/caregiver on patient safety

## 2024-06-11 NOTE — PLAN OF CARE
Problem: Chronic Conditions and Co-morbidities  Goal: Patient's chronic conditions and co-morbidity symptoms are monitored and maintained or improved  6/11/2024 0836 by Luba Tompkins RN  Outcome: Progressing  6/11/2024 0827 by Luba Tompkins RN  Outcome: Progressing  Flowsheets  Taken 6/11/2024 0810 by Luba Tompkins RN  Care Plan - Patient's Chronic Conditions and Co-Morbidity Symptoms are Monitored and Maintained or Improved: Monitor and assess patient's chronic conditions and comorbid symptoms for stability, deterioration, or improvement  Taken 6/10/2024 2010 by Kelly Biggs RN  Care Plan - Patient's Chronic Conditions and Co-Morbidity Symptoms are Monitored and Maintained or Improved: Monitor and assess patient's chronic conditions and comorbid symptoms for stability, deterioration, or improvement  6/10/2024 1953 by Robinson Lopez RN  Outcome: Progressing  Flowsheets (Taken 6/10/2024 0843)  Care Plan - Patient's Chronic Conditions and Co-Morbidity Symptoms are Monitored and Maintained or Improved: Monitor and assess patient's chronic conditions and comorbid symptoms for stability, deterioration, or improvement     Problem: Pain  Goal: Verbalizes/displays adequate comfort level or baseline comfort level  6/11/2024 0836 by Luba Tompkins RN  Outcome: Progressing  6/11/2024 0827 by Luba Tompkins RN  Outcome: Progressing  6/10/2024 1953 by Robinson Lopez RN  Outcome: Progressing     Problem: Safety - Adult  Goal: Free from fall injury  6/11/2024 0836 by Luba Tompkins RN  Outcome: Progressing  6/11/2024 0827 by Luba Tompkins RN  Outcome: Progressing  6/10/2024 1953 by Robinson Lopez RN  Outcome: Progressing  Flowsheets (Taken 6/10/2024 1114)  Free From Fall Injury: Instruct family/caregiver on patient safety

## 2024-06-12 ENCOUNTER — ANESTHESIA (OUTPATIENT)
Facility: HOSPITAL | Age: 64
End: 2024-06-12

## 2024-06-12 ENCOUNTER — ANESTHESIA EVENT (OUTPATIENT)
Facility: HOSPITAL | Age: 64
End: 2024-06-12

## 2024-06-12 LAB
ANION GAP SERPL CALC-SCNC: 9 MMOL/L (ref 3–18)
BASOPHILS # BLD: 0 K/UL (ref 0–0.1)
BASOPHILS NFR BLD: 0 % (ref 0–2)
BUN SERPL-MCNC: 6 MG/DL (ref 7–18)
BUN/CREAT SERPL: 8 (ref 12–20)
CALCIUM SERPL-MCNC: 8.4 MG/DL (ref 8.5–10.1)
CHLORIDE SERPL-SCNC: 112 MMOL/L (ref 100–111)
CO2 SERPL-SCNC: 20 MMOL/L (ref 21–32)
CREAT SERPL-MCNC: 0.71 MG/DL (ref 0.6–1.3)
DIFFERENTIAL METHOD BLD: ABNORMAL
EOSINOPHIL # BLD: 0.2 K/UL (ref 0–0.4)
EOSINOPHIL NFR BLD: 2 % (ref 0–5)
ERYTHROCYTE [DISTWIDTH] IN BLOOD BY AUTOMATED COUNT: 26.3 % (ref 11.6–14.5)
GLUCOSE BLD STRIP.AUTO-MCNC: 114 MG/DL (ref 70–110)
GLUCOSE BLD STRIP.AUTO-MCNC: 57 MG/DL (ref 70–110)
GLUCOSE BLD STRIP.AUTO-MCNC: 62 MG/DL (ref 70–110)
GLUCOSE BLD STRIP.AUTO-MCNC: 65 MG/DL (ref 70–110)
GLUCOSE BLD STRIP.AUTO-MCNC: 71 MG/DL (ref 70–110)
GLUCOSE BLD STRIP.AUTO-MCNC: 72 MG/DL (ref 70–110)
GLUCOSE BLD STRIP.AUTO-MCNC: 73 MG/DL (ref 70–110)
GLUCOSE BLD STRIP.AUTO-MCNC: 77 MG/DL (ref 70–110)
GLUCOSE BLD STRIP.AUTO-MCNC: 82 MG/DL (ref 70–110)
GLUCOSE SERPL-MCNC: 63 MG/DL (ref 74–99)
HCT VFR BLD AUTO: 28.3 % (ref 36–48)
HGB BLD-MCNC: 8.7 G/DL (ref 13–16)
IMM GRANULOCYTES # BLD AUTO: 0.1 K/UL (ref 0–0.04)
IMM GRANULOCYTES NFR BLD AUTO: 0 % (ref 0–0.5)
LYMPHOCYTES # BLD: 2.1 K/UL (ref 0.9–3.6)
LYMPHOCYTES NFR BLD: 17 % (ref 21–52)
MCH RBC QN AUTO: 23.8 PG (ref 24–34)
MCHC RBC AUTO-ENTMCNC: 30.7 G/DL (ref 31–37)
MCV RBC AUTO: 77.5 FL (ref 78–100)
MONOCYTES # BLD: 0.7 K/UL (ref 0.05–1.2)
MONOCYTES NFR BLD: 6 % (ref 3–10)
NEUTS SEG # BLD: 9.3 K/UL (ref 1.8–8)
NEUTS SEG NFR BLD: 75 % (ref 40–73)
NRBC # BLD: 0 K/UL (ref 0–0.01)
NRBC BLD-RTO: 0 PER 100 WBC
PLATELET # BLD AUTO: 519 K/UL (ref 135–420)
PMV BLD AUTO: 9.7 FL (ref 9.2–11.8)
POTASSIUM SERPL-SCNC: 3.7 MMOL/L (ref 3.5–5.5)
RBC # BLD AUTO: 3.65 M/UL (ref 4.35–5.65)
SODIUM SERPL-SCNC: 141 MMOL/L (ref 136–145)
WBC # BLD AUTO: 12.3 K/UL (ref 4.6–13.2)

## 2024-06-12 PROCEDURE — 0DBK8ZZ EXCISION OF ASCENDING COLON, VIA NATURAL OR ARTIFICIAL OPENING ENDOSCOPIC: ICD-10-PCS | Performed by: INTERNAL MEDICINE

## 2024-06-12 PROCEDURE — 7100000000 HC PACU RECOVERY - FIRST 15 MIN: Performed by: INTERNAL MEDICINE

## 2024-06-12 PROCEDURE — 3700000001 HC ADD 15 MINUTES (ANESTHESIA): Performed by: INTERNAL MEDICINE

## 2024-06-12 PROCEDURE — 0W3P8ZZ CONTROL BLEEDING IN GASTROINTESTINAL TRACT, VIA NATURAL OR ARTIFICIAL OPENING ENDOSCOPIC: ICD-10-PCS | Performed by: INTERNAL MEDICINE

## 2024-06-12 PROCEDURE — 88342 IMHCHEM/IMCYTCHM 1ST ANTB: CPT

## 2024-06-12 PROCEDURE — 6370000000 HC RX 637 (ALT 250 FOR IP): Performed by: INTERNAL MEDICINE

## 2024-06-12 PROCEDURE — 1100000000 HC RM PRIVATE

## 2024-06-12 PROCEDURE — 2580000003 HC RX 258: Performed by: INTERNAL MEDICINE

## 2024-06-12 PROCEDURE — 7100000010 HC PHASE II RECOVERY - FIRST 15 MIN: Performed by: INTERNAL MEDICINE

## 2024-06-12 PROCEDURE — 6360000002 HC RX W HCPCS: Performed by: NURSE ANESTHETIST, CERTIFIED REGISTERED

## 2024-06-12 PROCEDURE — 88341 IMHCHEM/IMCYTCHM EA ADD ANTB: CPT

## 2024-06-12 PROCEDURE — 36415 COLL VENOUS BLD VENIPUNCTURE: CPT

## 2024-06-12 PROCEDURE — 99232 SBSQ HOSP IP/OBS MODERATE 35: CPT | Performed by: INTERNAL MEDICINE

## 2024-06-12 PROCEDURE — 6370000000 HC RX 637 (ALT 250 FOR IP)

## 2024-06-12 PROCEDURE — 2500000003 HC RX 250 WO HCPCS: Performed by: NURSE ANESTHETIST, CERTIFIED REGISTERED

## 2024-06-12 PROCEDURE — 6370000000 HC RX 637 (ALT 250 FOR IP): Performed by: HOSPITALIST

## 2024-06-12 PROCEDURE — 85025 COMPLETE CBC W/AUTO DIFF WBC: CPT

## 2024-06-12 PROCEDURE — 2709999900 HC NON-CHARGEABLE SUPPLY: Performed by: INTERNAL MEDICINE

## 2024-06-12 PROCEDURE — 2580000003 HC RX 258: Performed by: HOSPITALIST

## 2024-06-12 PROCEDURE — 94761 N-INVAS EAR/PLS OXIMETRY MLT: CPT

## 2024-06-12 PROCEDURE — 6360000002 HC RX W HCPCS: Performed by: HOSPITALIST

## 2024-06-12 PROCEDURE — 0DB68ZZ EXCISION OF STOMACH, VIA NATURAL OR ARTIFICIAL OPENING ENDOSCOPIC: ICD-10-PCS | Performed by: INTERNAL MEDICINE

## 2024-06-12 PROCEDURE — 2580000003 HC RX 258: Performed by: ANESTHESIOLOGY

## 2024-06-12 PROCEDURE — 82962 GLUCOSE BLOOD TEST: CPT

## 2024-06-12 PROCEDURE — 2720000010 HC SURG SUPPLY STERILE: Performed by: INTERNAL MEDICINE

## 2024-06-12 PROCEDURE — 2580000003 HC RX 258: Performed by: NURSE ANESTHETIST, CERTIFIED REGISTERED

## 2024-06-12 PROCEDURE — 6360000002 HC RX W HCPCS: Performed by: INTERNAL MEDICINE

## 2024-06-12 PROCEDURE — 6370000000 HC RX 637 (ALT 250 FOR IP): Performed by: STUDENT IN AN ORGANIZED HEALTH CARE EDUCATION/TRAINING PROGRAM

## 2024-06-12 PROCEDURE — 80048 BASIC METABOLIC PNL TOTAL CA: CPT

## 2024-06-12 PROCEDURE — C9113 INJ PANTOPRAZOLE SODIUM, VIA: HCPCS | Performed by: HOSPITALIST

## 2024-06-12 PROCEDURE — 0DBM8ZX EXCISION OF DESCENDING COLON, VIA NATURAL OR ARTIFICIAL OPENING ENDOSCOPIC, DIAGNOSTIC: ICD-10-PCS | Performed by: INTERNAL MEDICINE

## 2024-06-12 PROCEDURE — 3600007512: Performed by: INTERNAL MEDICINE

## 2024-06-12 PROCEDURE — 3700000000 HC ANESTHESIA ATTENDED CARE: Performed by: INTERNAL MEDICINE

## 2024-06-12 PROCEDURE — 3E0G8GC INTRODUCTION OF OTHER THERAPEUTIC SUBSTANCE INTO UPPER GI, VIA NATURAL OR ARTIFICIAL OPENING ENDOSCOPIC: ICD-10-PCS | Performed by: INTERNAL MEDICINE

## 2024-06-12 PROCEDURE — 88305 TISSUE EXAM BY PATHOLOGIST: CPT

## 2024-06-12 PROCEDURE — 7100000011 HC PHASE II RECOVERY - ADDTL 15 MIN: Performed by: INTERNAL MEDICINE

## 2024-06-12 PROCEDURE — 3600007502: Performed by: INTERNAL MEDICINE

## 2024-06-12 PROCEDURE — C1889 IMPLANT/INSERT DEVICE, NOC: HCPCS | Performed by: INTERNAL MEDICINE

## 2024-06-12 DEVICE — CLIP LIG L235CM RESOL 360 BX/20: Type: IMPLANTABLE DEVICE | Site: STOMACH | Status: FUNCTIONAL

## 2024-06-12 RX ORDER — PROPOFOL 10 MG/ML
INJECTION, EMULSION INTRAVENOUS PRN
Status: DISCONTINUED | OUTPATIENT
Start: 2024-06-12 | End: 2024-06-12 | Stop reason: SDUPTHER

## 2024-06-12 RX ORDER — SODIUM CHLORIDE, SODIUM LACTATE, POTASSIUM CHLORIDE, CALCIUM CHLORIDE 600; 310; 30; 20 MG/100ML; MG/100ML; MG/100ML; MG/100ML
INJECTION, SOLUTION INTRAVENOUS CONTINUOUS
Status: DISCONTINUED | OUTPATIENT
Start: 2024-06-12 | End: 2024-06-14

## 2024-06-12 RX ORDER — DEXTROSE MONOHYDRATE 100 MG/ML
INJECTION, SOLUTION INTRAVENOUS CONTINUOUS PRN
Status: DISCONTINUED | OUTPATIENT
Start: 2024-06-12 | End: 2024-06-16 | Stop reason: HOSPADM

## 2024-06-12 RX ORDER — LIDOCAINE HYDROCHLORIDE 20 MG/ML
INJECTION, SOLUTION EPIDURAL; INFILTRATION; INTRACAUDAL; PERINEURAL PRN
Status: DISCONTINUED | OUTPATIENT
Start: 2024-06-12 | End: 2024-06-12 | Stop reason: SDUPTHER

## 2024-06-12 RX ORDER — SODIUM CHLORIDE, SODIUM LACTATE, POTASSIUM CHLORIDE, CALCIUM CHLORIDE 600; 310; 30; 20 MG/100ML; MG/100ML; MG/100ML; MG/100ML
INJECTION, SOLUTION INTRAVENOUS CONTINUOUS PRN
Status: DISCONTINUED | OUTPATIENT
Start: 2024-06-12 | End: 2024-06-12 | Stop reason: SDUPTHER

## 2024-06-12 RX ORDER — EPINEPHRINE 1 MG/ML(1)
AMPUL (ML) INJECTION PRN
Status: DISCONTINUED | OUTPATIENT
Start: 2024-06-12 | End: 2024-06-12 | Stop reason: ALTCHOICE

## 2024-06-12 RX ORDER — PHENYLEPHRINE HCL IN 0.9% NACL 1 MG/10 ML
SYRINGE (ML) INTRAVENOUS PRN
Status: DISCONTINUED | OUTPATIENT
Start: 2024-06-12 | End: 2024-06-12 | Stop reason: SDUPTHER

## 2024-06-12 RX ORDER — DEXTROSE MONOHYDRATE AND SODIUM CHLORIDE 5; .9 G/100ML; G/100ML
INJECTION, SOLUTION INTRAVENOUS CONTINUOUS
Status: DISCONTINUED | OUTPATIENT
Start: 2024-06-12 | End: 2024-06-14

## 2024-06-12 RX ADMIN — PROPOFOL 50 MG: 10 INJECTION, EMULSION INTRAVENOUS at 17:25

## 2024-06-12 RX ADMIN — MIDODRINE HYDROCHLORIDE 5 MG: 5 TABLET ORAL at 09:15

## 2024-06-12 RX ADMIN — PROPOFOL 50 MG: 10 INJECTION, EMULSION INTRAVENOUS at 17:15

## 2024-06-12 RX ADMIN — PANTOPRAZOLE SODIUM 40 MG: 40 INJECTION, POWDER, FOR SOLUTION INTRAVENOUS at 20:23

## 2024-06-12 RX ADMIN — SODIUM CHLORIDE, POTASSIUM CHLORIDE, SODIUM LACTATE AND CALCIUM CHLORIDE: 600; 310; 30; 20 INJECTION, SOLUTION INTRAVENOUS at 05:07

## 2024-06-12 RX ADMIN — LIDOCAINE HYDROCHLORIDE 40 MG: 20 INJECTION, SOLUTION EPIDURAL; INFILTRATION; INTRACAUDAL; PERINEURAL at 16:46

## 2024-06-12 RX ADMIN — POTASSIUM CHLORIDE 20 MEQ: 1500 TABLET, EXTENDED RELEASE ORAL at 20:23

## 2024-06-12 RX ADMIN — PROPOFOL 20 MG: 10 INJECTION, EMULSION INTRAVENOUS at 16:49

## 2024-06-12 RX ADMIN — PROPOFOL 50 MG: 10 INJECTION, EMULSION INTRAVENOUS at 17:11

## 2024-06-12 RX ADMIN — METOPROLOL SUCCINATE 25 MG: 25 TABLET, EXTENDED RELEASE ORAL at 09:15

## 2024-06-12 RX ADMIN — PANTOPRAZOLE SODIUM 40 MG: 40 INJECTION, POWDER, FOR SOLUTION INTRAVENOUS at 09:17

## 2024-06-12 RX ADMIN — POTASSIUM CHLORIDE 20 MEQ: 1500 TABLET, EXTENDED RELEASE ORAL at 09:16

## 2024-06-12 RX ADMIN — FERROUS SULFATE TAB 325 MG (65 MG ELEMENTAL FE) 325 MG: 325 (65 FE) TAB at 09:15

## 2024-06-12 RX ADMIN — PROPOFOL 80 MG: 10 INJECTION, EMULSION INTRAVENOUS at 16:48

## 2024-06-12 RX ADMIN — DEXTROSE AND SODIUM CHLORIDE: 5; 900 INJECTION, SOLUTION INTRAVENOUS at 20:19

## 2024-06-12 RX ADMIN — PROPOFOL 50 MG: 10 INJECTION, EMULSION INTRAVENOUS at 16:51

## 2024-06-12 RX ADMIN — PROPOFOL 50 MG: 10 INJECTION, EMULSION INTRAVENOUS at 17:06

## 2024-06-12 RX ADMIN — ATORVASTATIN CALCIUM 40 MG: 40 TABLET, FILM COATED ORAL at 20:23

## 2024-06-12 RX ADMIN — POLYETHYLENE GLYCOL-3350 AND ELECTROLYTES 2000 ML: 236; 6.74; 5.86; 2.97; 22.74 POWDER, FOR SOLUTION ORAL at 02:00

## 2024-06-12 RX ADMIN — Medication 100 MCG: at 17:41

## 2024-06-12 RX ADMIN — SODIUM CHLORIDE, PRESERVATIVE FREE 10 ML: 5 INJECTION INTRAVENOUS at 09:18

## 2024-06-12 RX ADMIN — PROPOFOL 50 MG: 10 INJECTION, EMULSION INTRAVENOUS at 17:22

## 2024-06-12 RX ADMIN — DEXTROSE MONOHYDRATE 125 ML: 100 INJECTION, SOLUTION INTRAVENOUS at 19:16

## 2024-06-12 RX ADMIN — DEXTROSE AND SODIUM CHLORIDE: 5; 900 INJECTION, SOLUTION INTRAVENOUS at 14:18

## 2024-06-12 RX ADMIN — DEXTROSE MONOHYDRATE: 10 INJECTION, SOLUTION INTRAVENOUS at 14:50

## 2024-06-12 RX ADMIN — SODIUM CHLORIDE, PRESERVATIVE FREE 10 ML: 5 INJECTION INTRAVENOUS at 20:23

## 2024-06-12 RX ADMIN — SODIUM CHLORIDE, POTASSIUM CHLORIDE, SODIUM LACTATE AND CALCIUM CHLORIDE: 600; 310; 30; 20 INJECTION, SOLUTION INTRAVENOUS at 15:15

## 2024-06-12 RX ADMIN — PROPOFOL 50 MG: 10 INJECTION, EMULSION INTRAVENOUS at 17:37

## 2024-06-12 RX ADMIN — PROPOFOL 50 MG: 10 INJECTION, EMULSION INTRAVENOUS at 16:58

## 2024-06-12 RX ADMIN — PROPOFOL 50 MG: 10 INJECTION, EMULSION INTRAVENOUS at 17:32

## 2024-06-12 RX ADMIN — PROPOFOL 50 MG: 10 INJECTION, EMULSION INTRAVENOUS at 17:01

## 2024-06-12 RX ADMIN — ASPIRIN 81 MG CHEWABLE TABLET 81 MG: 81 TABLET CHEWABLE at 09:17

## 2024-06-12 RX ADMIN — PROPOFOL 50 MG: 10 INJECTION, EMULSION INTRAVENOUS at 16:54

## 2024-06-12 RX ADMIN — PROPOFOL 50 MG: 10 INJECTION, EMULSION INTRAVENOUS at 17:19

## 2024-06-12 RX ADMIN — SODIUM CHLORIDE, SODIUM LACTATE, POTASSIUM CHLORIDE, AND CALCIUM CHLORIDE: 600; 310; 30; 20 INJECTION, SOLUTION INTRAVENOUS at 16:43

## 2024-06-12 ASSESSMENT — PAIN SCALES - GENERAL
PAINLEVEL_OUTOF10: 0

## 2024-06-12 ASSESSMENT — PAIN - FUNCTIONAL ASSESSMENT: PAIN_FUNCTIONAL_ASSESSMENT: 0-10

## 2024-06-12 NOTE — OP NOTE
at the end of the polypectomy    2 cm hiatal hernia noted-37 to 39 cm    Duodenum/jejunum: normal        Rectum: Small internal hemorrhoids-nonbleeding  Sigmoid: normal  Descending Colon: At around 30-35 cm or so, there is a large mass spanning three quarters of the circumference of the lumen.  This is nonobstructive; the mass seems to extend up to about 6 to 8 cm further cephalad.  This is highly suggestive of cancer.  Multiple biopsies taken.  Tattoo placed on the rectal side of the mass into orientations  Transverse Colon: Grossly normal; small/flat polyps may be missed  Ascending Colon: 8 mm sessile polyp seen near the ileocecal valve-removed by cold snare  Cecum: normal  Terminal Ileum: normal    Interventions: Polypectomies as above; colon mass biopsy    Specimen Removed:    ID Type Source Tests Collected by Time Destination   A : Gastric polyp Tissue Gastric SURGICAL PATHOLOGY Orestes Araujo MD 6/12/2024 1657    B : Colon mass @ 35 bx's Tissue Colon SURGICAL PATHOLOGY Orestes Araujo MD 6/12/2024 1716    C : Ascending polyp Tissue Colon-Ascending SURGICAL PATHOLOGY Orestes Araujo MD 6/12/2024 1734        Complications: None. patient tolerated the procedure well.    Devices/implants/grafts/tissues/prosthesis: Clips x 3 in stomach     EBL:  None.           Impression:     Gastric polyp; hiatal hernia; colon mass; colon polyp    Recommendations:    Start clear liquid diet.  Await biopsies.  Hold anticoagulation for the next 48 to 72 hours.  Colorectal surgery evaluation for colon mass;  Oncology evaluation for colon mass      Orestes Araujo MD  6/12/2024  5:47 PM

## 2024-06-12 NOTE — ANESTHESIA PRE PROCEDURE
08/16/2023 12:00 AM    AST 11 06/08/2024 09:39 PM    ALT 13 06/08/2024 09:39 PM       POC Tests:   Recent Labs     06/12/24  1456   POCGLU 114*       Coags:   Lab Results   Component Value Date/Time    PROTIME 13.7 06/08/2024 09:39 PM    INR 1.0 06/08/2024 09:39 PM    APTT 63.3 03/28/2024 02:30 AM       HCG (If Applicable): No results found for: \"PREGTESTUR\", \"PREGSERUM\", \"HCG\", \"HCGQUANT\"     ABGs:   Lab Results   Component Value Date/Time    PHART 7.38 03/22/2024 08:12 PM    PO2ART 171 03/22/2024 08:12 PM    ZTW7DVG 33 03/22/2024 08:12 PM    BZS1FFJ 19 03/22/2024 08:12 PM    W7EGIRAA 99 03/22/2024 08:12 PM        Type & Screen (If Applicable):  No results found for: \"LABABO\"    Drug/Infectious Status (If Applicable):  Lab Results   Component Value Date/Time    HEPCAB Non Reactive 08/16/2023 12:00 AM       COVID-19 Screening (If Applicable):   Lab Results   Component Value Date/Time    COVID19 Not detected 03/23/2024 04:00 AM           Anesthesia Evaluation  Patient summary reviewed  Airway: Mallampati: II  TM distance: >3 FB   Neck ROM: full  Mouth opening: > = 3 FB   Dental:      Comment: Few lower teeth in mouth    Pulmonary:Negative Pulmonary ROS and normal exam                               Cardiovascular:  Exercise tolerance: good (>4 METS)  (+) past MI (NSTEMI ~ 9 months ago. 2 stents in place):                  Neuro/Psych:   Negative Neuro/Psych ROS              GI/Hepatic/Renal: Neg GI/Hepatic/Renal ROS            Endo/Other:    (+) DiabetesType II DM, no interval change.                 Abdominal:             Vascular: negative vascular ROS.         Other Findings:       Anesthesia Plan      MAC     ASA 3       Induction: intravenous.      Anesthetic plan and risks discussed with patient.      Plan discussed with CRNA.                AJAY STANFORD MD   6/12/2024

## 2024-06-12 NOTE — ANESTHESIA POSTPROCEDURE EVALUATION
Department of Anesthesiology  Postprocedure Note    Patient: Gigi Mcneal  MRN: [id number]  YOB: 1960  Date of evaluation: 6/12/2024    Procedure Summary       Date: 06/12/24 Room / Location: [facility] ENDO 02 / [facility] ENDOSCOPY    Anesthesia Start: 1643 Anesthesia Stop: 1753    Procedures:       ESOPHAGOGASTRODUODENOSCOPY polypectomies and 3 clips (Upper GI Region)      COLONOSCOPY with polypectomy, bx's and tattoo (Abdomen) Diagnosis:       Anemia, unspecified type      BRBPR (bright red blood per rectum)      (Anemia, unspecified type [D64.9])      (BRBPR (bright red blood per rectum) [K62.5])    Surgeons: Orestes Araujo MD Responsible Provider: Catalino Gonzalez Jr., MD    Anesthesia Type: MAC ASA Status: 3            Anesthesia Type: MAC    Sona Phase I: Sona Score: 9    Sona Phase II: Sona Score: 9    Anesthesia Post Evaluation    Patient location during evaluation: bedside  Airway patency: patent  Cardiovascular status: hemodynamically stable  Respiratory status: acceptable  Hydration status: stable  Pain management: adequate    No notable events documented.

## 2024-06-12 NOTE — H&P
[phone]    GASTROENTEROLOGY Pre-Procedure H and P      Impression/Plan:   1. This patient is consented for an EGD and colonoscopy for Gi bleeding       Chief Complaint: Gi bleeding    HPI:  Gigi Mcneal is a 64 y.o. male who is having an EGD and colonoscopy for Gi bleeding  PMH:   Past Medical History:   Diagnosis Date    CAD (coronary artery disease)     Heart failure (HCC)     NSTEMI (non-ST elevated myocardial infarction) ([facility])        PSH:   Past Surgical History:   Procedure Laterality Date    CARDIAC PROCEDURE N/A 3/28/2024    Left heart cath performed by Butch Guaman MD at [facility] CARDIAC CATH LAB    CARDIAC PROCEDURE N/A 3/28/2024    Coronary angiography performed by Butch Guaman MD at [facility] CARDIAC CATH LAB    CARDIAC PROCEDURE N/A 3/28/2024    Left ventriculography performed by Butch Guaman MD at [facility] CARDIAC CATH LAB    CARDIAC PROCEDURE N/A 3/28/2024    Insert stent syeda coronary performed by Butch Guaman MD at [facility] CARDIAC CATH LAB       Social HX:   Social History     Socioeconomic History    Marital status:      Spouse name: Not on file    Number of children: Not on file    Years of education: Not on file    Highest education level: Not on file   Occupational History    Not on file   Tobacco Use    Smoking status: Former     Current packs/day: 0.00     Average packs/day: 0.3 packs/day for 19.0 years (4.8 ttl pk-yrs)     Types: Cigarettes     Start date:      Quit date: 2002     Years since quittin.4    Smokeless tobacco: Never   Vaping Use    Vaping Use: Never used   Substance and Sexual Activity    Alcohol use: Never    Drug use: Not Currently    Sexual activity: Not on file   Other Topics Concern    Not on file   Social History Narrative    Not on file     Social Determinants of Health     Financial Resource Strain: Patient Declined (2023)    Overall Financial Resource Strain (CARDIA)     Difficulty of Paying Living Expenses: Patient declined   Food Insecurity: No Food

## 2024-06-13 PROBLEM — K63.89 COLONIC MASS: Status: ACTIVE | Noted: 2024-06-13

## 2024-06-13 LAB
GLUCOSE BLD STRIP.AUTO-MCNC: 71 MG/DL (ref 70–110)
GLUCOSE BLD STRIP.AUTO-MCNC: 76 MG/DL (ref 70–110)
GLUCOSE BLD STRIP.AUTO-MCNC: 87 MG/DL (ref 70–110)
GLUCOSE BLD STRIP.AUTO-MCNC: 89 MG/DL (ref 70–110)

## 2024-06-13 PROCEDURE — 99232 SBSQ HOSP IP/OBS MODERATE 35: CPT | Performed by: INTERNAL MEDICINE

## 2024-06-13 PROCEDURE — 82962 GLUCOSE BLOOD TEST: CPT

## 2024-06-13 PROCEDURE — 6370000000 HC RX 637 (ALT 250 FOR IP): Performed by: INTERNAL MEDICINE

## 2024-06-13 PROCEDURE — 99254 IP/OBS CNSLTJ NEW/EST MOD 60: CPT | Performed by: SURGERY

## 2024-06-13 PROCEDURE — 6360000002 HC RX W HCPCS: Performed by: HOSPITALIST

## 2024-06-13 PROCEDURE — 6370000000 HC RX 637 (ALT 250 FOR IP): Performed by: HOSPITALIST

## 2024-06-13 PROCEDURE — C9113 INJ PANTOPRAZOLE SODIUM, VIA: HCPCS | Performed by: HOSPITALIST

## 2024-06-13 PROCEDURE — 2580000003 HC RX 258: Performed by: HOSPITALIST

## 2024-06-13 PROCEDURE — 6370000000 HC RX 637 (ALT 250 FOR IP)

## 2024-06-13 PROCEDURE — 2580000003 HC RX 258: Performed by: INTERNAL MEDICINE

## 2024-06-13 PROCEDURE — 94761 N-INVAS EAR/PLS OXIMETRY MLT: CPT

## 2024-06-13 PROCEDURE — 1100000000 HC RM PRIVATE

## 2024-06-13 RX ADMIN — ASPIRIN 81 MG CHEWABLE TABLET 81 MG: 81 TABLET CHEWABLE at 08:12

## 2024-06-13 RX ADMIN — PANTOPRAZOLE SODIUM 40 MG: 40 INJECTION, POWDER, FOR SOLUTION INTRAVENOUS at 08:15

## 2024-06-13 RX ADMIN — POTASSIUM CHLORIDE 20 MEQ: 1500 TABLET, EXTENDED RELEASE ORAL at 20:23

## 2024-06-13 RX ADMIN — POTASSIUM CHLORIDE 20 MEQ: 1500 TABLET, EXTENDED RELEASE ORAL at 14:09

## 2024-06-13 RX ADMIN — POTASSIUM CHLORIDE 20 MEQ: 1500 TABLET, EXTENDED RELEASE ORAL at 08:13

## 2024-06-13 RX ADMIN — DEXTROSE AND SODIUM CHLORIDE: 5; 900 INJECTION, SOLUTION INTRAVENOUS at 10:38

## 2024-06-13 RX ADMIN — ACETAMINOPHEN 325MG 650 MG: 325 TABLET ORAL at 00:53

## 2024-06-13 RX ADMIN — SODIUM CHLORIDE, PRESERVATIVE FREE 10 ML: 5 INJECTION INTRAVENOUS at 20:28

## 2024-06-13 RX ADMIN — PANTOPRAZOLE SODIUM 40 MG: 40 INJECTION, POWDER, FOR SOLUTION INTRAVENOUS at 20:24

## 2024-06-13 RX ADMIN — SODIUM CHLORIDE, PRESERVATIVE FREE 10 ML: 5 INJECTION INTRAVENOUS at 09:20

## 2024-06-13 RX ADMIN — FERROUS SULFATE TAB 325 MG (65 MG ELEMENTAL FE) 325 MG: 325 (65 FE) TAB at 08:13

## 2024-06-13 RX ADMIN — METOPROLOL SUCCINATE 25 MG: 25 TABLET, EXTENDED RELEASE ORAL at 08:12

## 2024-06-13 RX ADMIN — ATORVASTATIN CALCIUM 40 MG: 40 TABLET, FILM COATED ORAL at 20:23

## 2024-06-13 ASSESSMENT — PAIN SCALES - GENERAL
PAINLEVEL_OUTOF10: 0

## 2024-06-13 NOTE — PLAN OF CARE
Problem: Chronic Conditions and Co-morbidities  Goal: Patient's chronic conditions and co-morbidity symptoms are monitored and maintained or improved  Outcome: Progressing  Flowsheets (Taken 6/13/2024 0749)  Care Plan - Patient's Chronic Conditions and Co-Morbidity Symptoms are Monitored and Maintained or Improved:   Monitor and assess patient's chronic conditions and comorbid symptoms for stability, deterioration, or improvement   Collaborate with multidisciplinary team to address chronic and comorbid conditions and prevent exacerbation or deterioration   Update acute care plan with appropriate goals if chronic or comorbid symptoms are exacerbated and prevent overall improvement and discharge     Problem: Pain  Goal: Verbalizes/displays adequate comfort level or baseline comfort level  Outcome: Progressing     Problem: Safety - Adult  Goal: Free from fall injury  Outcome: Progressing  Flowsheets (Taken 6/13/2024 0749)  Free From Fall Injury: Instruct family/caregiver on patient safety

## 2024-06-14 ENCOUNTER — APPOINTMENT (OUTPATIENT)
Facility: HOSPITAL | Age: 64
DRG: 375 | End: 2024-06-14
Attending: STUDENT IN AN ORGANIZED HEALTH CARE EDUCATION/TRAINING PROGRAM

## 2024-06-14 LAB
ANION GAP SERPL CALC-SCNC: 7 MMOL/L (ref 3–18)
BASOPHILS # BLD: 0 K/UL (ref 0–0.1)
BASOPHILS NFR BLD: 0 % (ref 0–2)
BUN SERPL-MCNC: 5 MG/DL (ref 7–18)
BUN/CREAT SERPL: 7 (ref 12–20)
CALCIUM SERPL-MCNC: 8.2 MG/DL (ref 8.5–10.1)
CEA SERPL-MCNC: 1.3 NG/ML
CHLORIDE SERPL-SCNC: 113 MMOL/L (ref 100–111)
CO2 SERPL-SCNC: 21 MMOL/L (ref 21–32)
CREAT SERPL-MCNC: 0.71 MG/DL (ref 0.6–1.3)
DIFFERENTIAL METHOD BLD: ABNORMAL
EOSINOPHIL # BLD: 0.3 K/UL (ref 0–0.4)
EOSINOPHIL NFR BLD: 3 % (ref 0–5)
ERYTHROCYTE [DISTWIDTH] IN BLOOD BY AUTOMATED COUNT: 25.9 % (ref 11.6–14.5)
GLUCOSE BLD STRIP.AUTO-MCNC: 83 MG/DL (ref 70–110)
GLUCOSE BLD STRIP.AUTO-MCNC: 90 MG/DL (ref 70–110)
GLUCOSE BLD STRIP.AUTO-MCNC: 90 MG/DL (ref 70–110)
GLUCOSE SERPL-MCNC: 77 MG/DL (ref 74–99)
HCT VFR BLD AUTO: 25.7 % (ref 36–48)
HGB BLD-MCNC: 8 G/DL (ref 13–16)
IMM GRANULOCYTES # BLD AUTO: 0 K/UL (ref 0–0.04)
IMM GRANULOCYTES NFR BLD AUTO: 0 % (ref 0–0.5)
LYMPHOCYTES # BLD: 1.9 K/UL (ref 0.9–3.6)
LYMPHOCYTES NFR BLD: 23 % (ref 21–52)
MCH RBC QN AUTO: 24 PG (ref 24–34)
MCHC RBC AUTO-ENTMCNC: 31.1 G/DL (ref 31–37)
MCV RBC AUTO: 77.2 FL (ref 78–100)
MONOCYTES # BLD: 0.7 K/UL (ref 0.05–1.2)
MONOCYTES NFR BLD: 9 % (ref 3–10)
NEUTS SEG # BLD: 5.2 K/UL (ref 1.8–8)
NEUTS SEG NFR BLD: 65 % (ref 40–73)
NRBC # BLD: 0 K/UL (ref 0–0.01)
NRBC BLD-RTO: 0 PER 100 WBC
PLATELET # BLD AUTO: 426 K/UL (ref 135–420)
PMV BLD AUTO: 9.6 FL (ref 9.2–11.8)
POTASSIUM SERPL-SCNC: 3.9 MMOL/L (ref 3.5–5.5)
RBC # BLD AUTO: 3.33 M/UL (ref 4.35–5.65)
SODIUM SERPL-SCNC: 141 MMOL/L (ref 136–145)
WBC # BLD AUTO: 8.1 K/UL (ref 4.6–13.2)

## 2024-06-14 PROCEDURE — 1100000000 HC RM PRIVATE

## 2024-06-14 PROCEDURE — 94761 N-INVAS EAR/PLS OXIMETRY MLT: CPT

## 2024-06-14 PROCEDURE — 6370000000 HC RX 637 (ALT 250 FOR IP): Performed by: HOSPITALIST

## 2024-06-14 PROCEDURE — 6360000002 HC RX W HCPCS: Performed by: INTERNAL MEDICINE

## 2024-06-14 PROCEDURE — 99232 SBSQ HOSP IP/OBS MODERATE 35: CPT | Performed by: INTERNAL MEDICINE

## 2024-06-14 PROCEDURE — 36415 COLL VENOUS BLD VENIPUNCTURE: CPT

## 2024-06-14 PROCEDURE — 6360000002 HC RX W HCPCS: Performed by: HOSPITALIST

## 2024-06-14 PROCEDURE — 85025 COMPLETE CBC W/AUTO DIFF WBC: CPT

## 2024-06-14 PROCEDURE — 82962 GLUCOSE BLOOD TEST: CPT

## 2024-06-14 PROCEDURE — 71250 CT THORAX DX C-: CPT

## 2024-06-14 PROCEDURE — 99232 SBSQ HOSP IP/OBS MODERATE 35: CPT | Performed by: COLON & RECTAL SURGERY

## 2024-06-14 PROCEDURE — 80048 BASIC METABOLIC PNL TOTAL CA: CPT

## 2024-06-14 PROCEDURE — 6370000000 HC RX 637 (ALT 250 FOR IP)

## 2024-06-14 PROCEDURE — 6370000000 HC RX 637 (ALT 250 FOR IP): Performed by: INTERNAL MEDICINE

## 2024-06-14 PROCEDURE — 82378 CARCINOEMBRYONIC ANTIGEN: CPT

## 2024-06-14 PROCEDURE — 2580000003 HC RX 258: Performed by: HOSPITALIST

## 2024-06-14 PROCEDURE — C9113 INJ PANTOPRAZOLE SODIUM, VIA: HCPCS | Performed by: HOSPITALIST

## 2024-06-14 PROCEDURE — 2580000003 HC RX 258: Performed by: INTERNAL MEDICINE

## 2024-06-14 RX ORDER — PANTOPRAZOLE SODIUM 40 MG/1
40 TABLET, DELAYED RELEASE ORAL
Status: DISCONTINUED | OUTPATIENT
Start: 2024-06-15 | End: 2024-06-16 | Stop reason: HOSPADM

## 2024-06-14 RX ORDER — CLOPIDOGREL BISULFATE 75 MG/1
75 TABLET ORAL DAILY
Status: DISCONTINUED | OUTPATIENT
Start: 2024-06-14 | End: 2024-06-16 | Stop reason: HOSPADM

## 2024-06-14 RX ORDER — METOPROLOL SUCCINATE 25 MG/1
12.5 TABLET, EXTENDED RELEASE ORAL DAILY
Status: DISCONTINUED | OUTPATIENT
Start: 2024-06-15 | End: 2024-06-16 | Stop reason: HOSPADM

## 2024-06-14 RX ADMIN — FERROUS SULFATE TAB 325 MG (65 MG ELEMENTAL FE) 325 MG: 325 (65 FE) TAB at 09:14

## 2024-06-14 RX ADMIN — ASPIRIN 81 MG CHEWABLE TABLET 81 MG: 81 TABLET CHEWABLE at 09:15

## 2024-06-14 RX ADMIN — ATORVASTATIN CALCIUM 40 MG: 40 TABLET, FILM COATED ORAL at 20:56

## 2024-06-14 RX ADMIN — POTASSIUM CHLORIDE 20 MEQ: 1500 TABLET, EXTENDED RELEASE ORAL at 14:33

## 2024-06-14 RX ADMIN — POTASSIUM CHLORIDE 20 MEQ: 1500 TABLET, EXTENDED RELEASE ORAL at 09:14

## 2024-06-14 RX ADMIN — SODIUM CHLORIDE 125 MG: 9 INJECTION, SOLUTION INTRAVENOUS at 14:34

## 2024-06-14 RX ADMIN — METOPROLOL SUCCINATE 25 MG: 25 TABLET, EXTENDED RELEASE ORAL at 09:15

## 2024-06-14 RX ADMIN — CLOPIDOGREL BISULFATE 75 MG: 75 TABLET ORAL at 12:53

## 2024-06-14 RX ADMIN — SODIUM CHLORIDE, PRESERVATIVE FREE 10 ML: 5 INJECTION INTRAVENOUS at 09:15

## 2024-06-14 RX ADMIN — POTASSIUM CHLORIDE 20 MEQ: 1500 TABLET, EXTENDED RELEASE ORAL at 20:56

## 2024-06-14 RX ADMIN — PANTOPRAZOLE SODIUM 40 MG: 40 INJECTION, POWDER, FOR SOLUTION INTRAVENOUS at 09:15

## 2024-06-14 ASSESSMENT — PAIN SCALES - GENERAL
PAINLEVEL_OUTOF10: 0

## 2024-06-14 NOTE — PLAN OF CARE
Problem: Chronic Conditions and Co-morbidities  Goal: Patient's chronic conditions and co-morbidity symptoms are monitored and maintained or improved  6/14/2024 1121 by Katie Silva RN  Outcome: Progressing  6/13/2024 2216 by Rhonda Olivas, RN  Outcome: Progressing  Flowsheets (Taken 6/13/2024 2030)  Care Plan - Patient's Chronic Conditions and Co-Morbidity Symptoms are Monitored and Maintained or Improved: Monitor and assess patient's chronic conditions and comorbid symptoms for stability, deterioration, or improvement     Problem: Pain  Goal: Verbalizes/displays adequate comfort level or baseline comfort level  6/14/2024 1121 by Katie Silva RN  Outcome: Progressing  6/13/2024 2216 by Rhonda Olivas, RN  Outcome: Progressing     Problem: Safety - Adult  Goal: Free from fall injury  6/14/2024 1121 by Katie Silva RN  Outcome: Progressing  6/13/2024 2216 by Rhonda Olivas, RN  Outcome: Progressing

## 2024-06-14 NOTE — PLAN OF CARE
Problem: Chronic Conditions and Co-morbidities  Goal: Patient's chronic conditions and co-morbidity symptoms are monitored and maintained or improved  6/13/2024 2216 by Rhonda Olivsa, RN  Outcome: Progressing  Flowsheets (Taken 6/13/2024 2030)  Care Plan - Patient's Chronic Conditions and Co-Morbidity Symptoms are Monitored and Maintained or Improved: Monitor and assess patient's chronic conditions and comorbid symptoms for stability, deterioration, or improvement  6/13/2024 1642 by Winsome Okeefe, RN  Outcome: Progressing  Flowsheets (Taken 6/13/2024 0749)  Care Plan - Patient's Chronic Conditions and Co-Morbidity Symptoms are Monitored and Maintained or Improved:   Monitor and assess patient's chronic conditions and comorbid symptoms for stability, deterioration, or improvement   Collaborate with multidisciplinary team to address chronic and comorbid conditions and prevent exacerbation or deterioration   Update acute care plan with appropriate goals if chronic or comorbid symptoms are exacerbated and prevent overall improvement and discharge     Problem: Pain  Goal: Verbalizes/displays adequate comfort level or baseline comfort level  6/13/2024 2216 by Rhonda Olivas, RN  Outcome: Progressing  6/13/2024 1642 by Winsome Okeefe, RN  Outcome: Progressing     Problem: Safety - Adult  Goal: Free from fall injury  6/13/2024 2216 by Rhonda Olivas, RN  Outcome: Progressing  6/13/2024 1642 by Winsome Okeefe, RN  Outcome: Progressing  Flowsheets (Taken 6/13/2024 0749)  Free From Fall Injury: Instruct family/caregiver on patient safety

## 2024-06-14 NOTE — CONSULTS
Dx acute on chronic anemia, iron def  Descending colon mass , path pending  S/p coronary stent 3/28/24  Cardiomyopathy    Suggest IV venofer, await path from colon mass, very concerning for cancer  Awaits CRS evaluation  CEA  Note dictated    D/w pt at length  
ago and repeat on June 12, 2024.    FAMILY HISTORY:  Brother had colon cancer.    SOCIAL HISTORY:  .  Lives in [place].  Has worked as a .  Currently retired.  There is prior history of tobacco use.    REVIEW OF SYSTEMS:  Fatigue, shortness of breath on exertion.  No cough or phlegm.  Denies nausea or vomiting.  There is intermittent rectal bleeding.  Denies abdominal pain.  No chest pain, cough or shortness of breath at the current time.    PHYSICAL EXAMINATION:  GENERAL: Middle-aged male, fairly built, hemodynamically stable.  HEENT: Pallor is noted.  No jaundice.   NECK:  Supple.  No supraclavicular adenopathy.  LUNGS: Clear to percussion and auscultation.  CARDIAC: First and second heart sound audible.    ABDOMEN:  Soft.  No tenderness or organomegaly.  NEUROLOGIC ASSESSMENT: Alert and oriented.  No focal motor deficit.    LABS:  Hemoglobin 6.9, MCV 60.  Serum iron 17, iron saturation 5%.  Folate 5.9.    IMPRESSION:    1. Acute on chronic anemia, iron deficiency.  2. Folate deficiency.  3. Descending colon mass concerning for malignancy, biopsy is pending.  4. Coronary artery disease status post coronary stent on March 28, 2024.    RECOMMENDATIONS:  I have reviewed the findings of the colon mass with the patient.  Await biopsy results.  We will add CEA levels.  Check CT of the chest.  He awaits Colorectal surgery evaluation.    I have suggested IV Venofer to supplement his iron stores.  Folic acid supplements will be written.    Further recommendations on systemic adjuvant therapy per his staging.    Thank you Dr. Nguyen for requesting my evaluation and assistance in Mr. Mcneal's care.        MD YESSENIA PEÑA/JEANNIES  D:  06/14/2024 08:14:17  T:  06/14/2024 11:01:29  JOB #:  [id number]     
109   CO2 23   BUN 14   MG 2.1         CBC w/Diff    Recent Labs     06/08/24  2139 06/09/24  0151   WBC 12.8  --    RBC 2.98*  --    HGB 6.5* 6.0*   HCT 21.4* 20.3*   MCV 71.8*  --    MCH 21.8*  --    MCHC 30.4*  --    RDW ABNORMAL  --    *  --    MPV 9.5  --     No results for input(s): \"MONO\", \"PRO\", \"METAS\" in the last 72 hours.    Invalid input(s): \"GRANS\", \"LYMPH\", \"EOS\", \"BASO\", \"MYELO\", \"BLAST\"     Hepatic Function   No results for input(s): \"TP\" in the last 72 hours.    Invalid input(s): \"ALB\", \"DBILI\", \"TBILI\", \"GPT\", \"SGOT\", \"AP\", \"AML\", \"LPSE\"     Coags   Recent Labs     06/08/24  1220 06/08/24  2139   INR 1.1 1.0           CESAR SALCEDO MD.   [facility]  [phone]    
visit.    03/23/24    CARDIAC PROCEDURE 03/28/2024 11:03 AM (Final)    Conclusion    NSTEMI.    Moderate size codominant RCA with long mid/distal  with RPDA collateralized from the left coronary system.    Left main ostial 25-30% stenosis.    LAD with long mid 95% stenosis.  This was stented to residual 0% using 2.5 mm Medtronic HARISH, 22 mm length.    First diagonal branch has 99% gnosis.  This was stented to residual 0% using 2.5 mm Medtronic HARISH, 12 mm length.    Circumflex is codominant, and patent.    LVEDP 18 mmHg.  Overall left ventricular systolic function is low normal with EF 50%, and mid anterior hypokinesis.    Signed by: Butch Guaman MD on 3/28/2024 11:03 AM    Xray Result (most recent):  XR CHEST PORTABLE 06/08/2024    Narrative  EXAM: Portable CXR.    INDICATION: dizziness today. Hypotension.    FINDINGS:  The lungs appear clear. Heart is normal in size. There is no pulmonary edema.  There is no evident pneumothorax or pleural effusion.    Impression  No Acute Disease.      Electronically signed by PATRICE JOHNSON      Signed By: Latoya Veras, APRN - NP     Doris 10, 2024        
CALLED TO EVELYN MARIO AT 0253, ON 06/09/2024, BY MICHAELLE     Unit Number [id number]     Product Code Blood Bank RC LR     Unit Divison 00     Dispense Status Blood Bank TRANSFUSED     Unit Issue Date/Time [id number]     Product Code Blood Bank [id number]     Blood Bank Unit Type and Rh O POS     Blood Bank ISBT Product Blood Type 5100     Blood Bank Blood Product Expiration Date 202406252359     Crossmatch Result Compatible     Unit Number [id number]     Product Code Blood Bank RC LR,2     Unit Divison 00     Dispense Status Blood Bank TRANSFUSED     Unit Issue Date/Time 034081475802     Product Code Blood Bank [id number]     Blood Bank Unit Type and Rh O POS     Blood Bank ISBT Product Blood Type 5100     Blood Bank Blood Product Expiration Date 202406282359     Crossmatch Result Compatible      *Note: Due to a large number of results and/or encounters for the requested time period, some results have not been displayed. A complete set of results can be found in Results Review.       Diagnostic Studies:  Narrative & Impression  EXAM: CTA ABDOMEN PELVIS W WO CONTRAST     CLINICAL INDICATION/HISTORY: GI bleed/ anemia     TECHNIQUE: Contiguous axial images were obtained through the abdomen and pelvis  prior to and following intravenous administration of contrast.  From these,  sagittal and coronal reconstructions were generated.     Contrast : 100 cc Isovue-370     CT scans at this facility are performed using dose optimization technique as  appropriate with performed exam, to include automated exposure control,  adjustment of mA and/or kV according to patient's size (including appropriate  matching for site-specific examinations), or use of iterative reconstruction  technique.     COMPARISON: None     FINDINGS:  Lower chest: Bilateral lung bases are clear. The base of the heart is within  limits.     Liver: No focal lesions identified.      Gallbladder/Biliary: No calcified gallstones identified.     Spleen: Normal

## 2024-06-15 LAB
BASOPHILS # BLD: 0 K/UL (ref 0–0.1)
BASOPHILS NFR BLD: 0 % (ref 0–2)
DIFFERENTIAL METHOD BLD: ABNORMAL
EOSINOPHIL # BLD: 0.3 K/UL (ref 0–0.4)
EOSINOPHIL NFR BLD: 3 % (ref 0–5)
ERYTHROCYTE [DISTWIDTH] IN BLOOD BY AUTOMATED COUNT: 25.4 % (ref 11.6–14.5)
HCT VFR BLD AUTO: 26.3 % (ref 36–48)
HGB BLD-MCNC: 8.1 G/DL (ref 13–16)
IMM GRANULOCYTES # BLD AUTO: 0 K/UL (ref 0–0.04)
IMM GRANULOCYTES NFR BLD AUTO: 1 % (ref 0–0.5)
LYMPHOCYTES # BLD: 1.7 K/UL (ref 0.9–3.6)
LYMPHOCYTES NFR BLD: 22 % (ref 21–52)
MCH RBC QN AUTO: 23.5 PG (ref 24–34)
MCHC RBC AUTO-ENTMCNC: 30.8 G/DL (ref 31–37)
MCV RBC AUTO: 76.5 FL (ref 78–100)
MONOCYTES # BLD: 0.6 K/UL (ref 0.05–1.2)
MONOCYTES NFR BLD: 9 % (ref 3–10)
NEUTS SEG # BLD: 4.9 K/UL (ref 1.8–8)
NEUTS SEG NFR BLD: 65 % (ref 40–73)
NRBC # BLD: 0 K/UL (ref 0–0.01)
NRBC BLD-RTO: 0 PER 100 WBC
PLATELET # BLD AUTO: 429 K/UL (ref 135–420)
PMV BLD AUTO: 9.7 FL (ref 9.2–11.8)
RBC # BLD AUTO: 3.44 M/UL (ref 4.35–5.65)
WBC # BLD AUTO: 7.6 K/UL (ref 4.6–13.2)

## 2024-06-15 PROCEDURE — 85025 COMPLETE CBC W/AUTO DIFF WBC: CPT

## 2024-06-15 PROCEDURE — 2580000003 HC RX 258: Performed by: HOSPITALIST

## 2024-06-15 PROCEDURE — 94761 N-INVAS EAR/PLS OXIMETRY MLT: CPT

## 2024-06-15 PROCEDURE — 6360000002 HC RX W HCPCS: Performed by: INTERNAL MEDICINE

## 2024-06-15 PROCEDURE — 99232 SBSQ HOSP IP/OBS MODERATE 35: CPT | Performed by: HOSPITALIST

## 2024-06-15 PROCEDURE — 6370000000 HC RX 637 (ALT 250 FOR IP)

## 2024-06-15 PROCEDURE — 6370000000 HC RX 637 (ALT 250 FOR IP): Performed by: HOSPITALIST

## 2024-06-15 PROCEDURE — 1100000000 HC RM PRIVATE

## 2024-06-15 PROCEDURE — 6370000000 HC RX 637 (ALT 250 FOR IP): Performed by: INTERNAL MEDICINE

## 2024-06-15 PROCEDURE — 36415 COLL VENOUS BLD VENIPUNCTURE: CPT

## 2024-06-15 PROCEDURE — 2580000003 HC RX 258: Performed by: INTERNAL MEDICINE

## 2024-06-15 RX ADMIN — POTASSIUM CHLORIDE 20 MEQ: 1500 TABLET, EXTENDED RELEASE ORAL at 08:55

## 2024-06-15 RX ADMIN — POTASSIUM CHLORIDE 20 MEQ: 1500 TABLET, EXTENDED RELEASE ORAL at 20:50

## 2024-06-15 RX ADMIN — PANTOPRAZOLE SODIUM 40 MG: 40 TABLET, DELAYED RELEASE ORAL at 07:34

## 2024-06-15 RX ADMIN — ATORVASTATIN CALCIUM 40 MG: 40 TABLET, FILM COATED ORAL at 20:50

## 2024-06-15 RX ADMIN — ASPIRIN 81 MG CHEWABLE TABLET 81 MG: 81 TABLET CHEWABLE at 08:55

## 2024-06-15 RX ADMIN — SODIUM CHLORIDE, PRESERVATIVE FREE 10 ML: 5 INJECTION INTRAVENOUS at 20:50

## 2024-06-15 RX ADMIN — CLOPIDOGREL BISULFATE 75 MG: 75 TABLET ORAL at 08:54

## 2024-06-15 RX ADMIN — POTASSIUM CHLORIDE 20 MEQ: 1500 TABLET, EXTENDED RELEASE ORAL at 14:29

## 2024-06-15 RX ADMIN — SODIUM CHLORIDE 125 MG: 9 INJECTION, SOLUTION INTRAVENOUS at 10:02

## 2024-06-15 ASSESSMENT — PAIN SCALES - GENERAL
PAINLEVEL_OUTOF10: 0

## 2024-06-15 NOTE — PLAN OF CARE
Problem: Chronic Conditions and Co-morbidities  Goal: Patient's chronic conditions and co-morbidity symptoms are monitored and maintained or improved  6/15/2024 1036 by Robinson Lopez RN  Outcome: Progressing  6/14/2024 2116 by Rhonda Olivas RN  Outcome: Progressing  Flowsheets (Taken 6/14/2024 2057)  Care Plan - Patient's Chronic Conditions and Co-Morbidity Symptoms are Monitored and Maintained or Improved: Monitor and assess patient's chronic conditions and comorbid symptoms for stability, deterioration, or improvement     Problem: Pain  Goal: Verbalizes/displays adequate comfort level or baseline comfort level  6/15/2024 1036 by Robinson Lopez RN  Outcome: Progressing  6/14/2024 2116 by Rhonda Olivas RN  Outcome: Progressing     Problem: Safety - Adult  Goal: Free from fall injury  6/15/2024 1036 by Robinson Lopez RN  Outcome: Progressing  6/14/2024 2116 by Rhonda Olivas RN  Outcome: Progressing     Problem: Nutrition Deficit:  Goal: Optimize nutritional status  6/15/2024 1036 by Robinson Lopez RN  Outcome: Progressing  6/14/2024 2116 by Rhonda Olivas RN  Outcome: Progressing  Flowsheets (Taken 6/14/2024 1226 by Liana Daly, TRISH)  Nutrient intake appropriate for improving, restoring, or maintaining nutritional needs:   Assess nutritional status and recommend course of action   Monitor oral intake, labs, and treatment plans   Recommend appropriate diets, oral nutritional supplements, and vitamin/mineral supplements

## 2024-06-15 NOTE — PLAN OF CARE
Problem: Chronic Conditions and Co-morbidities  Goal: Patient's chronic conditions and co-morbidity symptoms are monitored and maintained or improved  6/14/2024 2116 by Rhonda Olivas RN  Outcome: Progressing  Flowsheets (Taken 6/14/2024 2057)  Care Plan - Patient's Chronic Conditions and Co-Morbidity Symptoms are Monitored and Maintained or Improved: Monitor and assess patient's chronic conditions and comorbid symptoms for stability, deterioration, or improvement  6/14/2024 1121 by Katie Silva RN  Outcome: Progressing     Problem: Pain  Goal: Verbalizes/displays adequate comfort level or baseline comfort level  6/14/2024 2116 by Rhonda Olivas RN  Outcome: Progressing  6/14/2024 1121 by Katie Silva RN  Outcome: Progressing     Problem: Safety - Adult  Goal: Free from fall injury  6/14/2024 2116 by Rhonda Olivas RN  Outcome: Progressing  6/14/2024 1121 by Katie Silva RN  Outcome: Progressing     Problem: Nutrition Deficit:  Goal: Optimize nutritional status  Outcome: Progressing  Flowsheets (Taken 6/14/2024 1226 by Liana Daly, TRISH)  Nutrient intake appropriate for improving, restoring, or maintaining nutritional needs:   Assess nutritional status and recommend course of action   Monitor oral intake, labs, and treatment plans   Recommend appropriate diets, oral nutritional supplements, and vitamin/mineral supplements

## 2024-06-16 VITALS
SYSTOLIC BLOOD PRESSURE: 105 MMHG | DIASTOLIC BLOOD PRESSURE: 68 MMHG | HEART RATE: 67 BPM | TEMPERATURE: 98.4 F | BODY MASS INDEX: 22.09 KG/M2 | HEIGHT: 73 IN | WEIGHT: 166.67 LBS | OXYGEN SATURATION: 100 % | RESPIRATION RATE: 18 BRPM

## 2024-06-16 LAB
GLUCOSE BLD STRIP.AUTO-MCNC: 102 MG/DL (ref 70–110)
GLUCOSE BLD STRIP.AUTO-MCNC: 115 MG/DL (ref 70–110)
GLUCOSE BLD STRIP.AUTO-MCNC: 99 MG/DL (ref 70–110)

## 2024-06-16 PROCEDURE — 99232 SBSQ HOSP IP/OBS MODERATE 35: CPT | Performed by: SURGERY

## 2024-06-16 PROCEDURE — 6370000000 HC RX 637 (ALT 250 FOR IP)

## 2024-06-16 PROCEDURE — 94761 N-INVAS EAR/PLS OXIMETRY MLT: CPT

## 2024-06-16 PROCEDURE — 99239 HOSP IP/OBS DSCHRG MGMT >30: CPT | Performed by: HOSPITALIST

## 2024-06-16 PROCEDURE — 2580000003 HC RX 258: Performed by: HOSPITALIST

## 2024-06-16 PROCEDURE — 82962 GLUCOSE BLOOD TEST: CPT

## 2024-06-16 PROCEDURE — 6370000000 HC RX 637 (ALT 250 FOR IP): Performed by: INTERNAL MEDICINE

## 2024-06-16 RX ORDER — CLOPIDOGREL BISULFATE 75 MG/1
75 TABLET ORAL DAILY
Qty: 30 TABLET | Refills: 3 | Status: SHIPPED | OUTPATIENT
Start: 2024-06-17

## 2024-06-16 RX ADMIN — SODIUM CHLORIDE, PRESERVATIVE FREE 10 ML: 5 INJECTION INTRAVENOUS at 08:06

## 2024-06-16 RX ADMIN — PANTOPRAZOLE SODIUM 40 MG: 40 TABLET, DELAYED RELEASE ORAL at 08:05

## 2024-06-16 RX ADMIN — POTASSIUM CHLORIDE 20 MEQ: 1500 TABLET, EXTENDED RELEASE ORAL at 08:04

## 2024-06-16 RX ADMIN — ASPIRIN 81 MG CHEWABLE TABLET 81 MG: 81 TABLET CHEWABLE at 08:04

## 2024-06-16 RX ADMIN — POTASSIUM CHLORIDE 20 MEQ: 1500 TABLET, EXTENDED RELEASE ORAL at 12:32

## 2024-06-16 RX ADMIN — CLOPIDOGREL BISULFATE 75 MG: 75 TABLET ORAL at 08:04

## 2024-06-16 ASSESSMENT — PAIN SCALES - GENERAL
PAINLEVEL_OUTOF10: 0

## 2024-06-16 NOTE — CARE COORDINATION
06/10/24 0900   Readmission Assessment   Number of Days since last admission? 8-30 days   Previous Disposition Home with Family   Who is being Interviewed Patient   What was the patient's/caregiver's perception as to why they think they needed to return back to the hospital? Other (Comment)  (dizziness. low blood.)   Did you visit your Primary Care Physician after you left the hospital, before you returned this time? No   Why weren't you able to visit your PCP? Other (Comment)  (\"I had an appointment, but I missed it because I forgot about it.\")   Did you see a specialist, such as Cardiac, Pulmonary, Orthopedic Physician, etc. after you left the hospital? Yes  (cardiologist)   Who advised the patient to return to the hospital? Self-referral   Does the patient report anything that got in the way of taking their medications? No   In our efforts to provide the best possible care to you and others like you, can you think of anything that we could have done to help you after you left the hospital the first time, so that you might not have needed to return so soon? Other (Comment)  (No)       Becky VERGARAN,RN, [facility]  Case Management  [phone]    
   06/16/24 1435   /Social Work Whiteboard Notes   /Social Work Whiteboard GREEN 06/16/2024 Patien discharged with no SW needs at this time. LLJ     Discharge order noted for today. Orders received. No other needs identified at this time. Case management remains available as needed.    Tereza Lopez BSW   Case Management    
Other (see comment)  (Patient states that his daughter will be the one to transport him home at time of discharge.)   Confirm Follow Up Transport Self   Condition of Participation: Discharge Planning   The Plan for Transition of Care is related to the following treatment goals: Home with family support       Becky SAINI,RN, [facility]  Case Management  [phone]

## 2024-06-16 NOTE — PROGRESS NOTES
[email or web address]  [phone]    Gastroenterology follow up-Progress note    Impression:  1. Colon mass - large, non obstructing mass in descending colon, spans ~3/4 of circumference of lumen and  extends 6-8cm. Biopsies taken are pending. Tattoo placed.   2. Acute on chronic anemia - s/p 2 units pRBC. Has had intermittent BRBPR since 3/2024 when patient started anticoagulation. Last colo >10yrs ago.    - colon mass on colonoscopy 6/12 as above. EGD also demonstrated inflamed appearing gastric polyp w/ surface ulceration, s/p biopsy, epi injection and endoclip.   3. CHF - EF 50-55%.  4. CAD s/p stent placement 3/2024 on ASA and Brilinta    Plan:  1. Continue liquid diet  2. Follow up biopsy results  3. Hold anticoagulation [id number], ok to resume at that point if no large volume overt bleeding, pending surgical consultation/plans.  4. Consult colorectal surgery and oncology - appreciate assistance in advance.   5. Monitor H/H, transfuse for Hgb <7  6. Continue medical management per primary team  Discussed w/ Dr. Nguyen    Chief Complaint: Acute on chronic anemia      Subjective:  Pt tolerating liquid diet well. Reviewed findings from his colonoscopy yesterday and plans for surgery and oncology consultation. He is unsure which providers have seen him thus far today. Offered to update family members which he politely declined.     ROS: Denies any fevers, chills, rash.       General: well developed, well nourished, no acute distress  Eyes: conjunctiva normal, EOM normal  Cardiovascular: heart normal, intact distal pulses, normal rate and regular rhythm  Pulmonary: breath sounds normal and effort normal  Abdominal: appearance normal, bowel sounds normal and soft, non-acute, non-tender     Patient Active Problem List   Diagnosis    Acute hypoxemic respiratory failure (HCC)    Multifocal pneumonia    HFrEF (heart failure with reduced ejection fraction) ([facility])    NSTEMI (non-ST elevated myocardial infarction) ([facility])    
      [email or web address]  [phone]    Gastroenterology Progress Note    Impression:  1. Acute on chronic anemia - s/p 2 units pRBC. Has had intermittent BRBPR since 3/2024 when patient started anticoagulation. Last colo >10yrs ago.    2. CHF - EF 50-55%  3. CAD s/p stent placement 3/2024 on ASA and Brilinta    Plan:  1. Tentatively plan on EGD-[place] Wednesday.   2. Monitor H/H, transfuse if Hgb <7.  3. IV PPI BID  4. Continue medical management per primary.     Chief Complaint: acute on chronic anemia, BRBPR      Subjective:  ate majority of breakfast, no acute overnight events. Last documented BM 6/9 and non-bloody.     ROS: Denies any fevers, chills, nausea, vomiting, abd pain.       General: well nourished, no acute distress  Eyes: conjunctiva normal, EOM normal  Cardiovascular: normal rate and regular rhythm  Pulmonary: breath sounds normal and effort normal  Abdominal: non-distended, soft, non-tender, non-acute  Patient Active Problem List   Diagnosis    Acute hypoxemic respiratory failure (HCC)    Multifocal pneumonia    HFrEF (heart failure with reduced ejection fraction) (HCC)    NSTEMI (non-ST elevated myocardial infarction) ([facility])    Hypokalemia    Anemia    History of tobacco abuse    Medical non-compliance    Coronary artery disease involving native coronary artery of native heart without angina pectoris    Symptomatic anemia    CAD (coronary artery disease)    Diabetes (HCC)         /65   Pulse 73   Temp 98.1 °F (36.7 °C) (Oral)   Resp 18   SpO2 100%         Intake/Output Summary (Last 24 hours) at 6/10/2024 0951  Last data filed at 6/10/2024 0847  Gross per 24 hour   Intake 1051.17 ml   Output 250 ml   Net 801.17 ml       CBC w/Diff    Lab Results   Component Value Date/Time    WBC 12.8 06/08/2024 09:39 PM    RBC 2.98 (L) 06/08/2024 09:39 PM    HGB 7.6 (L) 06/10/2024 04:50 AM    HCT 23.9 (L) 06/10/2024 04:50 AM    MCV 71.8 (L) 06/08/2024 09:39 PM    MCH 21.8 (L) 06/08/2024 09:39 PM    MCHC 30.4 
      [email or web address]  [phone]    Gastroenterology Progress Note    Impression:  1. Acute on chronic anemia - s/p 2 units pRBC. Has had intermittent BRBPR since 3/2024 when patient started anticoagulation. Last colo >10yrs ago.    2. CHF - EF 50-55%.  3. CAD s/p stent placement 3/2024 on ASA and Brilinta      Plan:  1. Tentatively plan on EGD-[place] tomorrow. Clear liquid diet today. NPO after midnight. Bowel prep ordered. Ok to restart aspirin, will be ok to restart Plavix after procedures tomorrow.  2. Monitor H/H, transfuse if Hgb <7.  3. IV PPI BID.  4. Continue medical management per primary.        Chief Complaint: acute on chronic anemia, BRBPR      Subjective:  no bleeding since 6/9, last BM yesterday. No acute overnight events.     ROS: Denies any fevers, chills, nausea, vomiting, abd pain.       General: well nourished, no acute distress  Eyes: conjunctiva normal, EOM normal  Pulmonary: breath effort normal  Abdominal: non-distended, soft, non-tender, non-acute      Patient Active Problem List   Diagnosis    Acute hypoxemic respiratory failure (HCC)    Multifocal pneumonia    HFrEF (heart failure with reduced ejection fraction) ([facility])    NSTEMI (non-ST elevated myocardial infarction) ([facility])    Hypokalemia    Anemia    History of tobacco abuse    Medical non-compliance    Coronary artery disease involving native coronary artery of native heart without angina pectoris    Symptomatic anemia    CAD (coronary artery disease)    Diabetes (HCC)         /70   Pulse 71   Temp 98.7 °F (37.1 °C) (Oral)   Resp 18   SpO2 100%         Intake/Output Summary (Last 24 hours) at 6/11/2024 0936  Last data filed at 6/11/2024 0820  Gross per 24 hour   Intake 130 ml   Output 800 ml   Net -670 ml       CBC w/Diff    Lab Results   Component Value Date/Time    WBC 8.7 06/11/2024 02:15 AM    RBC 3.27 (L) 06/11/2024 02:15 AM    HGB 7.6 (L) 06/11/2024 02:15 AM    HCT 24.5 (L) 06/11/2024 02:15 AM    MCV 74.9 (L) 06/11/2024 
 General Surgery Consult    Gigi Mcneal  Admit date: 2024    MRN: [id number]     : 1960     Age: 64 y.o.        Attending Physician: Noel Coker MD [facility]      History of Present Illness:      Gigi Mcneal is a 64 y.o. male who we are following for evaluation of colonic mass in the setting of multiple comorbidities including DVT on anticoagulation.  Patient is doing well with no evidence of bleeding and has been on Plavix for 3 days.  The plan is for him to be discharged home today.    Patient Active Problem List    Diagnosis Date Noted    Colonic mass 2024    Acute blood loss anemia 2024    CAD (coronary artery disease) 2024    Diabetes (HCC) 2024    Symptomatic anemia 2024    Coronary artery disease involving native coronary artery of native heart without angina pectoris 2024    NSTEMI (non-ST elevated myocardial infarction) ([facility]) 2024    Hypokalemia 2024    Anemia 2024    History of tobacco abuse 2024    Medical non-compliance 2024    Acute hypoxemic respiratory failure (HCC) 2024    Multifocal pneumonia 2024    HFrEF (heart failure with reduced ejection fraction) ([facility]) 2024     Past Medical History:   Diagnosis Date    CAD (coronary artery disease)     Heart failure ([facility])     NSTEMI (non-ST elevated myocardial infarction) ([facility])       Past Surgical History:   Procedure Laterality Date    CARDIAC PROCEDURE N/A 3/28/2024    Left heart cath performed by Butch Guaman MD at [facility] CARDIAC CATH LAB    CARDIAC PROCEDURE N/A 3/28/2024    Coronary angiography performed by Butch Guaman MD at [facility] CARDIAC CATH LAB    CARDIAC PROCEDURE N/A 3/28/2024    Left ventriculography performed by Butch Guaman MD at [facility] CARDIAC CATH LAB    CARDIAC PROCEDURE N/A 3/28/2024    Insert stent syeda coronary performed by Butch Guaman MD at [facility] CARDIAC CATH LAB    COLONOSCOPY N/A 2024    COLONOSCOPY with polypectomy, bx's and tattoo performed 
1930 Bedside shirt report received from ABIGAIL Mendoza. Patient awake alert and oriented. Denies pain. Visitors at bedside.    1938 POC recheck 82 s/p PRN dextrose 10% infusion     0005 Oral temperature 101.3  0052 Recheck 100.4   Administered PRN acetaminophen 650 mg PO    0200 oral temperature 99.5     0600 Oral temperature 98.4     0644  POC glucose 87  
[address] [address]  [phone]  Colon and Rectal Surgery Progress Note      Patient: Gigi Mcneal MRN: [id number]  SSN: [id number]    YOB: 1960  Age: 64 y.o.  Sex: male      Admit Date: 6/8/2024    LOS: 6 days     Subjective:     Asymptomatic.    Objective:     Vitals:    06/14/24 0300 06/14/24 0419 06/14/24 0700 06/14/24 0835   BP:  123/64  109/70   Pulse: 70 60 (!) 49 54   Resp:  18  18   Temp:  97.8 °F (36.6 °C)  98.2 °F (36.8 °C)   TempSrc:  Oral  Oral   SpO2:  100%  100%   Weight:       Height:            Intake and Output:  Current Shift: 06/14 0701 - 06/14 1900  In: 120 [P.O.:120]  Out: 100 [Urine:100]  Last three shifts: 06/12 1901 - 06/14 0700  In: 1560 [P.O.:1560]  Out: 3250 [Urine:3250]    Physical Exam:     Constitutional: well developed, in NAD  Abdomen: Soft, nontender nondistended    Lab/Data Review:    Lab Results   Component Value Date    WBC 8.1 06/14/2024    HGB 8.0 (L) 06/14/2024    HCT 25.7 (L) 06/14/2024    MCV 77.2 (L) 06/14/2024     (H) 06/14/2024     Lab Results   Component Value Date/Time     06/14/2024 02:50 AM    K 3.9 06/14/2024 02:50 AM     06/14/2024 02:50 AM    CO2 21 06/14/2024 02:50 AM    BUN 5 06/14/2024 02:50 AM    CREATININE 0.71 06/14/2024 02:50 AM    GLUCOSE 77 06/14/2024 02:50 AM    GLUCOSE 177 08/16/2023 12:00 AM    CALCIUM 8.2 06/14/2024 02:50 AM      Lab Results   Component Value Date/Time    MG 2.2 06/10/2024 04:50 AM     Lab Results   Component Value Date    CALCIUM 8.2 (L) 06/14/2024    PHOS 4.2 05/27/2024        Assessment:     Sigmoid colon mass, DE stent placed 3 months ago for coronary artery disease, underlying CHF    Plan:     Await pathology  Advance diet  Restart antiplatelets and see how he tolerates this given his significant cardiac risk  Discharged with follow-up with me in 2 to 3 weeks  Will need to discuss timing of surgery with cardiology to minimize cardiovascular risk  Currently no 
4 Eyes Skin Assessment     NAME:  Gigi Mcneal  YOB: 1960  MEDICAL RECORD NUMBER:  [id number]    The patient is being assessed for  Admission    I agree that at least one RN has performed a thorough Head to Toe Skin Assessment on the patient. ALL assessment sites listed below have been assessed.      Areas assessed by both nurses:    Head, Face, Ears, Shoulders, Back, Chest, Arms, Elbows, Hands, Sacrum. Buttock, Coccyx, Ischium, Legs. Feet and Heels, and Under Medical Devices         Does the Patient have a Wound? No noted wound(s)       Timothy Prevention initiated by RN: No  Wound Care Orders initiated by RN: No    Pressure Injury (Stage 3,4, Unstageable, DTI, NWPT, and Complex wounds) if present, place Wound referral order by RN under : No    New Ostomies, if present place, Ostomy referral order under : No     Nurse 1 eSignature: Electronically signed by Ciera Upton RN on 6/9/24 at 1:24 AM EDT    **SHARE this note so that the co-signing nurse can place an eSignature**    Nurse 2 eSignature: Electronically signed by Indio Mckeon RN on 6/9/24 at 3:33 AM EDT   
Advance Care Planning   Healthcare Decision Maker:    Primary Decision Maker: Yi Mcneal - Spouse - [phone]    Today we documented Decision Maker(s) consistent with Legal Next of Kin hierarchy.      conducted an initial consultation and Spiritual Assessment for Gigi Mcneal, who is a 64 y.o.,male. Patient's Primary Language is: English.   According to the patient's EMR Hoah[employer]ism Affiliation is: Scientologist.     The reason the Patient came to the hospital is:   Patient Active Problem List    Diagnosis Date Noted    CAD (coronary artery disease) 05/26/2024    Diabetes ([facility]) 05/26/2024    Symptomatic anemia 05/25/2024    Coronary artery disease involving native coronary artery of native heart without angina pectoris 03/27/2024    NSTEMI (non-ST elevated myocardial infarction) ([facility]) 03/26/2024    Hypokalemia 03/26/2024    Anemia 03/26/2024    History of tobacco abuse 03/26/2024    Medical non-compliance 03/26/2024    Acute hypoxemic respiratory failure ([facility]) 03/23/2024    Multifocal pneumonia 03/23/2024    HFrEF (heart failure with reduced ejection fraction) ([facility]) 03/23/2024        The  provided the following Interventions:  Initiated a relationship of care and support.   Provided information about Spiritual Care Services.  Chart reviewed.    The following outcomes where achieved:  Patient expressed gratitude for 's visit.    Assessment:  Patient does not have any Latter-day/cultural needs that will affect patient's preferences in health care.  There are no spiritual or Latter-day issues which require intervention at this time.     Plan:  Chaplains will continue to follow and will provide pastoral care on an as needed/requested basis.   recommends bedside caregivers page  on duty if patient shows signs of acute spiritual or emotional distress.    Chaplain Lara Stokes  Spiritual Care   [phone]     
Assisted patient to the bathroom. Noted dark red, watery output from rectum, 100 ml.   
Bowel prep complete  
[facility] - Progress Note    Admit Date: 6/8/2024  Attending Cardiologist: Dr. Guaman    Assessment:     Patient Active Problem List    Diagnosis Date Noted    CAD (coronary artery disease) 05/26/2024    Diabetes ([facility]) 05/26/2024    Symptomatic anemia 05/25/2024    Coronary artery disease involving native coronary artery of native heart without angina pectoris 03/27/2024    NSTEMI (non-ST elevated myocardial infarction) ([facility]) 03/26/2024    Hypokalemia 03/26/2024    Anemia 03/26/2024    History of tobacco abuse 03/26/2024    Medical non-compliance 03/26/2024    Acute hypoxemic respiratory failure ([facility]) 03/23/2024    Multifocal pneumonia 03/23/2024    HFrEF (heart failure with reduced ejection fraction) ([facility]) 03/23/2024       - Symptomatic anemia secondary to recurrent GIB:  plans for EGD/colonoscopy per GI tomorrow. Received 3 units of PRBC and 1 Unit of FFP. Hgb 7.6 today. On IV protonix. Aspirin and brilinta on hold.  - CAD:  C 3/28/2024 PCI with 2.5 mm HARISH to LAD and 2.5 mm HARISH first diagonal branch.    - Chronic HFrEF with recovered EF:  Echo 6/3/2024 EF 50-55%, normal wall motion, moderate AS, trivial pericardial effusion with no indication of cardiac tamponade.  Echo 3/29/2024 EF 35-40%, moderate global hypokinesis, moderate AS, mild to moderate MR.   - Moderate Aortic Stenosis on Echo 6/3/24 and 3/29/24. AV mean gradient is 31 mmHg. AV peak gradient is 54 mmHg. AV peak velocity is 3.7 m/s. AV area by continuity VTI is 0.9 cm2. AV area by peak velocity is 0.9 cm2. AV Stroke Volume index is 36.8 mL/m2.   - Tobacco use history     Primary cardiologist: Dr. Guaman    Plan:     Addendum: Independently seen and evaluated.  Agree with below.  Appreciate resuming as planned.  He is scheduled undergo further colonoscopy tomorrow.  Ideally, DAPT would be best for his coronary disease.    - Per GI, okay to restart aspirin and will be okay to start plavix after EGD-Colonoscopy tomorrow.  - Continue toprol 
Critical blood glucose  POC Glucose  POCT Glucose  Collected: 06/12/24 1910   Result status: Final   Reference range: 70 - 110 mg/dL   Value: 62 Low    Comment: (NOTE)  The FDA has indicated that no capillary point of care blood glucose  monitoring systems are approved for use in \"critically ill\" patients,  however they have not defined this population. The College of  American Pathologists has recommended that these devices should not  be used in cases such as severe hypotension, dehydration, shock, and  hyperglycemic-hyperosmolar state, amongst others.  Venous or arterial  collection is the recommended specimen for testing these patients.   Hypoglycemia protocol followed  Report given to oncjuan Thao for continuing patient care  
Critical blood glucose  Patient found to be hypoglycemic  Hypoglycemic protocol entered and followed     Latest Reference Range & Units 06/12/24 14:07 06/12/24 14:32 06/12/24 14:46 06/12/24 14:47 06/12/24 14:56   POC Glucose 70 - 110 mg/dL 57 (L) 73  72 65 (L) 71 114 (H)   (L): Data is abnormally low  (H): Data is abnormally high  
Hospitalist Progress Note    NAME:  Gigi Mcneal   :   1960   MRN:   [id number]     Date/Time:  2024  Subjective:   Chief Complaint:    Patient with recurrent lower GI bleed rectal bleeding;  Patient is status post 2 units of blood repeat H&H is pending.  Hypokalemia will replace.  GI is consulted.  Patient stools brown now.        Review of Systems:  Y  N       Y  N  []   [x]    Fever/chills                                               []   [x]    Chest Pain  []   [x]    Cough                                                       []   [x]    Diarrhea   []   [x]    Sputum                                                     []   [x]    Constipation  []   [x]    SOB/IRBY                                                []   [x]    Nausea/Vomit  []   [x]    Abd Pain                                                    []   []    Tolerating PT  []   [x]    Dysuria                                                      []   []    Tolerating Diet     []  Unable to obtain  ROS due to  []  mental status change  []  sedated   []  intubated     Past Med History and Social history reviewed. No changes.   [x]  Current Medication list and allergies reviewed*    Objective:   Vitals:  BP 97/62   Pulse 76   Temp 98.7 °F (37.1 °C) (Oral)   Resp 20   SpO2 100%   Temp (24hrs), Av.3 °F (36.8 °C), Min:98.1 °F (36.7 °C), Max:98.7 °F (37.1 °C)      Last 24hr Input/Output:    Intake/Output Summary (Last 24 hours) at 2024 1202  Last data filed at 2024 0618  Gross per 24 hour   Intake 480 ml   Output --   Net 480 ml        PHYSICAL EXAM:  Gen:  []  WD [x]  WN  []  cachectic  []  thin  []  obese  []  disheveled             []   Critically ill or  []  Chronically ill appearing    HEENT:   []   red/pink conjunctivae [x]  pale conjunctivae                  PERRL  [x]  yes  []  no        hearing intact to voice []  yes  []  No               [x]  moist or  []  dry  Mucosa  NECK:   supple [x]  yes  []  no      masses []  yes  [] 
Hospitalist Progress Note    NAME:  Gigi Mcneal   :   1960   MRN:   [id number]     Date/Time:  6/15/2024  Subjective:   Chief Complaint:      Patient seen evaluated, sitting up in bed, no acute distress.  Plavix restarted on .  Will continue to monitor, if no evidence of further bleeding, anticipate discharge home in a.m.    Results of the colonoscopy noted confirm the colon mass status post biopsies await results.    Plavix resumed    Patient seen by colorectal surgery will follow as an outpatient also seen by hematology will follow as well.    Observe overnight and if no further evidence of bleeding since Plavix has been resumed on , patient will be discharged home in a.m.         Review of Systems:  Y  N       Y  N  []   [x]    Fever/chills                                               []   [x]    Chest Pain  []   [x]    Cough                                                       []   [x]    Diarrhea   []   [x]    Sputum                                                     []   [x]    Constipation  []   [x]    SOB/IRBY                                                []   [x]    Nausea/Vomit  []   [x]    Abd Pain                                                    []   []    Tolerating PT  []   [x]    Dysuria                                                      []   []    Tolerating Diet     []  Unable to obtain  ROS due to  []  mental status change  []  sedated   []  intubated     Past Med History and Social history reviewed. No changes.   [x]  Current Medication list and allergies reviewed*    Objective:   Vitals:  /65   Pulse 64   Temp 98.4 °F (36.9 °C) (Oral)   Resp 17   Ht 1.854 m (6' 0.99\")   Wt 75.6 kg (166 lb 10.7 oz)   SpO2 100%   BMI 22.00 kg/m²   Temp (24hrs), Av.3 °F (36.8 °C), Min:98.1 °F (36.7 °C), Max:98.6 °F (37 °C)      Last 24hr Input/Output:    Intake/Output Summary (Last 24 hours) at 6/15/2024 1253  Last data filed at 6/15/2024 0820  Gross per 24 hour   Intake 820 ml 
Hospitalist Progress Note    NAME:  Gigi Mcneal   :   1960   MRN:   [id number]     Date/Time:  2024  Subjective:   Chief Complaint:      Patient has no more rectal bleeding.    Status post 2 units of blood hemoglobin around 7.6.      GI eval noted plan for EGD/colon: On Wednesday ;    Patient started on aspirin okay with GI;      Review of Systems:  Y  N       Y  N  []   [x]    Fever/chills                                               []   [x]    Chest Pain  []   [x]    Cough                                                       []   [x]    Diarrhea   []   [x]    Sputum                                                     []   [x]    Constipation  []   [x]    SOB/IRBY                                                []   [x]    Nausea/Vomit  []   [x]    Abd Pain                                                    []   []    Tolerating PT  []   [x]    Dysuria                                                      []   []    Tolerating Diet     []  Unable to obtain  ROS due to  []  mental status change  []  sedated   []  intubated     Past Med History and Social history reviewed. No changes.   [x]  Current Medication list and allergies reviewed*    Objective:   Vitals:  /66   Pulse 58   Temp 98.1 °F (36.7 °C) (Oral)   Resp 17   SpO2 100%   Temp (24hrs), Av.2 °F (36.8 °C), Min:97.5 °F (36.4 °C), Max:98.7 °F (37.1 °C)      Last 24hr Input/Output:    Intake/Output Summary (Last 24 hours) at 2024 1552  Last data filed at 2024 1154  Gross per 24 hour   Intake 130 ml   Output 1000 ml   Net -870 ml        PHYSICAL EXAM:  Gen:  []  WD [x]  WN  []  cachectic  []  thin  []  obese  []  disheveled             []   Critically ill or  []  Chronically ill appearing      RESP:   use of accessory muscles []  yes [x]  no                BS    [x]  clear  []  wheezing []  rhonchi []  crackles   CARD:  murmur  []  yes [x]  no   []  thrill  []  carotid bruits                 LE edema []  yes  [x]  no    []  
Hospitalist Progress Note    NAME:  Gigi Mcneal   :   1960   MRN:   [id number]     Date/Time:  2024  Subjective:   Chief Complaint:      Patient has no more rectal bleeding.    Results of the colonoscopy noted confirm the colon mass status post biopsies await results.    Discussed with GI okay to start Plavix tomorrow if no more bleeding.    I have consulted surgery and oncology.    Once he is seen by abdomen hopefully can discharge tomorrow with outpatient follow-up         Review of Systems:  Y  N       Y  N  []   [x]    Fever/chills                                               []   [x]    Chest Pain  []   [x]    Cough                                                       []   [x]    Diarrhea   []   [x]    Sputum                                                     []   [x]    Constipation  []   [x]    SOB/IRBY                                                []   [x]    Nausea/Vomit  []   [x]    Abd Pain                                                    []   []    Tolerating PT  []   [x]    Dysuria                                                      []   []    Tolerating Diet     []  Unable to obtain  ROS due to  []  mental status change  []  sedated   []  intubated     Past Med History and Social history reviewed. No changes.   [x]  Current Medication list and allergies reviewed*    Objective:   Vitals:  /64   Pulse 56   Temp 98.1 °F (36.7 °C) (Oral)   Resp 18   SpO2 94%   Temp (24hrs), Av °F (37.2 °C), Min:97.8 °F (36.6 °C), Max:101.3 °F (38.5 °C)      Last 24hr Input/Output:    Intake/Output Summary (Last 24 hours) at 2024 1411  Last data filed at 2024 1208  Gross per 24 hour   Intake 1480 ml   Output 1550 ml   Net -70 ml        PHYSICAL EXAM:  Gen:  []  WD [x]  WN  []  cachectic  []  thin  []  obese  []  disheveled             []   Critically ill or  []  Chronically ill appearing      RESP:   use of accessory muscles []  yes [x]  no                BS    [x]  clear  []  
Hospitalist Progress Note    NAME:  Gigi Mcneal   :   1960   MRN:   [id number]     Date/Time:  2024  Subjective:   Chief Complaint:      Patient has no more rectal bleeding.    Results of the colonoscopy noted confirm the colon mass status post biopsies await results.    Discussed with GI okay to start Plavix ;    Patient seen by colorectal surgery will follow as an outpatient also seen by hematology will follow as well.    Plan to monitor him overnight on aspirin Plavix give IV iron hopefully discharge tomorrow home;         Review of Systems:  Y  N       Y  N  []   [x]    Fever/chills                                               []   [x]    Chest Pain  []   [x]    Cough                                                       []   [x]    Diarrhea   []   [x]    Sputum                                                     []   [x]    Constipation  []   [x]    SOB/IRBY                                                []   [x]    Nausea/Vomit  []   [x]    Abd Pain                                                    []   []    Tolerating PT  []   [x]    Dysuria                                                      []   []    Tolerating Diet     []  Unable to obtain  ROS due to  []  mental status change  []  sedated   []  intubated     Past Med History and Social history reviewed. No changes.   [x]  Current Medication list and allergies reviewed*    Objective:   Vitals:  /68   Pulse 50   Temp 97.8 °F (36.6 °C) (Oral)   Resp 18   Ht 1.854 m (6' 0.99\")   Wt 75.6 kg (166 lb 10.7 oz)   SpO2 100%   BMI 22.00 kg/m²   Temp (24hrs), Av.1 °F (36.7 °C), Min:97.8 °F (36.6 °C), Max:98.4 °F (36.9 °C)      Last 24hr Input/Output:    Intake/Output Summary (Last 24 hours) at 2024 1421  Last data filed at 2024 1201  Gross per 24 hour   Intake 840 ml   Output 1200 ml   Net -360 ml        PHYSICAL EXAM:  Gen:  []  WD [x]  WN  []  cachectic  []  thin  []  obese  []  disheveled             []   Critically ill 
Occupational Therapy  OCCUPATIONAL THERAPY EVALUATION/DISCHARGE    Patient: Gigi Mcneal (64 y.o. male)  Date: 6/9/2024  Primary Diagnosis: Symptomatic anemia [D64.9]  Precautions: Fall Risk  PLOF: Pt lives with his wife in a 1 story home. Independent in self care tasks.      ASSESSMENT AND RECOMMENDATIONS:  Pt cleared for OT evaluation and pt agreeable to participate. Pt presents at baseline functioning for ADLs. Independent in bed mobility to sit EOB, independent in STS txr and functional mobility within his room. Denied need to use restroom at this time. No concerns regarding return to home at this time. No skilled OT services recommended at this level of care.     Further Equipment Recommendations for Discharge:  NA    AMPA: AM-PAC Inpatient Daily Activity Raw Score: 24    At this time and based on an AM-PAC score, no further OT is recommended upon discharge.  Recommend patient returns to prior setting with prior services.    This [facility] score should be considered in conjunction with interdisciplinary team recommendations to determine the most appropriate discharge setting. Patient's social support, diagnosis, medical stability, and prior level of function should also be taken into consideration.     SUBJECTIVE:   Patient stated “I can take care of myself.”    OBJECTIVE DATA SUMMARY:     Past Medical History:   Diagnosis Date    CAD (coronary artery disease)     Heart failure ([facility])     NSTEMI (non-ST elevated myocardial infarction) ([facility])      Past Surgical History:   Procedure Laterality Date    CARDIAC PROCEDURE N/A 3/28/2024    Left heart cath performed by Butch Guaman MD at [facility] CARDIAC CATH LAB    CARDIAC PROCEDURE N/A 3/28/2024    Coronary angiography performed by Butch Guaman MD at [facility] CARDIAC CATH LAB    CARDIAC PROCEDURE N/A 3/28/2024    Left ventriculography performed by Butch Guaman MD at [facility] CARDIAC CATH LAB    CARDIAC PROCEDURE N/A 3/28/2024    Insert stent syeda coronary performed by Butch Guaman 
Patient seen evaluated, lying in bed, no acute distress.  Patient was started on Plavix on 6/14.  He has been tolerating the medication well with no bloody bowel movements.  Patient feels better, will discharge home today.  Discharge plan discussed with patient who verbalized understanding.  
Physical Therapy  PHYSICAL THERAPY EVALUATION/DISCHARGE    Patient: Gigi Mcneal (64 y.o. male)  Date: 6/10/2024  Primary Diagnosis: Symptomatic anemia [D64.9]       Precautions: General Precautions,  ,  ,  ,  ,  ,  ,    PLOF: Pt lives wit wife in a 1 story home with a ramped entrance. Ind prior to admission.      ASSESSMENT AND RECOMMENDATIONS:  Pt received in bed in NAD and agreeable to PT session. Pt ind to EOB and displays good seated balance. Pt demonstrates good strength 5/5 BLE for knee extension, hip flexion, and ankle DF/PF. Pt reports intact and symmetrical sensation. Pt sit to stand ind and ambulates within room without A. Pt displays good safety and decision making and stand to sit into recliner ind. All needs and call bell within reach. Pt presents with no deficits and skilled acute care PT is not indicated at this time as pt presents at functional baseline.     Patient does not require further skilled physical therapy intervention at this level of care.    Further Equipment Recommendations for Discharge:  n/a     [facility]: AM-PAC Inpatient Mobility Raw Score : 24      At this time and based on an AM-PAC score, no further PT is recommended upon discharge.  Recommend patient returns to prior setting with prior services.    This [facility] score should be considered in conjunction with interdisciplinary team recommendations to determine the most appropriate discharge setting. Patient's social support, diagnosis, medical stability, and prior level of function should also be taken into consideration.     SUBJECTIVE:   Patient stated “I got up yesterday .”    OBJECTIVE DATA SUMMARY:     Past Medical History:   Diagnosis Date    CAD (coronary artery disease)     Heart failure ([facility])     NSTEMI (non-ST elevated myocardial infarction) ([facility])      Past Surgical History:   Procedure Laterality Date    CARDIAC PROCEDURE N/A 3/28/2024    Left heart cath performed by Butch Guaman MD at [facility] CARDIAC CATH LAB    CARDIAC 
Spoke with phlebotomist to collect patient's labs. Per phlebotomist someone will be up to collect labs when available. Labs reordered   
The discharge information has been reviewed with the patient.  The patient verbalized understanding. IV line removed.  Discharge medications reviewed with the patient and appropriate educational materials and side effects teaching were provided.  _________________________________________________________________________________________________________  
To Repeat Hemoglobin after 4 hours of blood transfusion as per .  
(BMI 22.0 to 24.9) age over 65    Estimated Daily Nutrient Needs:  Energy Requirements Based On: Formula  Weight Used for Energy Requirements: Current  Energy (kcal/day): 3111-3188 (MSJ x 1.2-1.4)  Weight Used for Protein Requirements: Current  Protein (g/day): 76-98 (1-1.3)  Method Used for Fluid Requirements: 1 ml/kcal  Fluid (ml/day): 5472-1443    Nutrition Diagnosis:   Inadequate oral intake related to altered GI structure, increase demand for energy/nutrients (colonic mass noted, pathology pending) as evidenced by NPO or clear liquid status due to medical condition    Nutrition Interventions:   Food and/or Nutrient Delivery: Continue NPO     Coordination of Nutrition Care: Continue to monitor while inpatient       Goals:     Goals: Initiate PO diet, by next RD assessment       Nutrition Monitoring and Evaluation:      Food/Nutrient Intake Outcomes: Diet Advancement/Tolerance, IVF Intake  Physical Signs/Symptoms Outcomes: Biochemical Data, GI Status, Fluid Status or Edema, Meal Time Behavior, Weight, Hemodynamic Status    Discharge Planning:    Too soon to determine     Liana Daly RD  Contact: [phone]  
significant bleeding risk.    - Patient going for planned EGD and colonoscopy today. Continue aspirin for now and likely plavix tomorrow if okay from GI standpoint.   - Continue cardiac medication regimen to include statin and toprol XL    Subjective:     No new complaints.     Objective:      Patient Vitals for the past 8 hrs:   Temp Pulse Resp BP SpO2   06/12/24 1100 98.5 °F (36.9 °C) 75 19 112/68 100 %   06/12/24 0745 98.3 °F (36.8 °C) 73 18 101/64 100 %   06/12/24 0700 -- 62 -- -- --   06/12/24 0512 -- 82 -- -- --   06/12/24 0502 98.4 °F (36.9 °C) 80 18 106/88 100 %         No data found.    TELE: normal sinus rhythm               Current Facility-Administered Medications   Medication Dose Route Frequency    aspirin chewable tablet 81 mg  81 mg Oral Daily    lactated ringers IV soln infusion   IntraVENous Continuous    potassium chloride (KLOR-CON M) extended release tablet 20 mEq  20 mEq Oral TID    0.9 % sodium chloride infusion   IntraVENous PRN    atorvastatin (LIPITOR) tablet 40 mg  40 mg Oral Nightly    ferrous sulfate (IRON 325) tablet 325 mg  325 mg Oral Daily with breakfast    metoprolol succinate (TOPROL XL) extended release tablet 25 mg  25 mg Oral Daily    sodium chloride flush 0.9 % injection 5-40 mL  5-40 mL IntraVENous 2 times per day    sodium chloride flush 0.9 % injection 5-40 mL  5-40 mL IntraVENous PRN    0.9 % sodium chloride infusion   IntraVENous PRN    potassium chloride (KLOR-CON M) extended release tablet 40 mEq  40 mEq Oral PRN    Or    potassium bicarb-citric acid (EFFER-K) effervescent tablet 40 mEq  40 mEq Oral PRN    Or    potassium chloride 10 mEq/100 mL IVPB (Peripheral Line)  10 mEq IntraVENous PRN    magnesium sulfate 2000 mg in 50 mL IVPB premix  2,000 mg IntraVENous PRN    ondansetron (ZOFRAN-ODT) disintegrating tablet 4 mg  4 mg Oral Q8H PRN    Or    ondansetron (ZOFRAN) injection 4 mg  4 mg IntraVENous Q6H PRN    polyethylene glycol (GLYCOLAX) packet 17 g  17 g Oral Daily 
   potassium chloride (KLOR-CON M) extended release tablet 40 mEq  40 mEq Oral PRN    Or    potassium bicarb-citric acid (EFFER-K) effervescent tablet 40 mEq  40 mEq Oral PRN    Or    potassium chloride 10 mEq/100 mL IVPB (Peripheral Line)  10 mEq IntraVENous PRN    magnesium sulfate 2000 mg in 50 mL IVPB premix  2,000 mg IntraVENous PRN    ondansetron (ZOFRAN-ODT) disintegrating tablet 4 mg  4 mg Oral Q8H PRN    Or    ondansetron (ZOFRAN) injection 4 mg  4 mg IntraVENous Q6H PRN    polyethylene glycol (GLYCOLAX) packet 17 g  17 g Oral Daily PRN    acetaminophen (TYLENOL) tablet 650 mg  650 mg Oral Q6H PRN    Or    acetaminophen (TYLENOL) suppository 650 mg  650 mg Rectal Q6H PRN         Intake/Output Summary (Last 24 hours) at 6/14/2024 1143  Last data filed at 6/14/2024 0835  Gross per 24 hour   Intake 840 ml   Output 1200 ml   Net -360 ml       Physical Exam:  General:  alert, appears stated age, and cooperative  Neck:  no JVD  Lungs:  clear to auscultation bilaterally  Heart:  regular rate and rhythm; 3/6 systolic murmur  Abdomen:  abdomen is soft without significant tenderness, masses, organomegaly or guarding  Extremities:  extremities normal, atraumatic, no cyanosis or edema    Visit Vitals  /70   Pulse 54   Temp 98.2 °F (36.8 °C) (Oral)   Resp 18   Ht 1.854 m (6' 0.99\")   Wt 75.6 kg (166 lb 10.7 oz)   SpO2 100%   BMI 21.99 kg/m²       Data Review:     Labs: Results:       Chemistry Recent Labs     06/12/24  0242 06/14/24  0250    141   K 3.7 3.9   * 113*   CO2 20* 21   BUN 6* 5*      CBC w/Diff Recent Labs     06/12/24  0242 06/14/24  0250   WBC 12.3 8.1   RBC 3.65* 3.33*   HGB 8.7* 8.0*   HCT 28.3* 25.7*   * 426*      Cardiac Enzymes Lab Results   Component Value Date/Time    TROPHS 17 06/08/2024 09:39 PM      Coagulation No results for input(s): \"INR\", \"APTT\" in the last 72 hours.    Lipid Panel Lab Results   Component Value Date/Time    CHOL 188 08/16/2023 12:00 AM    HDL 38 
results found for: \"BNP\"   Liver Enzymes Lab Results   Component Value Date    ALT 13 (L) 06/08/2024    AST 11 06/08/2024    ALKPHOS 58 06/08/2024    BILITOT 1.8 (H) 06/08/2024      Thyroid Studies No results found for: \"T4\", \"T3RU\", \"TSH\"       Signed By: Latoya Veras, APRN - NP     June 13, 2024        
    ___________________________________________________    *Medications reviewed:  Current Facility-Administered Medications   Medication Dose Route Frequency    dextrose 5 % and 0.9 % sodium chloride infusion   IntraVENous Continuous    aspirin chewable tablet 81 mg  81 mg Oral Daily    lactated ringers IV soln infusion   IntraVENous Continuous    potassium chloride (KLOR-CON M) extended release tablet 20 mEq  20 mEq Oral TID    0.9 % sodium chloride infusion   IntraVENous PRN    atorvastatin (LIPITOR) tablet 40 mg  40 mg Oral Nightly    ferrous sulfate (IRON 325) tablet 325 mg  325 mg Oral Daily with breakfast    metoprolol succinate (TOPROL XL) extended release tablet 25 mg  25 mg Oral Daily    sodium chloride flush 0.9 % injection 5-40 mL  5-40 mL IntraVENous 2 times per day    sodium chloride flush 0.9 % injection 5-40 mL  5-40 mL IntraVENous PRN    0.9 % sodium chloride infusion   IntraVENous PRN    potassium chloride (KLOR-CON M) extended release tablet 40 mEq  40 mEq Oral PRN    Or    potassium bicarb-citric acid (EFFER-K) effervescent tablet 40 mEq  40 mEq Oral PRN    Or    potassium chloride 10 mEq/100 mL IVPB (Peripheral Line)  10 mEq IntraVENous PRN    magnesium sulfate 2000 mg in 50 mL IVPB premix  2,000 mg IntraVENous PRN    ondansetron (ZOFRAN-ODT) disintegrating tablet 4 mg  4 mg Oral Q8H PRN    Or    ondansetron (ZOFRAN) injection 4 mg  4 mg IntraVENous Q6H PRN    polyethylene glycol (GLYCOLAX) packet 17 g  17 g Oral Daily PRN    acetaminophen (TYLENOL) tablet 650 mg  650 mg Oral Q6H PRN    Or    acetaminophen (TYLENOL) suppository 650 mg  650 mg Rectal Q6H PRN    pantoprazole (PROTONIX) 40 mg in sodium chloride (PF) 0.9 % 10 mL injection  40 mg IntraVENous Q12H    0.9 % sodium chloride infusion   IntraVENous PRN      
IntraVENous Q12H    0.9 % sodium chloride infusion   IntraVENous PRN

## 2024-06-16 NOTE — DISCHARGE INSTRUCTIONS
DISCHARGE SUMMARY from Nurse    PATIENT INSTRUCTIONS:    After general anesthesia or intravenous sedation, for 24 hours or while taking prescription Narcotics:  Limit your activities  Do not drive and operate hazardous machinery  Do not make important personal or business decisions  Do  not drink alcoholic beverages  If you have not urinated within 8 hours after discharge, please contact your surgeon on call.    Report the following to your surgeon:  Excessive pain, swelling, redness or odor of or around the surgical area  Temperature over 100.5  Nausea and vomiting lasting longer than 4 hours or if unable to take medications  Any signs of decreased circulation or nerve impairment to extremity: change in color, persistent  numbness, tingling, coldness or increase pain  Any questions    What to do at Home:  Recommended activity: activity as tolerated.    If you experience any of the following symptoms dizziness , fatigue, light headedness and fast heart rate, please follow up with your Doctor.    *  Please give a list of your current medications to your Primary Care Provider.    *  Please update this list whenever your medications are discontinued, doses are      changed, or new medications (including over-the-counter products) are added.    *  Please carry medication information at all times in case of emergency situations.    These are general instructions for a healthy lifestyle:    No smoking/ No tobacco products/ Avoid exposure to second hand smoke  Surgeon General's Warning:  Quitting smoking now greatly reduces serious risk to your health.    Obesity, smoking, and sedentary lifestyle greatly increases your risk for illness    A healthy diet, regular physical exercise & weight monitoring are important for maintaining a healthy lifestyle    You may be retaining fluid if you have a history of heart failure or if you experience any of the following symptoms:  Weight gain of 3 pounds or more overnight or 5 pounds

## 2024-06-16 NOTE — DISCHARGE SUMMARY
Hospitalist Discharge Summary      Patient: Gigi Mcneal MRN: [id number]  CSN: [id number]    YOB: 1960  Age: 64 y.o.  Sex: male    DOA: 6/8/2024 LOS:  LOS: 8 days   Discharge Date:     Admission Diagnoses: Symptomatic anemia [D64.9]    Discharge Diagnoses:    Sigmoid mass status post biopsy  Severe symptomatic anemia status post PRBC transfusion  Lower GI bleed  History of coronary artery disease status post drug-eluting stent placement x 2 3/24  Chronic diastolic CHF    Discharge Condition: Fair    Discharge To: Home    CODE STATUS: Full Code         PHYSICAL EXAM  Visit Vitals  /62   Pulse 72   Temp 98.2 °F (36.8 °C) (Oral)   Resp 20   Ht 1.854 m (6' 0.99\")   Wt 75.6 kg (166 lb 10.7 oz)   SpO2 100%   BMI 22.00 kg/m²       General: Alert, cooperative, no acute distress    Lungs:  CTA Bilaterally. No Wheezing/Rhonchi/Rales.  Heart:  Regular rate and Rhythm.  Abdomen: Soft, Non distended, Non tender. + Bowel sounds.  Extremities: No edema/ cyanosis/ clubbing  Neurologic:  AA oriented X 3. Moves all extremities.                                HPI:     Gigi Mcneal is a 64 y.o. male with a past medical history significant for CAD status post DE stent placement 3/28/2024, CHF with reduced EF, GI bleed and anemia who presented to the ED at [facility] with the above chief complaint.  History is gathered from the patient and after reviewing medical records. Patient was admitted here 3/23-3/30 for acute hypoxic respiratory failure secondary to pneumonia versus acute heart failure with reduced EF and underwent drug-eluting stent placement on 3/28/2024 on dual antiplatelet with aspirin and Brilinta. Patient had 2 episodes of GI bleeding and was seen and evaluated by GI. Bleeding stopped by its own no endoscopic interventions performed. Patient was admitted again at [facility] 5/25-5/27 for evaluation of GI bleed and dizziness and noted to have hemoglobin of 6.2. He was

## 2024-06-16 NOTE — PLAN OF CARE
Problem: Chronic Conditions and Co-morbidities  Goal: Patient's chronic conditions and co-morbidity symptoms are monitored and maintained or improved  6/16/2024 1656 by Robinson Lopez RN  Outcome: Adequate for Discharge  6/16/2024 1020 by Robinson Lopez RN  Outcome: Progressing  Flowsheets (Taken 6/16/2024 0800)  Care Plan - Patient's Chronic Conditions and Co-Morbidity Symptoms are Monitored and Maintained or Improved: Monitor and assess patient's chronic conditions and comorbid symptoms for stability, deterioration, or improvement     Problem: Pain  Goal: Verbalizes/displays adequate comfort level or baseline comfort level  6/16/2024 1656 by Robinson Lopez RN  Outcome: Adequate for Discharge  6/16/2024 1020 by Robinson Lopez RN  Outcome: Progressing     Problem: Safety - Adult  Goal: Free from fall injury  6/16/2024 1656 by Robinson Lopez RN  Outcome: Adequate for Discharge  6/16/2024 1020 by Robinson Lopez RN  Outcome: Progressing  Flowsheets (Taken 6/16/2024 0800)  Free From Fall Injury: Instruct family/caregiver on patient safety     Problem: Nutrition Deficit:  Goal: Optimize nutritional status  6/16/2024 1656 by Robinson Lopez RN  Outcome: Adequate for Discharge  6/16/2024 1020 by Robinson Lopez RN  Outcome: Progressing

## 2024-06-16 NOTE — PLAN OF CARE
Problem: Chronic Conditions and Co-morbidities  Goal: Patient's chronic conditions and co-morbidity symptoms are monitored and maintained or improved  Outcome: Progressing  Flowsheets (Taken 6/16/2024 0800)  Care Plan - Patient's Chronic Conditions and Co-Morbidity Symptoms are Monitored and Maintained or Improved: Monitor and assess patient's chronic conditions and comorbid symptoms for stability, deterioration, or improvement     Problem: Pain  Goal: Verbalizes/displays adequate comfort level or baseline comfort level  Outcome: Progressing     Problem: Safety - Adult  Goal: Free from fall injury  Outcome: Progressing  Flowsheets (Taken 6/16/2024 0800)  Free From Fall Injury: Instruct family/caregiver on patient safety     Problem: Nutrition Deficit:  Goal: Optimize nutritional status  Outcome: Progressing

## 2024-06-17 ENCOUNTER — TELEPHONE (OUTPATIENT)
Facility: CLINIC | Age: 64
End: 2024-06-17

## 2024-06-18 NOTE — PROGRESS NOTES
changes were  made today.     Time:   30 minutes (review of hospital records/providing suppport)    He will follow-up with Dr. Guaman as scheduled and as needed.      Afsaneh Fowler MSN, FNP-BC    Please note:  Portions of this chart were created with Dragon medical speech to text program.  Unrecognized errors may be present.

## 2024-06-19 ENCOUNTER — OFFICE VISIT (OUTPATIENT)
Age: 64
End: 2024-06-19
Payer: MEDICAID

## 2024-06-19 VITALS
DIASTOLIC BLOOD PRESSURE: 62 MMHG | HEIGHT: 73 IN | SYSTOLIC BLOOD PRESSURE: 104 MMHG | WEIGHT: 166.6 LBS | HEART RATE: 66 BPM | BODY MASS INDEX: 22.08 KG/M2 | OXYGEN SATURATION: 100 %

## 2024-06-19 DIAGNOSIS — E78.5 HYPERLIPIDEMIA, UNSPECIFIED HYPERLIPIDEMIA TYPE: ICD-10-CM

## 2024-06-19 DIAGNOSIS — I10 ESSENTIAL (PRIMARY) HYPERTENSION: Primary | ICD-10-CM

## 2024-06-19 DIAGNOSIS — D64.9 ANEMIA, UNSPECIFIED TYPE: ICD-10-CM

## 2024-06-19 DIAGNOSIS — I25.10 CORONARY ARTERY DISEASE INVOLVING NATIVE CORONARY ARTERY OF NATIVE HEART WITHOUT ANGINA PECTORIS: ICD-10-CM

## 2024-06-19 DIAGNOSIS — I42.9 CARDIOMYOPATHY, UNSPECIFIED TYPE (HCC): ICD-10-CM

## 2024-06-19 PROCEDURE — 3074F SYST BP LT 130 MM HG: CPT | Performed by: NURSE PRACTITIONER

## 2024-06-19 PROCEDURE — 99214 OFFICE O/P EST MOD 30 MIN: CPT | Performed by: NURSE PRACTITIONER

## 2024-06-19 PROCEDURE — 3078F DIAST BP <80 MM HG: CPT | Performed by: NURSE PRACTITIONER

## 2024-06-19 PROCEDURE — 1123F ACP DISCUSS/DSCN MKR DOCD: CPT | Performed by: NURSE PRACTITIONER

## 2024-06-19 ASSESSMENT — ENCOUNTER SYMPTOMS
CHEST TIGHTNESS: 0
CONSTIPATION: 0
BLOOD IN STOOL: 0
COUGH: 0
VOMITING: 0
SHORTNESS OF BREATH: 0
WHEEZING: 0
NAUSEA: 0
ABDOMINAL DISTENTION: 0
DIARRHEA: 0

## 2024-06-20 ENCOUNTER — OFFICE VISIT (OUTPATIENT)
Facility: CLINIC | Age: 64
End: 2024-06-20
Payer: MEDICAID

## 2024-06-20 VITALS
WEIGHT: 168.3 LBS | SYSTOLIC BLOOD PRESSURE: 102 MMHG | OXYGEN SATURATION: 99 % | TEMPERATURE: 97.5 F | BODY MASS INDEX: 22.31 KG/M2 | HEIGHT: 73 IN | DIASTOLIC BLOOD PRESSURE: 63 MMHG | HEART RATE: 53 BPM | RESPIRATION RATE: 18 BRPM

## 2024-06-20 DIAGNOSIS — C18.9 ADENOCARCINOMA OF COLON (HCC): ICD-10-CM

## 2024-06-20 DIAGNOSIS — Z09 HOSPITAL DISCHARGE FOLLOW-UP: Primary | ICD-10-CM

## 2024-06-20 DIAGNOSIS — I25.10 CORONARY ARTERY DISEASE INVOLVING NATIVE CORONARY ARTERY OF NATIVE HEART WITHOUT ANGINA PECTORIS: ICD-10-CM

## 2024-06-20 DIAGNOSIS — I21.4 NSTEMI (NON-ST ELEVATED MYOCARDIAL INFARCTION) (HCC): ICD-10-CM

## 2024-06-20 DIAGNOSIS — I50.20 HFREF (HEART FAILURE WITH REDUCED EJECTION FRACTION) (HCC): ICD-10-CM

## 2024-06-20 DIAGNOSIS — D64.9 SYMPTOMATIC ANEMIA: ICD-10-CM

## 2024-06-20 PROBLEM — J96.01 ACUTE HYPOXEMIC RESPIRATORY FAILURE (HCC): Status: RESOLVED | Noted: 2024-03-23 | Resolved: 2024-06-20

## 2024-06-20 PROBLEM — J18.9 MULTIFOCAL PNEUMONIA: Status: RESOLVED | Noted: 2024-03-23 | Resolved: 2024-06-20

## 2024-06-20 PROCEDURE — 1111F DSCHRG MED/CURRENT MED MERGE: CPT | Performed by: STUDENT IN AN ORGANIZED HEALTH CARE EDUCATION/TRAINING PROGRAM

## 2024-06-20 PROCEDURE — 99215 OFFICE O/P EST HI 40 MIN: CPT | Performed by: STUDENT IN AN ORGANIZED HEALTH CARE EDUCATION/TRAINING PROGRAM

## 2024-06-20 NOTE — PROGRESS NOTES
Post-Discharge Transitional Care  Follow Up      Gigi Mcneal   YOB: 1960    Date of Office Visit:  6/20/2024  Date of Hospital Admission: 6/8/24  Date of Hospital Discharge: 6/16/24  Risk of hospital readmission (high >=14%. Medium >=10%) :Readmission Risk Score: 25      Care management risk score Rising risk (score 2-5) and Complex Care (Scores >=6): No Risk Score On File     Non face to face  following discharge, date last encounter closed (first attempt may have been earlier): 06/17/2024    Call initiated 2 business days of discharge: Yes    ASSESSMENT/PLAN:   Hospital discharge follow-up  -     [place] DISCHARGE MEDS RECONCILED W/ CURRENT OUTPATIENT MED LIST  Coronary artery disease involving native coronary artery of native heart without angina pectoris  HFrEF (heart failure with reduced ejection fraction) (HCC)  NSTEMI (non-ST elevated myocardial infarction) (HCC)  Symptomatic anemia  Adenocarcinoma of colon (HCC)    Recent hospitalization for acute symptomatic anemia secondary to GI bleed resulted in colonoscopy showing large colonic mass.  Biopsy was consistent with adenocarcinoma.  He does not have the pathology results yet.  I asked him today if he would like to discuss the results and confirmed that I did have the pathology results available.  He declined to go over the diagnosis today and would like to defer the conversation until he sees Dr. Banuelos on 7/1/24.     Medical Decision Making: high complexity  Follow-up with [facility] rectal surgeon  Follow-up with cardiology         Subjective:   HPI:  Follow up of Hospital problems/diagnosis(es): see diagnoses above    Inpatient course: Discharge summary reviewed- see chart.    Interval history/Current status:   Patient here for hospital follow-up.  He was called on 6/17/2024 within the 2-day window.    Since last visit, he had a cardiology follow-up on 5/21/2024 was follow-up after his admission to [facility] in March 2024 for

## 2024-06-20 NOTE — PROGRESS NOTES
Gigi Mcneal presents today for   Chief Complaint   Patient presents with    Follow-up       Is someone accompanying this pt? no    Is the patient using any DME equipment during OV? no    Health Maintenance Due   Topic Date Due    COVID-19 Vaccine (1) Never done    Pneumococcal 65+ years Vaccine (1 of 2 - PCV) Never done    Diabetic foot exam  Never done    Diabetic Alb to Cr ratio (uACR) test  Never done    Diabetic retinal exam  Never done    DTaP/Tdap/Td vaccine (1 - Tdap) Never done    Shingles vaccine (1 of 2) Never done    Respiratory Syncytial Virus (RSV) Pregnant or age 60 yrs+ (1 - 1-dose 60+ series) Never done       \"Have you been to the ER, urgent care clinic since your last visit?  Hospitalized since your last visit?\"    YES - When: approximately 6 days ago.  Where and Why: [facility] .    “Have you seen or consulted any other health care providers outside of [facility] since your last visit?”    NO

## 2024-06-21 ENCOUNTER — TELEPHONE (OUTPATIENT)
Age: 64
End: 2024-06-21

## 2024-06-21 NOTE — TELEPHONE ENCOUNTER
I called and left the patient a message to call us back for results. I'm sending the patient a letter.

## 2024-07-01 ENCOUNTER — OFFICE VISIT (OUTPATIENT)
Age: 64
End: 2024-07-01
Payer: MEDICAID

## 2024-07-01 VITALS
SYSTOLIC BLOOD PRESSURE: 110 MMHG | DIASTOLIC BLOOD PRESSURE: 64 MMHG | BODY MASS INDEX: 23.33 KG/M2 | TEMPERATURE: 97.5 F | RESPIRATION RATE: 16 BRPM | HEART RATE: 65 BPM | OXYGEN SATURATION: 100 % | WEIGHT: 176 LBS | HEIGHT: 73 IN

## 2024-07-01 DIAGNOSIS — C18.7 MALIGNANT NEOPLASM OF SIGMOID COLON (HCC): Primary | ICD-10-CM

## 2024-07-01 PROCEDURE — 99214 OFFICE O/P EST MOD 30 MIN: CPT | Performed by: COLON & RECTAL SURGERY

## 2024-07-01 PROCEDURE — 1123F ACP DISCUSS/DSCN MKR DOCD: CPT | Performed by: COLON & RECTAL SURGERY

## 2024-07-01 NOTE — PROGRESS NOTES
Subjective: Minimal amounts of blood with bowel movements.  Denies any dizziness or fatigue.    Past medical history and ROS were reviewed and unchanged.     Abdomen: Soft, nontender nondistended    Pathology consistent with adenocarcinoma  CT chest abdomen and pelvis negative for metastatic disease    Assessment / Plan    Sigmoid colon cancer  Coronary artery disease status post drug-eluting stents March 2024 on dual antiplatelet therapy  Currently essentially asymptomatic  Told to call if he feels dizzy or weak and we will recheck hemoglobin  Will discuss timing of surgery with cardiology  Discussed robotic sigmoid colectomy in detail with the patient and his daughter  Risks delineated    30 minutes was spent in patient care.      The diagnoses and plan were discussed with patient.  All questions answered.  Plan of care agreed to by all concerned.

## 2024-07-08 ENCOUNTER — CLINICAL DOCUMENTATION (OUTPATIENT)
Age: 64
End: 2024-07-08

## 2024-07-08 RX ORDER — PANTOPRAZOLE SODIUM 40 MG/1
40 TABLET, DELAYED RELEASE ORAL DAILY
Qty: 90 TABLET | Refills: 3 | Status: SHIPPED | OUTPATIENT
Start: 2024-07-08

## 2024-07-08 NOTE — TELEPHONE ENCOUNTER
Patient called asking if pcp would be willing to send in a new script. Patient was given medication when he went to the ed on 5/25/2024. He is needing a new script to be sent in taking 1 tablet by mouth once daily because that is all that insurance will cover for.     Patient asking if he can be called back if pcp is willing to refill prescription for him at [phone].

## 2024-07-08 NOTE — PROGRESS NOTES
Case discussed with Afsaneh Fowler.  Per Dr. Guaman the earliest the patient can come off of his dual antiplatelet therapy is September 28, 6 months after his stent placement.  I called the patient and asked him to follow-up with me in the office in the beginning of September so we can plan for surgery in October.  Patient will not require any form of bridging therapy.  We will of course try to get him back on dual antiplatelet therapy as soon as possible after surgery.

## 2024-07-12 ENCOUNTER — OFFICE VISIT (OUTPATIENT)
Facility: CLINIC | Age: 64
End: 2024-07-12
Payer: MEDICAID

## 2024-07-12 VITALS
BODY MASS INDEX: 23.95 KG/M2 | RESPIRATION RATE: 16 BRPM | SYSTOLIC BLOOD PRESSURE: 125 MMHG | DIASTOLIC BLOOD PRESSURE: 72 MMHG | WEIGHT: 180.7 LBS | HEIGHT: 73 IN | OXYGEN SATURATION: 99 % | HEART RATE: 52 BPM | TEMPERATURE: 98 F

## 2024-07-12 DIAGNOSIS — D50.0 IRON DEFICIENCY ANEMIA DUE TO CHRONIC BLOOD LOSS: ICD-10-CM

## 2024-07-12 DIAGNOSIS — C18.7 ADENOCARCINOMA OF SIGMOID COLON (HCC): ICD-10-CM

## 2024-07-12 DIAGNOSIS — I25.10 CORONARY ARTERY DISEASE INVOLVING NATIVE CORONARY ARTERY OF NATIVE HEART WITHOUT ANGINA PECTORIS: ICD-10-CM

## 2024-07-12 DIAGNOSIS — E11.59 TYPE 2 DIABETES MELLITUS WITH OTHER CIRCULATORY COMPLICATION, WITHOUT LONG-TERM CURRENT USE OF INSULIN (HCC): Primary | ICD-10-CM

## 2024-07-12 PROBLEM — D64.9 SYMPTOMATIC ANEMIA: Status: RESOLVED | Noted: 2024-05-25 | Resolved: 2024-07-12

## 2024-07-12 PROBLEM — E11.9 DIABETES (HCC): Status: RESOLVED | Noted: 2024-05-26 | Resolved: 2024-07-12

## 2024-07-12 PROBLEM — D64.9 ANEMIA: Status: RESOLVED | Noted: 2024-03-26 | Resolved: 2024-07-12

## 2024-07-12 LAB — HBA1C MFR BLD: 4.5 %

## 2024-07-12 PROCEDURE — 83036 HEMOGLOBIN GLYCOSYLATED A1C: CPT | Performed by: STUDENT IN AN ORGANIZED HEALTH CARE EDUCATION/TRAINING PROGRAM

## 2024-07-12 PROCEDURE — 1123F ACP DISCUSS/DSCN MKR DOCD: CPT | Performed by: STUDENT IN AN ORGANIZED HEALTH CARE EDUCATION/TRAINING PROGRAM

## 2024-07-12 PROCEDURE — 99214 OFFICE O/P EST MOD 30 MIN: CPT | Performed by: STUDENT IN AN ORGANIZED HEALTH CARE EDUCATION/TRAINING PROGRAM

## 2024-07-12 RX ORDER — FUROSEMIDE 20 MG/1
20 TABLET ORAL DAILY
COMMUNITY
Start: 2024-07-08 | End: 2024-07-12

## 2024-07-12 NOTE — PROGRESS NOTES
Gigi Mcneal presents today for   Chief Complaint   Patient presents with    Follow-up     3 mo follow up       Is someone accompanying this pt? no    Is the patient using any DME equipment during OV? no    Health Maintenance Due   Topic Date Due    COVID-19 Vaccine (1) Never done    Pneumococcal 65+ years Vaccine (1 of 2 - PCV) Never done    Diabetic foot exam  Never done    Diabetic Alb to Cr ratio (uACR) test  Never done    Diabetic retinal exam  Never done    DTaP/Tdap/Td vaccine (1 - Tdap) Never done    Shingles vaccine (1 of 2) Never done    Respiratory Syncytial Virus (RSV) Pregnant or age 60 yrs+ (1 - 1-dose 60+ series) Never done       \"Have you been to the ER, urgent care clinic since your last visit?  Hospitalized since your last visit?\"    NO    “Have you seen or consulted any other health care providers outside of [facility] since your last visit?”    NO

## 2024-07-12 NOTE — PROGRESS NOTES
Fingerstick for HBa1C done in first finger by Jessa Aguilar MA per order of  Kamlesh Biggs MD  after cleaning area with alcohol wipe.  Patient tolerated procedure well.

## 2024-07-12 NOTE — PROGRESS NOTES
Subjective:   Gigi Mcneal is a 64 y.o. male who was seen for Follow-up (3 mo follow up)    Patient recently saw Dr. Banuelos and discussed course of care for adenocarcinoma of the colon.  Plan to perform surgery end of September as he is on dual antiplatelet therapy for recent stent placement.  That would be the time when he has been on dual antiplatelet therapy for at least 6 months.  He is post follow-up with colorectal surgeon at that time to schedule surgery.    Prior to Admission medications    Medication Sig Start Date End Date Taking? Authorizing Provider   pantoprazole (PROTONIX) 40 MG tablet Take 1 tablet by mouth daily 24  Yes Kamlesh Biggs MD   clopidogrel (PLAVIX) 75 MG tablet Take 1 tablet by mouth daily 24  Yes Dallas Castillo MD   ferrous sulfate (IRON 325) 325 (65 Fe) MG tablet Take 1 tablet by mouth daily (with breakfast) 24  Yes Kamlesh Biggs MD   aspirin 81 MG chewable tablet Take 1 tablet by mouth daily 3/31/24  Yes Asher Magallanes MD   atorvastatin (LIPITOR) 40 MG tablet Take 1 tablet by mouth nightly 3/30/24  Yes Asher Magallanes MD   metoprolol succinate (TOPROL XL) 25 MG extended release tablet Take 1 tablet by mouth daily 3/31/24  Yes Asher Magallanes MD     No Known Allergies  Social History     Tobacco Use    Smoking status: Former     Current packs/day: 0.00     Average packs/day: 0.3 packs/day for 19.0 years (4.8 ttl pk-yrs)     Types: Cigarettes     Start date:      Quit date:      Years since quittin.5    Smokeless tobacco: Never   Vaping Use    Vaping Use: Never used   Substance Use Topics    Alcohol use: Never    Drug use: Not Currently        Review of Systems   As noted above in HPI.      Objective:   /72 (Site: Left Upper Arm, Position: Sitting, Cuff Size: Medium Adult)   Pulse 52   Temp 98 °F (36.7 °C) (Temporal)   Resp 16   Ht 1.854 m (6' 1\")   Wt 82 kg (180 lb 11.2 oz)   SpO2 99%   BMI 23.84 kg/m²      General: alert,

## 2024-08-02 ENCOUNTER — APPOINTMENT (OUTPATIENT)
Age: 64
End: 2024-08-02
Payer: MEDICAID

## 2024-08-02 ENCOUNTER — HOSPITAL ENCOUNTER (INPATIENT)
Facility: HOSPITAL | Age: 64
LOS: 4 days | Discharge: HOME | DRG: 248 | End: 2024-08-06
Attending: INTERNAL MEDICINE | Admitting: HEALTH CARE PROVIDER
Payer: MEDICAID

## 2024-08-02 ENCOUNTER — HOSPITAL ENCOUNTER (EMERGENCY)
Age: 64
Discharge: ANOTHER HOSPITAL | End: 2024-08-02
Attending: EMERGENCY MEDICINE
Payer: MEDICAID

## 2024-08-02 VITALS
TEMPERATURE: 99.1 F | SYSTOLIC BLOOD PRESSURE: 106 MMHG | DIASTOLIC BLOOD PRESSURE: 72 MMHG | BODY MASS INDEX: 23.72 KG/M2 | OXYGEN SATURATION: 100 % | HEIGHT: 73 IN | WEIGHT: 179 LBS | HEART RATE: 110 BPM | RESPIRATION RATE: 20 BRPM

## 2024-08-02 DIAGNOSIS — K35.32 ACUTE PERFORATED APPENDICITIS: Primary | ICD-10-CM

## 2024-08-02 DIAGNOSIS — Z79.02 ANTIPLATELET OR ANTITHROMBOTIC LONG-TERM USE: ICD-10-CM

## 2024-08-02 DIAGNOSIS — I25.799 CORONARY ARTERY DISEASE INVOLVING OTHER CORONARY ARTERY BYPASS GRAFT WITH ANGINA PECTORIS (HCC): ICD-10-CM

## 2024-08-02 DIAGNOSIS — C18.7 MALIGNANT NEOPLASM OF SIGMOID COLON (HCC): ICD-10-CM

## 2024-08-02 LAB
ALBUMIN SERPL-MCNC: 3 G/DL (ref 3.4–5)
ALBUMIN/GLOB SERPL: 0.7 (ref 0.8–1.7)
ALP SERPL-CCNC: 78 U/L (ref 45–117)
ALT SERPL-CCNC: 11 U/L (ref 16–61)
ANION GAP SERPL CALC-SCNC: 15 MMOL/L (ref 3–18)
APPEARANCE UR: CLEAR
AST SERPL W P-5'-P-CCNC: 21 U/L (ref 10–38)
BACTERIA URNS QL MICRO: ABNORMAL /HPF
BASOPHILS # BLD: 0 K/UL (ref 0–0.1)
BASOPHILS NFR BLD: 0 % (ref 0–2)
BILIRUB SERPL-MCNC: 1.1 MG/DL (ref 0.2–1)
BILIRUB UR QL: NEGATIVE
BUN SERPL-MCNC: 12 MG/DL (ref 7–18)
BUN/CREAT SERPL: 13 (ref 12–20)
CA-I BLD-MCNC: 9.2 MG/DL (ref 8.5–10.1)
CHLORIDE SERPL-SCNC: 102 MMOL/L (ref 100–111)
CO2 SERPL-SCNC: 22 MMOL/L (ref 21–32)
COLOR UR: ABNORMAL
CREAT SERPL-MCNC: 0.9 MG/DL (ref 0.6–1.3)
DIFFERENTIAL METHOD BLD: ABNORMAL
EOSINOPHIL # BLD: 0 K/UL (ref 0–0.4)
EOSINOPHIL NFR BLD: 0 % (ref 0–5)
ERYTHROCYTE [DISTWIDTH] IN BLOOD BY AUTOMATED COUNT: 15.5 % (ref 11.6–14.5)
GLOBULIN SER CALC-MCNC: 4.3 G/DL (ref 2–4)
GLUCOSE SERPL-MCNC: 110 MG/DL (ref 74–99)
GLUCOSE UR STRIP.AUTO-MCNC: NEGATIVE MG/DL
GRAN CASTS URNS QL MICRO: ABNORMAL /LPF
HCT VFR BLD AUTO: 35.3 % (ref 36–48)
HGB BLD-MCNC: 10.9 G/DL (ref 13–16)
HGB UR QL STRIP: NEGATIVE
IMM GRANULOCYTES # BLD AUTO: 0 K/UL
IMM GRANULOCYTES NFR BLD AUTO: 0 %
KETONES UR QL STRIP.AUTO: 15 MG/DL
LEUKOCYTE ESTERASE UR QL STRIP.AUTO: NEGATIVE
LIPASE SERPL-CCNC: 20 U/L (ref 13–75)
LYMPHOCYTES # BLD: 3 K/UL (ref 0.9–3.6)
LYMPHOCYTES NFR BLD: 29 % (ref 21–52)
MCH RBC QN AUTO: 24.1 PG (ref 24–34)
MCHC RBC AUTO-ENTMCNC: 30.9 G/DL (ref 31–37)
MCV RBC AUTO: 78.1 FL (ref 78–100)
MONOCYTES # BLD: 0.5 K/UL (ref 0.05–1.2)
MONOCYTES NFR BLD: 5 % (ref 3–10)
NEUTS BAND NFR BLD MANUAL: 6 % (ref 0–5)
NEUTS SEG # BLD: 6.5 K/UL (ref 1.8–8)
NEUTS SEG NFR BLD: 56 % (ref 40–73)
NITRITE UR QL STRIP.AUTO: NEGATIVE
NRBC # BLD: 0 K/UL (ref 0–0.01)
NRBC BLD-RTO: 0 PER 100 WBC
OTHER CELLS NFR BLD MANUAL: 4 %
PH UR STRIP: 5.5 (ref 5–8)
PLATELET # BLD AUTO: 434 K/UL (ref 135–420)
PMV BLD AUTO: 9.7 FL (ref 9.2–11.8)
POTASSIUM SERPL-SCNC: 3.3 MMOL/L (ref 3.5–5.5)
PROT SERPL-MCNC: 7.3 G/DL (ref 6.4–8.2)
PROT UR STRIP-MCNC: 100 MG/DL
RBC # BLD AUTO: 4.52 M/UL (ref 4.35–5.65)
RBC #/AREA URNS HPF: ABNORMAL /HPF (ref 0–2)
RBC MORPH BLD: ABNORMAL
RBC MORPH BLD: ABNORMAL
SODIUM SERPL-SCNC: 139 MMOL/L (ref 136–145)
SP GR UR REFRACTOMETRY: >1.03 (ref 1–1.03)
UROBILINOGEN UR QL STRIP.AUTO: 1 EU/DL (ref 0.2–1)
WBC # BLD AUTO: 10.5 K/UL (ref 4.6–13.2)
WBC URNS QL MICRO: ABNORMAL /HPF (ref 0–4)

## 2024-08-02 PROCEDURE — 80053 COMPREHEN METABOLIC PANEL: CPT

## 2024-08-02 PROCEDURE — 6360000002 HC RX W HCPCS: Performed by: EMERGENCY MEDICINE

## 2024-08-02 PROCEDURE — 74177 CT ABD & PELVIS W/CONTRAST: CPT

## 2024-08-02 PROCEDURE — 96375 TX/PRO/DX INJ NEW DRUG ADDON: CPT

## 2024-08-02 PROCEDURE — 2140000001 HC CVICU INTERMEDIATE R&B

## 2024-08-02 PROCEDURE — 6360000004 HC RX CONTRAST MEDICATION: Performed by: EMERGENCY MEDICINE

## 2024-08-02 PROCEDURE — 2580000003 HC RX 258: Performed by: EMERGENCY MEDICINE

## 2024-08-02 PROCEDURE — 96365 THER/PROPH/DIAG IV INF INIT: CPT

## 2024-08-02 PROCEDURE — 99285 EMERGENCY DEPT VISIT HI MDM: CPT

## 2024-08-02 PROCEDURE — 85025 COMPLETE CBC W/AUTO DIFF WBC: CPT

## 2024-08-02 PROCEDURE — 83690 ASSAY OF LIPASE: CPT

## 2024-08-02 PROCEDURE — 81001 URINALYSIS AUTO W/SCOPE: CPT

## 2024-08-02 RX ORDER — ONDANSETRON 2 MG/ML
4 INJECTION INTRAMUSCULAR; INTRAVENOUS
Status: COMPLETED | OUTPATIENT
Start: 2024-08-02 | End: 2024-08-02

## 2024-08-02 RX ORDER — MORPHINE SULFATE 2 MG/ML
2 INJECTION, SOLUTION INTRAMUSCULAR; INTRAVENOUS
Status: COMPLETED | OUTPATIENT
Start: 2024-08-02 | End: 2024-08-02

## 2024-08-02 RX ADMIN — MORPHINE SULFATE 2 MG: 2 INJECTION, SOLUTION INTRAMUSCULAR; INTRAVENOUS at 17:24

## 2024-08-02 RX ADMIN — PIPERACILLIN AND TAZOBACTAM 4500 MG: 4; .5 INJECTION, POWDER, LYOPHILIZED, FOR SOLUTION INTRAVENOUS at 17:27

## 2024-08-02 RX ADMIN — DIATRIZOATE MEGLUMINE AND DIATRIZOATE SODIUM 30 ML: 660; 100 LIQUID ORAL; RECTAL at 15:00

## 2024-08-02 RX ADMIN — IOPAMIDOL 97 ML: 755 INJECTION, SOLUTION INTRAVENOUS at 16:16

## 2024-08-02 RX ADMIN — ONDANSETRON 4 MG: 2 INJECTION INTRAMUSCULAR; INTRAVENOUS at 17:22

## 2024-08-02 ASSESSMENT — PAIN DESCRIPTION - ORIENTATION
ORIENTATION: MID
ORIENTATION: MID
ORIENTATION: LOWER

## 2024-08-02 ASSESSMENT — PAIN SCALES - GENERAL
PAINLEVEL_OUTOF10: 4
PAINLEVEL_OUTOF10: 7
PAINLEVEL_OUTOF10: 3
PAINLEVEL_OUTOF10: 6

## 2024-08-02 ASSESSMENT — PAIN DESCRIPTION - LOCATION
LOCATION: ABDOMEN

## 2024-08-02 ASSESSMENT — PAIN - FUNCTIONAL ASSESSMENT
PAIN_FUNCTIONAL_ASSESSMENT: 0-10
PAIN_FUNCTIONAL_ASSESSMENT: 0-10

## 2024-08-02 NOTE — ED TRIAGE NOTES
Patient with lower abdominal pain since yesterday 5 pm, lose bm yesterday, no blood in stool, hx colon cancer, dx in jly 2024, surgery scheduled for October 2024, no chemo or radiation at this time. Patient denies urinary issues or concerns. Denies nausea/vomiting.

## 2024-08-02 NOTE — ED PROVIDER NOTES
EMERGENCY DEPARTMENT HISTORY AND PHYSICAL EXAM    Date: 8/2/2024  Patient Name: Gigi Mcneal    History of Presenting Illness     Chief Complaint   Patient presents with    Abdominal Pain    Constipation         History Provided By: Patient    Additional History (Context):   2:06 PM EDT  Gigi Mcneal is a 64 y.o. male with PMHX of significant coronary artery disease, previous NSTEMI status cardiac catheterization with stent placement, heart failure with reduced ejection fraction, ongoing tobacco use, long-term use anticoagulation, colon cancer who presents to the emergency department C/O abdominal pain.  Patient has complicated medical history noted to have significant coronary artery disease underwent heart catheterization end of March 2024 currently on Brilinta and Plavix.  Not long after he was admitted to the hospital for blood per rectum as evaluation studies by both GI and CAT scans.  Ultimately bleeding was controlled however he remains on antiplatelet therapy due to severity of heart disease.  GI bleed workup discovered clearly documented 7 cm mass sigmoid colon consistent with colon cancer.  After consultation with oncology as well as general surgery, the decision was made to keep him on dual antiplatelet therapy until beginning of October with plan for surgical excision and subsequent chemotherapy radiation depending on pathology found during malignancy.  He occasionally gets bowels with slow movement today he became frankly constipated with increased lower quadrant supra pubic pain discomfort.  He has no rebound.  He is nauseous without active vomiting.  He has no chest pain difficulty breathing.    Social History  Reportedly quit smoking over 20 years ago.  No alcohol or drug use.    Family History  Mother with diabetes, father with eczema.  No other malignancies or premature heart disease or bleeding disorders found.      PCP: Kamlesh Biggs MD    No current facility-administered medications for

## 2024-08-02 NOTE — ED NOTES
Bedside and Verbal shift change report given to Vivian Padilla (oncoming nurse) by Porsche (offgoing nurse). Report included the following information Nurse Handoff Report, ED Encounter Summary, ED SBAR, MAR, and Recent Results.

## 2024-08-03 PROBLEM — K59.00 CONSTIPATION: Status: ACTIVE | Noted: 2024-08-03

## 2024-08-03 PROBLEM — Z87.19 HISTORY OF GI BLEED: Status: ACTIVE | Noted: 2024-08-03

## 2024-08-03 PROBLEM — I50.32 CHRONIC HEART FAILURE WITH PRESERVED EJECTION FRACTION (HCC): Status: ACTIVE | Noted: 2024-08-03

## 2024-08-03 PROBLEM — C18.7 ADENOCARCINOMA OF SIGMOID COLON (HCC): Status: ACTIVE | Noted: 2024-08-03

## 2024-08-03 LAB
BASOPHILS # BLD: 0 K/UL (ref 0–0.1)
BASOPHILS NFR BLD: 0 % (ref 0–2)
DIFFERENTIAL METHOD BLD: ABNORMAL
EOSINOPHIL # BLD: 0 K/UL (ref 0–0.4)
EOSINOPHIL NFR BLD: 0 % (ref 0–5)
ERYTHROCYTE [DISTWIDTH] IN BLOOD BY AUTOMATED COUNT: 15.3 % (ref 11.6–14.5)
HCT VFR BLD AUTO: 32.6 % (ref 36–48)
HGB BLD-MCNC: 10.2 G/DL (ref 13–16)
IMM GRANULOCYTES # BLD AUTO: 0 K/UL (ref 0–0.04)
IMM GRANULOCYTES NFR BLD AUTO: 0 % (ref 0–0.5)
LYMPHOCYTES # BLD: 1.2 K/UL (ref 0.9–3.6)
LYMPHOCYTES NFR BLD: 12 % (ref 21–52)
MCH RBC QN AUTO: 24.3 PG (ref 24–34)
MCHC RBC AUTO-ENTMCNC: 31.3 G/DL (ref 31–37)
MCV RBC AUTO: 77.6 FL (ref 78–100)
MONOCYTES # BLD: 1.3 K/UL (ref 0.05–1.2)
MONOCYTES NFR BLD: 13 % (ref 3–10)
NEUTS BAND NFR BLD MANUAL: 6 %
NEUTS SEG # BLD: 7.8 K/UL (ref 1.8–8)
NEUTS SEG NFR BLD: 69 % (ref 40–73)
NRBC # BLD: 0 K/UL (ref 0–0.01)
NRBC BLD-RTO: 0 PER 100 WBC
PLATELET # BLD AUTO: 404 K/UL (ref 135–420)
PLATELET COMMENT: ABNORMAL
PMV BLD AUTO: 9.8 FL (ref 9.2–11.8)
POTASSIUM SERPL-SCNC: 3.4 MMOL/L (ref 3.5–5.5)
RBC # BLD AUTO: 4.2 M/UL (ref 4.35–5.65)
RBC MORPH BLD: ABNORMAL
WBC # BLD AUTO: 10.3 K/UL (ref 4.6–13.2)

## 2024-08-03 PROCEDURE — 94761 N-INVAS EAR/PLS OXIMETRY MLT: CPT

## 2024-08-03 PROCEDURE — 6360000002 HC RX W HCPCS: Performed by: HEALTH CARE PROVIDER

## 2024-08-03 PROCEDURE — 84132 ASSAY OF SERUM POTASSIUM: CPT

## 2024-08-03 PROCEDURE — 6370000000 HC RX 637 (ALT 250 FOR IP): Performed by: HEALTH CARE PROVIDER

## 2024-08-03 PROCEDURE — 85025 COMPLETE CBC W/AUTO DIFF WBC: CPT

## 2024-08-03 PROCEDURE — 99232 SBSQ HOSP IP/OBS MODERATE 35: CPT | Performed by: INTERNAL MEDICINE

## 2024-08-03 PROCEDURE — 2580000003 HC RX 258: Performed by: HEALTH CARE PROVIDER

## 2024-08-03 PROCEDURE — 36415 COLL VENOUS BLD VENIPUNCTURE: CPT

## 2024-08-03 PROCEDURE — 2140000001 HC CVICU INTERMEDIATE R&B

## 2024-08-03 PROCEDURE — 99222 1ST HOSP IP/OBS MODERATE 55: CPT | Performed by: SURGERY

## 2024-08-03 RX ORDER — SODIUM CHLORIDE, SODIUM LACTATE, POTASSIUM CHLORIDE, CALCIUM CHLORIDE 600; 310; 30; 20 MG/100ML; MG/100ML; MG/100ML; MG/100ML
INJECTION, SOLUTION INTRAVENOUS CONTINUOUS
Status: DISCONTINUED | OUTPATIENT
Start: 2024-08-03 | End: 2024-08-05

## 2024-08-03 RX ORDER — POTASSIUM CHLORIDE 7.45 MG/ML
10 INJECTION INTRAVENOUS
Status: DISPENSED | OUTPATIENT
Start: 2024-08-03 | End: 2024-08-03

## 2024-08-03 RX ORDER — LANOLIN ALCOHOL/MO/W.PET/CERES
3 CREAM (GRAM) TOPICAL NIGHTLY PRN
Status: DISCONTINUED | OUTPATIENT
Start: 2024-08-03 | End: 2024-08-06 | Stop reason: HOSPADM

## 2024-08-03 RX ORDER — ONDANSETRON 4 MG/1
4 TABLET, ORALLY DISINTEGRATING ORAL EVERY 8 HOURS PRN
Status: DISCONTINUED | OUTPATIENT
Start: 2024-08-02 | End: 2024-08-06 | Stop reason: HOSPADM

## 2024-08-03 RX ORDER — SODIUM CHLORIDE 0.9 % (FLUSH) 0.9 %
5-40 SYRINGE (ML) INJECTION PRN
Status: DISCONTINUED | OUTPATIENT
Start: 2024-08-02 | End: 2024-08-06 | Stop reason: HOSPADM

## 2024-08-03 RX ORDER — ONDANSETRON 2 MG/ML
4 INJECTION INTRAMUSCULAR; INTRAVENOUS EVERY 6 HOURS PRN
Status: DISCONTINUED | OUTPATIENT
Start: 2024-08-02 | End: 2024-08-06 | Stop reason: HOSPADM

## 2024-08-03 RX ORDER — MORPHINE SULFATE 2 MG/ML
2 INJECTION, SOLUTION INTRAMUSCULAR; INTRAVENOUS EVERY 4 HOURS PRN
Status: DISCONTINUED | OUTPATIENT
Start: 2024-08-03 | End: 2024-08-05

## 2024-08-03 RX ORDER — SODIUM CHLORIDE 0.9 % (FLUSH) 0.9 %
5-40 SYRINGE (ML) INJECTION EVERY 12 HOURS SCHEDULED
Status: DISCONTINUED | OUTPATIENT
Start: 2024-08-03 | End: 2024-08-06 | Stop reason: HOSPADM

## 2024-08-03 RX ORDER — MAGNESIUM SULFATE 1 G/100ML
1000 INJECTION INTRAVENOUS ONCE
Status: COMPLETED | OUTPATIENT
Start: 2024-08-03 | End: 2024-08-03

## 2024-08-03 RX ORDER — METOPROLOL SUCCINATE 25 MG/1
25 TABLET, EXTENDED RELEASE ORAL DAILY
Status: DISCONTINUED | OUTPATIENT
Start: 2024-08-03 | End: 2024-08-06 | Stop reason: HOSPADM

## 2024-08-03 RX ORDER — SODIUM CHLORIDE 9 MG/ML
INJECTION, SOLUTION INTRAVENOUS PRN
Status: DISCONTINUED | OUTPATIENT
Start: 2024-08-02 | End: 2024-08-06 | Stop reason: HOSPADM

## 2024-08-03 RX ORDER — ASPIRIN 81 MG/1
81 TABLET, CHEWABLE ORAL DAILY
Status: DISCONTINUED | OUTPATIENT
Start: 2024-08-03 | End: 2024-08-06 | Stop reason: HOSPADM

## 2024-08-03 RX ORDER — POTASSIUM CHLORIDE 7.45 MG/ML
10 INJECTION INTRAVENOUS PRN
Status: DISCONTINUED | OUTPATIENT
Start: 2024-08-02 | End: 2024-08-06 | Stop reason: HOSPADM

## 2024-08-03 RX ORDER — ATORVASTATIN CALCIUM 40 MG/1
40 TABLET, FILM COATED ORAL NIGHTLY
Status: DISCONTINUED | OUTPATIENT
Start: 2024-08-03 | End: 2024-08-06 | Stop reason: HOSPADM

## 2024-08-03 RX ORDER — ACETAMINOPHEN 325 MG/1
650 TABLET ORAL EVERY 6 HOURS PRN
Status: DISCONTINUED | OUTPATIENT
Start: 2024-08-02 | End: 2024-08-06 | Stop reason: HOSPADM

## 2024-08-03 RX ORDER — CLOPIDOGREL BISULFATE 75 MG/1
75 TABLET ORAL DAILY
Status: DISCONTINUED | OUTPATIENT
Start: 2024-08-03 | End: 2024-08-06 | Stop reason: HOSPADM

## 2024-08-03 RX ORDER — MAGNESIUM SULFATE IN WATER 40 MG/ML
2000 INJECTION, SOLUTION INTRAVENOUS PRN
Status: DISCONTINUED | OUTPATIENT
Start: 2024-08-02 | End: 2024-08-06 | Stop reason: HOSPADM

## 2024-08-03 RX ORDER — PANTOPRAZOLE SODIUM 40 MG/1
40 TABLET, DELAYED RELEASE ORAL DAILY
Status: DISCONTINUED | OUTPATIENT
Start: 2024-08-03 | End: 2024-08-06 | Stop reason: HOSPADM

## 2024-08-03 RX ORDER — ACETAMINOPHEN 650 MG/1
650 SUPPOSITORY RECTAL EVERY 6 HOURS PRN
Status: DISCONTINUED | OUTPATIENT
Start: 2024-08-02 | End: 2024-08-06 | Stop reason: HOSPADM

## 2024-08-03 RX ADMIN — POTASSIUM CHLORIDE 10 MEQ: 7.45 INJECTION INTRAVENOUS at 23:08

## 2024-08-03 RX ADMIN — PIPERACILLIN AND TAZOBACTAM 3375 MG: 3; .375 INJECTION, POWDER, FOR SOLUTION INTRAVENOUS at 09:00

## 2024-08-03 RX ADMIN — PIPERACILLIN AND TAZOBACTAM 3375 MG: 3; .375 INJECTION, POWDER, FOR SOLUTION INTRAVENOUS at 18:57

## 2024-08-03 RX ADMIN — PIPERACILLIN AND TAZOBACTAM 3375 MG: 3; .375 INJECTION, POWDER, FOR SOLUTION INTRAVENOUS at 02:09

## 2024-08-03 RX ADMIN — SODIUM CHLORIDE, POTASSIUM CHLORIDE, SODIUM LACTATE AND CALCIUM CHLORIDE: 600; 310; 30; 20 INJECTION, SOLUTION INTRAVENOUS at 01:11

## 2024-08-03 RX ADMIN — ATORVASTATIN CALCIUM 40 MG: 40 TABLET, FILM COATED ORAL at 21:24

## 2024-08-03 RX ADMIN — SODIUM CHLORIDE, PRESERVATIVE FREE 10 ML: 5 INJECTION INTRAVENOUS at 09:01

## 2024-08-03 RX ADMIN — ASPIRIN 81 MG CHEWABLE TABLET 81 MG: 81 TABLET CHEWABLE at 09:01

## 2024-08-03 RX ADMIN — POTASSIUM CHLORIDE 10 MEQ: 7.45 INJECTION INTRAVENOUS at 01:11

## 2024-08-03 RX ADMIN — POTASSIUM CHLORIDE 10 MEQ: 7.45 INJECTION INTRAVENOUS at 10:28

## 2024-08-03 RX ADMIN — ATORVASTATIN CALCIUM 40 MG: 40 TABLET, FILM COATED ORAL at 02:15

## 2024-08-03 RX ADMIN — SODIUM CHLORIDE, POTASSIUM CHLORIDE, SODIUM LACTATE AND CALCIUM CHLORIDE: 600; 310; 30; 20 INJECTION, SOLUTION INTRAVENOUS at 14:47

## 2024-08-03 RX ADMIN — SODIUM CHLORIDE, PRESERVATIVE FREE 10 ML: 5 INJECTION INTRAVENOUS at 21:24

## 2024-08-03 RX ADMIN — POTASSIUM CHLORIDE 10 MEQ: 7.45 INJECTION INTRAVENOUS at 21:32

## 2024-08-03 RX ADMIN — PANTOPRAZOLE SODIUM 40 MG: 40 TABLET, DELAYED RELEASE ORAL at 09:02

## 2024-08-03 RX ADMIN — ACETAMINOPHEN 325MG 650 MG: 325 TABLET ORAL at 04:31

## 2024-08-03 RX ADMIN — POTASSIUM CHLORIDE 10 MEQ: 7.45 INJECTION INTRAVENOUS at 08:46

## 2024-08-03 RX ADMIN — Medication 3 MG: at 23:02

## 2024-08-03 RX ADMIN — CLOPIDOGREL BISULFATE 75 MG: 75 TABLET ORAL at 09:02

## 2024-08-03 RX ADMIN — MAGNESIUM SULFATE HEPTAHYDRATE 1000 MG: 1 INJECTION, SOLUTION INTRAVENOUS at 00:51

## 2024-08-03 RX ADMIN — METOPROLOL SUCCINATE 25 MG: 25 TABLET, EXTENDED RELEASE ORAL at 09:02

## 2024-08-03 ASSESSMENT — PAIN SCALES - GENERAL
PAINLEVEL_OUTOF10: 0
PAINLEVEL_OUTOF10: 5
PAINLEVEL_OUTOF10: 4
PAINLEVEL_OUTOF10: 0
PAINLEVEL_OUTOF10: 4
PAINLEVEL_OUTOF10: 5
PAINLEVEL_OUTOF10: 0
PAINLEVEL_OUTOF10: 2

## 2024-08-03 ASSESSMENT — PAIN DESCRIPTION - ORIENTATION
ORIENTATION: RIGHT;LOWER

## 2024-08-03 ASSESSMENT — PAIN DESCRIPTION - LOCATION
LOCATION: ABDOMEN

## 2024-08-03 ASSESSMENT — PAIN - FUNCTIONAL ASSESSMENT: PAIN_FUNCTIONAL_ASSESSMENT: ACTIVITIES ARE NOT PREVENTED

## 2024-08-03 ASSESSMENT — PAIN DESCRIPTION - DESCRIPTORS
DESCRIPTORS: OTHER (COMMENT)
DESCRIPTORS: OTHER (COMMENT)

## 2024-08-03 NOTE — PLAN OF CARE
Problem: Chronic Conditions and Co-morbidities  Goal: Patient's chronic conditions and co-morbidity symptoms are monitored and maintained or improved  Outcome: Progressing     Problem: Discharge Planning  Goal: Discharge to home or other facility with appropriate resources  Outcome: Progressing     Problem: Safety - Adult  Goal: Free from fall injury  Outcome: Progressing     Problem: Pain  Goal: Verbalizes/displays adequate comfort level or baseline comfort level  Outcome: Progressing  Flowsheets (Taken 8/3/2024 0000)  Verbalizes/displays adequate comfort level or baseline comfort level:   Encourage patient to monitor pain and request assistance   Assess pain using appropriate pain scale   Implement non-pharmacological measures as appropriate and evaluate response     Problem: Metabolic/Fluid and Electrolytes - Adult  Goal: Electrolytes maintained within normal limits  Outcome: Progressing

## 2024-08-03 NOTE — PLAN OF CARE
Problem: Chronic Conditions and Co-morbidities  Goal: Patient's chronic conditions and co-morbidity symptoms are monitored and maintained or improved  8/3/2024 1140 by Diandra Saenz RN  Outcome: Progressing  8/3/2024 0231 by Lisa Sifuentes RN  Outcome: Progressing     Problem: Discharge Planning  Goal: Discharge to home or other facility with appropriate resources  8/3/2024 1140 by Diandra Saenz RN  Outcome: Progressing  8/3/2024 0231 by Lisa Sifuentes RN  Outcome: Progressing     Problem: Safety - Adult  Goal: Free from fall injury  8/3/2024 1140 by Diandra Saenz RN  Outcome: Progressing  8/3/2024 0231 by Lisa Sifuentes RN  Outcome: Progressing     Problem: Pain  Goal: Verbalizes/displays adequate comfort level or baseline comfort level  8/3/2024 1140 by Diandra Saenz RN  Outcome: Progressing  Flowsheets (Taken 8/3/2024 0400 by Lisa Sifuentes RN)  Verbalizes/displays adequate comfort level or baseline comfort level:   Encourage patient to monitor pain and request assistance   Assess pain using appropriate pain scale   Implement non-pharmacological measures as appropriate and evaluate response  8/3/2024 0231 by Lisa Sifuentes RN  Outcome: Progressing  Flowsheets (Taken 8/3/2024 0000)  Verbalizes/displays adequate comfort level or baseline comfort level:   Encourage patient to monitor pain and request assistance   Assess pain using appropriate pain scale   Implement non-pharmacological measures as appropriate and evaluate response     Problem: Metabolic/Fluid and Electrolytes - Adult  Goal: Electrolytes maintained within normal limits  8/3/2024 1140 by Diandra Saenz RN  Outcome: Progressing  8/3/2024 0231 by Lisa Sifuentes RN  Outcome: Progressing

## 2024-08-03 NOTE — ED NOTES
Per transfer center pt is going to [facility] bed 2302.  Report number is [phone].

## 2024-08-03 NOTE — PROGRESS NOTES
Signout from transferring facility    64-year-old male admitted at [facility] with abdominal pain CT suggestive of perforated appendix they contacted general surgeon with bone score currently at [facility] is Dr. Taylor, patient to be treated medically conservatively with antibiotics.  Surgery will consult when patient arrives.    Patient also has history of recently placed stent by Dr. Guaman, patient is on Brilinta and aspirin(DAPT) which we will continue.       Patient will be admitted to stepdown.  No further interventions currently done by me for management of this patient I defer this to your physician at [facility].    Discussed with ED physician and transfer center.

## 2024-08-03 NOTE — H&P
reviewed as follows:  Constitutional: negative fever, negative chills, negative weight loss  Eyes:   negative visual changes  ENT:   negative sore throat, tongue or lip swelling  Respiratory:  negative cough, negative dyspnea  Cards:   negative for chest pain, palpitations, lower extremity edema  GI:   negative for nausea, vomiting, diarrhea, and abdominal pain  Genitourinary: negative for frequency, dysuria  Integument:  negative for rash and pruritus  Hematologic:  negative for easy bruising and gum/nose bleeding  Musculoskel: negative for myalgias,  back pain and muscle weakness  Neurological:  negative for headaches, dizziness, vertigo  Behavl/Psych: negative for feelings of anxiety, depression     Pertinent Positives include:    Objective:     PHYSICAL EXAM:   Objective:  Vital signs: (most recent): There were no vitals taken for this visit.      General:  Alert, cooperative, no acute distress    HEENT: PERRLA, EOMI. No lymphadenopathy  Pulmonary:  Lungs clear to auscultation bilaterally. No wheezing. No crackles  Cardiovascular: Regular rate and Rhythm.  2/6 systolic murmur at left upper border.  Rubs or gallops  GI: No bowel sounds.  Nondistended.  Tender to palpation worse in right lower and left lower quadrant.  No rebound tenderness no Rovsing sign.  Able to flex right hip off bed without pain  Extremities:  Peripheral pulses intact bilaterally. No lower extremity edema  Psych: Normal affect. Good judgment  Neurologic: Patient alert to person, place, and situation. CN II-XII intact    LAB DATA REVIEWED:    No components found for: \"GLPOC\"  Recent Labs     08/02/24  1316      K 3.3*      CO2 22   BUN 12   WBC 10.5   HGB 10.9*   HCT 35.3*   *         IMAGING RESULTS:  CT ABDOMEN PELVIS W IV CONTRAST Additional Contrast? Oral    Result Date: 8/2/2024  1.  Acute appendicitis. There is trace pneumoperitoneum and small volume free fluid layering in the pelvis. In addition, at the tip of the

## 2024-08-04 LAB
ALBUMIN SERPL-MCNC: 2.2 G/DL (ref 3.4–5)
ALBUMIN/GLOB SERPL: 0.5 (ref 0.8–1.7)
ALP SERPL-CCNC: 63 U/L (ref 45–117)
ALT SERPL-CCNC: 15 U/L (ref 16–61)
ANION GAP SERPL CALC-SCNC: 9 MMOL/L (ref 3–18)
AST SERPL-CCNC: 29 U/L (ref 10–38)
BASOPHILS # BLD: 0.1 K/UL (ref 0–0.1)
BASOPHILS NFR BLD: 1 % (ref 0–2)
BILIRUB SERPL-MCNC: 1 MG/DL (ref 0.2–1)
BUN SERPL-MCNC: 18 MG/DL (ref 7–18)
BUN/CREAT SERPL: 21 (ref 12–20)
CALCIUM SERPL-MCNC: 9 MG/DL (ref 8.5–10.1)
CHLORIDE SERPL-SCNC: 101 MMOL/L (ref 100–111)
CO2 SERPL-SCNC: 24 MMOL/L (ref 21–32)
CREAT SERPL-MCNC: 0.85 MG/DL (ref 0.6–1.3)
DIFFERENTIAL METHOD BLD: ABNORMAL
EOSINOPHIL # BLD: 0 K/UL (ref 0–0.4)
EOSINOPHIL NFR BLD: 0 % (ref 0–5)
ERYTHROCYTE [DISTWIDTH] IN BLOOD BY AUTOMATED COUNT: 15.6 % (ref 11.6–14.5)
GLOBULIN SER CALC-MCNC: 4.2 G/DL (ref 2–4)
GLUCOSE SERPL-MCNC: 114 MG/DL (ref 74–99)
HCT VFR BLD AUTO: 33.9 % (ref 36–48)
HGB BLD-MCNC: 10.5 G/DL (ref 13–16)
IMM GRANULOCYTES # BLD AUTO: 0 K/UL (ref 0–0.04)
IMM GRANULOCYTES NFR BLD AUTO: 0 % (ref 0–0.5)
LACTATE SERPL-SCNC: 1.3 MMOL/L (ref 0.4–2)
LYMPHOCYTES # BLD: 1.2 K/UL (ref 0.9–3.6)
LYMPHOCYTES NFR BLD: 8 % (ref 21–52)
MCH RBC QN AUTO: 24 PG (ref 24–34)
MCHC RBC AUTO-ENTMCNC: 31 G/DL (ref 31–37)
MCV RBC AUTO: 77.4 FL (ref 78–100)
MONOCYTES # BLD: 0.6 K/UL (ref 0.05–1.2)
MONOCYTES NFR BLD: 4 % (ref 3–10)
NEUTS BAND NFR BLD MANUAL: 5 %
NEUTS SEG # BLD: 13 K/UL (ref 1.8–8)
NEUTS SEG NFR BLD: 82 % (ref 40–73)
NRBC # BLD: 0 K/UL (ref 0–0.01)
NRBC BLD-RTO: 0 PER 100 WBC
PLATELET # BLD AUTO: 475 K/UL (ref 135–420)
PLATELET COMMENT: ABNORMAL
PMV BLD AUTO: 10.2 FL (ref 9.2–11.8)
POTASSIUM SERPL-SCNC: 3.8 MMOL/L (ref 3.5–5.5)
PROT SERPL-MCNC: 6.4 G/DL (ref 6.4–8.2)
RBC # BLD AUTO: 4.38 M/UL (ref 4.35–5.65)
RBC MORPH BLD: ABNORMAL
SODIUM SERPL-SCNC: 134 MMOL/L (ref 136–145)
WBC # BLD AUTO: 14.9 K/UL (ref 4.6–13.2)

## 2024-08-04 PROCEDURE — 94761 N-INVAS EAR/PLS OXIMETRY MLT: CPT

## 2024-08-04 PROCEDURE — 6360000002 HC RX W HCPCS: Performed by: HEALTH CARE PROVIDER

## 2024-08-04 PROCEDURE — 83605 ASSAY OF LACTIC ACID: CPT

## 2024-08-04 PROCEDURE — 6370000000 HC RX 637 (ALT 250 FOR IP): Performed by: HEALTH CARE PROVIDER

## 2024-08-04 PROCEDURE — 6370000000 HC RX 637 (ALT 250 FOR IP): Performed by: INTERNAL MEDICINE

## 2024-08-04 PROCEDURE — 85025 COMPLETE CBC W/AUTO DIFF WBC: CPT

## 2024-08-04 PROCEDURE — 2580000003 HC RX 258: Performed by: HEALTH CARE PROVIDER

## 2024-08-04 PROCEDURE — 99232 SBSQ HOSP IP/OBS MODERATE 35: CPT | Performed by: INTERNAL MEDICINE

## 2024-08-04 PROCEDURE — 99233 SBSQ HOSP IP/OBS HIGH 50: CPT | Performed by: SURGERY

## 2024-08-04 PROCEDURE — 36415 COLL VENOUS BLD VENIPUNCTURE: CPT

## 2024-08-04 PROCEDURE — 80053 COMPREHEN METABOLIC PANEL: CPT

## 2024-08-04 PROCEDURE — 2140000001 HC CVICU INTERMEDIATE R&B

## 2024-08-04 RX ORDER — POLYETHYLENE GLYCOL 3350 17 G/17G
17 POWDER, FOR SOLUTION ORAL DAILY
Status: DISCONTINUED | OUTPATIENT
Start: 2024-08-04 | End: 2024-08-06

## 2024-08-04 RX ORDER — SENNA AND DOCUSATE SODIUM 50; 8.6 MG/1; MG/1
2 TABLET, FILM COATED ORAL DAILY
Status: DISCONTINUED | OUTPATIENT
Start: 2024-08-04 | End: 2024-08-06

## 2024-08-04 RX ADMIN — SODIUM CHLORIDE, POTASSIUM CHLORIDE, SODIUM LACTATE AND CALCIUM CHLORIDE: 600; 310; 30; 20 INJECTION, SOLUTION INTRAVENOUS at 17:10

## 2024-08-04 RX ADMIN — PIPERACILLIN AND TAZOBACTAM 3375 MG: 3; .375 INJECTION, POWDER, FOR SOLUTION INTRAVENOUS at 08:41

## 2024-08-04 RX ADMIN — PIPERACILLIN AND TAZOBACTAM 3375 MG: 3; .375 INJECTION, POWDER, FOR SOLUTION INTRAVENOUS at 17:15

## 2024-08-04 RX ADMIN — POTASSIUM CHLORIDE 10 MEQ: 7.45 INJECTION INTRAVENOUS at 00:31

## 2024-08-04 RX ADMIN — ASPIRIN 81 MG CHEWABLE TABLET 81 MG: 81 TABLET CHEWABLE at 08:35

## 2024-08-04 RX ADMIN — POLYETHYLENE GLYCOL 3350 17 G: 17 POWDER, FOR SOLUTION ORAL at 13:20

## 2024-08-04 RX ADMIN — SODIUM CHLORIDE, PRESERVATIVE FREE 10 ML: 5 INJECTION INTRAVENOUS at 08:37

## 2024-08-04 RX ADMIN — SODIUM CHLORIDE, POTASSIUM CHLORIDE, SODIUM LACTATE AND CALCIUM CHLORIDE: 600; 310; 30; 20 INJECTION, SOLUTION INTRAVENOUS at 03:55

## 2024-08-04 RX ADMIN — ATORVASTATIN CALCIUM 40 MG: 40 TABLET, FILM COATED ORAL at 21:32

## 2024-08-04 RX ADMIN — PANTOPRAZOLE SODIUM 40 MG: 40 TABLET, DELAYED RELEASE ORAL at 08:35

## 2024-08-04 RX ADMIN — SODIUM CHLORIDE: 9 INJECTION, SOLUTION INTRAVENOUS at 17:15

## 2024-08-04 RX ADMIN — SODIUM CHLORIDE, PRESERVATIVE FREE 10 ML: 5 INJECTION INTRAVENOUS at 21:35

## 2024-08-04 RX ADMIN — SENNOSIDES AND DOCUSATE SODIUM 2 TABLET: 50; 8.6 TABLET ORAL at 13:20

## 2024-08-04 RX ADMIN — CLOPIDOGREL BISULFATE 75 MG: 75 TABLET ORAL at 08:35

## 2024-08-04 RX ADMIN — METOPROLOL SUCCINATE 25 MG: 25 TABLET, EXTENDED RELEASE ORAL at 08:35

## 2024-08-04 RX ADMIN — POTASSIUM CHLORIDE 10 MEQ: 7.45 INJECTION INTRAVENOUS at 02:06

## 2024-08-04 RX ADMIN — PIPERACILLIN AND TAZOBACTAM 3375 MG: 3; .375 INJECTION, POWDER, FOR SOLUTION INTRAVENOUS at 00:39

## 2024-08-04 ASSESSMENT — PAIN SCALES - GENERAL
PAINLEVEL_OUTOF10: 0

## 2024-08-04 NOTE — PLAN OF CARE
Problem: Chronic Conditions and Co-morbidities  Goal: Patient's chronic conditions and co-morbidity symptoms are monitored and maintained or improved  Outcome: Progressing     Problem: Discharge Planning  Goal: Discharge to home or other facility with appropriate resources  Outcome: Progressing     Problem: Safety - Adult  Goal: Free from fall injury  Outcome: Progressing     Problem: Pain  Goal: Verbalizes/displays adequate comfort level or baseline comfort level  Outcome: Progressing  Flowsheets  Taken 8/4/2024 0000  Verbalizes/displays adequate comfort level or baseline comfort level:   Encourage patient to monitor pain and request assistance   Assess pain using appropriate pain scale   Implement non-pharmacological measures as appropriate and evaluate response  Taken 8/3/2024 2000  Verbalizes/displays adequate comfort level or baseline comfort level:   Encourage patient to monitor pain and request assistance   Assess pain using appropriate pain scale   Implement non-pharmacological measures as appropriate and evaluate response     Problem: Metabolic/Fluid and Electrolytes - Adult  Goal: Electrolytes maintained within normal limits  Outcome: Progressing

## 2024-08-04 NOTE — CONSULTS
disease)     Colon cancer (HCC)     Heart failure (HCC)     Hypertension     NSTEMI (non-ST elevated myocardial infarction) ([facility])       Past Surgical History:   Procedure Laterality Date    CARDIAC PROCEDURE N/A 3/28/2024    Left heart cath performed by Butch Guaman MD at [facility] CARDIAC CATH LAB    CARDIAC PROCEDURE N/A 3/28/2024    Coronary angiography performed by Butch Guaman MD at [facility] CARDIAC CATH LAB    CARDIAC PROCEDURE N/A 3/28/2024    Left ventriculography performed by Butch Guaman MD at [facility] CARDIAC CATH LAB    CARDIAC PROCEDURE N/A 3/28/2024    Insert stent syeda coronary performed by Butch Guaman MD at [facility] CARDIAC CATH LAB    COLONOSCOPY N/A 2024    COLONOSCOPY with polypectomy, bx's and tattoo performed by Orestes Araujo MD at [facility] ENDOSCOPY    UPPER GASTROINTESTINAL ENDOSCOPY N/A 2024    ESOPHAGOGASTRODUODENOSCOPY polypectomies and 3 clips performed by Orestes Araujo MD at [facility] ENDOSCOPY      Social History     Tobacco Use    Smoking status: Former     Current packs/day: 0.00     Average packs/day: 0.3 packs/day for 19.0 years (4.8 ttl pk-yrs)     Types: Cigarettes     Start date:      Quit date: 2002     Years since quittin.6    Smokeless tobacco: Never   Substance Use Topics    Alcohol use: Never      Social History     Tobacco Use   Smoking Status Former    Current packs/day: 0.00    Average packs/day: 0.3 packs/day for 19.0 years (4.8 ttl pk-yrs)    Types: Cigarettes    Start date:     Quit date: 2002    Years since quittin.6   Smokeless Tobacco Never     Family History   Problem Relation Age of Onset    Diabetes Mother     Eczema Father     No Known Problems Sister     No Known Problems Brother     No Known Problems Maternal Aunt     No Known Problems Maternal Uncle     No Known Problems Paternal Aunt     No Known Problems Paternal Uncle     No Known Problems Maternal Grandmother     No Known Problems Maternal Grandfather     No Known Problems Paternal Grandmother     No Known 
Max:99.7 °F (37.6 °C)    /70   Pulse 96   Temp 99.1 °F (37.3 °C) (Oral)   Resp 18   Wt 75.9 kg (167 lb 4.8 oz)   SpO2 99%   BMI 22.07 kg/m²     ROS: 12 point ROS obtained in details. Pertinent positives as mentioned in HPI,   otherwise negative    Physical Exam:    General: Well developed, well nourished male laying on the bed/reclined in bed, conversing w/o any issues.     General:   awake alert and oriented   HEENT:  Normocephalic, atraumatic, no scleral icterus or conjunctival hemorrhage   Lungs:   non-labored, bilaterally clear to auscultation- no crackles wheezes rales or rhonchi   Heart:  RRR, s1 and s2; no murmurs rubs or gallops, noedema   Abdomen:  soft, non-distended, no hepatomegaly, no splenomegaly. Non tender to palpation   Extremities:   no clubbing, cyanosis   Neurologic:  No gross focal motor deficits                        Skin:  No rash or ulcers noted   Psychiatric:   appropriate mood and affect         Labs: Results:   Chemistry Recent Labs     08/02/24  1316 08/03/24  1450 08/04/24  0332   GLUCOSE 110*  --  114*     --  134*   K 3.3* 3.4* 3.8     --  101   CO2 22  --  24   BUN 12  --  18   CREATININE 0.90  --  0.85   GLOB 4.3*  --  4.2*   ALT 11*  --  15*   AST 21  --  29      CBC w/Diff Recent Labs     08/02/24  1316 08/03/24  0324 08/04/24  0332   WBC 10.5 10.3 14.9*   RBC 4.52 4.20* 4.38   HGB 10.9* 10.2* 10.5*   HCT 35.3* 32.6* 33.9*   * 404 475*      Microbiology Results       ** No results found for the last 336 hours. **                RADIOLOGY:    All available imaging studies/reports in [facility] for this admission were reviewed    Disclaimer: Sections of this note are dictated utilizing voice recognition software, which may have resulted in some phonetic based errors in grammar and contents. Even though attempts were made to correct all the mistakes, some may have been missed, and remained in the body of the document. If questions arise, please

## 2024-08-04 NOTE — PLAN OF CARE
Problem: Chronic Conditions and Co-morbidities  Goal: Patient's chronic conditions and co-morbidity symptoms are monitored and maintained or improved  8/4/2024 1406 by Diandra Saenz RN  Outcome: Progressing  8/4/2024 0207 by Lisa Sifuentes RN  Outcome: Progressing  Flowsheets (Taken 8/3/2024 2000)  Care Plan - Patient's Chronic Conditions and Co-Morbidity Symptoms are Monitored and Maintained or Improved: Monitor and assess patient's chronic conditions and comorbid symptoms for stability, deterioration, or improvement     Problem: Discharge Planning  Goal: Discharge to home or other facility with appropriate resources  8/4/2024 1406 by Diandra Saenz RN  Outcome: Progressing  8/4/2024 0207 by Lisa Sifuentes RN  Outcome: Progressing  Flowsheets (Taken 8/3/2024 2000)  Discharge to home or other facility with appropriate resources:   Identify barriers to discharge with patient and caregiver   Arrange for needed discharge resources and transportation as appropriate   Identify discharge learning needs (meds, wound care, etc)     Problem: Safety - Adult  Goal: Free from fall injury  8/4/2024 1406 by Diandra Saenz RN  Outcome: Progressing  8/4/2024 0207 by Lisa Sifuentes RN  Outcome: Progressing     Problem: Pain  Goal: Verbalizes/displays adequate comfort level or baseline comfort level  8/4/2024 1406 by Diandra Saenz RN  Outcome: Progressing  Flowsheets (Taken 8/4/2024 0400 by Lisa Sifuentes RN)  Verbalizes/displays adequate comfort level or baseline comfort level:   Encourage patient to monitor pain and request assistance   Assess pain using appropriate pain scale   Implement non-pharmacological measures as appropriate and evaluate response  8/4/2024 0207 by Lisa Sifuentes RN  Outcome: Progressing  Flowsheets  Taken 8/4/2024 0000  Verbalizes/displays adequate comfort level or baseline comfort level:   Encourage patient to monitor pain and request assistance   Assess pain using

## 2024-08-05 ENCOUNTER — APPOINTMENT (OUTPATIENT)
Facility: HOSPITAL | Age: 64
DRG: 248 | End: 2024-08-05
Attending: INTERNAL MEDICINE
Payer: MEDICAID

## 2024-08-05 LAB
ALBUMIN SERPL-MCNC: 2 G/DL (ref 3.4–5)
ALBUMIN/GLOB SERPL: 0.5 (ref 0.8–1.7)
ALP SERPL-CCNC: 73 U/L (ref 45–117)
ALT SERPL-CCNC: 23 U/L (ref 16–61)
ANION GAP SERPL CALC-SCNC: 7 MMOL/L (ref 3–18)
AST SERPL-CCNC: 44 U/L (ref 10–38)
BASOPHILS # BLD: 0.1 K/UL (ref 0–0.1)
BASOPHILS NFR BLD: 1 % (ref 0–2)
BILIRUB SERPL-MCNC: 1.2 MG/DL (ref 0.2–1)
BUN SERPL-MCNC: 15 MG/DL (ref 7–18)
BUN/CREAT SERPL: 19 (ref 12–20)
CALCIUM SERPL-MCNC: 8.4 MG/DL (ref 8.5–10.1)
CHLORIDE SERPL-SCNC: 102 MMOL/L (ref 100–111)
CO2 SERPL-SCNC: 26 MMOL/L (ref 21–32)
CREAT SERPL-MCNC: 0.77 MG/DL (ref 0.6–1.3)
DIFFERENTIAL METHOD BLD: ABNORMAL
EOSINOPHIL # BLD: 0 K/UL (ref 0–0.4)
EOSINOPHIL NFR BLD: 0 % (ref 0–5)
ERYTHROCYTE [DISTWIDTH] IN BLOOD BY AUTOMATED COUNT: 15.7 % (ref 11.6–14.5)
GLOBULIN SER CALC-MCNC: 3.8 G/DL (ref 2–4)
GLUCOSE SERPL-MCNC: 123 MG/DL (ref 74–99)
HCT VFR BLD AUTO: 29.2 % (ref 36–48)
HGB BLD-MCNC: 9.3 G/DL (ref 13–16)
IMM GRANULOCYTES # BLD AUTO: 0 K/UL
IMM GRANULOCYTES NFR BLD AUTO: 0 %
LACTATE SERPL-SCNC: 1 MMOL/L (ref 0.4–2)
LYMPHOCYTES # BLD: 0.9 K/UL (ref 0.9–3.6)
LYMPHOCYTES NFR BLD: 7 % (ref 21–52)
MAGNESIUM SERPL-MCNC: 2.3 MG/DL (ref 1.6–2.6)
MCH RBC QN AUTO: 24.2 PG (ref 24–34)
MCHC RBC AUTO-ENTMCNC: 31.8 G/DL (ref 31–37)
MCV RBC AUTO: 76 FL (ref 78–100)
MONOCYTES # BLD: 0.9 K/UL (ref 0.05–1.2)
MONOCYTES NFR BLD: 7 % (ref 3–10)
NEUTS SEG # BLD: 10.4 K/UL (ref 1.8–8)
NEUTS SEG NFR BLD: 85 % (ref 40–73)
NRBC # BLD: 0 K/UL (ref 0–0.01)
NRBC BLD-RTO: 0 PER 100 WBC
PLATELET # BLD AUTO: 413 K/UL (ref 135–420)
PLATELET COMMENT: ABNORMAL
PMV BLD AUTO: 9.5 FL (ref 9.2–11.8)
POTASSIUM SERPL-SCNC: 3.2 MMOL/L (ref 3.5–5.5)
PROT SERPL-MCNC: 5.8 G/DL (ref 6.4–8.2)
RBC # BLD AUTO: 3.84 M/UL (ref 4.35–5.65)
RBC MORPH BLD: ABNORMAL
RBC MORPH BLD: ABNORMAL
SODIUM SERPL-SCNC: 135 MMOL/L (ref 136–145)
WBC # BLD AUTO: 12.3 K/UL (ref 4.6–13.2)

## 2024-08-05 PROCEDURE — 83735 ASSAY OF MAGNESIUM: CPT

## 2024-08-05 PROCEDURE — 99233 SBSQ HOSP IP/OBS HIGH 50: CPT | Performed by: SURGERY

## 2024-08-05 PROCEDURE — 6360000002 HC RX W HCPCS: Performed by: HEALTH CARE PROVIDER

## 2024-08-05 PROCEDURE — 80053 COMPREHEN METABOLIC PANEL: CPT

## 2024-08-05 PROCEDURE — 74270 X-RAY XM COLON 1CNTRST STD: CPT

## 2024-08-05 PROCEDURE — 6360000004 HC RX CONTRAST MEDICATION: Performed by: COLON & RECTAL SURGERY

## 2024-08-05 PROCEDURE — 36415 COLL VENOUS BLD VENIPUNCTURE: CPT

## 2024-08-05 PROCEDURE — 85025 COMPLETE CBC W/AUTO DIFF WBC: CPT

## 2024-08-05 PROCEDURE — 99232 SBSQ HOSP IP/OBS MODERATE 35: CPT | Performed by: STUDENT IN AN ORGANIZED HEALTH CARE EDUCATION/TRAINING PROGRAM

## 2024-08-05 PROCEDURE — 2580000003 HC RX 258: Performed by: HEALTH CARE PROVIDER

## 2024-08-05 PROCEDURE — 94761 N-INVAS EAR/PLS OXIMETRY MLT: CPT

## 2024-08-05 PROCEDURE — 2140000001 HC CVICU INTERMEDIATE R&B

## 2024-08-05 PROCEDURE — 6370000000 HC RX 637 (ALT 250 FOR IP): Performed by: HEALTH CARE PROVIDER

## 2024-08-05 PROCEDURE — 83605 ASSAY OF LACTIC ACID: CPT

## 2024-08-05 PROCEDURE — 6370000000 HC RX 637 (ALT 250 FOR IP): Performed by: FAMILY MEDICINE

## 2024-08-05 RX ORDER — OXYCODONE HYDROCHLORIDE AND ACETAMINOPHEN 5; 325 MG/1; MG/1
1 TABLET ORAL EVERY 6 HOURS PRN
Status: DISCONTINUED | OUTPATIENT
Start: 2024-08-05 | End: 2024-08-06 | Stop reason: HOSPADM

## 2024-08-05 RX ADMIN — ASPIRIN 81 MG CHEWABLE TABLET 81 MG: 81 TABLET CHEWABLE at 08:56

## 2024-08-05 RX ADMIN — METOPROLOL SUCCINATE 25 MG: 25 TABLET, EXTENDED RELEASE ORAL at 08:57

## 2024-08-05 RX ADMIN — ACETAMINOPHEN 325MG 650 MG: 325 TABLET ORAL at 16:03

## 2024-08-05 RX ADMIN — POTASSIUM CHLORIDE 10 MEQ: 7.45 INJECTION INTRAVENOUS at 18:46

## 2024-08-05 RX ADMIN — ATORVASTATIN CALCIUM 40 MG: 40 TABLET, FILM COATED ORAL at 19:55

## 2024-08-05 RX ADMIN — CLOPIDOGREL BISULFATE 75 MG: 75 TABLET ORAL at 08:56

## 2024-08-05 RX ADMIN — SODIUM CHLORIDE, PRESERVATIVE FREE 10 ML: 5 INJECTION INTRAVENOUS at 08:57

## 2024-08-05 RX ADMIN — POTASSIUM BICARBONATE 50 MEQ: 978 TABLET, EFFERVESCENT ORAL at 21:34

## 2024-08-05 RX ADMIN — PIPERACILLIN AND TAZOBACTAM 3375 MG: 3; .375 INJECTION, POWDER, FOR SOLUTION INTRAVENOUS at 01:28

## 2024-08-05 RX ADMIN — SODIUM CHLORIDE, PRESERVATIVE FREE 10 ML: 5 INJECTION INTRAVENOUS at 19:56

## 2024-08-05 RX ADMIN — PIPERACILLIN AND TAZOBACTAM 3375 MG: 3; .375 INJECTION, POWDER, FOR SOLUTION INTRAVENOUS at 08:56

## 2024-08-05 RX ADMIN — PIPERACILLIN AND TAZOBACTAM 3375 MG: 3; .375 INJECTION, POWDER, FOR SOLUTION INTRAVENOUS at 16:11

## 2024-08-05 RX ADMIN — PANTOPRAZOLE SODIUM 40 MG: 40 TABLET, DELAYED RELEASE ORAL at 08:56

## 2024-08-05 RX ADMIN — ACETAMINOPHEN 325MG 650 MG: 325 TABLET ORAL at 02:08

## 2024-08-05 RX ADMIN — DIATRIZOATE MEGLUMINE AND DIATRIZOATE SODIUM 600 ML: 660; 100 LIQUID ORAL; RECTAL at 14:11

## 2024-08-05 ASSESSMENT — PAIN SCALES - GENERAL
PAINLEVEL_OUTOF10: 0
PAINLEVEL_OUTOF10: 3
PAINLEVEL_OUTOF10: 1
PAINLEVEL_OUTOF10: 0

## 2024-08-05 ASSESSMENT — PAIN DESCRIPTION - ORIENTATION
ORIENTATION: LOWER
ORIENTATION: RIGHT

## 2024-08-05 ASSESSMENT — PAIN DESCRIPTION - LOCATION
LOCATION: ABDOMEN
LOCATION: ABDOMEN

## 2024-08-05 ASSESSMENT — PAIN DESCRIPTION - DESCRIPTORS: DESCRIPTORS: DISCOMFORT

## 2024-08-05 NOTE — PLAN OF CARE
Problem: Chronic Conditions and Co-morbidities  Goal: Patient's chronic conditions and co-morbidity symptoms are monitored and maintained or improved  Outcome: Progressing  Flowsheets (Taken 8/5/2024 0800)  Care Plan - Patient's Chronic Conditions and Co-Morbidity Symptoms are Monitored and Maintained or Improved: Monitor and assess patient's chronic conditions and comorbid symptoms for stability, deterioration, or improvement     Problem: Discharge Planning  Goal: Discharge to home or other facility with appropriate resources  Outcome: Progressing  Flowsheets (Taken 8/5/2024 0800)  Discharge to home or other facility with appropriate resources: Identify barriers to discharge with patient and caregiver     Problem: Safety - Adult  Goal: Free from fall injury  Outcome: Progressing     Problem: Pain  Goal: Verbalizes/displays adequate comfort level or baseline comfort level  Outcome: Progressing  Flowsheets (Taken 8/5/2024 0800)  Verbalizes/displays adequate comfort level or baseline comfort level: Encourage patient to monitor pain and request assistance     Problem: Metabolic/Fluid and Electrolytes - Adult  Goal: Electrolytes maintained within normal limits  Outcome: Progressing  Flowsheets (Taken 8/5/2024 0800)  Electrolytes maintained within normal limits: Monitor labs and assess patient for signs and symptoms of electrolyte imbalances

## 2024-08-06 VITALS
WEIGHT: 167.3 LBS | OXYGEN SATURATION: 100 % | TEMPERATURE: 97.7 F | HEIGHT: 73 IN | RESPIRATION RATE: 17 BRPM | SYSTOLIC BLOOD PRESSURE: 112 MMHG | HEART RATE: 84 BPM | DIASTOLIC BLOOD PRESSURE: 74 MMHG | BODY MASS INDEX: 22.17 KG/M2

## 2024-08-06 LAB
ALBUMIN SERPL-MCNC: 2 G/DL (ref 3.4–5)
ALBUMIN/GLOB SERPL: 0.5 (ref 0.8–1.7)
ALP SERPL-CCNC: 75 U/L (ref 45–117)
ALT SERPL-CCNC: 25 U/L (ref 16–61)
ANION GAP SERPL CALC-SCNC: 8 MMOL/L (ref 3–18)
AST SERPL-CCNC: 46 U/L (ref 10–38)
BASOPHILS # BLD: 0.1 K/UL (ref 0–0.1)
BASOPHILS NFR BLD: 0 % (ref 0–2)
BILIRUB SERPL-MCNC: 0.7 MG/DL (ref 0.2–1)
BUN SERPL-MCNC: 11 MG/DL (ref 7–18)
BUN/CREAT SERPL: 18 (ref 12–20)
CALCIUM SERPL-MCNC: 8.2 MG/DL (ref 8.5–10.1)
CHLORIDE SERPL-SCNC: 102 MMOL/L (ref 100–111)
CO2 SERPL-SCNC: 25 MMOL/L (ref 21–32)
CREAT SERPL-MCNC: 0.6 MG/DL (ref 0.6–1.3)
DIFFERENTIAL METHOD BLD: ABNORMAL
EOSINOPHIL # BLD: 0.1 K/UL (ref 0–0.4)
EOSINOPHIL NFR BLD: 0 % (ref 0–5)
ERYTHROCYTE [DISTWIDTH] IN BLOOD BY AUTOMATED COUNT: 15.9 % (ref 11.6–14.5)
GLOBULIN SER CALC-MCNC: 3.8 G/DL (ref 2–4)
GLUCOSE SERPL-MCNC: 60 MG/DL (ref 74–99)
HCT VFR BLD AUTO: 26.6 % (ref 36–48)
HGB BLD-MCNC: 8.7 G/DL (ref 13–16)
IMM GRANULOCYTES # BLD AUTO: 0.1 K/UL (ref 0–0.04)
IMM GRANULOCYTES NFR BLD AUTO: 1 % (ref 0–0.5)
LACTATE SERPL-SCNC: 0.7 MMOL/L (ref 0.4–2)
LYMPHOCYTES # BLD: 1.3 K/UL (ref 0.9–3.6)
LYMPHOCYTES NFR BLD: 10 % (ref 21–52)
MAGNESIUM SERPL-MCNC: 2.1 MG/DL (ref 1.6–2.6)
MCH RBC QN AUTO: 24.4 PG (ref 24–34)
MCHC RBC AUTO-ENTMCNC: 32.7 G/DL (ref 31–37)
MCV RBC AUTO: 74.5 FL (ref 78–100)
MONOCYTES # BLD: 1 K/UL (ref 0.05–1.2)
MONOCYTES NFR BLD: 8 % (ref 3–10)
NEUTS SEG # BLD: 10.1 K/UL (ref 1.8–8)
NEUTS SEG NFR BLD: 81 % (ref 40–73)
NRBC # BLD: 0 K/UL (ref 0–0.01)
NRBC BLD-RTO: 0 PER 100 WBC
PLATELET # BLD AUTO: 399 K/UL (ref 135–420)
PMV BLD AUTO: 9.4 FL (ref 9.2–11.8)
POTASSIUM SERPL-SCNC: 3.1 MMOL/L (ref 3.5–5.5)
PROT SERPL-MCNC: 5.8 G/DL (ref 6.4–8.2)
RBC # BLD AUTO: 3.57 M/UL (ref 4.35–5.65)
SODIUM SERPL-SCNC: 135 MMOL/L (ref 136–145)
WBC # BLD AUTO: 12.5 K/UL (ref 4.6–13.2)

## 2024-08-06 PROCEDURE — 94761 N-INVAS EAR/PLS OXIMETRY MLT: CPT

## 2024-08-06 PROCEDURE — 83605 ASSAY OF LACTIC ACID: CPT

## 2024-08-06 PROCEDURE — 6370000000 HC RX 637 (ALT 250 FOR IP): Performed by: FAMILY MEDICINE

## 2024-08-06 PROCEDURE — 36415 COLL VENOUS BLD VENIPUNCTURE: CPT

## 2024-08-06 PROCEDURE — 6360000002 HC RX W HCPCS: Performed by: HEALTH CARE PROVIDER

## 2024-08-06 PROCEDURE — 2580000003 HC RX 258: Performed by: HEALTH CARE PROVIDER

## 2024-08-06 PROCEDURE — 85025 COMPLETE CBC W/AUTO DIFF WBC: CPT

## 2024-08-06 PROCEDURE — 99232 SBSQ HOSP IP/OBS MODERATE 35: CPT | Performed by: COLON & RECTAL SURGERY

## 2024-08-06 PROCEDURE — 6370000000 HC RX 637 (ALT 250 FOR IP): Performed by: STUDENT IN AN ORGANIZED HEALTH CARE EDUCATION/TRAINING PROGRAM

## 2024-08-06 PROCEDURE — 83735 ASSAY OF MAGNESIUM: CPT

## 2024-08-06 PROCEDURE — 6370000000 HC RX 637 (ALT 250 FOR IP): Performed by: HEALTH CARE PROVIDER

## 2024-08-06 PROCEDURE — 80053 COMPREHEN METABOLIC PANEL: CPT

## 2024-08-06 PROCEDURE — 99232 SBSQ HOSP IP/OBS MODERATE 35: CPT | Performed by: STUDENT IN AN ORGANIZED HEALTH CARE EDUCATION/TRAINING PROGRAM

## 2024-08-06 RX ORDER — METRONIDAZOLE 500 MG/1
500 TABLET ORAL EVERY 8 HOURS SCHEDULED
Qty: 29 TABLET | Refills: 0 | Status: SHIPPED | OUTPATIENT
Start: 2024-08-06 | End: 2024-08-16

## 2024-08-06 RX ORDER — CIPROFLOXACIN 500 MG/1
500 TABLET, FILM COATED ORAL EVERY 12 HOURS SCHEDULED
Status: DISCONTINUED | OUTPATIENT
Start: 2024-08-06 | End: 2024-08-06 | Stop reason: HOSPADM

## 2024-08-06 RX ORDER — METRONIDAZOLE 500 MG/1
500 TABLET ORAL EVERY 8 HOURS SCHEDULED
Status: DISCONTINUED | OUTPATIENT
Start: 2024-08-06 | End: 2024-08-06 | Stop reason: HOSPADM

## 2024-08-06 RX ORDER — CIPROFLOXACIN 500 MG/1
500 TABLET, FILM COATED ORAL EVERY 12 HOURS SCHEDULED
Qty: 20 TABLET | Refills: 0 | Status: SHIPPED | OUTPATIENT
Start: 2024-08-06 | End: 2024-08-16

## 2024-08-06 RX ADMIN — CIPROFLOXACIN HYDROCHLORIDE 500 MG: 500 TABLET, FILM COATED ORAL at 11:18

## 2024-08-06 RX ADMIN — METRONIDAZOLE 500 MG: 500 TABLET ORAL at 14:28

## 2024-08-06 RX ADMIN — SODIUM CHLORIDE: 9 INJECTION, SOLUTION INTRAVENOUS at 08:20

## 2024-08-06 RX ADMIN — POTASSIUM CHLORIDE 10 MEQ: 7.45 INJECTION INTRAVENOUS at 07:14

## 2024-08-06 RX ADMIN — METOPROLOL SUCCINATE 25 MG: 25 TABLET, EXTENDED RELEASE ORAL at 08:14

## 2024-08-06 RX ADMIN — POTASSIUM CHLORIDE 10 MEQ: 7.45 INJECTION INTRAVENOUS at 05:50

## 2024-08-06 RX ADMIN — PANTOPRAZOLE SODIUM 40 MG: 40 TABLET, DELAYED RELEASE ORAL at 08:14

## 2024-08-06 RX ADMIN — PIPERACILLIN AND TAZOBACTAM 3375 MG: 3; .375 INJECTION, POWDER, FOR SOLUTION INTRAVENOUS at 02:57

## 2024-08-06 RX ADMIN — POTASSIUM CHLORIDE 10 MEQ: 7.45 INJECTION INTRAVENOUS at 08:47

## 2024-08-06 RX ADMIN — PIPERACILLIN AND TAZOBACTAM 3375 MG: 3; .375 INJECTION, POWDER, FOR SOLUTION INTRAVENOUS at 08:17

## 2024-08-06 RX ADMIN — POTASSIUM BICARBONATE 40 MEQ: 782 TABLET, EFFERVESCENT ORAL at 14:28

## 2024-08-06 RX ADMIN — POTASSIUM CHLORIDE 10 MEQ: 7.45 INJECTION INTRAVENOUS at 09:59

## 2024-08-06 RX ADMIN — SODIUM CHLORIDE, PRESERVATIVE FREE 10 ML: 5 INJECTION INTRAVENOUS at 08:29

## 2024-08-06 RX ADMIN — CLOPIDOGREL BISULFATE 75 MG: 75 TABLET ORAL at 08:14

## 2024-08-06 RX ADMIN — ASPIRIN 81 MG CHEWABLE TABLET 81 MG: 81 TABLET CHEWABLE at 08:14

## 2024-08-06 RX ADMIN — POTASSIUM BICARBONATE 50 MEQ: 978 TABLET, EFFERVESCENT ORAL at 02:50

## 2024-08-06 ASSESSMENT — PAIN SCALES - GENERAL
PAINLEVEL_OUTOF10: 0

## 2024-08-06 NOTE — DISCHARGE SUMMARY
Discharge Summary    Patient: Gigi Mcneal MRN: [id number]  CSN: [id number]    YOB: 1960  Age: 64 y.o.  Sex: male    DOA: 8/2/2024 LOS:  LOS: 4 days   Discharge Date: 8/6/24     Admission Diagnosis: Appendicitis with perforation [K35.32]    Discharge Diagnosis:    Principal Problem:    Appendicitis with perforation  Active Problems:    Coronary artery disease involving native heart without angina pectoris    Adenocarcinoma of sigmoid colon (HCC)    Constipation    History of GI bleed    Chronic heart failure with preserved ejection fraction (HCC)  Resolved Problems:    * No resolved hospital problems. *       Discharge Condition: Stable  Discharge Disposition: home     PHYSICAL EXAM  Visit Vitals  /74   Pulse 90   Temp 97.6 °F (36.4 °C) (Temporal)   Resp 18   Ht 1.854 m (6' 0.99\")   Wt 75.9 kg (167 lb 4.8 oz)   SpO2 100%   BMI 22.08 kg/m²       General: Alert, cooperative, no acute distress    HEENT: NC, Atraumatic.  PERRLA, EOMI. Anicteric sclerae.  Lungs:  CTA Bilaterally. No Wheezing/Rhonchi/Rales.  Heart:  Regular  rhythm,  No murmur, No Rubs, No Gallops  Abdomen: Soft, Non distended, Non tender.  +Bowel sounds, no HSM  Extremities: No c/c/e  Psych:   Good insight. Not anxious or agitated.  Neurologic:  CN 2-12 grossly intact, oriented X 3.  No acute neurological                                 Deficits,     Hospital Course By Problem:     Acute appendicitis with perforation - ID consulted. At discharge, can transition to PO ciprofloxacin 500 mg BID and metronidazole 500 mg TID (end date 8/15) for total 14 day course. Medically managed. No surgery needed.   NSTEMI in March 2024 with  RCA, HARISH to LAD and diagonal on dual antiplatelet therapy. Continued during stay.   Hypokalemia - Potassium 3.1. Repleted before discharge.   Sigmoid adenocarcinoma - Planned removal of adenocarcinoma in October 2024 once patient had completed 6 months of DAPT. Patient states he has planned appointment with

## 2024-08-06 NOTE — CARE COORDINATION
08/05/24 1037   Service Assessment   Patient Orientation Alert and Oriented   Cognition Alert   History Provided By Patient   Primary Caregiver Self   Support Systems Spouse/Significant Other   PCP Verified by CM Yes   Last Visit to PCP Within last 3 months   Prior Functional Level Independent in ADLs/IADLs   Current Functional Level Independent in ADLs/IADLs   Can patient return to prior living arrangement Unknown at present   Ability to make needs known: Good   Family able to assist with home care needs: Yes   Would you like for me to discuss the discharge plan with any other family members/significant others, and if so, who? Yes   Financial Resources Medicaid   Community Resources None   Social/Functional History   Lives With Spouse   Type of Home House   Bathroom Shower/Tub Walk-in shower   Bathroom Toilet Standard   Bathroom Equipment None   Home Equipment None   Receives Help From Family   ADL Assistance Independent   Homemaking Assistance Independent   Homemaking Responsibilities Yes   Ambulation Assistance Independent   Transfer Assistance Independent   Active  Yes   Mode of Transportation Car   Occupation Retired   Discharge Planning   Type of Residence House   Living Arrangements Spouse/Significant Other   Current Services Prior To Admission None   Potential Assistance Needed N/A   DME Ordered? No   Potential Assistance Purchasing Medications No   Type of Home Care Services None   Patient expects to be discharged to: House   Services At/After Discharge   Transition of Care Consult (CM Consult) N/A   Services At/After Discharge None   Confirm Follow Up Transport Family   Condition of Participation: Discharge Planning   The Plan for Transition of Care is related to the following treatment goals: Plan is to return home with spouse.     Tereza BARRIGA   Case Management    
Discharge order noted for today.  Orders reviewed.  No needs identified at this time          YENY Barba RN  Care Management    
Met with pt.  He stated his daughter will be picking him up at 6pm or later.  CM asked if we can arrange Medicaid transport to take him home and he is in agreement.  He stated he has key to the house.  He stated he does not need assistance to ambulate.    Called [employer] -[phone] and spoke with Waldemar.   Cab will be arranged to pick pt up and provider will call the nurses station before arrival.  Trip # is [id number].  Updated Nurse URSULA AustinN RN  Care Management     
Per nurse Diandra, ppt's child's mom is here and can take him home so cancel transport  Called Medicaid [facility] [phone] and spoke with Joanie.  Trip cancelled.                Taylor Muir, URSULAN RN  Care Management    
  Freedom of Choice list was provided with basic dialogue that supports the patient's individualized plan of care/goals, treatment preferences, and shares the quality data associated with the providers?  Yes     Tereza Lopez BSW   Case Management

## 2024-08-06 NOTE — PROGRESS NOTES
1120 Patient for discharge today. Instructed on home antibiotics. Daughter will take him home at 6 PM.  
2480 Patient instructed on home medications and new medicine handed. Instructed to follow up with PCP, Surgery and Cardiology as scheduled. Patient verbalized understanding. Patient went home accompanied by Relative.  
[facility] Hospitalist [facility]  Progress Note    Patient: Gigi Mcneal Age: 64 y.o. : 1960 MR#: [id number] SSN: [id number]  Date/Time: 2024     Subjective:   Patient doing well at this time.  Slight increase in white count noted today.  Per surgery patient is on a clear liquid diet and being observed.  Dr. Banuelos to al tomorrow.    Dispo: home  DC 2024 versus 2024 if no surgery indicated    Review of systems  General: No fevers or chills.  Cardiovascular: No chest pain or pressure. No palpitations.   Pulmonary: No shortness of breath, cough or wheeze.   Gastrointestinal: No abdominal pain, nausea, vomiting or diarrhea.   Genitourinary: No urinary frequency, urgency, hesitancy or dysuria.   Musculoskeletal: No joint or muscle pain, no back pain, no recent trauma.    Neurologic: No headache, numbness, tingling or weakness.   Assessment/Plan:   Acute appendicitis with perforation  NSTEMI in 2024 with  RCA, HARISH to LAD and diagonal in dual antiplatelet therapy  Hypokalemia  Sigmoid adenocarcinoma  History of GI bleed  constipation     Admitted to CVT stepdown  Cardiac monitoring  Continue Zosyn at this time  Continue IV fluids  Clear liquid diet  SCDs  Pain control with Tylenol and morphine  Surgery following, conservative measures at this time with reevaluation on Monday when Dr. Banuelos is here  Continue aspirin and Plavix given history of recent stent  Continue atorvastatin 40 mg  Replace potassium  Continue PPI  Planned removal of adenocarcinoma in 2024 once patient had completed 6 months of DAPT  Will proceed with bowel care once patient is tolerating diet      I spent 40 minutes with the patient in face-to-face consultation, of which greater than 50% was spent in counseling and coordination of care as described above.     Case discussed with:  [x]Patient  []Family  []Nursing  []Case Management  DVT Prophylaxis:  []Lovenox  []Hep SQ  [x]SCDs  []Coumadin 
[facility] Hospital[facility]  Progress Note    Patient: Gigi Mcneal Age: 64 y.o. : 1960 MR#: [id number] SSN: [id number]  Date/Time: 2024     Subjective:   Patient doing well at this time.  WBC downtrending.   Patient with mildly distended abdomen but no pain. Tolerating regular diet.     Review of systems  General: No fevers or chills.  Cardiovascular: No chest pain or pressure. No palpitations.   Pulmonary: No shortness of breath, cough or wheeze.   Gastrointestinal: No abdominal pain, nausea, vomiting or diarrhea.   Genitourinary: No urinary frequency, urgency, hesitancy or dysuria.   Musculoskeletal: No joint or muscle pain, no back pain, no recent trauma.    Neurologic: No headache, numbness, tingling or weakness.   Assessment/Plan:   Acute appendicitis with perforation  NSTEMI in 2024 with  RCA, HARISH to LAD and diagonal in dual antiplatelet therapy  Hypokalemia  Sigmoid adenocarcinoma  History of GI bleed  constipation     Admitted to CVT stepdown  Cardiac monitoring  Continue Zosyn at this time. Will discharge on PO ciprofloxacin 500 mg BID and metronidazole 500 mg TID (end date 8/15) for total 14 day course   Regular diet.   SCDs  Pain control with Tylenol and percocet  Continue aspirin and Plavix given history of recent stent  Continue atorvastatin 40 mg  Replace potassium  Continue PPI  Planned removal of adenocarcinoma in 2024 once patient had completed 6 months of DAPT  Will proceed with bowel care once patient is tolerating diet      I spent 40 minutes with the patient in face-to-face consultation, of which greater than 50% was spent in counseling and coordination of care as described above.     Case discussed with:  [x]Patient  []Family  []Nursing  []Case Management  DVT Prophylaxis:  []Lovenox  []Hep SQ  [x]SCDs  []Coumadin   []Eliquis/Xarelto         Diet    DVT Prophylax    GI Prophylaxis    Code status    Disposition Discharge tomorrow home if no 
Bedside and Verbal shift change report given to ABIGAIL Gonzalez (oncoming nurse) by ABIGAIL Jim (offgoing nurse). Report included the following information Nurse Handoff Report, Intake/Output, MAR, Recent Results, Med Rec Status, and Cardiac Rhythm SinusTachy .      > Pt's Potassium: 3.4. Protocol followed.     0300: Potassium replacement completed.     Bedside and Verbal shift change report given to Diandra HAYES (oncoming nurse) by Lisa HAYES (offgoing nurse). Report included the following information Nurse Handoff Report, Intake/Output, MAR, Recent Results, Med Rec Status, and Cardiac Rhythm Sinus Rhythm/ Sinus Tachy .    
Bedside and Verbal shift change report given to Ny RN (oncoming nurse) by Yane RN (offgoing nurse). Report included the following information Nurse Handoff Report and Cardiac Rhythm Sinus Tachy .      1310 patient off floor for XR    1500 Patient returned to unit, with loose watery stools.    1830 Patient assisted to BSC.       Bedside and Verbal shift change report given to Yane RN (oncoming nurse) by Ny RN (offgoing nurse). Report included the following information Nurse Handoff Report and Cardiac Rhythm Sinus Tachy.        
Bedside and Verbal shift change report given to Yane RN (oncoming nurse) by Diandra RN (offgoing nurse). Report included the following information Nurse Handoff Report, Intake/Output, MAR, Recent Results, and Cardiac Rhythm NSR/sinus tachy .     
Bedside and Verbal shift change report given to Yane RN (oncoming nurse) by Ny RN (offgoing nurse). Report included the following information Nurse Handoff Report, Intake/Output, MAR, Recent Results, and Cardiac Rhythm NSR/sinus tachy .     2030: Pt receiving potassium replacement and stating \"it burns\". Rate lowered and IV site changed with no improvement. Provider notified. Orders placed.   
Comprehensive Nutrition Assessment    Type and Reason for Visit:  Initial, Positive Nutrition Screen    Nutrition Recommendations/Plan:   Continue current diet as tolerated.  Continue oral supplements: Ensure Plus High Protein (each provides 350 kcal, 20g protein) 2x/d  Continue to monitor tolerance of PO, compliance of oral supplements, weight, labs, and plan of care during admission.     Malnutrition Assessment:  Malnutrition Status:  Mild malnutrition (08/05/24 1557)    Context:  Chronic Illness     Findings of the 6 clinical characteristics of malnutrition:  Energy Intake:  Unable to assess  Weight Loss:  Greater than 10% over 6 months     Body Fat Loss:  Unable to assess     Muscle Mass Loss:  Unable to assess    Fluid Accumulation:  No significant fluid accumulation     Strength:  Not Performed    Nutrition History and Allergies:   PMHx: CAD, Colon Ca, HF, HTN, sigmoid adenocarcinoma. Familiar with pt from admit (6/8-16) for symptomatic anemia and colonic mass, also on (3/23-30) for AE CHF-- limited intakes to assess at these times.     Weight Hx: Reviewed, Wt change: 15.7 kg (17%) x 5 months - significant.   Wt Readings from Last 10 Encounters:   08/03/24 75.9 kg (167 lb 4.8 oz)   08/02/24 81.2 kg (179 lb)   07/12/24 82 kg (180 lb 11.2 oz)   07/01/24 79.8 kg (176 lb)   06/20/24 76.3 kg (168 lb 4.8 oz)   06/19/24 75.6 kg (166 lb 9.6 oz)   06/13/24 75.6 kg (166 lb 10.7 oz)   06/08/24 73.9 kg (163 lb)   06/03/24 79.4 kg (175 lb)   05/25/24 79.5 kg (175 lb 4 oz)     NFPE: unable to perform NFPE. Food Allergies: NKFA    Nutrition Assessment:    Admitted for Appendicitis with perforation and concerns for intra-abdominal infection, managing non-operatively with abx. Also with sigmoid adenocarcinoma, gastrografin enema today determined no obstruction. Pt seen for - Positive Nutrition Screen: MST: wt loss, MST: poor appetite. Significant wt loss per EMR wt hx, no intakes to assess though noted pt tolerating 
Infectious Disease Consultation Note    Reason: perforated appendicitis, concerned for abscess    Current abx Prior abx   Pip-tazo (8/3 -       Assessment :  65 y/o AA M with h/o sigmoid adenocarcinoma, HFrecEF, NSTEMI in 3/2024 currently on DAPT admitted for perforated appendicitis with concerns for intra-abdominal infection.     His presentation overall is consistent with intra-abdominal infection d/t ruptured appendicitis currently being managed non operatively.     Patient clinically appears well, downtrending leukocytosis, and with distention on exam, but he continues to have minimal abdominal pain to palpation.     The primary issue complicating an ideal antibiotic strategy is the possible presence of the abscess, which was noted on his initial imaging. Generally, a perforated appendicitis managed nonoperatively can be treated with a 7-10 day course of antibiotics, especially if there is a response to initial therapy (as in his case), but the treatment course is individualized to response.     His situation is slightly different in that there is the presence of a possible intra-abdominal abscess seen on his initial imaging. This is an extremely small (<3 cm) abscess and would be classically considered a low-moderate risk intra-abdominal infection. But given that the appendix remains, source control has not been definitively achieved. In these circumstances, treatment can involve prolonged therapy with imaging to ensure radiographic resolution.    In his  case, I would recommend complete a total 2 week course of antibiotics, encompassing the treatment that he has received thus far, with close outpatient follow up to ensure improvement. Because of his concomitant malignancy, I would recommend Pseudomonal coverage as well, and he can be transitioned to ciprofloxacin and metronidazole at time of discharge. I reviewed his medication list, and he has no clear drug-drug interactions, and normal Qtc and not on any QT 
[facility] 2 CV STEPDOWN  [address] [facility] [address]  [phone]  Colon and Rectal Surgery Progress Note      Patient: Gigi Mcneal MRN: [id number]  SSN: [id number]    YOB: 1960  Age: 64 y.o.  Sex: male      Admit Date: 8/2/2024    LOS: 4 days     Subjective:     Denies pain.  Moving bowels.    Objective:     Vitals:    08/05/24 2001 08/05/24 2331 08/06/24 0400 08/06/24 0712   BP: 101/71 103/75 110/74 123/74   Pulse:  83 85 90   Resp:    18   Temp:  98 °F (36.7 °C) 97.4 °F (36.3 °C) 97.6 °F (36.4 °C)   TempSrc:  Oral Axillary Temporal   SpO2:  100%  100%   Weight:       Height:            Intake and Output:  Current Shift: 08/06 0701 - 08/06 1900  In: -   Out: 200 [Urine:200]  Last three shifts: 08/04 1901 - 08/06 0700  In: 2547.6 [I.V.:2330.3]  Out: -     Physical Exam:     Constitutional: well developed, in NAD  Abdomen: Soft, mildly distended, nontender, no right lower quadrant tenderness    Lab/Data Review:    Lab Results   Component Value Date    WBC 12.5 08/06/2024    HGB 8.7 (L) 08/06/2024    HCT 26.6 (L) 08/06/2024    MCV 74.5 (L) 08/06/2024     08/06/2024     Lab Results   Component Value Date/Time     08/06/2024 04:34 AM    K 3.1 08/06/2024 04:34 AM     08/06/2024 04:34 AM    CO2 25 08/06/2024 04:34 AM    BUN 11 08/06/2024 04:34 AM    CREATININE 0.60 08/06/2024 04:34 AM    GLUCOSE 60 08/06/2024 04:34 AM    GLUCOSE 177 08/16/2023 12:00 AM    CALCIUM 8.2 08/06/2024 04:34 AM      Lab Results   Component Value Date/Time    MG 2.1 08/06/2024 04:34 AM     Lab Results   Component Value Date    CALCIUM 8.2 (L) 08/06/2024    PHOS 4.2 05/27/2024      Gastrografin enema personally visualized; no evidence of colonic obstruction    Assessment:     Possible appendicitis in the setting of sigmoid colon cancer    Plan:     Antibiotics per ID and primary team  Regular diet  Discharge per primary team  Should follow-up with me in the office in approximately 2 weeks  Consider repeat imaging 
Patient seen and chart reviewed.  Agree with Dr. Coker regarding management with antibiotics.  Will order Gastrografin enema to evaluate whether or not there is obstruction from the tumor given his abdominal exam.  He is moderately distended.  
Spiritual Health Assessment/Progress Note  [facility]    Spiritual/Emotional Needs, Advance Care Planning,  ,  ,      Name: Gigi Mcneal MRN: [id number]    Age: 64 y.o.     Sex: male   Language: English   Rastafari: Restoration   Appendicitis with perforation     Date: 8/4/2024            Total Time Calculated: 7 min              Spiritual Assessment began in [facility] 2 CV STEPDOWN        Referral/Consult From: Rounding   Encounter Overview/Reason: Spiritual/Emotional Needs, Advance Care Planning  Service Provided For: Patient    Becky, Belief, Meaning:   Patient is connected with a becky tradition or spiritual practice  Family/Friends No family/friends present      Importance and Influence:  Patient has spiritual/personal beliefs that influence decisions regarding their health  Family/Friends no family/friends present    Community:  Patient feels well-supported. Support system includes: Spouse/Partner, Children, and Extended family  Family/Friends no family/friends present    Assessment and Plan of Care:     Patient Interventions include: Affirmed coping skills/support systems  Family/Friends Interventions include: Affirmed coping skills/support systems    Patient Plan of Care: No spiritual needs identified for follow-up  Family/Friends Plan of Care: No spiritual needs identified for follow-up    Electronically signed by MAYKEL Weaver on 8/4/2024 at 3:07 PM    
TRANSFER - IN REPORT:    Verbal report received from ABIGAIL Nieto on Gigi Mcneal  being received from [facility] for routine progression of patient care      Report consisted of patient's Situation, Background, Assessment and   Recommendations(SBAR).     Information from the following report(s) Nurse Handoff Report, ED SBAR, MAR, Recent Results, Med Rec Status, and Cardiac Rhythm Sinus Tachy  was reviewed with the receiving nurse.    Opportunity for questions and clarification was provided.      Assessment completed upon patient's arrival to unit and care assumed.      2320: Pt arrived at the unit via transport.   > VS: T 98; /72; ; RR 20; O2 98%. Mild pain complaint in the right lower abdomen - feeling bloated.   > CHG bath given.     0300: Potassium is running at a slower rate 50 mL/hr instead of 100 mL/hr due to pt's complaint of burning at the site. Ice pack is placed.     0430: Pt rated his right lower abdominal pain 5 on a scale of 0-10. Pt medicated per MAR.             
   metoprolol succinate (TOPROL XL) extended release tablet 25 mg  25 mg Oral Daily    pantoprazole (PROTONIX) tablet 40 mg  40 mg Oral Daily       Labs:    Recent Results (from the past 24 hour(s))   CBC with Auto Differential    Collection Time: 08/02/24  1:16 PM   Result Value Ref Range    WBC 10.5 4.6 - 13.2 K/uL    RBC 4.52 4.35 - 5.65 M/uL    Hemoglobin 10.9 (L) 13.0 - 16.0 g/dL    Hematocrit 35.3 (L) 36.0 - 48.0 %    MCV 78.1 78.0 - 100.0 FL    MCH 24.1 24.0 - 34.0 PG    MCHC 30.9 (L) 31.0 - 37.0 g/dL    RDW 15.5 (H) 11.6 - 14.5 %    Platelets 434 (H) 135 - 420 K/uL    MPV 9.7 9.2 - 11.8 FL    Nucleated RBCs 0.0 0.0  WBC    nRBC 0.00 0.00 - 0.01 K/uL    Neutrophils % 56 40 - 73 %    Band Neutrophils 6 (H) 0 - 5 %    Lymphocytes % 29 21 - 52 %    Monocytes % 5 3 - 10 %    Eosinophils % 0 0 - 5 %    Basophils % 0 0 - 2 %    Other cells, fluid 4 %    Immature Granulocytes % 0 %    Neutrophils Absolute 6.5 1.8 - 8.0 K/UL    Lymphocytes Absolute 3.0 0.9 - 3.6 K/UL    Monocytes Absolute 0.5 0.05 - 1.2 K/UL    Eosinophils Absolute 0.0 0.0 - 0.4 K/UL    Basophils Absolute 0.0 0.0 - 0.1 K/UL    Immature Granulocytes Absolute 0.0 K/UL    Differential Type Manual      RBC Comment Anisocytosis  1+        RBC Comment Poikilocytosis  1+       Comprehensive Metabolic Panel    Collection Time: 08/02/24  1:16 PM   Result Value Ref Range    Sodium 139 136 - 145 mmol/L    Potassium 3.3 (L) 3.5 - 5.5 mmol/L    Chloride 102 100 - 111 mmol/L    CO2 22 21 - 32 mmol/L    Anion Gap 15 3.0 - 18.0 mmol/L    Glucose 110 (H) 74 - 99 mg/dL    BUN 12 7 - 18 mg/dL    Creatinine 0.90 0.60 - 1.30 mg/dL    BUN/Creatinine Ratio 13 12 - 20      Est, Glom Filt Rate >90 >60 ml/min/1.73m2    Calcium 9.2 8.5 - 10.1 mg/dL    Total Bilirubin 1.1 (H) 0.2 - 1.0 mg/dL    AST 21 10 - 38 U/L    ALT 11 (L) 16 - 61 U/L    Alk Phosphatase 78 45 - 117 U/L    Total Protein 7.3 6.4 - 8.2 g/dL    Albumin 3.0 (L) 3.4 - 5.0 g/dL    Globulin 4.3 (H) 2.0 - 4.0 
08/04/2024 03:32 AM    CO2 24 08/04/2024 03:32 AM    BUN 18 08/04/2024 03:32 AM     CMP:  Lab Results   Component Value Date/Time     08/04/2024 03:32 AM    K 3.8 08/04/2024 03:32 AM     08/04/2024 03:32 AM    CO2 24 08/04/2024 03:32 AM    BUN 18 08/04/2024 03:32 AM    GLOB 4.2 08/04/2024 03:32 AM       No results found for this or any previous visit (from the past 24 hour(s)).    images and reports reviewed    Assessment:   Gigi Mcneal is a 64 y.o. male  who we are following for evaluation of acute ruptured appendicitis in the setting of multiple severe comorbidities including myocardial infarction and stent placement on dual antiplatelet therapy and sigmoid cancer that is managed conservatively till the possibility for surgery at the end of the year.  The patient continues to do well with the conservative therapy of his acute ruptured appendicitis and he denies any abdominal pain and is tolerating his liquid diet.  He has no more tachycardia and he has no fever.  He had leukocytosis yesterday but his WBC is not back today.     Given the fact that the patient is on dual antiplatelet therapy for drug-eluting stent that cannot be stopped, and the fact that he has no fever or tachycardia and responding well to the IV antibiotics and his physical exam is benign, and the fact that he will need a major colonic resection for her sigmoid colon cancer, we will continue to avoid that the surgery and we will treat him conservatively with IV antibiotics and IV fluids.  Of course there is a possibility that the patient's condition may worsen then we have no option but to proceed with the appendectomy, and also there is a possibility of developing intra-abdominal abscesses and in this case will ask interventional radiology to drain them    I will advance the patient to regular diet and also I will ask my partner Dr. Banuelos to see the patient today or tomorrow for follow-up on his colon cancer.     Plan:     I 
comorbidities including recent myocardial infarction and stent placement on dual antiplatelet therapy and sigmoid cancer that is managed conservatively till the possibility for surgery at the end of the year.  When I saw the patient this morning he stated he feels better and his abdominal pain has improved remarkably.  He feels hungry.  He has still some mild tachycardia but he has no fever.  His WBC is not back but the 1 from yesterday was normal.     Again given the fact that the patient is on dual antiplatelet therapy for drug-eluting stent that cannot be stopped, and the fact that he has no fever and no leukocytosis and his physical exam is relatively benign, and the fact that he will need a major colonic resection for her sigmoid colon cancer, we will continue to avoid that the surgery and we will treat him conservatively with IV antibiotics and IV fluids.  Of course there is a possibility that the patient's condition may worsen then we have no option but to proceed with the appendectomy, and also there is a possibility of developing intra-abdominal abscesses and in this case will ask interventional radiology to drain them but it seems that the patient is doing really well clinically.  I will start him on clear liquid diet and we will observe him very closely.  I will also inform my partner Dr. Banuelos to see the patient tomorrow because he was planning to do his sigmoid colectomy at the end of the year once the antiplatelet therapy can be stopped for his cardiac stent.     Plan:     Will continue to avoid surgery  IV antibiotics  IV fluids  I placed the patient on clear liquid diet  Close observation    Please call me if you have any questions (cell phone: [phone])     Signed By: Noel Coker MD     August 4, 2024

## 2024-08-06 NOTE — PLAN OF CARE
Problem: Chronic Conditions and Co-morbidities  Goal: Patient's chronic conditions and co-morbidity symptoms are monitored and maintained or improved  Outcome: Progressing     Problem: Discharge Planning  Goal: Discharge to home or other facility with appropriate resources  Outcome: Progressing     Problem: Safety - Adult  Goal: Free from fall injury  Outcome: Progressing     Problem: Pain  Goal: Verbalizes/displays adequate comfort level or baseline comfort level  Outcome: Progressing     Problem: Metabolic/Fluid and Electrolytes - Adult  Goal: Electrolytes maintained within normal limits  Outcome: Progressing

## 2024-08-06 NOTE — DISCHARGE INSTRUCTIONS
Possible Appendicitis: Care Instructions  Overview     Your doctor thinks you may have appendicitis. This means that your appendix may be infected. The appendix is a small sac that is shaped like a finger. It's attached to your large intestine.  Sometimes it's hard to tell if a person has appendicitis. It is especially hard to tell in children, pregnant women, and older adults. If your doctor thinks it's possible that you have appendicitis, more tests may be ordered. Or your doctor may want to wait to see if your symptoms change.  Your doctor thinks it's okay for you to go home right now. But you will need to watch for symptoms of appendicitis at home. If your symptoms continue or get worse, it's important to call your doctor or get medical care right away. If you do have appendicitis, it can get serious very quickly and your appendix may need to be removed.  Follow-up care is a key part of your treatment and safety. Be sure to make and go to all appointments, and call your doctor if you are having problems. It's also a good idea to know your test results and keep a list of the medicines you take.  How can you care for yourself at home?  Do not eat or drink anything, unless your doctor says it is okay. If you need surgery, it's best to have an empty stomach. If you're thirsty, you can rinse your mouth with water.  Do not take laxatives. If you have appendicitis, they can make the appendix burst.  Follow your doctor's instructions about taking medicines. Your doctor may tell you not to take antibiotics or pain pills. These medicines can make it harder to tell if you have appendicitis.  Watch for symptoms of appendicitis. These include severe belly pain, fever, nausea, and vomiting. It is very important to follow your doctor's instructions about getting treatment if you have these symptoms.  When should you call for help?   Call your doctor now or seek immediate medical care if:    You have new or worse belly

## 2024-08-15 DIAGNOSIS — I21.4 NSTEMI (NON-ST ELEVATED MYOCARDIAL INFARCTION) (HCC): ICD-10-CM

## 2024-08-15 RX ORDER — ATORVASTATIN CALCIUM 40 MG/1
40 TABLET, FILM COATED ORAL NIGHTLY
Qty: 90 TABLET | Refills: 0 | Status: SHIPPED | OUTPATIENT
Start: 2024-08-15

## 2024-08-15 RX ORDER — PANTOPRAZOLE SODIUM 40 MG/1
40 TABLET, DELAYED RELEASE ORAL DAILY
Qty: 90 TABLET | Refills: 0 | Status: SHIPPED | OUTPATIENT
Start: 2024-08-15

## 2024-08-16 DIAGNOSIS — I25.10 CORONARY ARTERY DISEASE INVOLVING NATIVE CORONARY ARTERY OF NATIVE HEART WITHOUT ANGINA PECTORIS: ICD-10-CM

## 2024-08-19 RX ORDER — METOPROLOL SUCCINATE 25 MG/1
25 TABLET, EXTENDED RELEASE ORAL DAILY
Qty: 30 TABLET | Refills: 0 | Status: SHIPPED | OUTPATIENT
Start: 2024-08-19

## 2024-08-28 ENCOUNTER — OFFICE VISIT (OUTPATIENT)
Age: 64
End: 2024-08-28
Payer: MEDICARE

## 2024-08-28 VITALS
WEIGHT: 160 LBS | SYSTOLIC BLOOD PRESSURE: 120 MMHG | DIASTOLIC BLOOD PRESSURE: 78 MMHG | BODY MASS INDEX: 21.67 KG/M2 | HEART RATE: 56 BPM | HEIGHT: 72 IN | OXYGEN SATURATION: 99 %

## 2024-08-28 DIAGNOSIS — I10 ESSENTIAL (PRIMARY) HYPERTENSION: Primary | ICD-10-CM

## 2024-08-28 DIAGNOSIS — I42.9 CARDIOMYOPATHY, UNSPECIFIED TYPE (HCC): ICD-10-CM

## 2024-08-28 DIAGNOSIS — E78.5 HYPERLIPIDEMIA, UNSPECIFIED HYPERLIPIDEMIA TYPE: ICD-10-CM

## 2024-08-28 DIAGNOSIS — D64.9 ANEMIA, UNSPECIFIED TYPE: ICD-10-CM

## 2024-08-28 DIAGNOSIS — I25.10 CORONARY ARTERY DISEASE INVOLVING NATIVE CORONARY ARTERY OF NATIVE HEART WITHOUT ANGINA PECTORIS: ICD-10-CM

## 2024-08-28 PROCEDURE — 1123F ACP DISCUSS/DSCN MKR DOCD: CPT | Performed by: INTERNAL MEDICINE

## 2024-08-28 PROCEDURE — 1111F DSCHRG MED/CURRENT MED MERGE: CPT | Performed by: INTERNAL MEDICINE

## 2024-08-28 PROCEDURE — G8427 DOCREV CUR MEDS BY ELIG CLIN: HCPCS | Performed by: INTERNAL MEDICINE

## 2024-08-28 PROCEDURE — 3017F COLORECTAL CA SCREEN DOC REV: CPT | Performed by: INTERNAL MEDICINE

## 2024-08-28 PROCEDURE — 99214 OFFICE O/P EST MOD 30 MIN: CPT | Performed by: INTERNAL MEDICINE

## 2024-08-28 PROCEDURE — G8420 CALC BMI NORM PARAMETERS: HCPCS | Performed by: INTERNAL MEDICINE

## 2024-08-28 PROCEDURE — 1036F TOBACCO NON-USER: CPT | Performed by: INTERNAL MEDICINE

## 2024-08-28 PROCEDURE — 3074F SYST BP LT 130 MM HG: CPT | Performed by: INTERNAL MEDICINE

## 2024-08-28 PROCEDURE — 93000 ELECTROCARDIOGRAM COMPLETE: CPT | Performed by: INTERNAL MEDICINE

## 2024-08-28 PROCEDURE — 3078F DIAST BP <80 MM HG: CPT | Performed by: INTERNAL MEDICINE

## 2024-08-28 ASSESSMENT — PATIENT HEALTH QUESTIONNAIRE - PHQ9
SUM OF ALL RESPONSES TO PHQ QUESTIONS 1-9: 0
2. FEELING DOWN, DEPRESSED OR HOPELESS: NOT AT ALL
SUM OF ALL RESPONSES TO PHQ9 QUESTIONS 1 & 2: 0
SUM OF ALL RESPONSES TO PHQ QUESTIONS 1-9: 0
1. LITTLE INTEREST OR PLEASURE IN DOING THINGS: NOT AT ALL

## 2024-08-28 NOTE — PROGRESS NOTES
Gigi Fredis presents today for   Chief Complaint   Patient presents with    Follow-up     3 month f/u with no concerns        Gigi Mcneal preferred language for health care discussion is english/other.    Is someone accompanying this pt? no    Is the patient using any DME equipment during OV? no    Depression Screening:  Depression: Not at risk (8/28/2024)    PHQ-2     PHQ-2 Score: 0        Learning Assessment:  Who is the primary learner? Patient    What is the preferred language for health care of the primary learner? ENGLISH    How does the primary learner prefer to learn new concepts? DEMONSTRATION    Answered By patient    Relationship to Learner SELF           Pt currently taking Anticoagulant therapy? no    Pt currently taking Antiplatelet therapy ? ASA 81 MG 1X DAILY AND PLAVIX 75 MG 1X DAILY       Coordination of Care:  1. Have you been to the ER, urgent care clinic since your last visit? Hospitalized since your last visit? yes    2. Have you seen or consulted any other health care providers outside of the [facility] since your last visit? Include any pap smears or colon screening. no

## 2024-08-28 NOTE — PROGRESS NOTES
HISTORY OF PRESENT ILLNESS  Gigi Mcneal  64 y.o. male     Chief Complaint   Patient presents with    Follow-up     3 month f/u        ASSESSMENT and PLAN    The primary encounter diagnosis was Essential (primary) hypertension. Diagnoses of Cardiomyopathy, unspecified type (HCC), Coronary artery disease involving native coronary artery of native heart without angina pectoris, Anemia, unspecified type, and Hyperlipidemia, unspecified hyperlipidemia type were also pertinent to this visit.    Mr. Gigi Mcneal presented to [facility] in March 2024 with respiratory failure, septic shock secondary to multifocal pneumonia.  He was noted to have GI bleed and significant anemia.  He also ruled in for NSTEMI.  He has history of tobacco use.  His echocardiogram at that time showed EF 35-40% with mild to moderate aortic stenosis, mean gradient 25 mmHg.  He also underwent coronary angiography which showed RCA  with distal collaterals, mid LAD 95% and first diagonal 99% lesions.  Both the LAD and diagonal lesions were stented using 2.5 mm HARISH to residual 0%.  In June 2024, he was admitted to [facility] with lower GI bleed.  His echocardiogram showed EF 50-55% with moderate aortic stenosis, mean gradient 31 mmHg, JAIR 0.9 cm².  RVSP was 21 mmHg.  He was diagnosed with colon cancer with sigmoid mass noted.  He is scheduled for colon surgery in October 2024.    Medication regimen reviewed and current cardiac regimen will be continued.  All new testing since the last office visit was independently reviewed by me.    CAD:    Clinically stable.  AS:   Echocardiogram in June 2024 showed moderate AS with mean gradient 31 mmHg.  HTN:    Well-controlled at 120/78.  Continue current BP regimen.  Rhythm:    Asymptomatic sinus bradycardia 56 bpm.  CHF:    There is no evidence of decompensated CHF noted.  BMI:     His weight today is 160 pounds.  His baseline weight in March 2024 was 202 pounds.  Hyperlipidemia:    deficit present.      Mental Status: He is alert and oriented to person, place, and time.   Psychiatric:         Mood and Affect: Mood normal.         Behavior: Behavior normal.            PCP: Cathy Justin MD    Past Medical History:   Diagnosis Date    CAD (coronary artery disease)     Colon cancer (HCC)     Heart failure (HCC)     Hypertension     NSTEMI (non-ST elevated myocardial infarction) (HCC)        Past Surgical History:   Procedure Laterality Date    CARDIAC PROCEDURE N/A 3/28/2024    Left heart cath performed by Butch Guaman MD at [facility] CARDIAC CATH LAB    CARDIAC PROCEDURE N/A 3/28/2024    Coronary angiography performed by Butch Guaman MD at [facility] CARDIAC CATH LAB    CARDIAC PROCEDURE N/A 3/28/2024    Left ventriculography performed by Butch Guaman MD at [facility] CARDIAC CATH LAB    CARDIAC PROCEDURE N/A 3/28/2024    Insert stent syeda coronary performed by Butch Guaman MD at [facility] CARDIAC CATH LAB    COLONOSCOPY N/A 6/12/2024    COLONOSCOPY with polypectomy, bx's and tattoo performed by Orestes Araujo MD at [facility] ENDOSCOPY    UPPER GASTROINTESTINAL ENDOSCOPY N/A 6/12/2024    ESOPHAGOGASTRODUODENOSCOPY polypectomies and 3 clips performed by Orestes Araujo MD at [facility] ENDOSCOPY       Current Outpatient Medications   Medication Sig Dispense Refill    metoprolol succinate (TOPROL XL) 25 MG extended release tablet Take 1 tablet by mouth daily 30 tablet 0    atorvastatin (LIPITOR) 40 MG tablet Take 1 tablet by mouth nightly 90 tablet 0    pantoprazole (PROTONIX) 40 MG tablet Take 1 tablet by mouth daily 90 tablet 0    clopidogrel (PLAVIX) 75 MG tablet Take 1 tablet by mouth daily 30 tablet 3    ferrous sulfate (IRON 325) 325 (65 Fe) MG tablet Take 1 tablet by mouth daily (with breakfast) 90 tablet 3    aspirin 81 MG chewable tablet Take 1 tablet by mouth daily 30 tablet 3     No current facility-administered medications for this visit.       The patient has a family history of    Social History     Tobacco Use

## 2024-09-05 ENCOUNTER — OFFICE VISIT (OUTPATIENT)
Age: 64
End: 2024-09-05
Payer: MEDICARE

## 2024-09-05 VITALS
HEART RATE: 56 BPM | HEIGHT: 72 IN | BODY MASS INDEX: 22.48 KG/M2 | SYSTOLIC BLOOD PRESSURE: 130 MMHG | WEIGHT: 166 LBS | RESPIRATION RATE: 16 BRPM | TEMPERATURE: 97.3 F | DIASTOLIC BLOOD PRESSURE: 62 MMHG | OXYGEN SATURATION: 97 %

## 2024-09-05 DIAGNOSIS — C18.7 MALIGNANT NEOPLASM OF SIGMOID COLON (HCC): Primary | ICD-10-CM

## 2024-09-05 PROCEDURE — 3017F COLORECTAL CA SCREEN DOC REV: CPT | Performed by: COLON & RECTAL SURGERY

## 2024-09-05 PROCEDURE — 1111F DSCHRG MED/CURRENT MED MERGE: CPT | Performed by: COLON & RECTAL SURGERY

## 2024-09-05 PROCEDURE — G8428 CUR MEDS NOT DOCUMENT: HCPCS | Performed by: COLON & RECTAL SURGERY

## 2024-09-05 PROCEDURE — 99214 OFFICE O/P EST MOD 30 MIN: CPT | Performed by: COLON & RECTAL SURGERY

## 2024-09-05 PROCEDURE — G8420 CALC BMI NORM PARAMETERS: HCPCS | Performed by: COLON & RECTAL SURGERY

## 2024-09-05 PROCEDURE — 1123F ACP DISCUSS/DSCN MKR DOCD: CPT | Performed by: COLON & RECTAL SURGERY

## 2024-09-05 PROCEDURE — 1036F TOBACCO NON-USER: CPT | Performed by: COLON & RECTAL SURGERY

## 2024-09-05 RX ORDER — NEOMYCIN SULFATE 500 MG/1
1000 TABLET ORAL 3 TIMES DAILY
Qty: 6 TABLET | Refills: 0 | Status: SHIPPED | OUTPATIENT
Start: 2024-09-05 | End: 2024-09-06

## 2024-09-05 RX ORDER — METRONIDAZOLE 500 MG/1
500 TABLET ORAL 3 TIMES DAILY
Qty: 3 TABLET | Refills: 0 | Status: SHIPPED | OUTPATIENT
Start: 2024-09-05 | End: 2024-09-06

## 2024-09-05 NOTE — PROGRESS NOTES
Subjective: Hospitalized for concern for appendicitis.  Denies abdominal pain.    Past medical history and ROS were reviewed and unchanged.     Abdomen: Soft, nontender nondistended    Assessment / Plan    Sigmoid colon cancer, possible involvement of appendix  Proceed with robotic sigmoid colectomy  He had a drug-eluting stent placed 6 months ago, we were to wait until the end of September for surgery  Schedule surgery in October, be sure we have cardiac clearance  Procedure and risks discussed in detail  In particular patient is at increased risk for MI postoperatively as well as bleeding when placed back on antiplatelets  Continuous cardiac monitoring postoperatively, possible cardiology consult    30 minutes was spent in patient care.      The diagnoses and plan were discussed with patient.  All questions answered.  Plan of care agreed to by all concerned.

## 2024-09-06 ENCOUNTER — PREP FOR PROCEDURE (OUTPATIENT)
Age: 64
End: 2024-09-06

## 2024-09-06 DIAGNOSIS — C18.7 MALIGNANT NEOPLASM OF SIGMOID COLON (HCC): ICD-10-CM

## 2024-09-11 ENCOUNTER — OFFICE VISIT (OUTPATIENT)
Facility: CLINIC | Age: 64
End: 2024-09-11
Payer: MEDICARE

## 2024-09-11 VITALS
SYSTOLIC BLOOD PRESSURE: 104 MMHG | TEMPERATURE: 97.4 F | HEIGHT: 72 IN | OXYGEN SATURATION: 100 % | HEART RATE: 69 BPM | WEIGHT: 172.6 LBS | RESPIRATION RATE: 17 BRPM | DIASTOLIC BLOOD PRESSURE: 69 MMHG | BODY MASS INDEX: 23.38 KG/M2

## 2024-09-11 DIAGNOSIS — I25.10 CORONARY ARTERY DISEASE INVOLVING NATIVE CORONARY ARTERY OF NATIVE HEART WITHOUT ANGINA PECTORIS: ICD-10-CM

## 2024-09-11 DIAGNOSIS — C18.7 ADENOCARCINOMA OF SIGMOID COLON (HCC): ICD-10-CM

## 2024-09-11 DIAGNOSIS — D50.0 IRON DEFICIENCY ANEMIA DUE TO CHRONIC BLOOD LOSS: ICD-10-CM

## 2024-09-11 DIAGNOSIS — Z12.5 ENCOUNTER FOR PROSTATE CANCER SCREENING: ICD-10-CM

## 2024-09-11 DIAGNOSIS — I25.5 ISCHEMIC CARDIOMYOPATHY: ICD-10-CM

## 2024-09-11 DIAGNOSIS — E11.9 TYPE 2 DIABETES MELLITUS WITHOUT COMPLICATION, WITHOUT LONG-TERM CURRENT USE OF INSULIN (HCC): Primary | ICD-10-CM

## 2024-09-11 PROBLEM — K59.00 CONSTIPATION: Status: RESOLVED | Noted: 2024-08-03 | Resolved: 2024-09-11

## 2024-09-11 PROBLEM — K63.89 COLONIC MASS: Status: RESOLVED | Noted: 2024-06-13 | Resolved: 2024-09-11

## 2024-09-11 PROBLEM — I50.20 HFREF (HEART FAILURE WITH REDUCED EJECTION FRACTION) (HCC): Status: RESOLVED | Noted: 2024-03-23 | Resolved: 2024-09-11

## 2024-09-11 PROBLEM — K35.32 APPENDICITIS WITH PERFORATION: Status: RESOLVED | Noted: 2024-08-02 | Resolved: 2024-09-11

## 2024-09-11 PROBLEM — I35.0 NONRHEUMATIC AORTIC VALVE STENOSIS: Status: ACTIVE | Noted: 2024-09-11

## 2024-09-11 PROBLEM — E87.6 HYPOKALEMIA: Status: RESOLVED | Noted: 2024-03-26 | Resolved: 2024-09-11

## 2024-09-11 PROBLEM — I50.32 CHRONIC HEART FAILURE WITH PRESERVED EJECTION FRACTION (HCC): Status: RESOLVED | Noted: 2024-08-03 | Resolved: 2024-09-11

## 2024-09-11 PROBLEM — D62 ACUTE BLOOD LOSS ANEMIA: Status: RESOLVED | Noted: 2024-06-11 | Resolved: 2024-09-11

## 2024-09-11 PROBLEM — I21.4 NSTEMI (NON-ST ELEVATED MYOCARDIAL INFARCTION) (HCC): Status: RESOLVED | Noted: 2024-03-26 | Resolved: 2024-09-11

## 2024-09-11 PROBLEM — Z91.199 MEDICAL NON-COMPLIANCE: Status: RESOLVED | Noted: 2024-03-26 | Resolved: 2024-09-11

## 2024-09-11 PROCEDURE — 3046F HEMOGLOBIN A1C LEVEL >9.0%: CPT | Performed by: STUDENT IN AN ORGANIZED HEALTH CARE EDUCATION/TRAINING PROGRAM

## 2024-09-11 PROCEDURE — 1123F ACP DISCUSS/DSCN MKR DOCD: CPT | Performed by: STUDENT IN AN ORGANIZED HEALTH CARE EDUCATION/TRAINING PROGRAM

## 2024-09-11 PROCEDURE — 3017F COLORECTAL CA SCREEN DOC REV: CPT | Performed by: STUDENT IN AN ORGANIZED HEALTH CARE EDUCATION/TRAINING PROGRAM

## 2024-09-11 PROCEDURE — G8427 DOCREV CUR MEDS BY ELIG CLIN: HCPCS | Performed by: STUDENT IN AN ORGANIZED HEALTH CARE EDUCATION/TRAINING PROGRAM

## 2024-09-11 PROCEDURE — 2022F DILAT RTA XM EVC RTNOPTHY: CPT | Performed by: STUDENT IN AN ORGANIZED HEALTH CARE EDUCATION/TRAINING PROGRAM

## 2024-09-11 PROCEDURE — G8420 CALC BMI NORM PARAMETERS: HCPCS | Performed by: STUDENT IN AN ORGANIZED HEALTH CARE EDUCATION/TRAINING PROGRAM

## 2024-09-11 PROCEDURE — 1036F TOBACCO NON-USER: CPT | Performed by: STUDENT IN AN ORGANIZED HEALTH CARE EDUCATION/TRAINING PROGRAM

## 2024-09-11 PROCEDURE — 99215 OFFICE O/P EST HI 40 MIN: CPT | Performed by: STUDENT IN AN ORGANIZED HEALTH CARE EDUCATION/TRAINING PROGRAM

## 2024-09-11 RX ORDER — ASPIRIN 81 MG/1
81 TABLET, CHEWABLE ORAL DAILY
Qty: 90 TABLET | Refills: 3 | Status: SHIPPED | OUTPATIENT
Start: 2024-09-11

## 2024-09-11 RX ORDER — METOPROLOL SUCCINATE 25 MG/1
25 TABLET, EXTENDED RELEASE ORAL DAILY
Qty: 90 TABLET | Refills: 3 | Status: SHIPPED | OUTPATIENT
Start: 2024-09-11

## 2024-09-11 RX ORDER — CLOPIDOGREL BISULFATE 75 MG/1
75 TABLET ORAL DAILY
Qty: 90 TABLET | Refills: 1 | Status: SHIPPED | OUTPATIENT
Start: 2024-09-11

## 2024-09-11 RX ORDER — ATORVASTATIN CALCIUM 40 MG/1
40 TABLET, FILM COATED ORAL NIGHTLY
Qty: 90 TABLET | Refills: 3 | Status: SHIPPED | OUTPATIENT
Start: 2024-09-11

## 2024-09-11 SDOH — ECONOMIC STABILITY: INCOME INSECURITY: HOW HARD IS IT FOR YOU TO PAY FOR THE VERY BASICS LIKE FOOD, HOUSING, MEDICAL CARE, AND HEATING?: NOT HARD AT ALL

## 2024-09-11 SDOH — ECONOMIC STABILITY: FOOD INSECURITY: WITHIN THE PAST 12 MONTHS, YOU WORRIED THAT YOUR FOOD WOULD RUN OUT BEFORE YOU GOT MONEY TO BUY MORE.: NEVER TRUE

## 2024-09-11 SDOH — ECONOMIC STABILITY: FOOD INSECURITY: WITHIN THE PAST 12 MONTHS, THE FOOD YOU BOUGHT JUST DIDN'T LAST AND YOU DIDN'T HAVE MONEY TO GET MORE.: NEVER TRUE

## 2024-09-19 DIAGNOSIS — I25.10 CORONARY ARTERY DISEASE INVOLVING NATIVE CORONARY ARTERY OF NATIVE HEART WITHOUT ANGINA PECTORIS: ICD-10-CM

## 2024-09-19 RX ORDER — METOPROLOL SUCCINATE 25 MG/1
25 TABLET, EXTENDED RELEASE ORAL DAILY
Qty: 90 TABLET | Refills: 3 | Status: ON HOLD | OUTPATIENT
Start: 2024-09-19

## 2024-09-22 ENCOUNTER — HOSPITAL ENCOUNTER (OUTPATIENT)
Age: 64
Setting detail: OBSERVATION
Discharge: ANOTHER HOSPITAL | End: 2024-09-23
Attending: EMERGENCY MEDICINE | Admitting: INTERNAL MEDICINE
Payer: MEDICARE

## 2024-09-22 DIAGNOSIS — C19 COLORECTAL CANCER (HCC): ICD-10-CM

## 2024-09-22 DIAGNOSIS — D64.9 SEVERE ANEMIA: Primary | ICD-10-CM

## 2024-09-22 DIAGNOSIS — K92.2 ACUTE LOWER GI BLEEDING: ICD-10-CM

## 2024-09-22 LAB
ALBUMIN SERPL-MCNC: 2.5 G/DL (ref 3.4–5)
ALBUMIN/GLOB SERPL: 0.8 (ref 0.8–1.7)
ALP SERPL-CCNC: 50 U/L (ref 45–117)
ALT SERPL-CCNC: 7 U/L (ref 16–61)
ANION GAP SERPL CALC-SCNC: 7 MMOL/L (ref 3–18)
AST SERPL W P-5'-P-CCNC: 6 U/L (ref 10–38)
BASOPHILS # BLD: 0 K/UL (ref 0–0.1)
BASOPHILS NFR BLD: 0 % (ref 0–2)
BILIRUB SERPL-MCNC: 0.5 MG/DL (ref 0.2–1)
BUN SERPL-MCNC: 10 MG/DL (ref 7–18)
BUN/CREAT SERPL: 15 (ref 12–20)
CA-I BLD-MCNC: 7.9 MG/DL (ref 8.5–10.1)
CHLORIDE SERPL-SCNC: 109 MMOL/L (ref 100–111)
CO2 SERPL-SCNC: 24 MMOL/L (ref 21–32)
CREAT SERPL-MCNC: 0.66 MG/DL (ref 0.6–1.3)
DIFFERENTIAL METHOD BLD: ABNORMAL
EKG ATRIAL RATE: 86 BPM
EKG DIAGNOSIS: NORMAL
EKG P AXIS: 47 DEGREES
EKG P-R INTERVAL: 132 MS
EKG Q-T INTERVAL: 380 MS
EKG QRS DURATION: 90 MS
EKG QTC CALCULATION (BAZETT): 454 MS
EKG R AXIS: 9 DEGREES
EKG T AXIS: 17 DEGREES
EKG VENTRICULAR RATE: 86 BPM
EOSINOPHIL # BLD: 0.1 K/UL (ref 0–0.4)
EOSINOPHIL NFR BLD: 1 % (ref 0–5)
ERYTHROCYTE [DISTWIDTH] IN BLOOD BY AUTOMATED COUNT: 17.2 % (ref 11.6–14.5)
GLOBULIN SER CALC-MCNC: 3 G/DL (ref 2–4)
GLUCOSE SERPL-MCNC: 147 MG/DL (ref 74–99)
HCT VFR BLD AUTO: 14.9 % (ref 36–48)
HGB BLD-MCNC: 4.3 G/DL (ref 13–16)
IMM GRANULOCYTES # BLD AUTO: 0 K/UL (ref 0–0.04)
IMM GRANULOCYTES NFR BLD AUTO: 0 % (ref 0–0.5)
LYMPHOCYTES # BLD: 1.3 K/UL (ref 0.9–3.6)
LYMPHOCYTES NFR BLD: 19 % (ref 21–52)
MCH RBC QN AUTO: 19.4 PG (ref 24–34)
MCHC RBC AUTO-ENTMCNC: 28.9 G/DL (ref 31–37)
MCV RBC AUTO: 67.1 FL (ref 78–100)
MONOCYTES # BLD: 0.5 K/UL (ref 0.05–1.2)
MONOCYTES NFR BLD: 7 % (ref 3–10)
NEUTS SEG # BLD: 5 K/UL (ref 1.8–8)
NEUTS SEG NFR BLD: 73 % (ref 40–73)
NRBC # BLD: 0.03 K/UL (ref 0–0.01)
NRBC BLD-RTO: 0.4 PER 100 WBC
PLATELET # BLD AUTO: 384 K/UL (ref 135–420)
PMV BLD AUTO: 11.7 FL (ref 9.2–11.8)
POTASSIUM SERPL-SCNC: 3.3 MMOL/L (ref 3.5–5.5)
PROT SERPL-MCNC: 5.5 G/DL (ref 6.4–8.2)
RBC # BLD AUTO: 2.22 M/UL (ref 4.35–5.65)
SODIUM SERPL-SCNC: 140 MMOL/L (ref 136–145)
TROPONIN I SERPL HS-MCNC: 25 NG/L (ref 0–78)
WBC # BLD AUTO: 6.9 K/UL (ref 4.6–13.2)

## 2024-09-22 PROCEDURE — 99285 EMERGENCY DEPT VISIT HI MDM: CPT

## 2024-09-22 PROCEDURE — 36430 TRANSFUSION BLD/BLD COMPNT: CPT

## 2024-09-22 PROCEDURE — 86850 RBC ANTIBODY SCREEN: CPT

## 2024-09-22 PROCEDURE — P9016 RBC LEUKOCYTES REDUCED: HCPCS

## 2024-09-22 PROCEDURE — 85025 COMPLETE CBC W/AUTO DIFF WBC: CPT

## 2024-09-22 PROCEDURE — 6370000000 HC RX 637 (ALT 250 FOR IP): Performed by: NURSE PRACTITIONER

## 2024-09-22 PROCEDURE — 86901 BLOOD TYPING SEROLOGIC RH(D): CPT

## 2024-09-22 PROCEDURE — 96374 THER/PROPH/DIAG INJ IV PUSH: CPT

## 2024-09-22 PROCEDURE — G0378 HOSPITAL OBSERVATION PER HR: HCPCS

## 2024-09-22 PROCEDURE — 84484 ASSAY OF TROPONIN QUANT: CPT

## 2024-09-22 PROCEDURE — 86920 COMPATIBILITY TEST SPIN: CPT

## 2024-09-22 PROCEDURE — 93005 ELECTROCARDIOGRAM TRACING: CPT | Performed by: EMERGENCY MEDICINE

## 2024-09-22 PROCEDURE — 6360000002 HC RX W HCPCS: Performed by: NURSE PRACTITIONER

## 2024-09-22 PROCEDURE — 80053 COMPREHEN METABOLIC PANEL: CPT

## 2024-09-22 PROCEDURE — 2580000003 HC RX 258: Performed by: NURSE PRACTITIONER

## 2024-09-22 PROCEDURE — 86900 BLOOD TYPING SEROLOGIC ABO: CPT

## 2024-09-22 RX ORDER — POLYETHYLENE GLYCOL 3350 17 G/17G
17 POWDER, FOR SOLUTION ORAL DAILY PRN
Status: DISCONTINUED | OUTPATIENT
Start: 2024-09-22 | End: 2024-09-23 | Stop reason: HOSPADM

## 2024-09-22 RX ORDER — ONDANSETRON 2 MG/ML
4 INJECTION INTRAMUSCULAR; INTRAVENOUS EVERY 6 HOURS PRN
Status: DISCONTINUED | OUTPATIENT
Start: 2024-09-22 | End: 2024-09-23 | Stop reason: HOSPADM

## 2024-09-22 RX ORDER — ACETAMINOPHEN 650 MG/1
650 SUPPOSITORY RECTAL EVERY 6 HOURS PRN
Status: DISCONTINUED | OUTPATIENT
Start: 2024-09-22 | End: 2024-09-23 | Stop reason: HOSPADM

## 2024-09-22 RX ORDER — PANTOPRAZOLE SODIUM 40 MG/10ML
40 INJECTION, POWDER, LYOPHILIZED, FOR SOLUTION INTRAVENOUS DAILY
Status: DISCONTINUED | OUTPATIENT
Start: 2024-09-22 | End: 2024-09-23 | Stop reason: HOSPADM

## 2024-09-22 RX ORDER — ACETAMINOPHEN 325 MG/1
650 TABLET ORAL EVERY 6 HOURS PRN
Status: DISCONTINUED | OUTPATIENT
Start: 2024-09-22 | End: 2024-09-23 | Stop reason: HOSPADM

## 2024-09-22 RX ORDER — SODIUM CHLORIDE 0.9 % (FLUSH) 0.9 %
5-40 SYRINGE (ML) INJECTION EVERY 12 HOURS SCHEDULED
Status: DISCONTINUED | OUTPATIENT
Start: 2024-09-22 | End: 2024-09-23 | Stop reason: HOSPADM

## 2024-09-22 RX ORDER — FUROSEMIDE 20 MG
20 TABLET ORAL DAILY
Status: DISCONTINUED | OUTPATIENT
Start: 2024-09-23 | End: 2024-09-23

## 2024-09-22 RX ORDER — FERROUS SULFATE 325(65) MG
325 TABLET ORAL
Status: DISCONTINUED | OUTPATIENT
Start: 2024-09-23 | End: 2024-09-23 | Stop reason: HOSPADM

## 2024-09-22 RX ORDER — ATORVASTATIN CALCIUM 40 MG/1
40 TABLET, FILM COATED ORAL NIGHTLY
Status: DISCONTINUED | OUTPATIENT
Start: 2024-09-22 | End: 2024-09-23 | Stop reason: HOSPADM

## 2024-09-22 RX ORDER — SODIUM CHLORIDE 0.9 % (FLUSH) 0.9 %
5-40 SYRINGE (ML) INJECTION PRN
Status: DISCONTINUED | OUTPATIENT
Start: 2024-09-22 | End: 2024-09-23 | Stop reason: HOSPADM

## 2024-09-22 RX ORDER — FUROSEMIDE 20 MG
20 TABLET ORAL DAILY
Status: ON HOLD | COMMUNITY
End: 2024-09-23

## 2024-09-22 RX ORDER — ONDANSETRON 4 MG/1
4 TABLET, ORALLY DISINTEGRATING ORAL EVERY 8 HOURS PRN
Status: DISCONTINUED | OUTPATIENT
Start: 2024-09-22 | End: 2024-09-23 | Stop reason: HOSPADM

## 2024-09-22 RX ORDER — SODIUM CHLORIDE 9 MG/ML
INJECTION, SOLUTION INTRAVENOUS PRN
Status: COMPLETED | OUTPATIENT
Start: 2024-09-22 | End: 2024-09-23

## 2024-09-22 RX ORDER — METOPROLOL SUCCINATE 25 MG/1
25 TABLET, EXTENDED RELEASE ORAL DAILY
Status: DISCONTINUED | OUTPATIENT
Start: 2024-09-23 | End: 2024-09-23 | Stop reason: HOSPADM

## 2024-09-22 RX ADMIN — SODIUM CHLORIDE, PRESERVATIVE FREE 10 ML: 5 INJECTION INTRAVENOUS at 22:11

## 2024-09-22 RX ADMIN — ATORVASTATIN CALCIUM 40 MG: 40 TABLET, FILM COATED ORAL at 22:07

## 2024-09-22 RX ADMIN — PANTOPRAZOLE SODIUM 40 MG: 40 INJECTION, POWDER, FOR SOLUTION INTRAVENOUS at 22:10

## 2024-09-22 ASSESSMENT — PAIN - FUNCTIONAL ASSESSMENT: PAIN_FUNCTIONAL_ASSESSMENT: NONE - DENIES PAIN

## 2024-09-23 ENCOUNTER — HOSPITAL ENCOUNTER (INPATIENT)
Facility: HOSPITAL | Age: 64
LOS: 2 days | Discharge: HOME | DRG: 812 | End: 2024-09-25
Attending: INTERNAL MEDICINE | Admitting: STUDENT IN AN ORGANIZED HEALTH CARE EDUCATION/TRAINING PROGRAM
Payer: MEDICARE

## 2024-09-23 VITALS
HEART RATE: 90 BPM | HEIGHT: 72 IN | OXYGEN SATURATION: 100 % | SYSTOLIC BLOOD PRESSURE: 99 MMHG | TEMPERATURE: 98.5 F | DIASTOLIC BLOOD PRESSURE: 67 MMHG | BODY MASS INDEX: 22.25 KG/M2 | RESPIRATION RATE: 13 BRPM | WEIGHT: 164.24 LBS

## 2024-09-23 PROBLEM — K92.2 GIB (GASTROINTESTINAL BLEEDING): Status: ACTIVE | Noted: 2024-09-23

## 2024-09-23 LAB
ANION GAP SERPL CALC-SCNC: 9 MMOL/L (ref 3–18)
BUN SERPL-MCNC: 11 MG/DL (ref 7–18)
BUN/CREAT SERPL: 17 (ref 12–20)
CA-I BLD-MCNC: 8 MG/DL (ref 8.5–10.1)
CHLORIDE SERPL-SCNC: 108 MMOL/L (ref 100–111)
CO2 SERPL-SCNC: 24 MMOL/L (ref 21–32)
CREAT SERPL-MCNC: 0.63 MG/DL (ref 0.6–1.3)
ERYTHROCYTE [DISTWIDTH] IN BLOOD BY AUTOMATED COUNT: 21.4 % (ref 11.6–14.5)
GLUCOSE SERPL-MCNC: 96 MG/DL (ref 74–99)
HCT VFR BLD AUTO: 21.1 % (ref 36–48)
HCT VFR BLD AUTO: 22 % (ref 36–48)
HCT VFR BLD AUTO: 25.3 % (ref 36–48)
HGB BLD-MCNC: 6.9 G/DL (ref 13–16)
HGB BLD-MCNC: 6.9 G/DL (ref 13–16)
HGB BLD-MCNC: 8.2 G/DL (ref 13–16)
MCH RBC QN AUTO: 23 PG (ref 24–34)
MCHC RBC AUTO-ENTMCNC: 31.4 G/DL (ref 31–37)
MCV RBC AUTO: 73.3 FL (ref 78–100)
NRBC # BLD: 0.02 K/UL (ref 0–0.01)
NRBC BLD-RTO: 0.2 PER 100 WBC
PLATELET # BLD AUTO: 362 K/UL (ref 135–420)
PMV BLD AUTO: 11.2 FL (ref 9.2–11.8)
POTASSIUM SERPL-SCNC: 3 MMOL/L (ref 3.5–5.5)
RBC # BLD AUTO: 3 M/UL (ref 4.35–5.65)
SODIUM SERPL-SCNC: 141 MMOL/L (ref 136–145)
WBC # BLD AUTO: 9.8 K/UL (ref 4.6–13.2)

## 2024-09-23 PROCEDURE — 2580000003 HC RX 258: Performed by: EMERGENCY MEDICINE

## 2024-09-23 PROCEDURE — 85014 HEMATOCRIT: CPT

## 2024-09-23 PROCEDURE — 80048 BASIC METABOLIC PNL TOTAL CA: CPT

## 2024-09-23 PROCEDURE — G0378 HOSPITAL OBSERVATION PER HR: HCPCS

## 2024-09-23 PROCEDURE — 2580000003 HC RX 258: Performed by: NURSE PRACTITIONER

## 2024-09-23 PROCEDURE — 99223 1ST HOSP IP/OBS HIGH 75: CPT | Performed by: STUDENT IN AN ORGANIZED HEALTH CARE EDUCATION/TRAINING PROGRAM

## 2024-09-23 PROCEDURE — 6370000000 HC RX 637 (ALT 250 FOR IP): Performed by: INTERNAL MEDICINE

## 2024-09-23 PROCEDURE — 6360000002 HC RX W HCPCS: Performed by: INTERNAL MEDICINE

## 2024-09-23 PROCEDURE — 6370000000 HC RX 637 (ALT 250 FOR IP): Performed by: NURSE PRACTITIONER

## 2024-09-23 PROCEDURE — 36430 TRANSFUSION BLD/BLD COMPNT: CPT

## 2024-09-23 PROCEDURE — 96375 TX/PRO/DX INJ NEW DRUG ADDON: CPT

## 2024-09-23 PROCEDURE — P9016 RBC LEUKOCYTES REDUCED: HCPCS

## 2024-09-23 PROCEDURE — 85027 COMPLETE CBC AUTOMATED: CPT

## 2024-09-23 PROCEDURE — 2580000003 HC RX 258: Performed by: STUDENT IN AN ORGANIZED HEALTH CARE EDUCATION/TRAINING PROGRAM

## 2024-09-23 PROCEDURE — 85018 HEMOGLOBIN: CPT

## 2024-09-23 PROCEDURE — 94761 N-INVAS EAR/PLS OXIMETRY MLT: CPT

## 2024-09-23 PROCEDURE — 96376 TX/PRO/DX INJ SAME DRUG ADON: CPT

## 2024-09-23 PROCEDURE — 6360000002 HC RX W HCPCS: Performed by: NURSE PRACTITIONER

## 2024-09-23 PROCEDURE — 1100000000 HC RM PRIVATE

## 2024-09-23 RX ORDER — ACETAMINOPHEN 325 MG/1
650 TABLET ORAL EVERY 6 HOURS PRN
Status: DISCONTINUED | OUTPATIENT
Start: 2024-09-23 | End: 2024-09-25 | Stop reason: HOSPADM

## 2024-09-23 RX ORDER — PANTOPRAZOLE SODIUM 40 MG/1
40 TABLET, DELAYED RELEASE ORAL DAILY
Status: DISCONTINUED | OUTPATIENT
Start: 2024-09-24 | End: 2024-09-25 | Stop reason: HOSPADM

## 2024-09-23 RX ORDER — ONDANSETRON 4 MG/1
4 TABLET, ORALLY DISINTEGRATING ORAL EVERY 8 HOURS PRN
Status: DISCONTINUED | OUTPATIENT
Start: 2024-09-23 | End: 2024-09-25 | Stop reason: HOSPADM

## 2024-09-23 RX ORDER — POTASSIUM CHLORIDE 1500 MG/1
40 TABLET, EXTENDED RELEASE ORAL PRN
Status: DISCONTINUED | OUTPATIENT
Start: 2024-09-23 | End: 2024-09-25 | Stop reason: HOSPADM

## 2024-09-23 RX ORDER — ONDANSETRON 2 MG/ML
4 INJECTION INTRAMUSCULAR; INTRAVENOUS EVERY 6 HOURS PRN
Status: DISCONTINUED | OUTPATIENT
Start: 2024-09-23 | End: 2024-09-25 | Stop reason: HOSPADM

## 2024-09-23 RX ORDER — ASPIRIN 81 MG/1
81 TABLET, CHEWABLE ORAL DAILY
Status: DISCONTINUED | OUTPATIENT
Start: 2024-09-24 | End: 2024-09-25 | Stop reason: HOSPADM

## 2024-09-23 RX ORDER — POTASSIUM CHLORIDE 7.45 MG/ML
10 INJECTION INTRAVENOUS
Status: COMPLETED | OUTPATIENT
Start: 2024-09-23 | End: 2024-09-23

## 2024-09-23 RX ORDER — MAGNESIUM SULFATE IN WATER 40 MG/ML
2000 INJECTION, SOLUTION INTRAVENOUS PRN
Status: DISCONTINUED | OUTPATIENT
Start: 2024-09-23 | End: 2024-09-25 | Stop reason: HOSPADM

## 2024-09-23 RX ORDER — SODIUM CHLORIDE 9 MG/ML
INJECTION, SOLUTION INTRAVENOUS PRN
Status: DISCONTINUED | OUTPATIENT
Start: 2024-09-23 | End: 2024-09-25 | Stop reason: HOSPADM

## 2024-09-23 RX ORDER — LANOLIN ALCOHOL/MO/W.PET/CERES
3 CREAM (GRAM) TOPICAL NIGHTLY PRN
Status: DISCONTINUED | OUTPATIENT
Start: 2024-09-23 | End: 2024-09-25 | Stop reason: HOSPADM

## 2024-09-23 RX ORDER — POLYETHYLENE GLYCOL 3350 17 G/17G
17 POWDER, FOR SOLUTION ORAL DAILY PRN
Status: DISCONTINUED | OUTPATIENT
Start: 2024-09-23 | End: 2024-09-25 | Stop reason: HOSPADM

## 2024-09-23 RX ORDER — ATORVASTATIN CALCIUM 40 MG/1
40 TABLET, FILM COATED ORAL NIGHTLY
Status: DISCONTINUED | OUTPATIENT
Start: 2024-09-23 | End: 2024-09-25 | Stop reason: HOSPADM

## 2024-09-23 RX ORDER — FERROUS SULFATE 325(65) MG
325 TABLET ORAL
Status: DISCONTINUED | OUTPATIENT
Start: 2024-09-24 | End: 2024-09-25 | Stop reason: HOSPADM

## 2024-09-23 RX ORDER — METOPROLOL SUCCINATE 25 MG/1
25 TABLET, EXTENDED RELEASE ORAL DAILY
Status: DISCONTINUED | OUTPATIENT
Start: 2024-09-24 | End: 2024-09-25 | Stop reason: HOSPADM

## 2024-09-23 RX ORDER — SODIUM CHLORIDE 0.9 % (FLUSH) 0.9 %
5-40 SYRINGE (ML) INJECTION PRN
Status: DISCONTINUED | OUTPATIENT
Start: 2024-09-23 | End: 2024-09-25 | Stop reason: HOSPADM

## 2024-09-23 RX ORDER — CLOPIDOGREL BISULFATE 75 MG/1
75 TABLET ORAL DAILY
Status: DISCONTINUED | OUTPATIENT
Start: 2024-09-24 | End: 2024-09-25 | Stop reason: HOSPADM

## 2024-09-23 RX ORDER — POTASSIUM CHLORIDE 750 MG/1
40 TABLET, EXTENDED RELEASE ORAL DAILY
Status: DISCONTINUED | OUTPATIENT
Start: 2024-09-23 | End: 2024-09-23 | Stop reason: HOSPADM

## 2024-09-23 RX ORDER — SODIUM CHLORIDE 0.9 % (FLUSH) 0.9 %
5-40 SYRINGE (ML) INJECTION EVERY 12 HOURS SCHEDULED
Status: DISCONTINUED | OUTPATIENT
Start: 2024-09-23 | End: 2024-09-25 | Stop reason: HOSPADM

## 2024-09-23 RX ORDER — SODIUM CHLORIDE 9 MG/ML
INJECTION, SOLUTION INTRAVENOUS PRN
Status: DISCONTINUED | OUTPATIENT
Start: 2024-09-23 | End: 2024-09-23 | Stop reason: HOSPADM

## 2024-09-23 RX ORDER — ACETAMINOPHEN 650 MG/1
650 SUPPOSITORY RECTAL EVERY 6 HOURS PRN
Status: DISCONTINUED | OUTPATIENT
Start: 2024-09-23 | End: 2024-09-25 | Stop reason: HOSPADM

## 2024-09-23 RX ORDER — POTASSIUM CHLORIDE 7.45 MG/ML
10 INJECTION INTRAVENOUS PRN
Status: DISCONTINUED | OUTPATIENT
Start: 2024-09-23 | End: 2024-09-25 | Stop reason: HOSPADM

## 2024-09-23 RX ADMIN — SODIUM CHLORIDE, PRESERVATIVE FREE 5 ML: 5 INJECTION INTRAVENOUS at 08:07

## 2024-09-23 RX ADMIN — PANTOPRAZOLE SODIUM 40 MG: 40 INJECTION, POWDER, FOR SOLUTION INTRAVENOUS at 08:04

## 2024-09-23 RX ADMIN — ATORVASTATIN CALCIUM 40 MG: 40 TABLET, FILM COATED ORAL at 20:08

## 2024-09-23 RX ADMIN — SODIUM CHLORIDE, PRESERVATIVE FREE 10 ML: 5 INJECTION INTRAVENOUS at 23:30

## 2024-09-23 RX ADMIN — POTASSIUM CHLORIDE 10 MEQ: 7.46 INJECTION, SOLUTION INTRAVENOUS at 08:19

## 2024-09-23 RX ADMIN — FERROUS SULFATE TAB 325 MG (65 MG ELEMENTAL FE) 325 MG: 325 (65 FE) TAB at 08:04

## 2024-09-23 RX ADMIN — POTASSIUM CHLORIDE 10 MEQ: 7.46 INJECTION, SOLUTION INTRAVENOUS at 10:46

## 2024-09-23 RX ADMIN — POTASSIUM CHLORIDE 40 MEQ: 750 TABLET, EXTENDED RELEASE ORAL at 08:04

## 2024-09-23 RX ADMIN — POTASSIUM CHLORIDE 10 MEQ: 7.46 INJECTION, SOLUTION INTRAVENOUS at 09:42

## 2024-09-23 RX ADMIN — SODIUM CHLORIDE: 9 INJECTION, SOLUTION INTRAVENOUS at 08:20

## 2024-09-23 RX ADMIN — METOPROLOL SUCCINATE 25 MG: 25 TABLET, EXTENDED RELEASE ORAL at 08:04

## 2024-09-23 ASSESSMENT — PAIN SCALES - GENERAL
PAINLEVEL_OUTOF10: 0

## 2024-09-24 LAB
ABO + RH BLD: NORMAL
ALBUMIN SERPL-MCNC: 2.4 G/DL (ref 3.4–5)
ALBUMIN/GLOB SERPL: 1 (ref 0.8–1.7)
ALP SERPL-CCNC: 46 U/L (ref 45–117)
ALT SERPL-CCNC: 7 U/L (ref 16–61)
ANION GAP SERPL CALC-SCNC: 4 MMOL/L (ref 3–18)
AST SERPL-CCNC: 6 U/L (ref 10–38)
BASOPHILS # BLD: 0 K/UL (ref 0–0.1)
BASOPHILS NFR BLD: 0 % (ref 0–2)
BILIRUB SERPL-MCNC: 2.3 MG/DL (ref 0.2–1)
BLD PROD TYP BPU: NORMAL
BLOOD BANK BLOOD PRODUCT EXPIRATION DATE: NORMAL
BLOOD BANK DISPENSE STATUS: NORMAL
BLOOD BANK ISBT PRODUCT BLOOD TYPE: 5100
BLOOD BANK PRODUCT CODE: NORMAL
BLOOD BANK UNIT TYPE AND RH: NORMAL
BLOOD GROUP ANTIBODIES SERPL: NEGATIVE
BPU ID: NORMAL
BUN SERPL-MCNC: 9 MG/DL (ref 7–18)
BUN/CREAT SERPL: 14 (ref 12–20)
CALCIUM SERPL-MCNC: 7.8 MG/DL (ref 8.5–10.1)
CHLORIDE SERPL-SCNC: 111 MMOL/L (ref 100–111)
CO2 SERPL-SCNC: 25 MMOL/L (ref 21–32)
CREAT SERPL-MCNC: 0.65 MG/DL (ref 0.6–1.3)
CROSSMATCH RESULT: NORMAL
DIFFERENTIAL METHOD BLD: ABNORMAL
EOSINOPHIL # BLD: 0.1 K/UL (ref 0–0.4)
EOSINOPHIL NFR BLD: 2 % (ref 0–5)
ERYTHROCYTE [DISTWIDTH] IN BLOOD BY AUTOMATED COUNT: 20.1 % (ref 11.6–14.5)
GLOBULIN SER CALC-MCNC: 2.5 G/DL (ref 2–4)
GLUCOSE SERPL-MCNC: 81 MG/DL (ref 74–99)
HCT VFR BLD AUTO: 24.7 % (ref 36–48)
HCT VFR BLD AUTO: 30 % (ref 36–48)
HGB BLD-MCNC: 7.9 G/DL (ref 13–16)
HGB BLD-MCNC: 9.7 G/DL (ref 13–16)
HISTORY CHECK: NORMAL
IMM GRANULOCYTES # BLD AUTO: 0.1 K/UL (ref 0–0.04)
IMM GRANULOCYTES NFR BLD AUTO: 1 % (ref 0–0.5)
LYMPHOCYTES # BLD: 1.4 K/UL (ref 0.9–3.6)
LYMPHOCYTES NFR BLD: 20 % (ref 21–52)
MAGNESIUM SERPL-MCNC: 2 MG/DL (ref 1.6–2.6)
MCH RBC QN AUTO: 23.8 PG (ref 24–34)
MCHC RBC AUTO-ENTMCNC: 32 G/DL (ref 31–37)
MCV RBC AUTO: 74.4 FL (ref 78–100)
MONOCYTES # BLD: 0.6 K/UL (ref 0.05–1.2)
MONOCYTES NFR BLD: 9 % (ref 3–10)
NEUTS SEG # BLD: 5 K/UL (ref 1.8–8)
NEUTS SEG NFR BLD: 69 % (ref 40–73)
NRBC # BLD: 0.06 K/UL (ref 0–0.01)
NRBC BLD-RTO: 0.8 PER 100 WBC
PLATELET # BLD AUTO: 300 K/UL (ref 135–420)
PMV BLD AUTO: 11.4 FL (ref 9.2–11.8)
POTASSIUM SERPL-SCNC: 3.2 MMOL/L (ref 3.5–5.5)
POTASSIUM SERPL-SCNC: 3.2 MMOL/L (ref 3.5–5.5)
PROT SERPL-MCNC: 4.9 G/DL (ref 6.4–8.2)
RBC # BLD AUTO: 3.32 M/UL (ref 4.35–5.65)
SODIUM SERPL-SCNC: 140 MMOL/L (ref 136–145)
SPECIMEN EXP DATE BLD: NORMAL
TRANSFUSION STATUS PATIENT QL: NORMAL
UNIT DIVISION: 0
UNIT ISSUE DATE/TIME: NORMAL
WBC # BLD AUTO: 7.2 K/UL (ref 4.6–13.2)

## 2024-09-24 PROCEDURE — 85014 HEMATOCRIT: CPT

## 2024-09-24 PROCEDURE — 85025 COMPLETE CBC W/AUTO DIFF WBC: CPT

## 2024-09-24 PROCEDURE — 36415 COLL VENOUS BLD VENIPUNCTURE: CPT

## 2024-09-24 PROCEDURE — 30233N1 TRANSFUSION OF NONAUTOLOGOUS RED BLOOD CELLS INTO PERIPHERAL VEIN, PERCUTANEOUS APPROACH: ICD-10-PCS | Performed by: STUDENT IN AN ORGANIZED HEALTH CARE EDUCATION/TRAINING PROGRAM

## 2024-09-24 PROCEDURE — P9016 RBC LEUKOCYTES REDUCED: HCPCS

## 2024-09-24 PROCEDURE — P9040 RBC LEUKOREDUCED IRRADIATED: HCPCS

## 2024-09-24 PROCEDURE — 6370000000 HC RX 637 (ALT 250 FOR IP): Performed by: STUDENT IN AN ORGANIZED HEALTH CARE EDUCATION/TRAINING PROGRAM

## 2024-09-24 PROCEDURE — 1100000000 HC RM PRIVATE

## 2024-09-24 PROCEDURE — 86901 BLOOD TYPING SEROLOGIC RH(D): CPT

## 2024-09-24 PROCEDURE — 94761 N-INVAS EAR/PLS OXIMETRY MLT: CPT

## 2024-09-24 PROCEDURE — 85018 HEMOGLOBIN: CPT

## 2024-09-24 PROCEDURE — 80053 COMPREHEN METABOLIC PANEL: CPT

## 2024-09-24 PROCEDURE — 83735 ASSAY OF MAGNESIUM: CPT

## 2024-09-24 PROCEDURE — 2580000003 HC RX 258: Performed by: STUDENT IN AN ORGANIZED HEALTH CARE EDUCATION/TRAINING PROGRAM

## 2024-09-24 PROCEDURE — 86900 BLOOD TYPING SEROLOGIC ABO: CPT

## 2024-09-24 PROCEDURE — 84132 ASSAY OF SERUM POTASSIUM: CPT

## 2024-09-24 PROCEDURE — 86850 RBC ANTIBODY SCREEN: CPT

## 2024-09-24 PROCEDURE — 36430 TRANSFUSION BLD/BLD COMPNT: CPT

## 2024-09-24 PROCEDURE — 99222 1ST HOSP IP/OBS MODERATE 55: CPT | Performed by: COLON & RECTAL SURGERY

## 2024-09-24 PROCEDURE — 86923 COMPATIBILITY TEST ELECTRIC: CPT

## 2024-09-24 RX ORDER — SODIUM CHLORIDE 9 MG/ML
INJECTION, SOLUTION INTRAVENOUS PRN
Status: DISCONTINUED | OUTPATIENT
Start: 2024-09-24 | End: 2024-09-25 | Stop reason: HOSPADM

## 2024-09-24 RX ADMIN — CLOPIDOGREL BISULFATE 75 MG: 75 TABLET ORAL at 08:52

## 2024-09-24 RX ADMIN — FERROUS SULFATE TAB 325 MG (65 MG ELEMENTAL FE) 325 MG: 325 (65 FE) TAB at 08:52

## 2024-09-24 RX ADMIN — ASPIRIN 81 MG CHEWABLE TABLET 81 MG: 81 TABLET CHEWABLE at 08:52

## 2024-09-24 RX ADMIN — POTASSIUM CHLORIDE 40 MEQ: 1500 TABLET, EXTENDED RELEASE ORAL at 05:22

## 2024-09-24 RX ADMIN — PANTOPRAZOLE SODIUM 40 MG: 40 TABLET, DELAYED RELEASE ORAL at 08:52

## 2024-09-24 RX ADMIN — ATORVASTATIN CALCIUM 40 MG: 40 TABLET, FILM COATED ORAL at 21:01

## 2024-09-24 RX ADMIN — SODIUM CHLORIDE, PRESERVATIVE FREE 10 ML: 5 INJECTION INTRAVENOUS at 21:02

## 2024-09-24 RX ADMIN — SODIUM CHLORIDE, PRESERVATIVE FREE 10 ML: 5 INJECTION INTRAVENOUS at 08:56

## 2024-09-24 ASSESSMENT — PAIN SCALES - GENERAL
PAINLEVEL_OUTOF10: 0

## 2024-09-24 NOTE — PROGRESS NOTES
HPI: Gigi Mcneal is a 64 y.o. male presenting with chief complain of acute anemia.  This is likely related to the patient's known sigmoid colon cancer.  The patient had noted blood per rectum grossly.  He is on antiplatelet therapy for a stent placed in March of this year.  Surgery was scheduled for approximately 3 weeks from now.  Currently the patient is asymptomatic.  He was received several units of blood and hemoglobin appears stable at around 8.    Past Medical History:   Diagnosis Date    CAD (coronary artery disease)     Colon cancer (HCC)     Heart failure (HCC)     Hypertension     NSTEMI (non-ST elevated myocardial infarction) ([facility])        Past Surgical History:   Procedure Laterality Date    CARDIAC PROCEDURE N/A 3/28/2024    Left heart cath performed by Butch Guaman MD at [facility] CARDIAC CATH LAB    CARDIAC PROCEDURE N/A 3/28/2024    Coronary angiography performed by Butch Guaman MD at [facility] CARDIAC CATH LAB    CARDIAC PROCEDURE N/A 3/28/2024    Left ventriculography performed by Butch Guaman MD at [facility] CARDIAC CATH LAB    CARDIAC PROCEDURE N/A 3/28/2024    Insert stent syeda coronary performed by Butch Guaman MD at [facility] CARDIAC CATH LAB    COLONOSCOPY N/A 6/12/2024    COLONOSCOPY with polypectomy, bx's and tattoo performed by Orestes Araujo MD at [facility] ENDOSCOPY    UPPER GASTROINTESTINAL ENDOSCOPY N/A 6/12/2024    ESOPHAGOGASTRODUODENOSCOPY polypectomies and 3 clips performed by Orestes Araujo MD at [facility] ENDOSCOPY       Family History   Problem Relation Age of Onset    Diabetes Mother     Eczema Father     No Known Problems Sister     No Known Problems Brother     No Known Problems Maternal Aunt     No Known Problems Maternal Uncle     No Known Problems Paternal Aunt     No Known Problems Paternal Uncle     No Known Problems Maternal Grandmother     No Known Problems Maternal Grandfather     No Known Problems Paternal Grandmother     No Known Problems Paternal Grandfather     No Known Problems Other         Social History     Socioeconomic History    Marital status:    Tobacco Use    Smoking status: Former     Current packs/day: 0.00     Average packs/day: 0.3 packs/day for 19.0 years (4.8 ttl pk-yrs)     Types: Cigarettes     Start date:      Quit date:      Years since quittin.7    Smokeless tobacco: Never   Vaping Use    Vaping status: Never Used   Substance and Sexual Activity    Alcohol use: Never    Drug use: Not Currently    Sexual activity: Yes     Partners: Female     Social Determinants of Health     Financial Resource Strain: Low Risk  (2024)    Overall Financial Resource Strain (CARDIA)     Difficulty of Paying Living Expenses: Not hard at all   Food Insecurity: No Food Insecurity (2024)    Hunger Vital Sign     Worried About Running Out of Food in the Last Year: Never true     Ran Out of Food in the Last Year: Never true   Transportation Needs: No Transportation Needs (2024)    PRAPARE - Transportation     Lack of Transportation (Medical): No     Lack of Transportation (Non-Medical): No   Housing Stability: Low Risk  (2024)    Housing Stability Vital Sign     Unable to Pay for Housing in the Last Year: No     Number of Times Moved in the Last Year: 1     Homeless in the Last Year: No       Current Outpatient Medications   Medication Instructions    aspirin 81 mg, Oral, DAILY    atorvastatin (LIPITOR) 40 mg, Oral, NIGHTLY    clopidogrel (PLAVIX) 75 mg, Oral, DAILY    ferrous sulfate (IRON 325) 325 mg, Oral, DAILY WITH BREAKFAST    metoprolol succinate (TOPROL XL) 25 mg, Oral, DAILY    pantoprazole (PROTONIX) 40 mg, Oral, DAILY        No Known Allergies    ROS: negative    Vitals:    24 0326   BP: 112/73   Pulse: 73   Resp: 16   Temp: 98.4 °F (36.9 °C)   SpO2: 100%       Physical Exam  Abdomen: Soft, nontender nondistended    Lab Results   Component Value Date    WBC 7.2 2024    HGB 7.9 (L) 2024    HCT 24.7 (L) 2024    MCV 74.4 (L)

## 2024-09-24 NOTE — PROGRESS NOTES
Physical Therapy  PT order received and chart reviewed.  Upon entering room and discussing role of PT, pt declines any mobility deficits and notes he has been getting up and walking to the bathroom with no issues.  Pt declines any need for further therapy, no need for formal evaluation at this time and PT will sign off.  Thank you for this referral.      Pratima Dickens, PT, DPT

## 2024-09-24 NOTE — PROGRESS NOTES
Advance Care Planning   Healthcare Decision Maker:    Primary Decision Maker: Yi Mcneal - Spouse - [phone]    Today we documented Decision Maker(s) consistent with Legal Next of Kin hierarchy.     Spiritual Health History and Assessment/Progress Note  [facility]    Spiritual/Emotional Needs, Advance Care Planning,  ,  ,      Name: Gigi Mcneal MRN: [id number]    Age: 64 y.o.     Sex: male   Language: English   Catholic: Quaker   GIB (gastrointestinal bleeding)     Date: 9/24/2024            Total Time Calculated: (P) 7 min              Spiritual Assessment began in [address]        Referral/Consult From: Multi-disciplinary team   Encounter Overview/Reason: Spiritual/Emotional Needs, Advance Care Planning  Service Provided For: Patient    Becky, Belief, Meaning:   Patient has beliefs or practices that help with coping during difficult times  Family/Friends No family/friends present      Importance and Influence:  Patient has spiritual/personal beliefs that influence decisions regarding their health  Family/Friends No family/friends present    Community:  Patient feels well-supported. Support system includes: Spouse/Partner and Children  Family/Friends No family/friends present    Assessment and Plan of Care:     Patient Interventions include: Affirmed coping skills/support systems  Family/Friends Interventions include: No family/friends present    Patient Plan of Care: No spiritual needs identified for follow-up  Family/Friends Plan of Care: Spiritual Care available upon further referral    Electronically signed by MAYKEL Weaver on 9/24/2024 at 1:02 PM

## 2024-09-24 NOTE — DISCHARGE INSTRUCTIONS
DISCHARGE SUMMARY from Nurse    PATIENT INSTRUCTIONS:    After general anesthesia or intravenous sedation, for 24 hours or while taking prescription Narcotics:  Limit your activities  Do not drive and operate hazardous machinery  Do not make important personal or business decisions  Do  not drink alcoholic beverages  If you have not urinated within 8 hours after discharge, please contact your surgeon on call.    Report the following to your surgeon:  Excessive pain, swelling, redness or odor of or around the surgical area  Temperature over 100.5  Nausea and vomiting lasting longer than 4 hours or if unable to take medications  Any signs of decreased circulation or nerve impairment to extremity: change in color, persistent  numbness, tingling, coldness or increase pain  Any questions    What to do at Home:  Recommended activity: activity as tolerated,     If you experience any of the following symptoms chest pain, shortness of breath, nausea, vomiting, any noted bleeding, pain unrelieved by medication, please follow up with PCP, Dr Banuelos as directed.    *  Please give a list of your current medications to your Primary Care Provider.    *  Please update this list whenever your medications are discontinued, doses are      changed, or new medications (including over-the-counter products) are added.    *  Please carry medication information at all times in case of emergency situations.    These are general instructions for a healthy lifestyle:    No smoking/ No tobacco products/ Avoid exposure to second hand smoke  Surgeon General's Warning:  Quitting smoking now greatly reduces serious risk to your health.    Obesity, smoking, and sedentary lifestyle greatly increases your risk for illness    A healthy diet, regular physical exercise & weight monitoring are important for maintaining a healthy lifestyle    You may be retaining fluid if you have a history of heart failure or if you experience any of the following

## 2024-09-24 NOTE — H&P
History & Physical    Patient: Gigi Mcneal MRN: [id number]  CSN: [id number]    YOB: 1960  Age: 64 y.o.  Sex: male      DOA: 9/23/2024    Chief Complaint: No chief complaint on file.         HPI:     Gigi Mcneal is a 64 y.o. male who presented as a transfer from outside hospital for symptomatic blood loss anemia secondary to adenocarcinoma of colon and GI bleed.  Patient initially noted typical symptoms of blood loss anemia including fatigue dizziness.  Patient has past history of GI bleed and did note small amounts of blood in his stool.  Of note, patient has planned colorectal surgery within a month.  Patient was worked up in outside hospital and found to be profoundly anemic.  Patient was given blood transfusion with appropriate response.  Decision was made to transfer patient to our facility given his surgical with the colorectal surgeon at this facility.    Past Medical History:   Diagnosis Date    CAD (coronary artery disease)     Colon cancer (HCC)     Heart failure (HCC)     Hypertension     NSTEMI (non-ST elevated myocardial infarction) (HCC)        Past Surgical History:   Procedure Laterality Date    CARDIAC PROCEDURE N/A 3/28/2024    Left heart cath performed by Butch Guaman MD at [facility] CARDIAC CATH LAB    CARDIAC PROCEDURE N/A 3/28/2024    Coronary angiography performed by Butch Guaman MD at [facility] CARDIAC CATH LAB    CARDIAC PROCEDURE N/A 3/28/2024    Left ventriculography performed by Butch Guaman MD at [facility] CARDIAC CATH LAB    CARDIAC PROCEDURE N/A 3/28/2024    Insert stent syeda coronary performed by Butch Guaman MD at [facility] CARDIAC CATH LAB    COLONOSCOPY N/A 6/12/2024    COLONOSCOPY with polypectomy, bx's and tattoo performed by Orestes Araujo MD at [facility] ENDOSCOPY    UPPER GASTROINTESTINAL ENDOSCOPY N/A 6/12/2024    ESOPHAGOGASTRODUODENOSCOPY polypectomies and 3 clips performed by Orestes Araujo MD at [facility] ENDOSCOPY       Family History   Problem Relation Age of Onset

## 2024-09-24 NOTE — CARE COORDINATION
09/24/24 1123   Readmission Assessment   Number of Days since last admission? 1-7 days   Previous Disposition Home with Family   Who is being Interviewed Patient   What was the patient's/caregiver's perception as to why they think they needed to return back to the hospital? Other (Comment)  (Patient blood pressure was low)   Did you visit your Primary Care Physician after you left the hospital, before you returned this time? Yes   Did you see a specialist, such as Cardiac, Pulmonary, Orthopedic Physician, etc. after you left the hospital? No   Who advised the patient to return to the hospital? Other (Comment)  ([facility])   Does the patient report anything that got in the way of taking their medications? No   In our efforts to provide the best possible care to you and others like you, can you think of anything that we could have done to help you after you left the hospital the first time, so that you might not have needed to return so soon? Other (Comment)  (Patient stated \"went to [facility] they sent me to [facility] ED because my blood pressure was low and need a blood transfusion.\")     Devyn Quintero BSW  Case Management

## 2024-09-24 NOTE — PLAN OF CARE
Problem: Chronic Conditions and Co-morbidities  Goal: Patient's chronic conditions and co-morbidity symptoms are monitored and maintained or improved  9/24/2024 1622 by Ny Barclay RN  Outcome: Adequate for Discharge  9/24/2024 0453 by Zulma Danielle RN  Outcome: Progressing     Problem: Safety - Adult  Goal: Free from fall injury  9/24/2024 1622 by Ny Barclay RN  Outcome: Adequate for Discharge  9/24/2024 0453 by Zulma Danielle RN  Outcome: Progressing     Problem: Pain  Goal: Verbalizes/displays adequate comfort level or baseline comfort level  9/24/2024 1622 by Ny Barclay RN  Outcome: Adequate for Discharge  9/24/2024 0453 by Zulma Danielle RN  Outcome: Progressing     Problem: Spiritual Care  Goal: Pt/Family able to move forward in process of forgiving self, others, and/or higher power  Description: INTERVENTIONS:  1. Assist patient/family to overcome blocks to healing by use of spiritual practices (prayer, meditation, guided imagery, reiki, breath work, etc).  2. De-myth guilt and help patient/family identify possible irrational spiritual/cultural beliefs and values.  3. Explore possibilities of making amends & reconciliation with self, others, and/or a greater power.  4. Guide patient/family in identifying painful feelings of guilt.  5. Help patient/famiy explore and identify spiritual beliefs, cultural understandings or values that may help or hinder letting go of issue.  6. Help patient/family explore feelings of anger, bitterness, resentment.  7. Help patient/family identify and examine the situation in which these feelings are experienced.  8. Help patient/family identify destructive displacement of feelings onto other individuals.  9. Invite use of sacraments/rituals/ceremonies as appropriate (e.g. - confession, anointing, smudging).  10. Refer patient/family to formal counseling and/or to [facility] community for further support work.  9/24/2024 1622 by Deny

## 2024-09-24 NOTE — CARE COORDINATION
09/24/24 1113   Service Assessment   Patient Orientation Alert and Oriented;Person;Place;Situation;Self   Cognition Alert   History Provided By Patient   Primary Caregiver Self   Accompanied By/Relationship Patient is current alone in the room   Support Systems Spouse/Significant Other   Patient's Healthcare Decision Maker is: Legal Next of Kin   PCP Verified by CM Yes  (Dr. Justin)   Last Visit to PCP Within last 3 months   Prior Functional Level Independent in ADLs/IADLs   Current Functional Level Independent in ADLs/IADLs   Can patient return to prior living arrangement Yes   Ability to make needs known: Good   Family able to assist with home care needs: Yes   Would you like for me to discuss the discharge plan with any other family members/significant others, and if so, who? Yes  (Spouse- Yi)   Financial Resources Medicaid;Medicare   Community Resources None   CM/SW Referral Other (see comment)  (Discharge Planning)   Social/Functional History   Lives With Spouse   Type of Home House   Home Layout One level   Home Access Stairs to enter with rails   Entrance Stairs - Number of Steps 5   Entrance Stairs - Rails Both   Bathroom Shower/Tub Tub/Shower unit   Bathroom Toilet Standard   Bathroom Equipment None   Bathroom Accessibility Accessible   Home Equipment None   Receives Help From Family   ADL Assistance Independent   Homemaking Assistance Independent   Homemaking Responsibilities Yes   Ambulation Assistance Independent   Transfer Assistance Independent   Active  Yes   Mode of Transportation Car   Education Not Applicable   Occupation Full time employment   Type of Occupation [employer]   Discharge Planning   Type of Residence House   Living Arrangements Spouse/Significant Other   Current Services Prior To Admission None   Potential Assistance Needed N/A   DME Ordered? No   Potential Assistance Purchasing Medications No   Type of Home Care Services None   Patient expects to be discharged  to: House   Follow Up Appointment: Best Day/Time    (TBD)   One/Two Story Residence One story   History of falls? 0   Services At/After Discharge   Transition of Care Consult (CM Consult) Discharge Planning   Services At/After Discharge None   [place] Resource Information Provided? No   Mode of Transport at Discharge Other (see comment)  (Family Member)   Hospital Transport Time of Discharge   (TBD)   Confirm Follow Up Transport Self   Condition of Participation: Discharge Planning   The Plan for Transition of Care is related to the following treatment goals: Anticiapate to home with available resouces.   The Patient and/or Patient Representative was provided with a Choice of Provider? Patient   The Patient and/Or Patient Representative agree with the Discharge Plan? Yes   Freedom of Choice list was provided with basic dialogue that supports the patient's individualized plan of care/goals, treatment preferences, and shares the quality data associated with the providers?  No  (Patient decline needing a Foc list at this time.)   Documentation for Discharge Appeal   Discharge Appealed by Other (comment)  (Not Applicable)   Devyn Quintero BSW  Case Management

## 2024-09-24 NOTE — PROGRESS NOTES
4 Eyes Skin Assessment     NAME:  Gigi Mcneal  YOB: 1960  MEDICAL RECORD NUMBER:  [id number]    The patient is being assessed for  Admission    I agree that at least one RN has performed a thorough Head to Toe Skin Assessment on the patient. ALL assessment sites listed below have been assessed.      Areas assessed by both nurses:    Head, Face, Ears, Shoulders, Back, Chest, Arms, Elbows, Hands, Sacrum. Buttock, Coccyx, Ischium, and Legs. Feet and Heels        Does the Patient have a Wound? No noted wound(s)       Timothy Prevention initiated by RN: Yes  Wound Care Orders initiated by RN: No    Pressure Injury (Stage 3,4, Unstageable, DTI, NWPT, and Complex wounds) if present, place Wound referral order by RN under : No    New Ostomies, if present place, Ostomy referral order under : No     Nurse 1 eSignature: Electronically signed by Zulma Danielle RN on 9/24/24 at 1:09 AM EDT    **SHARE this note so that the co-signing nurse can place an eSignature**    Nurse 2 eSignature: Electronically signed by Leroy Burleson RN on 9/24/24 at 1:10 AM EDT

## 2024-09-24 NOTE — CARE COORDINATION
09/24/24 1113   IMM Letter   IMM Letter given to Patient/Family/Significant other/Guardian/POA/by: Devyn Quintero   IMM Letter date given: 09/24/24   IMM Letter time given: 1113     Devyn Quintero BSW  Case Management

## 2024-09-24 NOTE — CARE COORDINATION
D/C order noted for today. Orders reviewed. No needs identified at this time family to transport. SW/CM remains available if needed.         Devyn Quintero BSW  Case Management

## 2024-09-24 NOTE — PROGRESS NOTES
Bedside and Verbal shift change report given to Ny RN (oncoming nurse) by Zulma RN (offgoing nurse). Report included the following information Nurse Handoff Report and Recent Results.     1215 Consent witnessed and signed, patient notifed he would be discharged after transfusion and updated H/H.    1245 I unit PRBCs infusing, patient sitting up in bed on cell phone tolerating without difficulty.    1545 Transfusion completed, patient resting quietly on bed on cell phone.    1630 Patient stated he was not comfortable with going home following his blood transfusion. Attending notified.     Bedside and Verbal shift change report given to Suzette HAYES (oncoming nurse) by Ny RN (offgoing nurse). Report included the following information Nurse Handoff Report and Recent Results.

## 2024-09-24 NOTE — PLAN OF CARE
Problem: Safety - Adult  Goal: Free from fall injury  Outcome: Progressing     Problem: Chronic Conditions and Co-morbidities  Goal: Patient's chronic conditions and co-morbidity symptoms are monitored and maintained or improved  Outcome: Progressing     Problem: Spiritual Care  Goal: Pt/Family able to move forward in process of forgiving self, others, and/or higher power  Description: INTERVENTIONS:  1. Assist patient/family to overcome blocks to healing by use of spiritual practices (prayer, meditation, guided imagery, reiki, breath work, etc).  2. De-myth guilt and help patient/family identify possible irrational spiritual/cultural beliefs and values.  3. Explore possibilities of making amends & reconciliation with self, others, and/or a greater power.  4. Guide patient/family in identifying painful feelings of guilt.  5. Help patient/famiy explore and identify spiritual beliefs, cultural understandings or values that may help or hinder letting go of issue.  6. Help patient/family explore feelings of anger, bitterness, resentment.  7. Help patient/family identify and examine the situation in which these feelings are experienced.  8. Help patient/family identify destructive displacement of feelings onto other individuals.  9. Invite use of sacraments/rituals/ceremonies as appropriate (e.g. - confession, anointing, smudging).  10. Refer patient/family to formal counseling and/or to [place] community for further support work.  Outcome: Progressing

## 2024-09-24 NOTE — CONSENT
Informed Consent for Blood Component Transfusion Note    I have discussed with the patient the rationale for blood component transfusion; its benefits in treating or preventing fatigue, organ damage, or death; and its risk which includes mild transfusion reactions, rare risk of blood borne infection, or more serious but rare reactions. I have discussed the alternatives to transfusion, including the risk and consequences of not receiving transfusion. The patient had an opportunity to ask questions and had agreed to proceed with transfusion of blood components.    Electronically signed by Aiden Lopes DO on 9/24/24 at 10:02 AM EDT

## 2024-09-25 VITALS
DIASTOLIC BLOOD PRESSURE: 71 MMHG | WEIGHT: 161.82 LBS | BODY MASS INDEX: 21.92 KG/M2 | HEIGHT: 72 IN | TEMPERATURE: 98.4 F | SYSTOLIC BLOOD PRESSURE: 124 MMHG | HEART RATE: 52 BPM | RESPIRATION RATE: 18 BRPM | OXYGEN SATURATION: 100 %

## 2024-09-25 LAB
ABO + RH BLD: NORMAL
ANION GAP SERPL CALC-SCNC: 3 MMOL/L (ref 3–18)
BASOPHILS # BLD: 0 K/UL (ref 0–0.1)
BASOPHILS NFR BLD: 0 % (ref 0–2)
BLD PROD TYP BPU: NORMAL
BLOOD BANK BLOOD PRODUCT EXPIRATION DATE: NORMAL
BLOOD BANK DISPENSE STATUS: NORMAL
BLOOD BANK ISBT PRODUCT BLOOD TYPE: 9500
BLOOD BANK PRODUCT CODE: NORMAL
BLOOD BANK UNIT TYPE AND RH: NORMAL
BLOOD GROUP ANTIBODIES SERPL: NORMAL
BPU ID: NORMAL
BUN SERPL-MCNC: 6 MG/DL (ref 7–18)
BUN/CREAT SERPL: 9 (ref 12–20)
CALCIUM SERPL-MCNC: 7.8 MG/DL (ref 8.5–10.1)
CALLED TO: NORMAL
CHLORIDE SERPL-SCNC: 113 MMOL/L (ref 100–111)
CO2 SERPL-SCNC: 23 MMOL/L (ref 21–32)
CREAT SERPL-MCNC: 0.69 MG/DL (ref 0.6–1.3)
CROSSMATCH RESULT: NORMAL
DIFFERENTIAL METHOD BLD: ABNORMAL
EOSINOPHIL # BLD: 0.2 K/UL (ref 0–0.4)
EOSINOPHIL NFR BLD: 2 % (ref 0–5)
ERYTHROCYTE [DISTWIDTH] IN BLOOD BY AUTOMATED COUNT: 20.1 % (ref 11.6–14.5)
GLUCOSE SERPL-MCNC: 85 MG/DL (ref 74–99)
HCT VFR BLD AUTO: 27.2 % (ref 36–48)
HGB BLD-MCNC: 8.8 G/DL (ref 13–16)
IMM GRANULOCYTES # BLD AUTO: 0 K/UL (ref 0–0.04)
IMM GRANULOCYTES NFR BLD AUTO: 1 % (ref 0–0.5)
LYMPHOCYTES # BLD: 1.8 K/UL (ref 0.9–3.6)
LYMPHOCYTES NFR BLD: 23 % (ref 21–52)
MAGNESIUM SERPL-MCNC: 2 MG/DL (ref 1.6–2.6)
MCH RBC QN AUTO: 24.9 PG (ref 24–34)
MCHC RBC AUTO-ENTMCNC: 32.4 G/DL (ref 31–37)
MCV RBC AUTO: 76.8 FL (ref 78–100)
MONOCYTES # BLD: 0.6 K/UL (ref 0.05–1.2)
MONOCYTES NFR BLD: 7 % (ref 3–10)
NEUTS SEG # BLD: 5.3 K/UL (ref 1.8–8)
NEUTS SEG NFR BLD: 67 % (ref 40–73)
NRBC # BLD: 0.05 K/UL (ref 0–0.01)
NRBC BLD-RTO: 0.6 PER 100 WBC
PLATELET # BLD AUTO: 317 K/UL (ref 135–420)
PMV BLD AUTO: 11.3 FL (ref 9.2–11.8)
POTASSIUM SERPL-SCNC: 3.4 MMOL/L (ref 3.5–5.5)
RBC # BLD AUTO: 3.54 M/UL (ref 4.35–5.65)
SODIUM SERPL-SCNC: 139 MMOL/L (ref 136–145)
SPECIMEN EXP DATE BLD: NORMAL
UNIT DIVISION: 0
UNIT ISSUE DATE/TIME: NORMAL
WBC # BLD AUTO: 7.9 K/UL (ref 4.6–13.2)

## 2024-09-25 PROCEDURE — 83735 ASSAY OF MAGNESIUM: CPT

## 2024-09-25 PROCEDURE — 94761 N-INVAS EAR/PLS OXIMETRY MLT: CPT

## 2024-09-25 PROCEDURE — 2580000003 HC RX 258: Performed by: STUDENT IN AN ORGANIZED HEALTH CARE EDUCATION/TRAINING PROGRAM

## 2024-09-25 PROCEDURE — 99238 HOSP IP/OBS DSCHRG MGMT 30/<: CPT | Performed by: STUDENT IN AN ORGANIZED HEALTH CARE EDUCATION/TRAINING PROGRAM

## 2024-09-25 PROCEDURE — 6370000000 HC RX 637 (ALT 250 FOR IP): Performed by: STUDENT IN AN ORGANIZED HEALTH CARE EDUCATION/TRAINING PROGRAM

## 2024-09-25 PROCEDURE — 99232 SBSQ HOSP IP/OBS MODERATE 35: CPT | Performed by: STUDENT IN AN ORGANIZED HEALTH CARE EDUCATION/TRAINING PROGRAM

## 2024-09-25 PROCEDURE — 85025 COMPLETE CBC W/AUTO DIFF WBC: CPT

## 2024-09-25 PROCEDURE — 36415 COLL VENOUS BLD VENIPUNCTURE: CPT

## 2024-09-25 PROCEDURE — 80048 BASIC METABOLIC PNL TOTAL CA: CPT

## 2024-09-25 RX ADMIN — PANTOPRAZOLE SODIUM 40 MG: 40 TABLET, DELAYED RELEASE ORAL at 10:23

## 2024-09-25 RX ADMIN — SODIUM CHLORIDE, PRESERVATIVE FREE 10 ML: 5 INJECTION INTRAVENOUS at 10:25

## 2024-09-25 RX ADMIN — FERROUS SULFATE TAB 325 MG (65 MG ELEMENTAL FE) 325 MG: 325 (65 FE) TAB at 11:58

## 2024-09-25 RX ADMIN — POTASSIUM CHLORIDE 40 MEQ: 1500 TABLET, EXTENDED RELEASE ORAL at 07:06

## 2024-09-25 RX ADMIN — ASPIRIN 81 MG CHEWABLE TABLET 81 MG: 81 TABLET CHEWABLE at 10:22

## 2024-09-25 RX ADMIN — CLOPIDOGREL BISULFATE 75 MG: 75 TABLET ORAL at 10:23

## 2024-09-25 RX ADMIN — METOPROLOL SUCCINATE 25 MG: 25 TABLET, EXTENDED RELEASE ORAL at 10:23

## 2024-09-25 RX ADMIN — POTASSIUM CHLORIDE 40 MEQ: 1500 TABLET, EXTENDED RELEASE ORAL at 00:50

## 2024-09-25 ASSESSMENT — PAIN SCALES - GENERAL
PAINLEVEL_OUTOF10: 0

## 2024-09-25 NOTE — PLAN OF CARE
Problem: Chronic Conditions and Co-morbidities  Goal: Patient's chronic conditions and co-morbidity symptoms are monitored and maintained or improved  9/25/2024 0228 by Suzette Guan RN  Outcome: Progressing  9/24/2024 1622 by Ny Barclay RN  Outcome: Adequate for Discharge     Problem: Safety - Adult  Goal: Free from fall injury  9/25/2024 0228 by Suzette Guan RN  Outcome: Progressing  9/24/2024 1622 by Ny Barclay RN  Outcome: Adequate for Discharge     Problem: Pain  Goal: Verbalizes/displays adequate comfort level or baseline comfort level  9/25/2024 0228 by Suzette Guan RN  Outcome: Progressing  9/24/2024 1622 by Ny Barclay RN  Outcome: Adequate for Discharge     Problem: Spiritual Care  Goal: Pt/Family able to move forward in process of forgiving self, others, and/or higher power  Description: INTERVENTIONS:  1. Assist patient/family to overcome blocks to healing by use of spiritual practices (prayer, meditation, guided imagery, reiki, breath work, etc).  2. De-myth guilt and help patient/family identify possible irrational spiritual/cultural beliefs and values.  3. Explore possibilities of making amends & reconciliation with self, others, and/or a greater power.  4. Guide patient/family in identifying painful feelings of guilt.  5. Help patient/famiy explore and identify spiritual beliefs, cultural understandings or values that may help or hinder letting go of issue.  6. Help patient/family explore feelings of anger, bitterness, resentment.  7. Help patient/family identify and examine the situation in which these feelings are experienced.  8. Help patient/family identify destructive displacement of feelings onto other individuals.  9. Invite use of sacraments/rituals/ceremonies as appropriate (e.g. - confession, anointing, smudging).  10. Refer patient/family to formal counseling and/or to [place] community for further support work.  9/25/2024 0228 by Suzette Guan RN  Outcome:

## 2024-09-25 NOTE — PROGRESS NOTES
4 Eyes Skin Assessment     NAME:  Gigi Mcneal  YOB: 1960  MEDICAL RECORD NUMBER:  [id number]    The patient is being assessed for  {Reason for Assessment:28865}    I agree that at least one RN has performed a thorough Head to Toe Skin Assessment on the patient. ALL assessment sites listed below have been assessed.      Areas assessed by both nurses:    Head, Face, Ears, Shoulders, Back, Chest, Arms, Elbows, Hands, Sacrum. Buttock, Coccyx, Ischium, and Legs. Feet and Heels        Does the Patient have a Wound? No noted wound(s)       Timothy Prevention initiated by RN: No  Wound Care Orders initiated by RN: No    Pressure Injury (Stage 3,4, Unstageable, DTI, NWPT, and Complex wounds) if present, place Wound referral order by RN under : No    New Ostomies, if present place, Ostomy referral order under : No     Nurse 1 eSignature: Electronically signed by ALLYSON ANDERSON RN on 9/25/24 at 8:05 AM EDT    **SHARE this note so that the co-signing nurse can place an eSignature**    Nurse 2 eSignature: {Esignature:[id number]}

## 2024-09-25 NOTE — PROGRESS NOTES
Discharge teaching completed at bedside with patient. Opportunity provided for clarifying questions. All answered to patient satisfaction. IV removed. ID removed and shredded. Patient discharged via wheelchair.

## 2024-09-25 NOTE — PROGRESS NOTES
Followed up with patient regarding missed d/c from 9/24. Patient stated he is ready to go home and his ride will be here to transport him home at 1:30PM.

## 2024-09-26 ENCOUNTER — TELEPHONE (OUTPATIENT)
Facility: CLINIC | Age: 64
End: 2024-09-26

## 2024-09-28 NOTE — DISCHARGE SUMMARY
Discharge Summary    Patient: Gigi Mcneal MRN: [id number]  CSN: [id number]    YOB: 1960  Age: 64 y.o.  Sex: male    DOA: 9/23/2024 LOS:  LOS: 2 days        Disposition: Home    Discharge Date: 9/25/2024    Admission Diagnosis: GIB (gastrointestinal bleeding) [K92.2]    Discharge Diagnosis:    Symptomatic blood loss anemia  GI bleed  Colon adenocarcinoma    Discharge Condition: Stable      PHYSICAL EXAM  Visit Vitals  /71   Pulse 52   Temp 98.4 °F (36.9 °C) (Oral)   Resp 18   Ht 1.829 m (6' 0.01\")   Wt 73.4 kg (161 lb 13.1 oz)   SpO2 100%   BMI 21.94 kg/m²       General: Alert, cooperative, no acute distress    HEENT: PERRLA, EOMI. Anicteric sclerae.  Lungs:  CTA Bilaterally. No Wheezing/Rales.  Heart:             Regular rate and Rhythm.  Abdomen: Soft, Non distended, Non tender. + Bowel sounds.  Extremities: No edema.  Psych:   Good insight. Not anxious or agitated.  Neurologic:  AA, oriented X 3. Moves all ext                                 Hospital Course:   Gigi Mcneal is a 64 y.o. male who presented as a transfer from outside hospital for symptomatic blood loss anemia secondary to adenocarcinoma of colon and GI bleed.  Patient initially noted typical symptoms of blood loss anemia including fatigue and dizziness.  Patient has past history of GI bleed and did note small amounts of blood in his stool.  Of note, patient has planned colorectal surgery within a month.  Patient was worked up in outside hospital and found to be profoundly anemic.  Patient was given blood transfusion with appropriate response.  Decision was made to transfer patient to our facility given his surgical with the colorectal surgeon at this facility.     Patient was evaluated by colorectal surgery. Although hemoglobin was greater than 7, patient was transfused another unit of blood to prepare patient for upcoming surgery. Patient's symptoms have resolved. Patient is hemodynamically stable for discharge  home.        Procedures: None     Consults: colorectal surgery    Imaging studies:   No results found.    Discharge Medications:     Discharge Medication List as of 9/25/2024 11:07 AM        CONTINUE these medications which have NOT CHANGED    Details   metoprolol succinate (TOPROL XL) 25 MG extended release tablet Take 1 tablet by mouth daily, Disp-90 tablet, R-3Normal      atorvastatin (LIPITOR) 40 MG tablet Take 1 tablet by mouth nightly, Disp-90 tablet, R-3Normal      aspirin 81 MG chewable tablet Take 1 tablet by mouth daily, Disp-90 tablet, R-3Normal      clopidogrel (PLAVIX) 75 MG tablet Take 1 tablet by mouth daily, Disp-90 tablet, R-1Normal      pantoprazole (PROTONIX) 40 MG tablet Take 1 tablet by mouth daily, Disp-90 tablet, R-0Normal      ferrous sulfate (IRON 325) 325 (65 Fe) MG tablet Take 1 tablet by mouth daily (with breakfast), Disp-90 tablet, R-3Normal           It is important that you take the medication exactly as they are prescribed.   Keep your medication in the bottles provided by the pharmacist and keep a list of the medication names, dosages, and times to be taken in your wallet.   Do not take other medications without consulting your doctor.           FOLLOW UP CARE:   Follow-up Information     Cathy Justin MD. Schedule an appointment as soon as possible for a   visit in 1 week(s).    Specialty: Family Medicine  Why: Follow up for general management and refills  Contact information:  [address] [place] [id number] [phone]                       Minutes spent on discharge: 40 minutes spent coordinating this discharge (review instructions/follow-up, prescriptions, preparing report for sign off)    Aiden Lopes DO  9/28/2024 12:18 PM    Disclaimer: Sections of this note are dictated using utilizing voice recognition software. Minor typographical errors may be present. If questions arise, please do not hesitate to contact me or call our department.      Aiden

## 2024-09-28 NOTE — PROGRESS NOTES
[facility]  Progress Note  Date:9/28/2024       Room:[facility]  Patient Name:Gigi Mcneal     YOB: 1960     Age:64 y.o.        Subjective        No acute events overnight.  Patient feels well. No further bleeding.     Disposition: Stable for discharge home tomorrow. Pending repeat H/H    Objective         Vitals Last 24 Hours:  TEMPERATURE:  No data recorded  RESPIRATIONS RANGE: No data recorded  PULSE OXIMETRY RANGE: No data recorded  PULSE RANGE: No data recorded  BLOOD PRESSURE RANGE: No data recorded.    ; No data recorded.      I/O (24Hr):  No intake or output data in the 24 hours ending 09/28/24 1219  Objective:  General Appearance:  Comfortable.    Vital signs: (most recent): Blood pressure 124/71, pulse 52, temperature 98.4 °F (36.9 °C), temperature source Oral, resp. rate 18, height 1.829 m (6' 0.01\"), weight 73.4 kg (161 lb 13.1 oz), SpO2 100%.    Output: Producing urine.    HEENT: Normal HEENT exam.    Lungs:  Normal effort and normal respiratory rate.  Breath sounds clear to auscultation.    Heart: Normal rate.  Regular rhythm.    Abdomen: Abdomen is flat.    Extremities: Normal range of motion.    Pulses: Distal pulses are intact.    Neurological: Patient is alert and oriented to person, place and time.    Skin:  Warm and dry.          Labs/Imaging/Diagnostics    Labs:  CBC:  No results for input(s): \"WBC\", \"RBC\", \"HGB\", \"HCT\", \"MCV\", \"RDW\", \"PLT\" in the last 72 hours.  CHEMISTRIES:  No results for input(s): \"NA\", \"K\", \"CL\", \"CO2\", \"BUN\", \"CREATININE\", \"GLUCOSE\", \"PHOS\", \"MG\" in the last 72 hours.    Invalid input(s): \"CA\"  PT/INR:No results for input(s): \"PROTIME\", \"INR\" in the last 72 hours.  APTT:No results for input(s): \"APTT\" in the last 72 hours.  LIVER PROFILE:  No results for input(s): \"AST\", \"ALT\", \"BILIDIR\", \"BILITOT\", \"ALKPHOS\" in the last 72 hours.    Imaging:  No results found.    Current Medications:  No current facility-administered

## 2024-10-04 PROBLEM — C18.7 MALIGNANT NEOPLASM OF SIGMOID COLON (HCC): Status: ACTIVE | Noted: 2024-09-06

## 2024-10-05 ENCOUNTER — HOSPITAL ENCOUNTER (OUTPATIENT)
Facility: HOSPITAL | Age: 64
Discharge: HOME | End: 2024-10-08
Payer: MEDICARE

## 2024-10-05 LAB
ALBUMIN SERPL-MCNC: 2.8 G/DL (ref 3.4–5)
ALBUMIN/GLOB SERPL: 0.9 (ref 0.8–1.7)
ALP SERPL-CCNC: 60 U/L (ref 45–117)
ALT SERPL-CCNC: 14 U/L (ref 16–61)
ANION GAP SERPL CALC-SCNC: 6 MMOL/L (ref 3–18)
ANION GAP SERPL CALC-SCNC: 9 MMOL/L (ref 3–18)
APTT PPP: 25.7 SEC (ref 23–36.4)
AST SERPL-CCNC: 12 U/L (ref 10–38)
BASOPHILS # BLD: 0 K/UL (ref 0–0.1)
BASOPHILS NFR BLD: 0 % (ref 0–2)
BILIRUB SERPL-MCNC: 0.6 MG/DL (ref 0.2–1)
BUN SERPL-MCNC: 10 MG/DL (ref 7–18)
BUN SERPL-MCNC: 11 MG/DL (ref 7–18)
BUN/CREAT SERPL: 14 (ref 12–20)
BUN/CREAT SERPL: 15 (ref 12–20)
CALCIUM SERPL-MCNC: 8.2 MG/DL (ref 8.5–10.1)
CALCIUM SERPL-MCNC: 8.2 MG/DL (ref 8.5–10.1)
CHLORIDE SERPL-SCNC: 115 MMOL/L (ref 100–111)
CHLORIDE SERPL-SCNC: 116 MMOL/L (ref 100–111)
CHOLEST SERPL-MCNC: 129 MG/DL
CO2 SERPL-SCNC: 22 MMOL/L (ref 21–32)
CO2 SERPL-SCNC: 24 MMOL/L (ref 21–32)
CREAT SERPL-MCNC: 0.72 MG/DL (ref 0.6–1.3)
CREAT SERPL-MCNC: 0.75 MG/DL (ref 0.6–1.3)
DIFFERENTIAL METHOD BLD: ABNORMAL
EOSINOPHIL # BLD: 0.2 K/UL (ref 0–0.4)
EOSINOPHIL NFR BLD: 2 % (ref 0–5)
ERYTHROCYTE [DISTWIDTH] IN BLOOD BY AUTOMATED COUNT: 22.5 % (ref 11.6–14.5)
ERYTHROCYTE [DISTWIDTH] IN BLOOD BY AUTOMATED COUNT: 22.5 % (ref 11.6–14.5)
FERRITIN SERPL-MCNC: 14 NG/ML (ref 8–388)
GLOBULIN SER CALC-MCNC: 3 G/DL (ref 2–4)
GLUCOSE SERPL-MCNC: 94 MG/DL (ref 74–99)
GLUCOSE SERPL-MCNC: 95 MG/DL (ref 74–99)
HCT VFR BLD AUTO: 29.9 % (ref 36–48)
HCT VFR BLD AUTO: 30 % (ref 36–48)
HDLC SERPL-MCNC: 59 MG/DL (ref 40–60)
HDLC SERPL: 2.2 (ref 0–5)
HGB BLD-MCNC: 9 G/DL (ref 13–16)
HGB BLD-MCNC: 9 G/DL (ref 13–16)
IMM GRANULOCYTES # BLD AUTO: 0 K/UL (ref 0–0.04)
IMM GRANULOCYTES NFR BLD AUTO: 0 % (ref 0–0.5)
INR PPP: 1.1 (ref 0.9–1.1)
IRON SATN MFR SERPL: 93 % (ref 20–50)
IRON SERPL-MCNC: 324 UG/DL (ref 50–175)
LDLC SERPL CALC-MCNC: 58.6 MG/DL (ref 0–100)
LIPID PANEL: NORMAL
LYMPHOCYTES # BLD: 1.8 K/UL (ref 0.9–3.6)
LYMPHOCYTES NFR BLD: 22 % (ref 21–52)
MCH RBC QN AUTO: 24.1 PG (ref 24–34)
MCH RBC QN AUTO: 24.1 PG (ref 24–34)
MCHC RBC AUTO-ENTMCNC: 30 G/DL (ref 31–37)
MCHC RBC AUTO-ENTMCNC: 30.1 G/DL (ref 31–37)
MCV RBC AUTO: 79.9 FL (ref 78–100)
MCV RBC AUTO: 80.2 FL (ref 78–100)
MONOCYTES # BLD: 0.5 K/UL (ref 0.05–1.2)
MONOCYTES NFR BLD: 6 % (ref 3–10)
NEUTS SEG # BLD: 5.7 K/UL (ref 1.8–8)
NEUTS SEG NFR BLD: 69 % (ref 40–73)
NRBC # BLD: 0 K/UL (ref 0–0.01)
NRBC # BLD: 0 K/UL (ref 0–0.01)
NRBC BLD-RTO: 0 PER 100 WBC
NRBC BLD-RTO: 0 PER 100 WBC
PLATELET # BLD AUTO: 416 K/UL (ref 135–420)
PLATELET # BLD AUTO: 423 K/UL (ref 135–420)
PMV BLD AUTO: 10.3 FL (ref 9.2–11.8)
PMV BLD AUTO: 10.6 FL (ref 9.2–11.8)
POTASSIUM SERPL-SCNC: 3.8 MMOL/L (ref 3.5–5.5)
POTASSIUM SERPL-SCNC: 3.9 MMOL/L (ref 3.5–5.5)
PROT SERPL-MCNC: 5.8 G/DL (ref 6.4–8.2)
PROTHROMBIN TIME: 13.9 SEC (ref 11.9–14.9)
PSA SERPL-MCNC: 70.3 NG/ML (ref 0–4)
RBC # BLD AUTO: 3.74 M/UL (ref 4.35–5.65)
RBC # BLD AUTO: 3.74 M/UL (ref 4.35–5.65)
SODIUM SERPL-SCNC: 146 MMOL/L (ref 136–145)
SODIUM SERPL-SCNC: 146 MMOL/L (ref 136–145)
TIBC SERPL-MCNC: 347 UG/DL (ref 250–450)
TRIGL SERPL-MCNC: 57 MG/DL
VLDLC SERPL CALC-MCNC: 11.4 MG/DL
WBC # BLD AUTO: 8.2 K/UL (ref 4.6–13.2)
WBC # BLD AUTO: 8.2 K/UL (ref 4.6–13.2)

## 2024-10-05 PROCEDURE — 85610 PROTHROMBIN TIME: CPT

## 2024-10-05 PROCEDURE — 82728 ASSAY OF FERRITIN: CPT

## 2024-10-05 PROCEDURE — 80061 LIPID PANEL: CPT

## 2024-10-05 PROCEDURE — G0103 PSA SCREENING: HCPCS

## 2024-10-05 PROCEDURE — 83540 ASSAY OF IRON: CPT

## 2024-10-05 PROCEDURE — 80053 COMPREHEN METABOLIC PANEL: CPT

## 2024-10-05 PROCEDURE — 36415 COLL VENOUS BLD VENIPUNCTURE: CPT

## 2024-10-05 PROCEDURE — 85027 COMPLETE CBC AUTOMATED: CPT

## 2024-10-05 PROCEDURE — 85025 COMPLETE CBC W/AUTO DIFF WBC: CPT

## 2024-10-05 PROCEDURE — 83550 IRON BINDING TEST: CPT

## 2024-10-05 PROCEDURE — 85730 THROMBOPLASTIN TIME PARTIAL: CPT

## 2024-10-07 NOTE — PERIOP NOTE
Instructions for your surgery at [facility]      Today's Date:  10/7/2024      Patient's Name:  Gigi Mcneal           Surgery Date:  10/15              Please enter the main entrance of the hospital and check-in at the front security desk located in the lobby. They will direct you to the area to report for your surgery.     Do NOT eat or drink anything, including candy, gum, or ice chips after midnight prior to your surgery, unless you have specific instructions from your surgeon or anesthesia provider to do so.  Brush your teeth before coming to the hospital. You may swish with water, but do not swallow.  No smoking/Vaping/E-Cigarettes 24 hours prior to the day of surgery.  No alcohol 24 hours prior to the day of surgery.  No recreational drugs for one week prior to the day of surgery.  Bring Photo ID, Insurance information, and Co-pay if required on day of surgery.  Bring in pertinent legal documents, such as, Medical Power of , DNR, Advance Directive, etc.  Leave all valuables, including money/purse, at home.  Remove all jewelry, including ALL body piercings, nail polish, acrylic nails, and makeup (including mascara); no lotions, powders, deodorant, or perfume/cologne/after shave on the skin.  Follow instruction for Hibiclens washes and CHG wipes from surgeon's office.   Glasses and dentures may be worn to the hospital. They must be removed prior to surgery. Please bring case/container for glasses or dentures.   Contact lenses should not be worn on day of surgery.   Call your doctor's office if symptoms of a cold or illness develop within 24-48 hours prior to your surgery.  Call your doctor's office if you have any questions concerning insurance or co-pays.  15. AN ADULT (relative or friend 18 years or older) MUST DRIVE YOU HOME AFTER YOUR SURGERY.  16. Please make arrangements for a responsible adult (18 years or older) to be with you for 24 hours after your surgery.   17. TWO

## 2024-10-08 ENCOUNTER — OFFICE VISIT (OUTPATIENT)
Facility: CLINIC | Age: 64
End: 2024-10-08

## 2024-10-08 VITALS
WEIGHT: 162.2 LBS | SYSTOLIC BLOOD PRESSURE: 91 MMHG | HEIGHT: 72 IN | RESPIRATION RATE: 18 BRPM | HEART RATE: 82 BPM | OXYGEN SATURATION: 100 % | TEMPERATURE: 96.8 F | BODY MASS INDEX: 21.97 KG/M2 | DIASTOLIC BLOOD PRESSURE: 62 MMHG

## 2024-10-08 DIAGNOSIS — R97.20 ELEVATED PSA: ICD-10-CM

## 2024-10-08 DIAGNOSIS — Z87.19 HISTORY OF GI BLEED: ICD-10-CM

## 2024-10-08 DIAGNOSIS — C18.7 ADENOCARCINOMA OF SIGMOID COLON (HCC): ICD-10-CM

## 2024-10-08 DIAGNOSIS — Z09 HOSPITAL DISCHARGE FOLLOW-UP: Primary | ICD-10-CM

## 2024-10-08 DIAGNOSIS — D64.9 SEVERE ANEMIA: ICD-10-CM

## 2024-10-08 NOTE — PROGRESS NOTES
Gigi Mcneal presents today for   Chief Complaint   Patient presents with    Follow-Up from Hospital     Patient is here for hospital follow up from [facility].        Is someone accompanying this pt? no    Is the patient using any DME equipment during OV? no    Health Maintenance Due   Topic Date Due    Pneumococcal 65+ years Vaccine (1 of 2 - PCV) Never done    Diabetic foot exam  Never done    Diabetic Alb to Cr ratio (uACR) test  Never done    Diabetic retinal exam  Never done    DTaP/Tdap/Td vaccine (1 - Tdap) Never done    Shingles vaccine (1 of 2) Never done    Respiratory Syncytial Virus (RSV) Pregnant or age 60 yrs+ (1 - 1-dose 60+ series) Never done    Flu vaccine (1) Never done    Annual Wellness Visit (Medicare)  Never done    COVID-19 Vaccine (1 - 2023-24 season) Never done       \"Have you been to the ER, urgent care clinic since your last visit?  Hospitalized since your last visit?\"    YES - When: approximately 2  weeks ago.  Where and Why: [facility] for GI Bleed.    “Have you seen or consulted any other health care providers outside of [facility] since your last visit?”    NO            
kg (162 lb 3.2 oz)   SpO2 100%   BMI 22.00 kg/m²         An electronic signature was used to authenticate this note.  --Cathy Justin MD

## 2024-10-12 ENCOUNTER — HOSPITAL ENCOUNTER (OUTPATIENT)
Facility: HOSPITAL | Age: 64
Discharge: HOME | End: 2024-10-15
Payer: MEDICARE

## 2024-10-12 DIAGNOSIS — C18.7 MALIGNANT NEOPLASM OF SIGMOID COLON (HCC): ICD-10-CM

## 2024-10-12 LAB
ABO + RH BLD: NORMAL
BLOOD GROUP ANTIBODIES SERPL: NORMAL
SPECIMEN EXP DATE BLD: NORMAL

## 2024-10-12 PROCEDURE — 36415 COLL VENOUS BLD VENIPUNCTURE: CPT

## 2024-10-12 PROCEDURE — 86900 BLOOD TYPING SEROLOGIC ABO: CPT

## 2024-10-12 PROCEDURE — 86850 RBC ANTIBODY SCREEN: CPT

## 2024-10-12 PROCEDURE — 86901 BLOOD TYPING SEROLOGIC RH(D): CPT

## 2024-10-14 ENCOUNTER — ANESTHESIA EVENT (OUTPATIENT)
Facility: HOSPITAL | Age: 64
End: 2024-10-14
Payer: MEDICARE

## 2024-10-14 ENCOUNTER — TELEPHONE (OUTPATIENT)
Age: 64
End: 2024-10-14

## 2024-10-15 ENCOUNTER — ANESTHESIA (OUTPATIENT)
Facility: HOSPITAL | Age: 64
End: 2024-10-15
Payer: MEDICARE

## 2024-10-15 ENCOUNTER — HOSPITAL ENCOUNTER (INPATIENT)
Facility: HOSPITAL | Age: 64
LOS: 4 days | Discharge: HOME | DRG: 329 | End: 2024-10-19
Attending: COLON & RECTAL SURGERY | Admitting: COLON & RECTAL SURGERY
Payer: MEDICARE

## 2024-10-15 DIAGNOSIS — C18.7 MALIGNANT NEOPLASM OF SIGMOID COLON (HCC): Primary | ICD-10-CM

## 2024-10-15 PROBLEM — C18.9 COLON CANCER (HCC): Status: ACTIVE | Noted: 2024-10-15

## 2024-10-15 PROCEDURE — 88305 TISSUE EXAM BY PATHOLOGIST: CPT

## 2024-10-15 PROCEDURE — 2500000003 HC RX 250 WO HCPCS: Performed by: COLON & RECTAL SURGERY

## 2024-10-15 PROCEDURE — 2709999900 HC NON-CHARGEABLE SUPPLY: Performed by: COLON & RECTAL SURGERY

## 2024-10-15 PROCEDURE — 7100000000 HC PACU RECOVERY - FIRST 15 MIN: Performed by: COLON & RECTAL SURGERY

## 2024-10-15 PROCEDURE — 3700000000 HC ANESTHESIA ATTENDED CARE: Performed by: COLON & RECTAL SURGERY

## 2024-10-15 PROCEDURE — 0DJD8ZZ INSPECTION OF LOWER INTESTINAL TRACT, VIA NATURAL OR ARTIFICIAL OPENING ENDOSCOPIC: ICD-10-PCS | Performed by: COLON & RECTAL SURGERY

## 2024-10-15 PROCEDURE — 6360000002 HC RX W HCPCS: Performed by: NURSE ANESTHETIST, CERTIFIED REGISTERED

## 2024-10-15 PROCEDURE — 0DTN4ZZ RESECTION OF SIGMOID COLON, PERCUTANEOUS ENDOSCOPIC APPROACH: ICD-10-PCS | Performed by: COLON & RECTAL SURGERY

## 2024-10-15 PROCEDURE — 2580000003 HC RX 258: Performed by: NURSE ANESTHETIST, CERTIFIED REGISTERED

## 2024-10-15 PROCEDURE — 2500000003 HC RX 250 WO HCPCS: Performed by: NURSE ANESTHETIST, CERTIFIED REGISTERED

## 2024-10-15 PROCEDURE — 2580000003 HC RX 258: Performed by: COLON & RECTAL SURGERY

## 2024-10-15 PROCEDURE — 6370000000 HC RX 637 (ALT 250 FOR IP): Performed by: COLON & RECTAL SURGERY

## 2024-10-15 PROCEDURE — 3700000001 HC ADD 15 MINUTES (ANESTHESIA): Performed by: COLON & RECTAL SURGERY

## 2024-10-15 PROCEDURE — 3600000019 HC SURGERY ROBOT ADDTL 15MIN: Performed by: COLON & RECTAL SURGERY

## 2024-10-15 PROCEDURE — 2720000010 HC SURG SUPPLY STERILE: Performed by: COLON & RECTAL SURGERY

## 2024-10-15 PROCEDURE — 3600000009 HC SURGERY ROBOT BASE: Performed by: COLON & RECTAL SURGERY

## 2024-10-15 PROCEDURE — 44207 L COLECTOMY/COLOPROCTOSTOMY: CPT | Performed by: COLON & RECTAL SURGERY

## 2024-10-15 PROCEDURE — 6360000002 HC RX W HCPCS: Performed by: COLON & RECTAL SURGERY

## 2024-10-15 PROCEDURE — 1100000000 HC RM PRIVATE

## 2024-10-15 PROCEDURE — 44213 LAP MOBIL SPLENIC FL ADD-ON: CPT | Performed by: COLON & RECTAL SURGERY

## 2024-10-15 PROCEDURE — 6370000000 HC RX 637 (ALT 250 FOR IP): Performed by: NURSE ANESTHETIST, CERTIFIED REGISTERED

## 2024-10-15 PROCEDURE — 88309 TISSUE EXAM BY PATHOLOGIST: CPT

## 2024-10-15 PROCEDURE — 8E0W4CZ ROBOTIC ASSISTED PROCEDURE OF TRUNK REGION, PERCUTANEOUS ENDOSCOPIC APPROACH: ICD-10-PCS | Performed by: COLON & RECTAL SURGERY

## 2024-10-15 PROCEDURE — 44955 APPENDECTOMY ADD-ON: CPT | Performed by: COLON & RECTAL SURGERY

## 2024-10-15 PROCEDURE — 0DTJ4ZZ RESECTION OF APPENDIX, PERCUTANEOUS ENDOSCOPIC APPROACH: ICD-10-PCS | Performed by: COLON & RECTAL SURGERY

## 2024-10-15 PROCEDURE — S2900 ROBOTIC SURGICAL SYSTEM: HCPCS | Performed by: COLON & RECTAL SURGERY

## 2024-10-15 PROCEDURE — 7100000001 HC PACU RECOVERY - ADDTL 15 MIN: Performed by: COLON & RECTAL SURGERY

## 2024-10-15 RX ORDER — ASPIRIN 81 MG/1
81 TABLET, CHEWABLE ORAL DAILY
Status: DISCONTINUED | OUTPATIENT
Start: 2024-10-17 | End: 2024-10-19 | Stop reason: HOSPADM

## 2024-10-15 RX ORDER — SODIUM CHLORIDE 9 MG/ML
INJECTION, SOLUTION INTRAVENOUS PRN
Status: DISCONTINUED | OUTPATIENT
Start: 2024-10-15 | End: 2024-10-15 | Stop reason: HOSPADM

## 2024-10-15 RX ORDER — NALOXONE HYDROCHLORIDE 0.4 MG/ML
INJECTION, SOLUTION INTRAMUSCULAR; INTRAVENOUS; SUBCUTANEOUS PRN
Status: DISCONTINUED | OUTPATIENT
Start: 2024-10-15 | End: 2024-10-15 | Stop reason: HOSPADM

## 2024-10-15 RX ORDER — SODIUM CHLORIDE, SODIUM LACTATE, POTASSIUM CHLORIDE, CALCIUM CHLORIDE 600; 310; 30; 20 MG/100ML; MG/100ML; MG/100ML; MG/100ML
INJECTION, SOLUTION INTRAVENOUS CONTINUOUS
Status: DISCONTINUED | OUTPATIENT
Start: 2024-10-15 | End: 2024-10-15 | Stop reason: HOSPADM

## 2024-10-15 RX ORDER — GABAPENTIN 300 MG/1
300 CAPSULE ORAL 3 TIMES DAILY
Status: DISCONTINUED | OUTPATIENT
Start: 2024-10-15 | End: 2024-10-19 | Stop reason: HOSPADM

## 2024-10-15 RX ORDER — BUPIVACAINE HYDROCHLORIDE 2.5 MG/ML
INJECTION, SOLUTION EPIDURAL; INFILTRATION; INTRACAUDAL PRN
Status: DISCONTINUED | OUTPATIENT
Start: 2024-10-15 | End: 2024-10-15 | Stop reason: ALTCHOICE

## 2024-10-15 RX ORDER — GABAPENTIN 300 MG/1
600 CAPSULE ORAL ONCE
Status: COMPLETED | OUTPATIENT
Start: 2024-10-15 | End: 2024-10-15

## 2024-10-15 RX ORDER — FENTANYL CITRATE 50 UG/ML
50 INJECTION, SOLUTION INTRAMUSCULAR; INTRAVENOUS EVERY 5 MIN PRN
Status: DISCONTINUED | OUTPATIENT
Start: 2024-10-15 | End: 2024-10-15 | Stop reason: HOSPADM

## 2024-10-15 RX ORDER — PROCHLORPERAZINE EDISYLATE 5 MG/ML
10 INJECTION INTRAMUSCULAR; INTRAVENOUS
Status: DISCONTINUED | OUTPATIENT
Start: 2024-10-15 | End: 2024-10-15 | Stop reason: HOSPADM

## 2024-10-15 RX ORDER — FENTANYL CITRATE 50 UG/ML
INJECTION, SOLUTION INTRAMUSCULAR; INTRAVENOUS
Status: DISCONTINUED | OUTPATIENT
Start: 2024-10-15 | End: 2024-10-15 | Stop reason: SDUPTHER

## 2024-10-15 RX ORDER — SUCCINYLCHOLINE/SOD CL,ISO/PF 100 MG/5ML
SYRINGE (ML) INTRAVENOUS
Status: DISCONTINUED | OUTPATIENT
Start: 2024-10-15 | End: 2024-10-15 | Stop reason: SDUPTHER

## 2024-10-15 RX ORDER — DEXAMETHASONE SODIUM PHOSPHATE 4 MG/ML
INJECTION, SOLUTION INTRA-ARTICULAR; INTRALESIONAL; INTRAMUSCULAR; INTRAVENOUS; SOFT TISSUE
Status: DISCONTINUED | OUTPATIENT
Start: 2024-10-15 | End: 2024-10-15 | Stop reason: SDUPTHER

## 2024-10-15 RX ORDER — METRONIDAZOLE 500 MG/100ML
500 INJECTION, SOLUTION INTRAVENOUS EVERY 8 HOURS
Status: COMPLETED | OUTPATIENT
Start: 2024-10-15 | End: 2024-10-16

## 2024-10-15 RX ORDER — ONDANSETRON 2 MG/ML
INJECTION INTRAMUSCULAR; INTRAVENOUS
Status: DISCONTINUED | OUTPATIENT
Start: 2024-10-15 | End: 2024-10-15 | Stop reason: SDUPTHER

## 2024-10-15 RX ORDER — FAMOTIDINE 20 MG/1
20 TABLET, FILM COATED ORAL ONCE
Status: COMPLETED | OUTPATIENT
Start: 2024-10-15 | End: 2024-10-15

## 2024-10-15 RX ORDER — MORPHINE SULFATE 2 MG/ML
2 INJECTION, SOLUTION INTRAMUSCULAR; INTRAVENOUS
Status: DISCONTINUED | OUTPATIENT
Start: 2024-10-15 | End: 2024-10-19 | Stop reason: HOSPADM

## 2024-10-15 RX ORDER — LIDOCAINE HYDROCHLORIDE 20 MG/ML
INJECTION, SOLUTION EPIDURAL; INFILTRATION; INTRACAUDAL; PERINEURAL
Status: DISCONTINUED | OUTPATIENT
Start: 2024-10-15 | End: 2024-10-15 | Stop reason: SDUPTHER

## 2024-10-15 RX ORDER — EPHEDRINE SULFATE/0.9% NACL/PF 25 MG/5 ML
SYRINGE (ML) INTRAVENOUS
Status: DISCONTINUED | OUTPATIENT
Start: 2024-10-15 | End: 2024-10-15 | Stop reason: SDUPTHER

## 2024-10-15 RX ORDER — METRONIDAZOLE 500 MG/100ML
500 INJECTION, SOLUTION INTRAVENOUS ONCE
Status: COMPLETED | OUTPATIENT
Start: 2024-10-15 | End: 2024-10-15

## 2024-10-15 RX ORDER — ONDANSETRON 2 MG/ML
4 INJECTION INTRAMUSCULAR; INTRAVENOUS EVERY 6 HOURS PRN
Status: DISCONTINUED | OUTPATIENT
Start: 2024-10-15 | End: 2024-10-19 | Stop reason: HOSPADM

## 2024-10-15 RX ORDER — VECURONIUM BROMIDE 1 MG/ML
INJECTION, POWDER, LYOPHILIZED, FOR SOLUTION INTRAVENOUS
Status: DISCONTINUED | OUTPATIENT
Start: 2024-10-15 | End: 2024-10-15 | Stop reason: SDUPTHER

## 2024-10-15 RX ORDER — ONDANSETRON 2 MG/ML
4 INJECTION INTRAMUSCULAR; INTRAVENOUS
Status: DISCONTINUED | OUTPATIENT
Start: 2024-10-15 | End: 2024-10-15 | Stop reason: HOSPADM

## 2024-10-15 RX ORDER — PHENYLEPHRINE HCL IN 0.9% NACL 1 MG/10 ML
SYRINGE (ML) INTRAVENOUS
Status: DISCONTINUED | OUTPATIENT
Start: 2024-10-15 | End: 2024-10-15 | Stop reason: SDUPTHER

## 2024-10-15 RX ORDER — METOPROLOL SUCCINATE 25 MG/1
25 TABLET, EXTENDED RELEASE ORAL DAILY
Status: DISCONTINUED | OUTPATIENT
Start: 2024-10-16 | End: 2024-10-19 | Stop reason: HOSPADM

## 2024-10-15 RX ORDER — LIDOCAINE HYDROCHLORIDE 10 MG/ML
1 INJECTION, SOLUTION EPIDURAL; INFILTRATION; INTRACAUDAL; PERINEURAL
Status: DISCONTINUED | OUTPATIENT
Start: 2024-10-15 | End: 2024-10-15 | Stop reason: HOSPADM

## 2024-10-15 RX ORDER — SODIUM CHLORIDE, SODIUM LACTATE, POTASSIUM CHLORIDE, CALCIUM CHLORIDE 600; 310; 30; 20 MG/100ML; MG/100ML; MG/100ML; MG/100ML
INJECTION, SOLUTION INTRAVENOUS CONTINUOUS
Status: DISCONTINUED | OUTPATIENT
Start: 2024-10-15 | End: 2024-10-17

## 2024-10-15 RX ORDER — GLYCOPYRROLATE 0.2 MG/ML
INJECTION INTRAMUSCULAR; INTRAVENOUS
Status: DISCONTINUED | OUTPATIENT
Start: 2024-10-15 | End: 2024-10-15 | Stop reason: SDUPTHER

## 2024-10-15 RX ORDER — DEXTROSE MONOHYDRATE 100 MG/ML
INJECTION, SOLUTION INTRAVENOUS CONTINUOUS PRN
Status: DISCONTINUED | OUTPATIENT
Start: 2024-10-15 | End: 2024-10-15 | Stop reason: HOSPADM

## 2024-10-15 RX ORDER — HEPARIN SODIUM 5000 [USP'U]/ML
5000 INJECTION, SOLUTION INTRAVENOUS; SUBCUTANEOUS EVERY 8 HOURS SCHEDULED
Status: DISCONTINUED | OUTPATIENT
Start: 2024-10-16 | End: 2024-10-19 | Stop reason: HOSPADM

## 2024-10-15 RX ORDER — SODIUM CHLORIDE 0.9 % (FLUSH) 0.9 %
5-40 SYRINGE (ML) INJECTION PRN
Status: DISCONTINUED | OUTPATIENT
Start: 2024-10-15 | End: 2024-10-15 | Stop reason: HOSPADM

## 2024-10-15 RX ORDER — PROPOFOL 10 MG/ML
INJECTION, EMULSION INTRAVENOUS
Status: DISCONTINUED | OUTPATIENT
Start: 2024-10-15 | End: 2024-10-15 | Stop reason: SDUPTHER

## 2024-10-15 RX ORDER — ACETAMINOPHEN 500 MG
1000 TABLET ORAL EVERY 6 HOURS
Status: DISCONTINUED | OUTPATIENT
Start: 2024-10-15 | End: 2024-10-19 | Stop reason: HOSPADM

## 2024-10-15 RX ORDER — SODIUM CHLORIDE 0.9 % (FLUSH) 0.9 %
5-40 SYRINGE (ML) INJECTION EVERY 12 HOURS SCHEDULED
Status: DISCONTINUED | OUTPATIENT
Start: 2024-10-15 | End: 2024-10-15 | Stop reason: HOSPADM

## 2024-10-15 RX ORDER — ACETAMINOPHEN 500 MG
1000 TABLET ORAL ONCE
Status: COMPLETED | OUTPATIENT
Start: 2024-10-15 | End: 2024-10-15

## 2024-10-15 RX ORDER — DIPHENHYDRAMINE HYDROCHLORIDE 50 MG/ML
25 INJECTION INTRAMUSCULAR; INTRAVENOUS EVERY 6 HOURS PRN
Status: DISCONTINUED | OUTPATIENT
Start: 2024-10-15 | End: 2024-10-19 | Stop reason: HOSPADM

## 2024-10-15 RX ADMIN — WATER 2000 MG: 1 INJECTION INTRAMUSCULAR; INTRAVENOUS; SUBCUTANEOUS at 15:16

## 2024-10-15 RX ADMIN — PROPOFOL 100 MG: 10 INJECTION, EMULSION INTRAVENOUS at 07:37

## 2024-10-15 RX ADMIN — GABAPENTIN 300 MG: 300 CAPSULE ORAL at 21:37

## 2024-10-15 RX ADMIN — ACETAMINOPHEN 1000 MG: 500 TABLET ORAL at 21:37

## 2024-10-15 RX ADMIN — FENTANYL CITRATE 50 MCG: 50 INJECTION INTRAMUSCULAR; INTRAVENOUS at 08:11

## 2024-10-15 RX ADMIN — Medication 200 MCG: at 08:42

## 2024-10-15 RX ADMIN — VECURONIUM BROMIDE 2 MG: 1 INJECTION, POWDER, LYOPHILIZED, FOR SOLUTION INTRAVENOUS at 09:55

## 2024-10-15 RX ADMIN — DEXMEDETOMIDINE HYDROCHLORIDE 6 MCG: 100 INJECTION, SOLUTION INTRAVENOUS at 08:11

## 2024-10-15 RX ADMIN — SODIUM CHLORIDE, POTASSIUM CHLORIDE, SODIUM LACTATE AND CALCIUM CHLORIDE: 600; 310; 30; 20 INJECTION, SOLUTION INTRAVENOUS at 15:13

## 2024-10-15 RX ADMIN — FAMOTIDINE 20 MG: 20 TABLET ORAL at 06:07

## 2024-10-15 RX ADMIN — GABAPENTIN 300 MG: 300 CAPSULE ORAL at 15:19

## 2024-10-15 RX ADMIN — GLYCOPYRROLATE 0.2 MG: 0.2 INJECTION, SOLUTION INTRAMUSCULAR; INTRAVENOUS at 07:26

## 2024-10-15 RX ADMIN — Medication 10 MG: at 10:37

## 2024-10-15 RX ADMIN — SODIUM CHLORIDE, POTASSIUM CHLORIDE, SODIUM LACTATE AND CALCIUM CHLORIDE: 600; 310; 30; 20 INJECTION, SOLUTION INTRAVENOUS at 06:25

## 2024-10-15 RX ADMIN — LIDOCAINE HYDROCHLORIDE 100 MG: 20 INJECTION, SOLUTION EPIDURAL; INFILTRATION; INTRACAUDAL; PERINEURAL at 07:37

## 2024-10-15 RX ADMIN — Medication 200 MCG: at 07:48

## 2024-10-15 RX ADMIN — SUGAMMADEX 200 MG: 100 INJECTION, SOLUTION INTRAVENOUS at 12:38

## 2024-10-15 RX ADMIN — VECURONIUM BROMIDE 4 MG: 1 INJECTION, POWDER, LYOPHILIZED, FOR SOLUTION INTRAVENOUS at 07:55

## 2024-10-15 RX ADMIN — METRONIDAZOLE 500 MG: 500 INJECTION, SOLUTION INTRAVENOUS at 15:24

## 2024-10-15 RX ADMIN — VECURONIUM BROMIDE 2 MG: 1 INJECTION, POWDER, LYOPHILIZED, FOR SOLUTION INTRAVENOUS at 08:55

## 2024-10-15 RX ADMIN — DEXAMETHASONE SODIUM PHOSPHATE 4 MG: 4 INJECTION INTRA-ARTICULAR; INTRALESIONAL; INTRAMUSCULAR; INTRAVENOUS; SOFT TISSUE at 08:21

## 2024-10-15 RX ADMIN — Medication 200 MCG: at 09:51

## 2024-10-15 RX ADMIN — Medication 100 MG: at 07:37

## 2024-10-15 RX ADMIN — ACETAMINOPHEN 1000 MG: 500 TABLET ORAL at 15:19

## 2024-10-15 RX ADMIN — FENTANYL CITRATE 50 MCG: 50 INJECTION INTRAMUSCULAR; INTRAVENOUS at 07:37

## 2024-10-15 RX ADMIN — WATER 2000 MG: 1 INJECTION INTRAMUSCULAR; INTRAVENOUS; SUBCUTANEOUS at 07:54

## 2024-10-15 RX ADMIN — Medication 200 MCG: at 09:41

## 2024-10-15 RX ADMIN — SODIUM CHLORIDE, PRESERVATIVE FREE 20 MG: 5 INJECTION INTRAVENOUS at 15:16

## 2024-10-15 RX ADMIN — METRONIDAZOLE 500 MG: 500 INJECTION, SOLUTION INTRAVENOUS at 07:45

## 2024-10-15 RX ADMIN — Medication 200 MCG: at 10:33

## 2024-10-15 RX ADMIN — Medication 200 MCG: at 09:18

## 2024-10-15 RX ADMIN — GABAPENTIN 600 MG: 300 CAPSULE ORAL at 06:07

## 2024-10-15 RX ADMIN — MORPHINE SULFATE 2 MG: 2 INJECTION, SOLUTION INTRAMUSCULAR; INTRAVENOUS at 15:17

## 2024-10-15 RX ADMIN — DEXMEDETOMIDINE HYDROCHLORIDE 10 MCG: 100 INJECTION, SOLUTION INTRAVENOUS at 07:26

## 2024-10-15 RX ADMIN — DEXMEDETOMIDINE HYDROCHLORIDE 6 MCG: 100 INJECTION, SOLUTION INTRAVENOUS at 09:20

## 2024-10-15 RX ADMIN — ONDANSETRON 4 MG: 2 INJECTION INTRAMUSCULAR; INTRAVENOUS at 08:22

## 2024-10-15 RX ADMIN — ACETAMINOPHEN 1000 MG: 500 TABLET ORAL at 06:07

## 2024-10-15 RX ADMIN — DEXMEDETOMIDINE HYDROCHLORIDE 4 MCG: 100 INJECTION, SOLUTION INTRAVENOUS at 11:31

## 2024-10-15 RX ADMIN — VECURONIUM BROMIDE 2 MG: 1 INJECTION, POWDER, LYOPHILIZED, FOR SOLUTION INTRAVENOUS at 07:37

## 2024-10-15 RX ADMIN — SODIUM CHLORIDE, PRESERVATIVE FREE 20 MG: 5 INJECTION INTRAVENOUS at 21:38

## 2024-10-15 ASSESSMENT — PAIN DESCRIPTION - DESCRIPTORS
DESCRIPTORS: ACHING;DISCOMFORT
DESCRIPTORS: ACHING
DESCRIPTORS: ACHING;DISCOMFORT

## 2024-10-15 ASSESSMENT — PAIN DESCRIPTION - LOCATION
LOCATION: ABDOMEN
LOCATION: ABDOMEN

## 2024-10-15 ASSESSMENT — PAIN DESCRIPTION - FREQUENCY
FREQUENCY: INTERMITTENT
FREQUENCY: INTERMITTENT

## 2024-10-15 ASSESSMENT — PAIN DESCRIPTION - PAIN TYPE
TYPE: SURGICAL PAIN
TYPE: SURGICAL PAIN

## 2024-10-15 ASSESSMENT — PAIN - FUNCTIONAL ASSESSMENT
PAIN_FUNCTIONAL_ASSESSMENT: ACTIVITIES ARE NOT PREVENTED
PAIN_FUNCTIONAL_ASSESSMENT: 0-10

## 2024-10-15 ASSESSMENT — PAIN DESCRIPTION - DIRECTION: RADIATING_TOWARDS: NO

## 2024-10-15 ASSESSMENT — PAIN DESCRIPTION - ONSET
ONSET: ON-GOING
ONSET: ON-GOING

## 2024-10-15 ASSESSMENT — PAIN SCALES - GENERAL
PAINLEVEL_OUTOF10: 4
PAINLEVEL_OUTOF10: 0
PAINLEVEL_OUTOF10: 6
PAINLEVEL_OUTOF10: 5
PAINLEVEL_OUTOF10: 0

## 2024-10-15 ASSESSMENT — PAIN DESCRIPTION - ORIENTATION
ORIENTATION: MID
ORIENTATION: MID

## 2024-10-15 NOTE — ANESTHESIA POSTPROCEDURE EVALUATION
Department of Anesthesiology  Postprocedure Note    Patient: Gigi Mcneal  MRN: [id number]  YOB: 1960  Date of evaluation: 10/15/2024    Procedure Summary       Date: 10/15/24 Room / Location: [facility] MAIN 04 / [facility] MAIN OR    Anesthesia Start: 0730 Anesthesia Stop: 1255    Procedures:       ROBOTIC SIGMOID COLECTOMY/ APPENDECTOMY (Abdomen)      FLEXIBLE SIGMOIDOSCOPY (Rectum) Diagnosis:       Malignant neoplasm of sigmoid colon (HCC)      (Malignant neoplasm of sigmoid colon (HCC) [C18.7])    Surgeons: Candido Banuelos MD Responsible Provider: Dandy Murdock MD    Anesthesia Type: General ASA Status: 3            Anesthesia Type: General    Sona Phase I: Sona Score: 8    Sona Phase II:      Anesthesia Post Evaluation    Patient location during evaluation: bedside  Patient participation: complete - patient participated  Level of consciousness: awake  Pain score: 6  Airway patency: patent  Nausea & Vomiting: no nausea  Cardiovascular status: hemodynamically stable  Respiratory status: acceptable  Hydration status: euvolemic  Pain management: satisfactory to patient        No notable events documented.

## 2024-10-15 NOTE — OP NOTE
Operative Note      Patient: Gigi Mcneal  YOB: 1960  MRN: [id number]    Date of Procedure: 10/15/2024    Pre-Op Diagnosis Codes:      * Malignant neoplasm of sigmoid colon (HCC) [C18.7]    Post-Op Diagnosis:  Sigmoid colon cancer, appendicitis       Procedure(s):  ROBOTIC SIGMOID COLECTOMY/ APPENDECTOMY  FLEXIBLE SIGMOIDOSCOPY  Splenic flexure mobilization    Surgeon(s):  Candido Banuelos MD    Assistant:   Surgical Assistant: Dale Worthington    Anesthesia: General    Estimated Blood Loss (mL): less than 50     Complications: None    Specimens:   ID Type Source Tests Collected by Time Destination   A : sigmoid colon, proximal rectum and appendix Tissue Sigmoid Colon SURGICAL PATHOLOGY Candido Banuelos MD 10/15/2024 1119    B : PROXIMAL ANASTAMOTIC DONUT Tissue Colon SURGICAL PATHOLOGY Candido Banuelos MD 10/15/2024 1208    C : DISTAL  ANASTAMOTIC DONUT Tissue Colon SURGICAL PATHOLOGY Candido Banuelos MD 10/15/2024 1209        Implants:  * No implants in log *      Drains:   Urinary Catheter 10/15/24 2 Way;Uribe (Active)       Findings:  Infection Present At Time Of Surgery (PATOS) (choose all levels that have infection present):  Yes. Acute appendictiis with phlegmon and abscess cavity    Other Findings: Acute appendicitis  Colon Resection  Operation performed with curative intent Yes   Tumor Location (select all that apply) Sigmoid colon   Extent of colon and vascular resection (select all that apply) Sigmoid resection - inferior mesenteric        Detailed Description of Procedure:     The patient was properly identified in the holding area and brought to the operating room.  The patient was laid supine on the operating room table.  General anesthesia was administered.  Uribe catheter was placed.  The patient was placed in a split leg position with their arms tucked at their side and their chest strapped to the bed.  Abdomen and perineal areas were prepped and draped in the usual sterile  fashion.  We made a small stab incision beneath the left subcostal margin, inserted a Veress needle, and insufflated the abdomen to 15 mmHg.  We placed four robotic ports down the patient's right side, the lowermost port being a 12-mm port.  The patient was placed in Trendelenburg position with right side down.       There was adhesive disease to the anterior abdominal wall of both small and large bowel.  Much of this was lysed laparoscopically prior to docking the robot.  We subsequently docked to the robot.  We took down some additional adhesive disease.  The appendix was adherent to the sigmoid colon distally.  This appeared to be distal to the tattoo and not involved with the tumor.  The tip was dilated, inflamed and edematous.  We entered an abscess cavity in dissecting this free of the colon. There was phlegmon. We transected the mesoappendix with the vessel sealing device and stapled across the appendiceal base with a single firing of a 45 mm blue load robotic stapler.  We left the appendix attached to the distal sigmoid colon.    We scored the mesentery of the left colon along the aorta with hook cautery.  We performed medial-to-lateral dissection.  We circumferentially dissected the inferior mesenteric artery and after identifying the left ureter, transected the artery at its base with vessel sealing device.  We then completed medial-to-lateral dissection.  We placed a stitch at the junction of the descending and the sigmoid colon for future identification and then took all the lateral attachments of the descending colon and sigmoid colon to the proximal rectum.  We circumferentially dissected out the proximal rectum.  We stapled across this with 3 firings of a 45-mm blue load robotic stapler.  We then transected the mesentery of the distal descending colon to the point just proximal to the stitch we had previously placed.  ICG dye was injected intravenously and Firefly technique used, which did assure good  blood flow to the distal descending colon.       The patient was placed in reverse Trendelenburg position.  The omentum was incised to open the lesser sac.  We transected the omentum and took down lateral attachments along the white line of Toldt superiorly by the spleen and completely mobilized down the splenic flexure off Gerota's fascia.       We first extracted a piece of small bowel which we were concerned we might have created an enterotomy there was none.  Next, the colon was extracted through the muscle-splitting incision in the left lateral region.  Wound protector was used.  We transected the distal descending colon with the auto pursestring device and enter the anvil of a 29 mm EEA stapler.  We tied this into place.  We cleared away the anastomotic staple line of fat and assure that there were no diverticula in the staple line.  We placed the descending colon back into the abdominal cavity and re-insufflated the abdomen to 15 mmHg.    We entered the EEA stapler per anus and advanced it to the staple line, opened the spike beside the staple line, and attached the anvil.  We closed the EEA stapler and after assuring that there was no twisting of the mesentery or intestine, fired the stapler.  Air leak test was then performed with flexible sigmoidoscopy and was negative for leak.  The anastomosis appeared healthy with a complete anastomotic staple line and healthy pink mucosa proximally and distally.       Blood and fluid were suctioned from the abdominal cavity.  We made sure there was no small bowel beneath the mesenteric rent.  All ports were removed under direct visualization.  We closed the 12-mm port site at the level of fascia with 0 Vicryl suture.  We closed the extraction site in 2 layers with 0 Vicryl suture and #1 PDS suture.  Subcutaneous tissues were irrigated.  Skin incisions were closed with 4-0 Monocryl subcuticular suture.  Steri-Strips were applied.     The patient tolerated the procedure

## 2024-10-15 NOTE — ANESTHESIA PRE PROCEDURE
Applicable):   Lab Results   Component Value Date/Time    COVID19 Not detected 03/23/2024 04:00 AM           Anesthesia Evaluation  Patient summary reviewed  Airway: Mallampati: II          Dental:    (+) poor dentition  Comment: Many missing teeth. Remaining teeth in poor condition    Pulmonary:Negative Pulmonary ROS and normal exam                               Cardiovascular:  Exercise tolerance: good (>4 METS)  (+) hypertension:, past MI: > 6 months, CAD:        Rhythm: regular  Rate: normal                    Neuro/Psych:   Negative Neuro/Psych ROS              GI/Hepatic/Renal: Neg GI/Hepatic/Renal ROS            Endo/Other:    (+) Diabetes, malignancy/cancer.                 Abdominal:             Vascular: negative vascular ROS.         Other Findings:       Anesthesia Plan      general     ASA 3       Induction: intravenous.      Anesthetic plan and risks discussed with patient, spouse and child/children.        Attending anesthesiologist reviewed and agrees with Preprocedure content            Dandy Murdock MD   10/15/2024

## 2024-10-15 NOTE — H&P
HPI: Gigi Mcneal is a 64 y.o. male presenting with chief complain of sigmoid colon cancer    Past Medical History:   Diagnosis Date    CAD (coronary artery disease)     Cardiomyopathy (HCC)     Colon cancer (HCC)     Heart failure (HCC)     History of blood transfusion     Hypertension     NSTEMI (non-ST elevated myocardial infarction) (HCC)        Past Surgical History:   Procedure Laterality Date    CARDIAC PROCEDURE N/A 3/28/2024    Left heart cath performed by Butch Guaman MD at [facility] CARDIAC CATH LAB    CARDIAC PROCEDURE N/A 3/28/2024    Coronary angiography performed by Butch Guaman MD at [facility] CARDIAC CATH LAB    CARDIAC PROCEDURE N/A 3/28/2024    Left ventriculography performed by Butch Guaman MD at [facility] CARDIAC CATH LAB    CARDIAC PROCEDURE N/A 3/28/2024    Insert stent syeda coronary performed by Butch Guaman MD at [facility] CARDIAC CATH LAB    COLONOSCOPY N/A 6/12/2024    COLONOSCOPY with polypectomy, bx's and tattoo performed by Orestes Araujo MD at [facility] ENDOSCOPY    UPPER GASTROINTESTINAL ENDOSCOPY N/A 6/12/2024    ESOPHAGOGASTRODUODENOSCOPY polypectomies and 3 clips performed by Orestes Araujo MD at [facility] ENDOSCOPY       Family History   Problem Relation Age of Onset    Diabetes Mother     Eczema Father     No Known Problems Sister     No Known Problems Brother     No Known Problems Maternal Aunt     No Known Problems Maternal Uncle     No Known Problems Paternal Aunt     No Known Problems Paternal Uncle     No Known Problems Maternal Grandmother     No Known Problems Maternal Grandfather     No Known Problems Paternal Grandmother     No Known Problems Paternal Grandfather     No Known Problems Other        Social History     Socioeconomic History    Marital status:      Spouse name: None    Number of children: None    Years of education: None    Highest education level: None   Tobacco Use    Smoking status: Former     Current packs/day: 0.00     Average packs/day: 0.3 packs/day for 19.0 years (4.8 ttl

## 2024-10-15 NOTE — PLAN OF CARE
Problem: Pain  Goal: Verbalizes/displays adequate comfort level or baseline comfort level  10/15/2024 1941 by Tani Stewart RN  Outcome: Progressing  Flowsheets (Taken 10/15/2024 1941)  Verbalizes/displays adequate comfort level or baseline comfort level:   Encourage patient to monitor pain and request assistance   Assess pain using appropriate pain scale   Administer analgesics based on type and severity of pain and evaluate response   Consider cultural and social influences on pain and pain management   Implement non-pharmacological measures as appropriate and evaluate response   Notify Licensed Independent Practitioner if interventions unsuccessful or patient reports new pain  Note: Pain assessed with appropraite pain scale, analgesics administered according to the severity of pain. Patient is in bed resting.     Problem: Safety - Adult  Goal: Free from fall injury  10/15/2024 1941 by Tani Stewart RN  Outcome: Progressing  Note: Side rails, call bell and bed side table in use. Patient is advised to call before attempting to get up. Bed alarm on.     Problem: Discharge Planning  Goal: Discharge to home or other facility with appropriate resources  10/15/2024 1941 by Tani Stewart RN  Outcome: Progressing  Flowsheets  Taken 10/15/2024 1941  Discharge to home or other facility with appropriate resources:   Identify barriers to discharge with patient and caregiver   Arrange for needed discharge resources and transportation as appropriate   Identify discharge learning needs (meds, wound care, etc)   Arrange for interpreters to assist at discharge as needed   Refer to discharge planning if patient needs post-hospital services based on physician order or complex needs related to functional status, cognitive ability or social support system  Taken 10/15/2024 1933  Discharge to home or other facility with appropriate resources: Identify barriers to discharge with patient and caregiver  Note: Plans to  discharge patient is ongoing

## 2024-10-15 NOTE — PERIOP NOTE
TRANSFER - OUT REPORT:    Verbal report given to Litzy on Gigi Mcneal  being transferred to  for routine post-op       Report consisted of patient's Situation, Background, Assessment and   Recommendations(SBAR).     Information from the following report(s) Nurse Handoff Report, Adult Overview, Surgery Report, and MAR was reviewed with the receiving nurse.           Lines:   Peripheral IV 10/15/24 Distal;Left Forearm (Active)   Site Assessment Clean, dry & intact 10/15/24 1253   Line Status Infusing 10/15/24 1253   Line Care Connections checked and tightened 10/15/24 1253   Phlebitis Assessment No symptoms 10/15/24 1253   Infiltration Assessment 0 10/15/24 1253   Alcohol Cap Used No 10/15/24 1253   Dressing Status Clean, dry & intact 10/15/24 1253   Dressing Type Transparent 10/15/24 1253       Peripheral IV 10/15/24 Posterior;Right Hand (Active)   Site Assessment Clean, dry & intact 10/15/24 1253   Line Status Flushed 10/15/24 1253   Line Care Connections checked and tightened 10/15/24 1253   Phlebitis Assessment No symptoms 10/15/24 1253   Infiltration Assessment 0 10/15/24 1253   Alcohol Cap Used No 10/15/24 1253   Dressing Status Clean, dry & intact 10/15/24 1253   Dressing Type Transparent 10/15/24 1253        Opportunity for questions and clarification was provided.      Patient transported with:  Monitor and Registered Nurse

## 2024-10-15 NOTE — PERIOP NOTE
Patient /Family /Designee has been informed that [facility] is not responsible for patient belongings per policy and the signed [facility] Patient Agreement document.  Personal items should be sent home or checked in with security.  Patient /Family /Designee selected the following action:                            [x]  Send personal items home with a family member or friend                                                 []  Check in personal items with security, excluding clothing                            []  Maintain personal items at the bedside, against recommendation                                 by Basim Smith [facility]

## 2024-10-16 LAB
ANION GAP SERPL CALC-SCNC: 5 MMOL/L (ref 3–18)
BUN SERPL-MCNC: 6 MG/DL (ref 7–18)
BUN/CREAT SERPL: 9 (ref 12–20)
CALCIUM SERPL-MCNC: 7.9 MG/DL (ref 8.5–10.1)
CHLORIDE SERPL-SCNC: 111 MMOL/L (ref 100–111)
CO2 SERPL-SCNC: 25 MMOL/L (ref 21–32)
CREAT SERPL-MCNC: 0.67 MG/DL (ref 0.6–1.3)
ERYTHROCYTE [DISTWIDTH] IN BLOOD BY AUTOMATED COUNT: 21.1 % (ref 11.6–14.5)
GLUCOSE SERPL-MCNC: 100 MG/DL (ref 74–99)
HCT VFR BLD AUTO: 25 % (ref 36–48)
HCT VFR BLD AUTO: 26.4 % (ref 36–48)
HGB BLD-MCNC: 7.2 G/DL (ref 13–16)
HGB BLD-MCNC: 7.7 G/DL (ref 13–16)
MAGNESIUM SERPL-MCNC: 1.8 MG/DL (ref 1.6–2.6)
MCH RBC QN AUTO: 23.3 PG (ref 24–34)
MCHC RBC AUTO-ENTMCNC: 28.8 G/DL (ref 31–37)
MCV RBC AUTO: 80.9 FL (ref 78–100)
NRBC # BLD: 0.02 K/UL (ref 0–0.01)
NRBC BLD-RTO: 0.3 PER 100 WBC
PHOSPHATE SERPL-MCNC: 4.2 MG/DL (ref 2.5–4.9)
PLATELET # BLD AUTO: 414 K/UL (ref 135–420)
PMV BLD AUTO: 10.3 FL (ref 9.2–11.8)
POTASSIUM SERPL-SCNC: 3.2 MMOL/L (ref 3.5–5.5)
RBC # BLD AUTO: 3.09 M/UL (ref 4.35–5.65)
SODIUM SERPL-SCNC: 141 MMOL/L (ref 136–145)
WBC # BLD AUTO: 7.1 K/UL (ref 4.6–13.2)

## 2024-10-16 PROCEDURE — 2500000003 HC RX 250 WO HCPCS: Performed by: COLON & RECTAL SURGERY

## 2024-10-16 PROCEDURE — 36415 COLL VENOUS BLD VENIPUNCTURE: CPT

## 2024-10-16 PROCEDURE — 6360000002 HC RX W HCPCS: Performed by: COLON & RECTAL SURGERY

## 2024-10-16 PROCEDURE — 85027 COMPLETE CBC AUTOMATED: CPT

## 2024-10-16 PROCEDURE — 85018 HEMOGLOBIN: CPT

## 2024-10-16 PROCEDURE — 80048 BASIC METABOLIC PNL TOTAL CA: CPT

## 2024-10-16 PROCEDURE — 85014 HEMATOCRIT: CPT

## 2024-10-16 PROCEDURE — 94761 N-INVAS EAR/PLS OXIMETRY MLT: CPT

## 2024-10-16 PROCEDURE — 6370000000 HC RX 637 (ALT 250 FOR IP): Performed by: COLON & RECTAL SURGERY

## 2024-10-16 PROCEDURE — 1100000000 HC RM PRIVATE

## 2024-10-16 PROCEDURE — 83735 ASSAY OF MAGNESIUM: CPT

## 2024-10-16 PROCEDURE — 84100 ASSAY OF PHOSPHORUS: CPT

## 2024-10-16 PROCEDURE — 2580000003 HC RX 258: Performed by: COLON & RECTAL SURGERY

## 2024-10-16 RX ORDER — POTASSIUM CHLORIDE 7.45 MG/ML
10 INJECTION INTRAVENOUS
Status: DISPENSED | OUTPATIENT
Start: 2024-10-16 | End: 2024-10-16

## 2024-10-16 RX ORDER — POTASSIUM CHLORIDE 7.45 MG/ML
10 INJECTION INTRAVENOUS ONCE
Status: COMPLETED | OUTPATIENT
Start: 2024-10-16 | End: 2024-10-16

## 2024-10-16 RX ADMIN — GABAPENTIN 300 MG: 300 CAPSULE ORAL at 13:49

## 2024-10-16 RX ADMIN — HEPARIN SODIUM 5000 UNITS: 5000 INJECTION INTRAVENOUS; SUBCUTANEOUS at 13:53

## 2024-10-16 RX ADMIN — GABAPENTIN 300 MG: 300 CAPSULE ORAL at 21:34

## 2024-10-16 RX ADMIN — SODIUM CHLORIDE, PRESERVATIVE FREE 20 MG: 5 INJECTION INTRAVENOUS at 21:35

## 2024-10-16 RX ADMIN — WATER 2000 MG: 1 INJECTION INTRAMUSCULAR; INTRAVENOUS; SUBCUTANEOUS at 00:09

## 2024-10-16 RX ADMIN — ACETAMINOPHEN 1000 MG: 500 TABLET ORAL at 07:56

## 2024-10-16 RX ADMIN — ACETAMINOPHEN 1000 MG: 500 TABLET ORAL at 21:34

## 2024-10-16 RX ADMIN — POTASSIUM CHLORIDE 10 MEQ: 7.46 INJECTION, SOLUTION INTRAVENOUS at 08:06

## 2024-10-16 RX ADMIN — SODIUM CHLORIDE, POTASSIUM CHLORIDE, SODIUM LACTATE AND CALCIUM CHLORIDE: 600; 310; 30; 20 INJECTION, SOLUTION INTRAVENOUS at 23:00

## 2024-10-16 RX ADMIN — WATER 2000 MG: 1 INJECTION INTRAMUSCULAR; INTRAVENOUS; SUBCUTANEOUS at 07:57

## 2024-10-16 RX ADMIN — METRONIDAZOLE 500 MG: 500 INJECTION, SOLUTION INTRAVENOUS at 00:04

## 2024-10-16 RX ADMIN — ACETAMINOPHEN 1000 MG: 500 TABLET ORAL at 15:20

## 2024-10-16 RX ADMIN — METRONIDAZOLE 500 MG: 500 INJECTION, SOLUTION INTRAVENOUS at 08:03

## 2024-10-16 RX ADMIN — GABAPENTIN 300 MG: 300 CAPSULE ORAL at 08:30

## 2024-10-16 RX ADMIN — HEPARIN SODIUM 5000 UNITS: 5000 INJECTION INTRAVENOUS; SUBCUTANEOUS at 21:35

## 2024-10-16 RX ADMIN — ACETAMINOPHEN 1000 MG: 500 TABLET ORAL at 03:15

## 2024-10-16 RX ADMIN — SODIUM CHLORIDE, POTASSIUM CHLORIDE, SODIUM LACTATE AND CALCIUM CHLORIDE: 600; 310; 30; 20 INJECTION, SOLUTION INTRAVENOUS at 01:35

## 2024-10-16 RX ADMIN — POTASSIUM CHLORIDE 10 MEQ: 7.46 INJECTION, SOLUTION INTRAVENOUS at 17:54

## 2024-10-16 RX ADMIN — SODIUM CHLORIDE, PRESERVATIVE FREE 20 MG: 5 INJECTION INTRAVENOUS at 08:00

## 2024-10-16 RX ADMIN — POTASSIUM CHLORIDE 10 MEQ: 7.46 INJECTION, SOLUTION INTRAVENOUS at 16:19

## 2024-10-16 ASSESSMENT — PAIN - FUNCTIONAL ASSESSMENT
PAIN_FUNCTIONAL_ASSESSMENT: ACTIVITIES ARE NOT PREVENTED

## 2024-10-16 ASSESSMENT — PAIN SCALES - GENERAL
PAINLEVEL_OUTOF10: 0
PAINLEVEL_OUTOF10: 4
PAINLEVEL_OUTOF10: 4
PAINLEVEL_OUTOF10: 0
PAINLEVEL_OUTOF10: 4
PAINLEVEL_OUTOF10: 3
PAINLEVEL_OUTOF10: 2
PAINLEVEL_OUTOF10: 0

## 2024-10-16 ASSESSMENT — PAIN DESCRIPTION - LOCATION
LOCATION: ABDOMEN

## 2024-10-16 ASSESSMENT — PAIN DESCRIPTION - FREQUENCY
FREQUENCY: INTERMITTENT

## 2024-10-16 ASSESSMENT — PAIN DESCRIPTION - ORIENTATION
ORIENTATION: MID

## 2024-10-16 ASSESSMENT — PAIN DESCRIPTION - DIRECTION
RADIATING_TOWARDS: NO

## 2024-10-16 ASSESSMENT — PAIN DESCRIPTION - PAIN TYPE
TYPE: SURGICAL PAIN

## 2024-10-16 ASSESSMENT — PAIN DESCRIPTION - ONSET
ONSET: ON-GOING

## 2024-10-16 ASSESSMENT — PAIN DESCRIPTION - DESCRIPTORS
DESCRIPTORS: ACHING

## 2024-10-16 NOTE — PROGRESS NOTES
Physician Progress Note      PATIENT:               PAUL LOPEZ  CSN #:                  [id number]  :                       1960  ADMIT DATE:       10/15/2024 5:15 AM  DISCH DATE:  RESPONDING  PROVIDER #:        Candido Banuelos MD          QUERY TEXT:    Pt admitted with colon cancer. Pt noted to have Hemoglobin Quant: 7.2. If   possible, please document in the progress notes and discharge summary if you   are evaluating and/or treating any of the following:    The medical record reflects the following:  Risk Factors: postop  Clinical Indicators:  10/16/24 02:39  Hemoglobin Quant: 7.2 (L)  Hematocrit: 25.0 (L)  Treatment: lab monitoring    Thank you  Susanne Hernadez RN, CDI, CCDS, CRCR  Certified  Clinical Documentation   O: [phone]  [email or web address]  I can also be reached by perfect serve  Options provided:  -- Acute blood loss anemia  -- Postoperative acute blood loss anemia  -- Other - I will add my own diagnosis  -- Disagree - Not applicable / Not valid  -- Disagree - Clinically unable to determine / Unknown  -- Refer to Clinical Documentation Reviewer    PROVIDER RESPONSE TEXT:    This patient has postoperative acute blood loss anemia.    Query created by: Susanne Hernadez on 10/16/2024 6:38 AM      Electronically signed by:  Candido Banuelos MD 10/16/2024 8:09 AM

## 2024-10-16 NOTE — CARE COORDINATION
10/16/24 0854   Readmission Assessment   Number of Days since last admission? 8-30 days   Previous Disposition Home with Family   Who is being Interviewed Patient   What was the patient's/caregiver's perception as to why they think they needed to return back to the hospital? Other (Comment)  (needed blood)   Did you visit your Primary Care Physician after you left the hospital, before you returned this time? Yes   Did you see a specialist, such as Cardiac, Pulmonary, Orthopedic Physician, etc. after you left the hospital? No   Who advised the patient to return to the hospital? Physician   Does the patient report anything that got in the way of taking their medications? No   In our efforts to provide the best possible care to you and others like you, can you think of anything that we could have done to help you after you left the hospital the first time, so that you might not have needed to return so soon? Other (Comment)  (no, everyone was great)

## 2024-10-16 NOTE — PROGRESS NOTES
[facility]  [address] [facility] [address]  [phone]  Colon and Rectal Surgery Progress Note      Patient: Gigi Mcneal MRN: [id number]  SSN: [id number]    YOB: 1960  Age: 64 y.o.  Sex: male      Admit Date: 10/15/2024    LOS: 1 day     Subjective:     Tolerating clears.     Objective:     Vitals:    10/16/24 0315 10/16/24 0700 10/16/24 0735 10/16/24 1123   BP: (!) 99/59  101/63 106/62   Pulse: 64 63 59 60   Resp: 18  18 17   Temp: 96.8 °F (36 °C)  97.6 °F (36.4 °C) 98.1 °F (36.7 °C)   TempSrc: Oral  Oral Oral   SpO2: 100%  100% 100%   Weight:       Height:            Intake and Output:  Current Shift: 10/16 0701 - 10/16 1900  In: -   Out: 700 [Urine:700]  Last three shifts: 10/14 1901 - 10/16 0700  In: 1500 [I.V.:1500]  Out: 1800 [Urine:1700]    Physical Exam:     Constitutional: well developed, in NAD  Abdomen: soft, appr tender, non-distended    Lab/Data Review:    Lab Results   Component Value Date    WBC 7.1 10/16/2024    HGB 7.7 (L) 10/16/2024    HCT 26.4 (L) 10/16/2024    MCV 80.9 10/16/2024     10/16/2024     Lab Results   Component Value Date/Time     10/16/2024 02:39 AM    K 3.2 10/16/2024 02:39 AM     10/16/2024 02:39 AM    CO2 25 10/16/2024 02:39 AM    BUN 6 10/16/2024 02:39 AM    CREATININE 0.67 10/16/2024 02:39 AM    GLUCOSE 100 10/16/2024 02:39 AM    GLUCOSE 177 08/16/2023 12:00 AM    CALCIUM 7.9 10/16/2024 02:39 AM      Lab Results   Component Value Date/Time    MG 1.8 10/16/2024 02:39 AM     Lab Results   Component Value Date    CALCIUM 7.9 (L) 10/16/2024    PHOS 4.2 10/16/2024        Assessment:     S/P robotic sigmoid colectomy, appendectomy for colon cancer and appendicitis    Plan:     Anabella  D/C jann [place]    Signed By: Candido Banuelos MD        October 16, 2024

## 2024-10-16 NOTE — PROGRESS NOTES
Advance Care Planning     Advance Care Planning Inpatient Note  [facility] Department    Today's Date: 10/16/2024  Unit: [facility]    Received request from patient.  Upon review of chart and communication with care team, patient's decision making abilities are not in question.. Patient was/were present in the room during visit.    Goals of ACP Conversation:  Discuss advance care planning documents  Facilitate a discussion related to patient's goals of care as they align with the patient's values and beliefs.    Health Care Decision Makers:       Primary Decision Maker: Yi Mcneal - Spouse - [phone]    Secondary Decision Maker: Kelli Mcneal - Child - [phone]  Summary:  Completed New Documents  Verified Healthcare Decision Maker  Updated Healthcare Decision Maker    Advance Care Planning Documents (Patient Wishes):  Healthcare Power of /Advance Directive Appointment of Health Care Agent  Living Will/Advance Directive  Anatomical Gift/Organ Donation     Assisted patient with the execution of Advance Medical Directive.  Placed the copy into patient's \"like paper chart.\"  See Flow Sheet for other pastoral interventions.  Chaplains will continue to follow and provide pastoral care on an as needed/requested basis. No spiritual concerns.    Interventions:  Provided education on documents for clarity and greater understanding  Encouraged ongoing ACP conversation with future decision makers and loved ones    Care Preferences Communicated:   No    Outcomes/Plan:  ACP Discussion: Completed  New advance directive completed.  Returned original document(s) to patient, as well as copies for distribution to appointed agents  Copy of advance directive given to staff to scan into medical record.    Electronically signed by MAYKEL Weaver on 10/16/2024 at 11:56 AM

## 2024-10-16 NOTE — PROGRESS NOTES
Patient was met lying in bed, report with Luba, patient is on scd, has incentive spirometry, verbalised no pain. Call bell and bed side table within reach.

## 2024-10-16 NOTE — CARE COORDINATION
10/16/24 0846   Service Assessment   Patient Orientation Alert and Oriented;Person;Place;Situation   Cognition Alert   History Provided By Patient   Primary Caregiver Self   Accompanied By/Relationship n/a   Support Systems Spouse/Significant Other   Patient's Healthcare Decision Maker is: Legal Next of Kin   PCP Verified by CM Yes   Last Visit to PCP Within last 3 months   Prior Functional Level Independent in ADLs/IADLs   Current Functional Level Independent in ADLs/IADLs   Can patient return to prior living arrangement Yes   Ability to make needs known: Good   Family able to assist with home care needs: Yes   Would you like for me to discuss the discharge plan with any other family members/significant others, and if so, who? Yes   Financial Resources Medicaid;Medicare   Community Resources None   Social/Functional History   Lives With Spouse   Type of Home House   Home Layout One level   Home Access Stairs to enter with rails   Entrance Stairs - Number of Steps 5   Entrance Stairs - Rails Both   Bathroom Shower/Tub Tub/Shower unit   Bathroom Toilet Standard   Bathroom Equipment None   Bathroom Accessibility Accessible   Home Equipment None   Receives Help From Family   ADL Assistance Independent   Homemaking Assistance Independent   Homemaking Responsibilities Yes   Ambulation Assistance Independent   Transfer Assistance Independent   Education Not Applicable   Discharge Planning   Type of Residence House   Living Arrangements Spouse/Significant Other   Current Services Prior To Admission None   Potential Assistance Needed N/A   DME Ordered? No   Potential Assistance Purchasing Medications No   Type of Home Care Services None   Patient expects to be discharged to: House   Services At/After Discharge   Transition of Care Consult (CM Consult) N/A  (tbd)   Services At/After Discharge None    Resource Information Provided? No   Mode of Transport at Discharge Other (see comment)  (family to transport)

## 2024-10-16 NOTE — PROGRESS NOTES
0800 IV potassium x3 ordered, medication given as ordered, pt c/o burning, rate changed to 50cc, pt c/o burning, rate changed to 25cc    0900 Pt tolerating IV Potassium    1100 Uribe catheter removed as ordered, pt given urinal and instructed to hit call bell after urination    1200 2nd bag of Potassium infusing at this time     1400 3rd bag of Potassium infusing at this time     1600 Pt infusion complete, unable to scan 2nd and 3rd bad r/t rate of infusion.  Medication reordered in order to properly scan     1650 Pt has not yet voided, denied the urge.  Bladder scan performed 287cc noted in bladder,  Pt denies discomfort.  Pt instructed to hydrate and to attempt to urinate    1835 200cc urine noted in urinal, bladder scan performed 223cc residual urine noted.  Pt instructed to continue to hydrate and attempt to void

## 2024-10-17 LAB
ANION GAP SERPL CALC-SCNC: 2 MMOL/L (ref 3–18)
BUN SERPL-MCNC: 6 MG/DL (ref 7–18)
BUN/CREAT SERPL: 10 (ref 12–20)
CALCIUM SERPL-MCNC: 7.7 MG/DL (ref 8.5–10.1)
CHLORIDE SERPL-SCNC: 114 MMOL/L (ref 100–111)
CO2 SERPL-SCNC: 28 MMOL/L (ref 21–32)
CREAT SERPL-MCNC: 0.62 MG/DL (ref 0.6–1.3)
ERYTHROCYTE [DISTWIDTH] IN BLOOD BY AUTOMATED COUNT: 20.8 % (ref 11.6–14.5)
GLUCOSE SERPL-MCNC: 75 MG/DL (ref 74–99)
HCT VFR BLD AUTO: 24.2 % (ref 36–48)
HGB BLD-MCNC: 7.2 G/DL (ref 13–16)
MAGNESIUM SERPL-MCNC: 1.9 MG/DL (ref 1.6–2.6)
MCH RBC QN AUTO: 23.6 PG (ref 24–34)
MCHC RBC AUTO-ENTMCNC: 29.8 G/DL (ref 31–37)
MCV RBC AUTO: 79.3 FL (ref 78–100)
NRBC # BLD: 0 K/UL (ref 0–0.01)
NRBC BLD-RTO: 0 PER 100 WBC
PHOSPHATE SERPL-MCNC: 3.4 MG/DL (ref 2.5–4.9)
PLATELET # BLD AUTO: 359 K/UL (ref 135–420)
PMV BLD AUTO: 9.4 FL (ref 9.2–11.8)
POTASSIUM SERPL-SCNC: 3.5 MMOL/L (ref 3.5–5.5)
RBC # BLD AUTO: 3.05 M/UL (ref 4.35–5.65)
SODIUM SERPL-SCNC: 144 MMOL/L (ref 136–145)
WBC # BLD AUTO: 6.2 K/UL (ref 4.6–13.2)

## 2024-10-17 PROCEDURE — 85027 COMPLETE CBC AUTOMATED: CPT

## 2024-10-17 PROCEDURE — 6360000002 HC RX W HCPCS: Performed by: COLON & RECTAL SURGERY

## 2024-10-17 PROCEDURE — 6370000000 HC RX 637 (ALT 250 FOR IP): Performed by: INTERNAL MEDICINE

## 2024-10-17 PROCEDURE — 2580000003 HC RX 258: Performed by: COLON & RECTAL SURGERY

## 2024-10-17 PROCEDURE — 6370000000 HC RX 637 (ALT 250 FOR IP): Performed by: COLON & RECTAL SURGERY

## 2024-10-17 PROCEDURE — 2500000003 HC RX 250 WO HCPCS: Performed by: COLON & RECTAL SURGERY

## 2024-10-17 PROCEDURE — 6360000002 HC RX W HCPCS: Performed by: INTERNAL MEDICINE

## 2024-10-17 PROCEDURE — 83735 ASSAY OF MAGNESIUM: CPT

## 2024-10-17 PROCEDURE — 1100000000 HC RM PRIVATE

## 2024-10-17 PROCEDURE — 84100 ASSAY OF PHOSPHORUS: CPT

## 2024-10-17 PROCEDURE — 80048 BASIC METABOLIC PNL TOTAL CA: CPT

## 2024-10-17 PROCEDURE — 36415 COLL VENOUS BLD VENIPUNCTURE: CPT

## 2024-10-17 PROCEDURE — 94761 N-INVAS EAR/PLS OXIMETRY MLT: CPT

## 2024-10-17 PROCEDURE — 2580000003 HC RX 258: Performed by: INTERNAL MEDICINE

## 2024-10-17 RX ORDER — DIPHENHYDRAMINE HYDROCHLORIDE 50 MG/ML
25 INJECTION INTRAMUSCULAR; INTRAVENOUS
Status: COMPLETED | OUTPATIENT
Start: 2024-10-17 | End: 2024-10-17

## 2024-10-17 RX ORDER — ACETAMINOPHEN 325 MG/1
650 TABLET ORAL
Status: COMPLETED | OUTPATIENT
Start: 2024-10-17 | End: 2024-10-17

## 2024-10-17 RX ADMIN — SODIUM CHLORIDE 25 MG: 9 INJECTION, SOLUTION INTRAVENOUS at 12:11

## 2024-10-17 RX ADMIN — SODIUM CHLORIDE, PRESERVATIVE FREE 20 MG: 5 INJECTION INTRAVENOUS at 08:37

## 2024-10-17 RX ADMIN — METOPROLOL SUCCINATE 25 MG: 25 TABLET, FILM COATED, EXTENDED RELEASE ORAL at 08:38

## 2024-10-17 RX ADMIN — ACETAMINOPHEN 650 MG: 325 TABLET ORAL at 12:06

## 2024-10-17 RX ADMIN — GABAPENTIN 300 MG: 300 CAPSULE ORAL at 14:10

## 2024-10-17 RX ADMIN — DIPHENHYDRAMINE HYDROCHLORIDE 25 MG: 50 INJECTION INTRAMUSCULAR; INTRAVENOUS at 12:07

## 2024-10-17 RX ADMIN — HEPARIN SODIUM 5000 UNITS: 5000 INJECTION INTRAVENOUS; SUBCUTANEOUS at 06:56

## 2024-10-17 RX ADMIN — HEPARIN SODIUM 5000 UNITS: 5000 INJECTION INTRAVENOUS; SUBCUTANEOUS at 21:09

## 2024-10-17 RX ADMIN — ASPIRIN 81 MG CHEWABLE TABLET 81 MG: 81 TABLET CHEWABLE at 08:38

## 2024-10-17 RX ADMIN — HEPARIN SODIUM 5000 UNITS: 5000 INJECTION INTRAVENOUS; SUBCUTANEOUS at 14:10

## 2024-10-17 RX ADMIN — ACETAMINOPHEN 1000 MG: 500 TABLET ORAL at 21:09

## 2024-10-17 RX ADMIN — GABAPENTIN 300 MG: 300 CAPSULE ORAL at 21:10

## 2024-10-17 RX ADMIN — SODIUM CHLORIDE 975 MG: 9 INJECTION, SOLUTION INTRAVENOUS at 13:03

## 2024-10-17 RX ADMIN — SODIUM CHLORIDE, PRESERVATIVE FREE 20 MG: 5 INJECTION INTRAVENOUS at 21:10

## 2024-10-17 RX ADMIN — ACETAMINOPHEN 1000 MG: 500 TABLET ORAL at 08:38

## 2024-10-17 RX ADMIN — GABAPENTIN 300 MG: 300 CAPSULE ORAL at 08:38

## 2024-10-17 ASSESSMENT — PAIN SCALES - GENERAL
PAINLEVEL_OUTOF10: 0

## 2024-10-17 ASSESSMENT — PAIN DESCRIPTION - LOCATION: LOCATION: ABDOMEN

## 2024-10-17 NOTE — PROGRESS NOTES
[facility] 5 [facility], [place]  [address] [facility] [address]  [phone]  Colon and Rectal Surgery Progress Note      Patient: Gigi Mcneal MRN: [id number]  SSN: [id number]    YOB: 1960  Age: 64 y.o.  Sex: male      Admit Date: 10/15/2024    LOS: 2 days     Subjective:     Tolerating clears, BM this morning.    Objective:     Vitals:    10/17/24 0300 10/17/24 0315 10/17/24 0500 10/17/24 0752   BP:  129/84  134/79   Pulse: (!) 102 84 63 72   Resp:  17 16   Temp:  97.5 °F (36.4 °C)  98.3 °F (36.8 °C)   TempSrc:  Oral  Oral   SpO2:  100%  100%   Weight:    78.7 kg (173 lb 8 oz)   Height:            Intake and Output:  Current Shift: 10/17 0701 - 10/17 1900  In: -   Out: 950 [Urine:950]  Last three shifts: 10/15 1901 - 10/17 0700  In: -   Out: 3623 [Urine:3623]    Physical Exam:     Constitutional: well developed, in NAD  Abdomen: soft, appr tender, non-distended    Lab/Data Review:    Lab Results   Component Value Date    WBC 6.2 10/17/2024    HGB 7.2 (L) 10/17/2024    HCT 24.2 (L) 10/17/2024    MCV 79.3 10/17/2024     10/17/2024     Lab Results   Component Value Date/Time     10/17/2024 04:54 AM    K 3.5 10/17/2024 04:54 AM     10/17/2024 04:54 AM    CO2 28 10/17/2024 04:54 AM    BUN 6 10/17/2024 04:54 AM    CREATININE 0.62 10/17/2024 04:54 AM    GLUCOSE 75 10/17/2024 04:54 AM    GLUCOSE 177 08/16/2023 12:00 AM    CALCIUM 7.7 10/17/2024 04:54 AM      Lab Results   Component Value Date/Time    MG 1.9 10/17/2024 04:54 AM     Lab Results   Component Value Date    CALCIUM 7.7 (L) 10/17/2024    PHOS 3.4 10/17/2024        Assessment:     S/P robotic sigmoid colectomy, appendectomy for colon cancer and appendicitis    Plan:     Full liquids, solids tomorrow  Aspirin to start today, Plavix to start tomorrow  Possible discharge Saturday    Signed By: Candido Banuelos MD        October 17, 2024

## 2024-10-17 NOTE — PROGRESS NOTES
Patient: Gigi Mcneal   MRN: [id number]         CSN: [id number]    YOB: 1960      Admit Date: 10/15/2024[facility]    Assessment:     Principal Problem (Resolved):    Malignant neoplasm of sigmoid colon (HCC)  Active Problems:    Colon cancer (HCC)    Sigmoid colon cancer 2024  Iron def anemia, sec to rectal bleeding  Coronary stent placement 3/2024  S/p sigmoid colectomy 10/15/24    Plan:     Await path from surgery  Followup in office to review path, adjuvant therapy and surveillance  recommendations, in 2 weeks  Iron dextran IV today, total dose today    Celeste Russell MD  [facility]  Office [phone]      Subjective:      fatigue    Objective:     /79   Pulse 72   Temp 98.3 °F (36.8 °C) (Oral)   Resp 16   Ht 1.829 m (6')   Wt 78.7 kg (173 lb 8 oz)   SpO2 100%   BMI 23.53 kg/m²           Temp (24hrs), Av.2 °F (36.8 °C), Min:97.5 °F (36.4 °C), Max:99.1 °F (37.3 °C)        Intake/Output Summary (Last 24 hours) at 10/17/2024 0806  Last data filed at 10/17/2024 0710  Gross per 24 hour   Intake --   Output 2823 ml   Net -2823 ml     Review of Systems:   Constitutional: fatigue  Eyes: negative for visual disturbance,  icterus  Ears, Nose, Mouth, Throat, and Face: negative for tinnitus, epistaxis, sore mouth and hoarseness  Respiratory: negative for cough, sputum, hemoptysis, pleurisy/chest pain or wheezing  Cardiovascular: negative for chest pain, , palpitations,  syncope, paroxysmal nocturnal dyspnea  Gastrointestinal:  rectal bleeding, negative for reflux symptoms, nausea, vomiting, melena, diarrhea, constipation and abdominal pain  Genitourinary:negative for dysuria, nocturia, urinary incontinence, hesitancy and hematuria  Endocrine: no polydipsia, no goitre  Integument: negative for rash, skin lesion(s) and pruritus  Hematologic/Lymphatic: negative for easy bruising, bleeding and lymphadenopathy  Musculoskeletal:negative for myalgias, arthralgias  and bone pain  Neurological: negative for headaches, dizziness, seizures, paresthesia and weakness    Physical Exam: ECOG : 1  Constitutional: Alert, oriented, not in distress  Eyes: PERRLA, anicteric,  pallor  ENT: no palpable Lymph nodes, no mucositis, no thrush.  Respiratory: bilateral air entry, no rales,  no wheezing.  Cardiovascular: S1S2 regular , no  murmur, no pedal edema  Gastrointestinal: soft,  non tender, laparoscopy wound  Integument: no rash, no petechiae or ecchymosis.  Neurological: intact cranial nerves, no focal motor or sensory deficits.      Labs:  Recent Results (from the past 24 hour(s))   Hemoglobin and Hematocrit    Collection Time: 10/16/24 12:33 PM   Result Value Ref Range    Hemoglobin 7.7 (L) 13.0 - 16.0 g/dL    Hematocrit 26.4 (L) 36.0 - 48.0 %   Basic Metabolic Panel    Collection Time: 10/17/24  4:54 AM   Result Value Ref Range    Sodium 144 136 - 145 mmol/L    Potassium 3.5 3.5 - 5.5 mmol/L    Chloride 114 (H) 100 - 111 mmol/L    CO2 28 21 - 32 mmol/L    Anion Gap 2 (L) 3.0 - 18 mmol/L    Glucose 75 74 - 99 mg/dL    BUN 6 (L) 7.0 - 18 MG/DL    Creatinine 0.62 0.6 - 1.3 MG/DL    BUN/Creatinine Ratio 10 (L) 12 - 20      Est, Glom Filt Rate >90 >60 ml/min/1.73m2    Calcium 7.7 (L) 8.5 - 10.1 MG/DL   CBC    Collection Time: 10/17/24  4:54 AM   Result Value Ref Range    WBC 6.2 4.6 - 13.2 K/uL    RBC 3.05 (L) 4.35 - 5.65 M/uL    Hemoglobin 7.2 (L) 13.0 - 16.0 g/dL    Hematocrit 24.2 (L) 36.0 - 48.0 %    MCV 79.3 78.0 - 100.0 FL    MCH 23.6 (L) 24.0 - 34.0 PG    MCHC 29.8 (L) 31.0 - 37.0 g/dL    RDW 20.8 (H) 11.6 - 14.5 %    Platelets 359 135 - 420 K/uL    MPV 9.4 9.2 - 11.8 FL    Nucleated RBCs 0.0 0  WBC    nRBC 0.00 0.00 - 0.01 K/uL   Magnesium    Collection Time: 10/17/24  4:54 AM   Result Value Ref Range    Magnesium 1.9 1.6 - 2.6 mg/dL   Phosphorus    Collection Time: 10/17/24  4:54 AM   Result Value Ref Range    Phosphorus 3.4 2.5 - 4.9 MG/DL

## 2024-10-17 NOTE — PLAN OF CARE
Problem: Pain  Goal: Verbalizes/displays adequate comfort level or baseline comfort level  Outcome: Progressing  Flowsheets  Taken 10/16/2024 2244 by Tani Stewart RN  Verbalizes/displays adequate comfort level or baseline comfort level:   Encourage patient to monitor pain and request assistance   Assess pain using appropriate pain scale   Administer analgesics based on type and severity of pain and evaluate response   Implement non-pharmacological measures as appropriate and evaluate response   Consider cultural and social influences on pain and pain management   Notify Licensed Independent Practitioner if interventions unsuccessful or patient reports new pain  Taken 10/16/2024 1233 by Beti Puga RN  Verbalizes/displays adequate comfort level or baseline comfort level:   Encourage patient to monitor pain and request assistance   Assess pain using appropriate pain scale  Taken 10/16/2024 0856 by Beti Puga RN  Verbalizes/displays adequate comfort level or baseline comfort level:   Encourage patient to monitor pain and request assistance   Assess pain using appropriate pain scale  Note: Pain assessed with appropraite pain scale, analgesia administered according to the severity of the pain, pain is well controlled     Problem: Safety - Adult  Goal: Free from fall injury  Outcome: Progressing  Flowsheets (Taken 10/16/2024 2244)  Free From Fall Injury:   Instruct family/caregiver on patient safety   Based on caregiver fall risk screen, instruct family/caregiver to ask for assistance with transferring infant if caregiver noted to have fall risk factors  Note: Patient is lying in bed with side rails, call bell and bed side table within reach, bed alarm on. Patient is free from fall.     Problem: Discharge Planning  Goal: Discharge to home or other facility with appropriate resources  Outcome: Progressing  Flowsheets  Taken 10/16/2024 2244  Discharge to home or other facility with appropriate resources:    Identify barriers to discharge with patient and caregiver   Arrange for needed discharge resources and transportation as appropriate   Identify discharge learning needs (meds, wound care, etc)   Arrange for interpreters to assist at discharge as needed   Refer to discharge planning if patient needs post-hospital services based on physician order or complex needs related to functional status, cognitive ability or social support system  Taken 10/16/2024 2024  Discharge to home or other facility with appropriate resources:   Arrange for needed discharge resources and transportation as appropriate   Identify discharge learning needs (meds, wound care, etc)  Note: Plans to discharge patient is ongoing,advancing diet.

## 2024-10-17 NOTE — PLAN OF CARE
Problem: Chronic Conditions and Co-morbidities  Goal: Patient's chronic conditions and co-morbidity symptoms are monitored and maintained or improved  Outcome: Progressing  Flowsheets (Taken 10/17/2024 0800)  Care Plan - Patient's Chronic Conditions and Co-Morbidity Symptoms are Monitored and Maintained or Improved: Monitor and assess patient's chronic conditions and comorbid symptoms for stability, deterioration, or improvement     Problem: Pain  Goal: Verbalizes/displays adequate comfort level or baseline comfort level  Outcome: Progressing  Flowsheets (Taken 10/17/2024 0752)  Verbalizes/displays adequate comfort level or baseline comfort level:   Encourage patient to monitor pain and request assistance   Assess pain using appropriate pain scale     Problem: Safety - Adult  Goal: Free from fall injury  Outcome: Progressing     Problem: Discharge Planning  Goal: Discharge to home or other facility with appropriate resources  Outcome: Progressing  Flowsheets (Taken 10/17/2024 0800)  Discharge to home or other facility with appropriate resources:   Identify barriers to discharge with patient and caregiver   Arrange for needed discharge resources and transportation as appropriate

## 2024-10-17 NOTE — PROGRESS NOTES
0000: iv line got infilterated, patient is hard stick,Icu called. Patient got new Iv line inserted by Hai HAYES, ivf infusing.  Patient urine ouput was 500mls in 4hours

## 2024-10-17 NOTE — PROGRESS NOTES
[facility] 5 [facility]  [address] [facility] [address]  [phone]  Colon and Rectal Surgery Progress Note      Patient: Gigi Mcneal MRN: [id number]  SSN: [id number]    YOB: 1960  Age: 64 y.o.  Sex: male      Admit Date: 10/15/2024    LOS: 2 days     Subjective:     Tolerating clears, BM this morning.    Objective:     Vitals:    10/17/24 0300 10/17/24 0315 10/17/24 0500 10/17/24 0752   BP:  129/84  134/79   Pulse: (!) 102 84 63 72   Resp:  17 16   Temp:  97.5 °F (36.4 °C)  98.3 °F (36.8 °C)   TempSrc:  Oral  Oral   SpO2:  100%  100%   Weight:    78.7 kg (173 lb 8 oz)   Height:            Intake and Output:  Current Shift: 10/17 0701 - 10/17 1900  In: -   Out: 950 [Urine:950]  Last three shifts: 10/15 1901 - 10/17 0700  In: -   Out: 3623 [Urine:3623]    Physical Exam:     Constitutional: well developed, in NAD  Abdomen: soft, appr tender, non-distended    Lab/Data Review:    Lab Results   Component Value Date    WBC 6.2 10/17/2024    HGB 7.2 (L) 10/17/2024    HCT 24.2 (L) 10/17/2024    MCV 79.3 10/17/2024     10/17/2024     Lab Results   Component Value Date/Time     10/17/2024 04:54 AM    K 3.5 10/17/2024 04:54 AM     10/17/2024 04:54 AM    CO2 28 10/17/2024 04:54 AM    BUN 6 10/17/2024 04:54 AM    CREATININE 0.62 10/17/2024 04:54 AM    GLUCOSE 75 10/17/2024 04:54 AM    GLUCOSE 177 08/16/2023 12:00 AM    CALCIUM 7.7 10/17/2024 04:54 AM      Lab Results   Component Value Date/Time    MG 1.9 10/17/2024 04:54 AM     Lab Results   Component Value Date    CALCIUM 7.7 (L) 10/17/2024    PHOS 3.4 10/17/2024        Assessment:     S/P robotic sigmoid colectomy, appendectomy for colon cancer and appendicitis    Plan:     Full liquids, solids tomorrow  Aspirin to start today, Plavix to start tomorrow  Possible discharge Saturday    Signed By: Candido Banuelos MD        October 17, 2024

## 2024-10-18 LAB
ANION GAP SERPL CALC-SCNC: 5 MMOL/L (ref 3–18)
BUN SERPL-MCNC: 4 MG/DL (ref 7–18)
BUN/CREAT SERPL: 7 (ref 12–20)
CALCIUM SERPL-MCNC: 7.8 MG/DL (ref 8.5–10.1)
CHLORIDE SERPL-SCNC: 113 MMOL/L (ref 100–111)
CO2 SERPL-SCNC: 24 MMOL/L (ref 21–32)
CREAT SERPL-MCNC: 0.58 MG/DL (ref 0.6–1.3)
ERYTHROCYTE [DISTWIDTH] IN BLOOD BY AUTOMATED COUNT: 20.5 % (ref 11.6–14.5)
GLUCOSE SERPL-MCNC: 72 MG/DL (ref 74–99)
HCT VFR BLD AUTO: 25.9 % (ref 36–48)
HGB BLD-MCNC: 7.5 G/DL (ref 13–16)
MAGNESIUM SERPL-MCNC: 2 MG/DL (ref 1.6–2.6)
MCH RBC QN AUTO: 23.5 PG (ref 24–34)
MCHC RBC AUTO-ENTMCNC: 29 G/DL (ref 31–37)
MCV RBC AUTO: 81.2 FL (ref 78–100)
NRBC # BLD: 0 K/UL (ref 0–0.01)
NRBC BLD-RTO: 0 PER 100 WBC
PHOSPHATE SERPL-MCNC: 3.3 MG/DL (ref 2.5–4.9)
PLATELET # BLD AUTO: 387 K/UL (ref 135–420)
PMV BLD AUTO: 9.9 FL (ref 9.2–11.8)
POTASSIUM SERPL-SCNC: 3.5 MMOL/L (ref 3.5–5.5)
RBC # BLD AUTO: 3.19 M/UL (ref 4.35–5.65)
SODIUM SERPL-SCNC: 142 MMOL/L (ref 136–145)
WBC # BLD AUTO: 6.4 K/UL (ref 4.6–13.2)

## 2024-10-18 PROCEDURE — 97161 PT EVAL LOW COMPLEX 20 MIN: CPT

## 2024-10-18 PROCEDURE — 83735 ASSAY OF MAGNESIUM: CPT

## 2024-10-18 PROCEDURE — 6370000000 HC RX 637 (ALT 250 FOR IP): Performed by: COLON & RECTAL SURGERY

## 2024-10-18 PROCEDURE — 97116 GAIT TRAINING THERAPY: CPT

## 2024-10-18 PROCEDURE — 2580000003 HC RX 258: Performed by: COLON & RECTAL SURGERY

## 2024-10-18 PROCEDURE — 94761 N-INVAS EAR/PLS OXIMETRY MLT: CPT

## 2024-10-18 PROCEDURE — 1100000000 HC RM PRIVATE

## 2024-10-18 PROCEDURE — 84100 ASSAY OF PHOSPHORUS: CPT

## 2024-10-18 PROCEDURE — 2500000003 HC RX 250 WO HCPCS: Performed by: COLON & RECTAL SURGERY

## 2024-10-18 PROCEDURE — 85027 COMPLETE CBC AUTOMATED: CPT

## 2024-10-18 PROCEDURE — 36415 COLL VENOUS BLD VENIPUNCTURE: CPT

## 2024-10-18 PROCEDURE — 80048 BASIC METABOLIC PNL TOTAL CA: CPT

## 2024-10-18 PROCEDURE — 6360000002 HC RX W HCPCS: Performed by: COLON & RECTAL SURGERY

## 2024-10-18 RX ORDER — OXYCODONE AND ACETAMINOPHEN 5; 325 MG/1; MG/1
1 TABLET ORAL EVERY 4 HOURS PRN
Qty: 20 TABLET | Refills: 0 | Status: SHIPPED | OUTPATIENT
Start: 2024-10-18 | End: 2024-10-25

## 2024-10-18 RX ORDER — CLOPIDOGREL BISULFATE 75 MG/1
75 TABLET ORAL DAILY
Status: DISCONTINUED | OUTPATIENT
Start: 2024-10-19 | End: 2024-10-19 | Stop reason: HOSPADM

## 2024-10-18 RX ADMIN — SODIUM CHLORIDE, PRESERVATIVE FREE 20 MG: 5 INJECTION INTRAVENOUS at 08:59

## 2024-10-18 RX ADMIN — GABAPENTIN 300 MG: 300 CAPSULE ORAL at 21:12

## 2024-10-18 RX ADMIN — GABAPENTIN 300 MG: 300 CAPSULE ORAL at 08:58

## 2024-10-18 RX ADMIN — ACETAMINOPHEN 1000 MG: 500 TABLET ORAL at 04:11

## 2024-10-18 RX ADMIN — GABAPENTIN 300 MG: 300 CAPSULE ORAL at 13:11

## 2024-10-18 RX ADMIN — METOPROLOL SUCCINATE 25 MG: 25 TABLET, FILM COATED, EXTENDED RELEASE ORAL at 08:58

## 2024-10-18 RX ADMIN — ACETAMINOPHEN 1000 MG: 500 TABLET ORAL at 21:12

## 2024-10-18 RX ADMIN — ACETAMINOPHEN 1000 MG: 500 TABLET ORAL at 08:58

## 2024-10-18 RX ADMIN — HEPARIN SODIUM 5000 UNITS: 5000 INJECTION INTRAVENOUS; SUBCUTANEOUS at 13:12

## 2024-10-18 RX ADMIN — HEPARIN SODIUM 5000 UNITS: 5000 INJECTION INTRAVENOUS; SUBCUTANEOUS at 06:23

## 2024-10-18 RX ADMIN — SODIUM CHLORIDE, PRESERVATIVE FREE 20 MG: 5 INJECTION INTRAVENOUS at 21:12

## 2024-10-18 RX ADMIN — HEPARIN SODIUM 5000 UNITS: 5000 INJECTION INTRAVENOUS; SUBCUTANEOUS at 21:13

## 2024-10-18 RX ADMIN — ASPIRIN 81 MG CHEWABLE TABLET 81 MG: 81 TABLET CHEWABLE at 08:58

## 2024-10-18 ASSESSMENT — PAIN SCALES - GENERAL
PAINLEVEL_OUTOF10: 0

## 2024-10-18 NOTE — PLAN OF CARE
Problem: Pain  Goal: Verbalizes/displays adequate comfort level or baseline comfort level  10/17/2024 2005 by Tani Stewart RN  Outcome: Progressing  Flowsheets (Taken 10/17/2024 1942)  Verbalizes/displays adequate comfort level or baseline comfort level:   Encourage patient to monitor pain and request assistance   Assess pain using appropriate pain scale   Administer analgesics based on type and severity of pain and evaluate response   Implement non-pharmacological measures as appropriate and evaluate response   Consider cultural and social influences on pain and pain management   Notify Licensed Independent Practitioner if interventions unsuccessful or patient reports new pain  Note: Pain is assessed with appropraite pain scale, pain medication administered depending on the pain scale.     Problem: Safety - Adult  Goal: Free from fall injury  10/17/2024 2005 by Tani Stewart RN  Outcome: Progressing  Note: Patient is lying in bed with bed rails, bed alarm on, call bell and bed side commode within reach.     Problem: Discharge Planning  Goal: Discharge to home or other facility with appropriate resources  10/17/2024 2005 by Tani Stewart RN  Outcome: Progressing  Flowsheets (Taken 10/17/2024 1921)  Discharge to home or other facility with appropriate resources: Identify barriers to discharge with patient and caregiver  Note: Discharge plans is ongoing, possible discharge Saturday.

## 2024-10-18 NOTE — PROGRESS NOTES
Bed side shift report with Katy, patient was met lying in bed, ivf infusing, alert and oriented x4. Call bell and bed side table within reach.

## 2024-10-18 NOTE — PROGRESS NOTES
Physical Therapy  PHYSICAL THERAPY EVALUATION/DISCHARGE    Patient: Gigi Mcneal (64 y.o. male)  Date: 10/18/2024  Primary Diagnosis: Malignant neoplasm of sigmoid colon (HCC) [C18.7]  Colon cancer (HCC) [C18.9]  Procedure(s) (LRB):  ROBOTIC SIGMOID COLECTOMY/ APPENDECTOMY (N/A)  FLEXIBLE SIGMOIDOSCOPY (N/A) 3 Days Post-Op   Precautions: General Precautions,  ,  ,  ,  ,  ,  ,    PLOF: Lives with spouse in a 1 story home with ramped entrance per pt. Ind prior to admission.     ASSESSMENT AND RECOMMENDATIONS:  Pt cleared by nursing. Pt received in bed in [facility], educated on PT role and evaluation process and agreeable. Pt ind to EOB with 5/5 BLE for knee extension, hip flexion, and ankle DF/PF and intact sensation. Initial sit to stand supervision with slight balance check with pt able to ind correct. Ambulate with slow steady gait within room, slight wide CARI. Pt demonstrate good safety awareness and decision making and returns to recliner. Call bell and all needs left within reach. Pt is at functional baseline and skilled acute care PT is not indicated at this time, will sign off.       Patient does not require further skilled physical therapy intervention at this level of care.    Further Equipment Recommendations for Discharge: None    [facility]: AM-PAC Inpatient Mobility Raw Score : 23      At this time and based on an AM-PAC score, no further PT is recommended upon discharge.  Recommend patient returns to prior setting with prior services.    This [facility] score should be considered in conjunction with interdisciplinary team recommendations to determine the most appropriate discharge setting. Patient's social support, diagnosis, medical stability, and prior level of function should also be taken into consideration.     SUBJECTIVE:   Patient stated “I need to get up.”    OBJECTIVE DATA SUMMARY:     Past Medical History:   Diagnosis Date    CAD (coronary artery disease)     Cardiomyopathy (HCC)     Colon cancer (HCC)      Heart failure (HCC)     History of blood transfusion     Hypertension     NSTEMI (non-ST elevated myocardial infarction) ([facility])      Past Surgical History:   Procedure Laterality Date    CARDIAC PROCEDURE N/A 3/28/2024    Left heart cath performed by Butch Guaman MD at [facility] CARDIAC CATH LAB    CARDIAC PROCEDURE N/A 3/28/2024    Coronary angiography performed by Butch Guaman MD at [facility] CARDIAC CATH LAB    CARDIAC PROCEDURE N/A 3/28/2024    Left ventriculography performed by Butch Guaman MD at [facility] CARDIAC CATH LAB    CARDIAC PROCEDURE N/A 3/28/2024    Insert stent syeda coronary performed by Butch Guaman MD at [facility] CARDIAC CATH LAB    COLONOSCOPY N/A 6/12/2024    COLONOSCOPY with polypectomy, bx's and tattoo performed by Orestes Araujo MD at [facility] ENDOSCOPY    SIGMOID COLECTOMY N/A 10/15/2024    ROBOTIC SIGMOID COLECTOMY/ APPENDECTOMY performed by Candido Banuelos MD at [facility] MAIN OR    SIGMOIDOSCOPY N/A 10/15/2024    FLEXIBLE SIGMOIDOSCOPY performed by Candido Banuelos MD at [facility] MAIN OR    UPPER GASTROINTESTINAL ENDOSCOPY N/A 6/12/2024    ESOPHAGOGASTRODUODENOSCOPY polypectomies and 3 clips performed by Orestes Araujo MD at [facility] ENDOSCOPY       Home Situation:  Social/Functional History  Lives With: Spouse  Type of Home: House  Home Layout: One level  Home Access: Ramped entrance  Entrance Stairs - Number of Steps: 5  Entrance Stairs - Rails: Both  Bathroom Shower/Tub: Tub/Shower unit  Bathroom Toilet: Standard  Bathroom Equipment: None  Bathroom Accessibility: Accessible  Home Equipment: None  Has the patient had two or more falls in the past year or any fall with injury in the past year?: No  Receives Help From: Family  ADL Assistance: Independent  Homemaking Assistance: Independent  Homemaking Responsibilities: Yes  Ambulation Assistance: Independent  Transfer Assistance: Independent  Education: Not Applicable  Critical Behavior:  Orientation  Overall Orientation Status: Within Normal Limits  Orientation Level:

## 2024-10-18 NOTE — PROGRESS NOTES
[facility] 5 [facility]  [address] [facility] [address]  [phone]  Colon and Rectal Surgery Progress Note      Patient: Gigi Mcneal MRN: [id number]  SSN: [id number]    YOB: 1960  Age: 64 y.o.  Sex: male      Admit Date: 10/15/2024    LOS: 3 days     Subjective:     Tolerating solids.     Objective:     Vitals:    10/18/24 1100 10/18/24 1216 10/18/24 1300 10/18/24 1541   BP:  109/71  109/64   Pulse: 75 65 66 58   Resp:  18 18   Temp:  98.2 °F (36.8 °C)  98.4 °F (36.9 °C)   TempSrc:  Oral  Oral   SpO2:  100%  100%   Weight:       Height:            Intake and Output:  Current Shift: 10/18 0701 - 10/18 1900  In: 200 [P.O.:200]  Out: 200 [Urine:200]  Last three shifts: 10/16 1901 - 10/18 0700  In: 360 [P.O.:360]  Out: 3550 [Urine:3550]    Physical Exam:     Constitutional: well developed, in NAD  Abdomen: soft, appr tender, non-distended    Lab/Data Review:    Lab Results   Component Value Date    WBC 6.4 10/18/2024    HGB 7.5 (L) 10/18/2024    HCT 25.9 (L) 10/18/2024    MCV 81.2 10/18/2024     10/18/2024     Lab Results   Component Value Date/Time     10/18/2024 05:03 AM    K 3.5 10/18/2024 05:03 AM     10/18/2024 05:03 AM    CO2 24 10/18/2024 05:03 AM    BUN 4 10/18/2024 05:03 AM    CREATININE 0.58 10/18/2024 05:03 AM    GLUCOSE 72 10/18/2024 05:03 AM    GLUCOSE 177 08/16/2023 12:00 AM    CALCIUM 7.8 10/18/2024 05:03 AM      Lab Results   Component Value Date/Time    MG 2.0 10/18/2024 05:03 AM     Lab Results   Component Value Date    CALCIUM 7.8 (L) 10/18/2024    PHOS 3.3 10/18/2024        Assessment:     S/P robotic sigmoid colectomy, appendectomy for colon cancer and appendicitis    Plan:     Regular diet  Continue ASA start plavix in AM  Possible discharge Saturday    Signed By: Candido Banuelos MD        October 18, 2024

## 2024-10-18 NOTE — PROGRESS NOTES
4 Eyes Skin Assessment     NAME:  Gigi Mcneal  YOB: 1960  MEDICAL RECORD NUMBER:  [id number]    The patient is being assessed for  {Reason for Assessment:01198}    I agree that at least one RN has performed a thorough Head to Toe Skin Assessment on the patient. ALL assessment sites listed below have been assessed.      Areas assessed by both nurses:    Head, Face, Ears, Shoulders, Back, Chest, Arms, Elbows, Hands, Sacrum. Buttock, Coccyx, Ischium, and Legs. Feet and Heels        Does the Patient have a Wound? No noted wound(s)       Timothy Prevention initiated by RN: No  Wound Care Orders initiated by RN: No    Pressure Injury (Stage 3,4, Unstageable, DTI, NWPT, and Complex wounds) if present, place Wound referral order by RN under : No    New Ostomies, if present place, Ostomy referral order under : No     Nurse 1 eSignature: Electronically signed by Tani Stewart RN on 10/18/24 at 8:07 AM EDT    **SHARE this note so that the co-signing nurse can place an eSignature**    Nurse 2 eSignature: {Esignature:[id number]}

## 2024-10-19 VITALS
BODY MASS INDEX: 23.5 KG/M2 | HEIGHT: 72 IN | TEMPERATURE: 98.6 F | DIASTOLIC BLOOD PRESSURE: 75 MMHG | SYSTOLIC BLOOD PRESSURE: 125 MMHG | HEART RATE: 67 BPM | OXYGEN SATURATION: 100 % | WEIGHT: 173.5 LBS | RESPIRATION RATE: 16 BRPM

## 2024-10-19 PROCEDURE — 6370000000 HC RX 637 (ALT 250 FOR IP): Performed by: COLON & RECTAL SURGERY

## 2024-10-19 PROCEDURE — 2500000003 HC RX 250 WO HCPCS: Performed by: COLON & RECTAL SURGERY

## 2024-10-19 PROCEDURE — 6360000002 HC RX W HCPCS: Performed by: COLON & RECTAL SURGERY

## 2024-10-19 PROCEDURE — 2580000003 HC RX 258: Performed by: COLON & RECTAL SURGERY

## 2024-10-19 RX ADMIN — ASPIRIN 81 MG CHEWABLE TABLET 81 MG: 81 TABLET CHEWABLE at 08:26

## 2024-10-19 RX ADMIN — ACETAMINOPHEN 1000 MG: 500 TABLET ORAL at 04:18

## 2024-10-19 RX ADMIN — GABAPENTIN 300 MG: 300 CAPSULE ORAL at 08:25

## 2024-10-19 RX ADMIN — METOPROLOL SUCCINATE 25 MG: 25 TABLET, FILM COATED, EXTENDED RELEASE ORAL at 08:26

## 2024-10-19 RX ADMIN — HEPARIN SODIUM 5000 UNITS: 5000 INJECTION INTRAVENOUS; SUBCUTANEOUS at 06:01

## 2024-10-19 RX ADMIN — ACETAMINOPHEN 1000 MG: 500 TABLET ORAL at 08:26

## 2024-10-19 RX ADMIN — SODIUM CHLORIDE, PRESERVATIVE FREE 20 MG: 5 INJECTION INTRAVENOUS at 08:26

## 2024-10-19 RX ADMIN — CLOPIDOGREL BISULFATE 75 MG: 75 TABLET ORAL at 08:25

## 2024-10-19 NOTE — PROGRESS NOTES
Patient seen and examined.  He is doing great and is ready to go home.  His abdomen is soft and nontender.  He is ordered for Plavix today and he knows that he has to continue taking it.  He will follow-up as an outpatient.

## 2024-10-19 NOTE — CARE COORDINATION
10/19/24 0930   IMM Letter   IMM Letter given to Patient/Family/Significant other/Guardian/POA/by: Samara Bradley   IMM Letter date given: 10/19/24   IMM Letter time given: 0852

## 2024-10-19 NOTE — FLOWSHEET NOTE
4 Eyes Skin Assessment     NAME:  Gigi Mcneal  YOB: 1960  MEDICAL RECORD NUMBER:  [id number]    The patient is being assessed for  Shift Handoff    I agree that at least one RN has performed a thorough Head to Toe Skin Assessment on the patient. ALL assessment sites listed below have been assessed.      Areas assessed by both nurses:    Head, Face, Ears, Shoulders, Back, Chest, Arms, Elbows, Hands, Sacrum. Buttock, Coccyx, Ischium, and Legs. Feet and Heels        Does the Patient have a Wound? No noted wound(s)       Timothy Prevention initiated by RN: No  Wound Care Orders initiated by RN: No    Pressure Injury (Stage 3,4, Unstageable, DTI, NWPT, and Complex wounds) if present, place Wound referral order by RN under : No    New Ostomies, if present place, Ostomy referral order under : No     Nurse 1 eSignature: Electronically signed by CESAR ALEXANDRA RN on 10/19/24 at 7:18 AM EDT    **SHARE this note so that the co-signing nurse can place an eSignature**    Nurse 2 eSignature: Electronically signed by CESAR JOSE RN on 10/19/24 at 7:23 AM EDT

## 2024-10-19 NOTE — CARE COORDINATION
D/C order noted for today. Orders reviewed. No needs identified at this time. Patient's family to transport patient home today for discharge. CM remains available if needed.               Samara Bradley, [phone]

## 2024-10-21 ENCOUNTER — TELEPHONE (OUTPATIENT)
Facility: CLINIC | Age: 64
End: 2024-10-21

## 2024-10-21 NOTE — DISCHARGE SUMMARY
Colon and Rectal Surgery Discharge Summary     Patient: Gigi Mcneal MRN: [id number]  SSN: [id number]    YOB: 1960  Age: 64 y.o.  Sex: male       Admit Date: 10/15/2024    Discharge Date: 10/19/2024    Admission Diagnoses: Malignant neoplasm of sigmoid colon (HCC) [C18.7]  Colon cancer (HCC) [C18.9]    Discharge Diagnoses: Sigmoid colon cancer, appendicitis    Procedure:  Robotic sigmoid colectomy, appendectomy    Discharge Condition: Good    Hospital Course: 2 OR for above.  Diet advanced.  Remained afebrile.  Ambulated without difficulty.  Restarted on aspirin and Plavix without any signs of bleeding.    Consults: None    Significant Diagnostic Studies: None    Disposition: home    Discharge Medications:   Discharge Medication List as of 10/19/2024  9:38 AM        START taking these medications    Details   oxyCODONE-acetaminophen (PERCOCET) 5-325 MG per tablet Take 1 tablet by mouth every 4 hours as needed for Pain for up to 7 days. Max Daily Amount: 6 tablets, Disp-20 tablet, R-0Normal           CONTINUE these medications which have NOT CHANGED    Details   metoprolol succinate (TOPROL XL) 25 MG extended release tablet Take 1 tablet by mouth daily, Disp-90 tablet, R-3Normal      atorvastatin (LIPITOR) 40 MG tablet Take 1 tablet by mouth nightly, Disp-90 tablet, R-3Normal      aspirin 81 MG chewable tablet Take 1 tablet by mouth daily, Disp-90 tablet, R-3Normal      clopidogrel (PLAVIX) 75 MG tablet Take 1 tablet by mouth daily, Disp-90 tablet, R-1Normal      pantoprazole (PROTONIX) 40 MG tablet Take 1 tablet by mouth daily, Disp-90 tablet, R-0Normal      ferrous sulfate (IRON 325) 325 (65 Fe) MG tablet Take 1 tablet by mouth daily (with breakfast), Disp-90 tablet, R-3Normal             Activity: no heavy lifting for 6 weeks  Diet: regular diet  Wound Care: none needed    No follow-ups on file.    Signed By: Candido Banuelos MD     October 21, 2024

## 2024-10-21 NOTE — TELEPHONE ENCOUNTER
Care Transitions Initial Follow Up Call    Outreach made within 2 business days of discharge: Yes    Patient: Gigi Mcneal Patient : 1960   MRN: [id number]  Reason for Admission: colon cancer  Discharge Date: 10/19/24       Spoke with: no answer, unable to leave message    Scheduled appointment with PCP within 7-14 days    Follow Up  Future Appointments   Date Time Provider Department Center   10/31/2024  1:00 PM Cathy Justin MD [facility]   12/3/2024 10:40 AM Butch Guaman MD [facility]   2024 12:30 [facility]       Tiffany Peter LPN

## 2024-10-31 ENCOUNTER — OFFICE VISIT (OUTPATIENT)
Facility: CLINIC | Age: 64
End: 2024-10-31

## 2024-10-31 VITALS
HEART RATE: 50 BPM | DIASTOLIC BLOOD PRESSURE: 75 MMHG | SYSTOLIC BLOOD PRESSURE: 127 MMHG | HEIGHT: 72 IN | WEIGHT: 167.8 LBS | RESPIRATION RATE: 18 BRPM | OXYGEN SATURATION: 100 % | BODY MASS INDEX: 22.73 KG/M2

## 2024-10-31 DIAGNOSIS — Z09 HOSPITAL DISCHARGE FOLLOW-UP: Primary | ICD-10-CM

## 2024-10-31 DIAGNOSIS — C18.7 ADENOCARCINOMA OF SIGMOID COLON (HCC): ICD-10-CM

## 2024-10-31 NOTE — PROGRESS NOTES
Post-Discharge Transitional Care  Follow Up      Gigi Mcneal   YOB: 1960    Date of Office Visit:  10/31/2024  Date of Hospital Admission: 10/15/24  Date of Hospital Discharge: 10/19/24  Risk of hospital readmission (high >=14%. Medium >=10%) :Readmission Risk Score: 22.4      Care management risk score Rising risk (score 2-5) and Complex Care (Scores >=6): No Risk Score On File     Non face to face  following discharge, date last encounter closed (first attempt may have been earlier): 10/21/2024    Call initiated 2 business days of discharge: Yes    ASSESSMENT/PLAN:   Hospital discharge follow-up  -     FL DISCHARGE MEDS RECONCILED W/ CURRENT OUTPATIENT MED LIST  Adenocarcinoma of sigmoid colon (HCC)    Patient feeling well.  Appetite is good.  Some constipation likely related to his iron-okay to take his OTC stool softener daily.  Number given for urology to follow-up about his elevated PSA  Has upcoming appoint with surgery within the next week for follow-up    DC summary, labs, imaging reviewed-will plan to recheck his iron and blood counts in 2 to 3 months.  Continue iron supplement    Medical Decision Making: moderate complexity  No follow-ups on file.           Subjective:   HPI:  Follow up of Hospital problems/diagnosis(es): Colon cancer    Inpatient course: Discharge summary reviewed- see chart.    Interval history/Current status:     Patient is doing well.  He denies any pain.  Bowels are moving well-although he is slightly constipated and has been using OTC stool softener as needed.  His appetite is back and he is gaining some weight.  He is still drinking the Ensure supplements.  No concerns or complaints today    Patient Active Problem List   Diagnosis    History of tobacco abuse    Coronary artery disease involving native heart without angina pectoris    Iron deficiency anemia due to chronic blood loss    Adenocarcinoma of sigmoid colon (HCC)    History of GI bleed    Type 2 diabetes

## 2024-10-31 NOTE — PROGRESS NOTES
Gigi Fredis presents today for   Chief Complaint   Patient presents with    Follow-Up from Hospital     [facility] follow up 10/15-10/19        Is someone accompanying this pt? no    Is the patient using any DME equipment during OV? no    Health Maintenance Due   Topic Date Due    Pneumococcal 65+ years Vaccine (1 of 2 - PCV) Never done    Diabetic foot exam  Never done    Diabetic Alb to Cr ratio (uACR) test  Never done    Diabetic retinal exam  Never done    DTaP/Tdap/Td vaccine (1 - Tdap) Never done    Shingles vaccine (1 of 2) Never done    Respiratory Syncytial Virus (RSV) Pregnant or age 60 yrs+ (1 - 1-dose 60+ series) Never done    Flu vaccine (1) Never done    Annual Wellness Visit (Medicare)  Never done    COVID-19 Vaccine (1 - 2023-24 season) Never done       \"Have you been to the ER, urgent care clinic since your last visit?  Hospitalized since your last visit?\"    Yes- [facility]    “Have you seen or consulted any other health care providers outside of [facility] since your last visit?”    NO

## 2024-11-12 ENCOUNTER — TELEPHONE (OUTPATIENT)
Age: 64
End: 2024-11-12

## 2024-11-12 ENCOUNTER — PREP FOR PROCEDURE (OUTPATIENT)
Age: 64
End: 2024-11-12

## 2024-11-12 DIAGNOSIS — C18.7 MALIGNANT NEOPLASM OF SIGMOID COLON (HCC): Primary | ICD-10-CM

## 2024-11-12 NOTE — TELEPHONE ENCOUNTER
I called to schedule a mediport placement for the pt but he was not available. There is an opening 11/26 with an arrival of 9:15 am. Per Dr. Banuelos the pt needs to have the mediport placed before Thanksgiving. Pt also needs to have labs done.

## 2024-11-12 NOTE — TELEPHONE ENCOUNTER
FYI:    Pt called back and is scheduled for the mediport placement to be done on 11/26/24 with [facility]     I have sent the request to hold Plavix to Dr. Cathy Justin through [employer]. I have asked that Dr. Justin's office call the patient with the information on holding the plavix for 7 days.     Pt states that he just had blood work done. Please advise him if he needs to do more.     The prep instructions with the surgery date and arrival time have been sent through [employer] to the pt. Pt has been advised of the information and also advised that he needs to have someone drive him home. If the pt has to take an Uber or medical transport, he knows that he needs to have someone he knows ride back with him.     Pt is going to call Dr Russell's office regarding rescheduling an appointment that he has on 11/26.

## 2024-11-13 NOTE — TELEPHONE ENCOUNTER
Called and notified patient that he does not need any lab work prior to his mediport placement. Patient understands.

## 2024-11-19 ENCOUNTER — TELEPHONE (OUTPATIENT)
Facility: CLINIC | Age: 64
End: 2024-11-19

## 2024-11-19 NOTE — PROGRESS NOTES
TWO VISITORS will be allowed in the waiting area during your surgery.  Exceptions may be made for surgical admissions, per nursing unit guidelines      Special Instructions:      Bring a list of CURRENT medications.  Follow instructions from the office regarding Blood Thinners.  Follow instructions from the office regarding medications to take the morning of surgery.          If you have a history of recreational drug use, you may be required to submit a urine sample for drug testing the day of your procedure, as some recreational drugs can interact with anesthetics and increase your surgical risk.    On day of surgery if you are running late, unable to make procedure time, or sick, please call the Pre-op department at [phone]    These surgical instructions were reviewed with Gigi Mcneal during the PAT phone call.

## 2024-11-19 NOTE — TELEPHONE ENCOUNTER
Called and spoke with patient he stated that  was the name on his medication witch is way the told him to ask her. He is going to call his cardiologist and talk with them.

## 2024-11-19 NOTE — TELEPHONE ENCOUNTER
Patient called stating that he is having Surgery on 11/26/24 and he needs  to stop his aspirin and blood thinner medication before he can have the surgery    Call back number is  [phone]

## 2024-11-20 ENCOUNTER — TELEPHONE (OUTPATIENT)
Age: 64
End: 2024-11-20

## 2024-11-20 NOTE — TELEPHONE ENCOUNTER
Received a cardiac clearance request fro mediport placement on 11/26/24. Patient is on plavix and will need to hold 7 days prior per surgeon.    Verbal order and read back per Butch Guaman MD  Can hold plavix 7 days prior   Cleared for procedure     Scanned clearance in chart

## 2024-11-25 ENCOUNTER — ANESTHESIA EVENT (OUTPATIENT)
Facility: HOSPITAL | Age: 64
End: 2024-11-25
Payer: MEDICARE

## 2024-11-26 ENCOUNTER — APPOINTMENT (OUTPATIENT)
Facility: HOSPITAL | Age: 64
End: 2024-11-26
Attending: COLON & RECTAL SURGERY
Payer: MEDICARE

## 2024-11-26 ENCOUNTER — HOSPITAL ENCOUNTER (OUTPATIENT)
Facility: HOSPITAL | Age: 64
Setting detail: OUTPATIENT SURGERY
Discharge: HOME | End: 2024-11-26
Attending: COLON & RECTAL SURGERY | Admitting: COLON & RECTAL SURGERY
Payer: MEDICARE

## 2024-11-26 ENCOUNTER — ANESTHESIA (OUTPATIENT)
Facility: HOSPITAL | Age: 64
End: 2024-11-26
Payer: MEDICARE

## 2024-11-26 VITALS
OXYGEN SATURATION: 100 % | DIASTOLIC BLOOD PRESSURE: 75 MMHG | HEIGHT: 73 IN | BODY MASS INDEX: 22.45 KG/M2 | WEIGHT: 169.4 LBS | RESPIRATION RATE: 16 BRPM | SYSTOLIC BLOOD PRESSURE: 161 MMHG | HEART RATE: 46 BPM | TEMPERATURE: 97.7 F

## 2024-11-26 PROCEDURE — 6360000002 HC RX W HCPCS: Performed by: NURSE ANESTHETIST, CERTIFIED REGISTERED

## 2024-11-26 PROCEDURE — 3700000000 HC ANESTHESIA ATTENDED CARE: Performed by: COLON & RECTAL SURGERY

## 2024-11-26 PROCEDURE — 77001 FLUOROGUIDE FOR VEIN DEVICE: CPT | Performed by: COLON & RECTAL SURGERY

## 2024-11-26 PROCEDURE — 6360000002 HC RX W HCPCS: Performed by: COLON & RECTAL SURGERY

## 2024-11-26 PROCEDURE — 7100000001 HC PACU RECOVERY - ADDTL 15 MIN: Performed by: COLON & RECTAL SURGERY

## 2024-11-26 PROCEDURE — 2580000003 HC RX 258: Performed by: ANESTHESIOLOGY

## 2024-11-26 PROCEDURE — 36561 INSERT TUNNELED CV CATH: CPT | Performed by: COLON & RECTAL SURGERY

## 2024-11-26 PROCEDURE — 7100000000 HC PACU RECOVERY - FIRST 15 MIN: Performed by: COLON & RECTAL SURGERY

## 2024-11-26 PROCEDURE — 3600000012 HC SURGERY LEVEL 2 ADDTL 15MIN: Performed by: COLON & RECTAL SURGERY

## 2024-11-26 PROCEDURE — 3600000002 HC SURGERY LEVEL 2 BASE: Performed by: COLON & RECTAL SURGERY

## 2024-11-26 PROCEDURE — 7100000010 HC PHASE II RECOVERY - FIRST 15 MIN: Performed by: COLON & RECTAL SURGERY

## 2024-11-26 PROCEDURE — 3700000001 HC ADD 15 MINUTES (ANESTHESIA): Performed by: COLON & RECTAL SURGERY

## 2024-11-26 PROCEDURE — 71045 X-RAY EXAM CHEST 1 VIEW: CPT

## 2024-11-26 PROCEDURE — 6370000000 HC RX 637 (ALT 250 FOR IP): Performed by: NURSE ANESTHETIST, CERTIFIED REGISTERED

## 2024-11-26 PROCEDURE — C1788 PORT, INDWELLING, IMP: HCPCS | Performed by: COLON & RECTAL SURGERY

## 2024-11-26 PROCEDURE — 2580000003 HC RX 258: Performed by: COLON & RECTAL SURGERY

## 2024-11-26 PROCEDURE — 7100000011 HC PHASE II RECOVERY - ADDTL 15 MIN: Performed by: COLON & RECTAL SURGERY

## 2024-11-26 PROCEDURE — 2709999900 HC NON-CHARGEABLE SUPPLY: Performed by: COLON & RECTAL SURGERY

## 2024-11-26 DEVICE — PORT INFUS 8FR PLAS ATTCH OPN END POLYUR CATH SIL FILL SUT: Type: IMPLANTABLE DEVICE | Site: CHEST | Status: FUNCTIONAL

## 2024-11-26 RX ORDER — LIDOCAINE HYDROCHLORIDE 10 MG/ML
1 INJECTION, SOLUTION EPIDURAL; INFILTRATION; INTRACAUDAL; PERINEURAL
Status: DISCONTINUED | OUTPATIENT
Start: 2024-11-26 | End: 2024-11-26 | Stop reason: HOSPADM

## 2024-11-26 RX ORDER — PHENYLEPHRINE HCL IN 0.9% NACL 1 MG/10 ML
SYRINGE (ML) INTRAVENOUS
Status: DISCONTINUED | OUTPATIENT
Start: 2024-11-26 | End: 2024-11-26 | Stop reason: SDUPTHER

## 2024-11-26 RX ORDER — INSULIN LISPRO 100 [IU]/ML
0-4 INJECTION, SOLUTION INTRAVENOUS; SUBCUTANEOUS EVERY 6 HOURS SCHEDULED
Status: DISCONTINUED | OUTPATIENT
Start: 2024-11-26 | End: 2024-11-26 | Stop reason: HOSPADM

## 2024-11-26 RX ORDER — FAMOTIDINE 20 MG/1
20 TABLET, FILM COATED ORAL ONCE
Status: COMPLETED | OUTPATIENT
Start: 2024-11-26 | End: 2024-11-26

## 2024-11-26 RX ORDER — ONDANSETRON 2 MG/ML
4 INJECTION INTRAMUSCULAR; INTRAVENOUS
Status: CANCELLED | OUTPATIENT
Start: 2024-11-26 | End: 2024-11-27

## 2024-11-26 RX ORDER — PROPOFOL 10 MG/ML
INJECTION, EMULSION INTRAVENOUS
Status: DISCONTINUED | OUTPATIENT
Start: 2024-11-26 | End: 2024-11-26 | Stop reason: SDUPTHER

## 2024-11-26 RX ORDER — SODIUM CHLORIDE 0.9 % (FLUSH) 0.9 %
5-40 SYRINGE (ML) INJECTION PRN
Status: DISCONTINUED | OUTPATIENT
Start: 2024-11-26 | End: 2024-11-26 | Stop reason: HOSPADM

## 2024-11-26 RX ORDER — LIDOCAINE HYDROCHLORIDE 20 MG/ML
INJECTION, SOLUTION EPIDURAL; INFILTRATION; INTRACAUDAL; PERINEURAL
Status: DISCONTINUED | OUTPATIENT
Start: 2024-11-26 | End: 2024-11-26 | Stop reason: SDUPTHER

## 2024-11-26 RX ORDER — FENTANYL CITRATE 50 UG/ML
25 INJECTION, SOLUTION INTRAMUSCULAR; INTRAVENOUS EVERY 5 MIN PRN
Status: CANCELLED | OUTPATIENT
Start: 2024-11-26

## 2024-11-26 RX ORDER — SODIUM CHLORIDE 0.9 % (FLUSH) 0.9 %
5-40 SYRINGE (ML) INJECTION EVERY 12 HOURS SCHEDULED
Status: DISCONTINUED | OUTPATIENT
Start: 2024-11-26 | End: 2024-11-26 | Stop reason: HOSPADM

## 2024-11-26 RX ORDER — FENTANYL CITRATE 50 UG/ML
50 INJECTION, SOLUTION INTRAMUSCULAR; INTRAVENOUS EVERY 5 MIN PRN
Status: CANCELLED | OUTPATIENT
Start: 2024-11-26

## 2024-11-26 RX ORDER — SODIUM CHLORIDE, SODIUM LACTATE, POTASSIUM CHLORIDE, CALCIUM CHLORIDE 600; 310; 30; 20 MG/100ML; MG/100ML; MG/100ML; MG/100ML
INJECTION, SOLUTION INTRAVENOUS CONTINUOUS
Status: CANCELLED | OUTPATIENT
Start: 2024-11-26

## 2024-11-26 RX ORDER — NALOXONE HYDROCHLORIDE 0.4 MG/ML
INJECTION, SOLUTION INTRAMUSCULAR; INTRAVENOUS; SUBCUTANEOUS PRN
Status: CANCELLED | OUTPATIENT
Start: 2024-11-26

## 2024-11-26 RX ORDER — MIDAZOLAM HYDROCHLORIDE 1 MG/ML
INJECTION, SOLUTION INTRAMUSCULAR; INTRAVENOUS
Status: DISCONTINUED | OUTPATIENT
Start: 2024-11-26 | End: 2024-11-26 | Stop reason: SDUPTHER

## 2024-11-26 RX ORDER — SODIUM CHLORIDE 9 MG/ML
INJECTION, SOLUTION INTRAVENOUS PRN
Status: DISCONTINUED | OUTPATIENT
Start: 2024-11-26 | End: 2024-11-26 | Stop reason: HOSPADM

## 2024-11-26 RX ORDER — HEPARIN SODIUM 5000 [USP'U]/ML
INJECTION, SOLUTION INTRAVENOUS; SUBCUTANEOUS PRN
Status: DISCONTINUED | OUTPATIENT
Start: 2024-11-26 | End: 2024-11-26 | Stop reason: ALTCHOICE

## 2024-11-26 RX ORDER — SODIUM CHLORIDE, SODIUM LACTATE, POTASSIUM CHLORIDE, CALCIUM CHLORIDE 600; 310; 30; 20 MG/100ML; MG/100ML; MG/100ML; MG/100ML
INJECTION, SOLUTION INTRAVENOUS CONTINUOUS
Status: DISCONTINUED | OUTPATIENT
Start: 2024-11-26 | End: 2024-11-26 | Stop reason: HOSPADM

## 2024-11-26 RX ORDER — DEXTROSE MONOHYDRATE 100 MG/ML
INJECTION, SOLUTION INTRAVENOUS CONTINUOUS PRN
Status: DISCONTINUED | OUTPATIENT
Start: 2024-11-26 | End: 2024-11-26 | Stop reason: HOSPADM

## 2024-11-26 RX ADMIN — Medication 100 MCG: at 11:09

## 2024-11-26 RX ADMIN — WATER 2000 MG: 1 INJECTION INTRAMUSCULAR; INTRAVENOUS; SUBCUTANEOUS at 11:00

## 2024-11-26 RX ADMIN — PROPOFOL 50 MG: 10 INJECTION, EMULSION INTRAVENOUS at 11:02

## 2024-11-26 RX ADMIN — SODIUM CHLORIDE, SODIUM LACTATE, POTASSIUM CHLORIDE, AND CALCIUM CHLORIDE: 600; 310; 30; 20 INJECTION, SOLUTION INTRAVENOUS at 10:53

## 2024-11-26 RX ADMIN — PROPOFOL 50 MG: 10 INJECTION, EMULSION INTRAVENOUS at 10:57

## 2024-11-26 RX ADMIN — PROPOFOL 25 MG: 10 INJECTION, EMULSION INTRAVENOUS at 11:16

## 2024-11-26 RX ADMIN — MIDAZOLAM 2 MG: 1 INJECTION, SOLUTION INTRAMUSCULAR; INTRAVENOUS at 10:53

## 2024-11-26 RX ADMIN — Medication 100 MCG: at 11:23

## 2024-11-26 RX ADMIN — PROPOFOL 50 MG: 10 INJECTION, EMULSION INTRAVENOUS at 11:09

## 2024-11-26 RX ADMIN — FAMOTIDINE 20 MG: 20 TABLET ORAL at 08:52

## 2024-11-26 RX ADMIN — PROPOFOL 25 MG: 10 INJECTION, EMULSION INTRAVENOUS at 11:24

## 2024-11-26 RX ADMIN — Medication 100 MCG: at 11:32

## 2024-11-26 RX ADMIN — LIDOCAINE HYDROCHLORIDE 60 MG: 20 INJECTION, SOLUTION EPIDURAL; INFILTRATION; INTRACAUDAL; PERINEURAL at 10:57

## 2024-11-26 ASSESSMENT — PAIN - FUNCTIONAL ASSESSMENT: PAIN_FUNCTIONAL_ASSESSMENT: 0-10

## 2024-11-26 ASSESSMENT — PAIN SCALES - GENERAL
PAINLEVEL_OUTOF10: 0
PAINLEVEL_OUTOF10: 0

## 2024-11-26 NOTE — DISCHARGE INSTRUCTIONS
Mediport Discharge Instructions    Okay to restart Aspirin and Plavix on Friday  OK to shower in 48 hours  Percocet or Tylenol for pain  Follow up in the office if you have redness or discharge from your wound         DISCHARGE SUMMARY from Nurse    PATIENT INSTRUCTIONS:    After general anesthesia or intravenous sedation, for 24 hours or while taking prescription Narcotics:  Limit your activities  Do not drive and operate hazardous machinery  Do not make important personal or business decisions  Do  not drink alcoholic beverages  If you have not urinated within 8 hours after discharge, please contact your surgeon on call.    Report the following to your surgeon:  Excessive pain, swelling, redness or odor of or around the surgical area  Temperature over 100.5  Nausea and vomiting lasting longer than 4 hours or if unable to take medications  Any signs of decreased circulation or nerve impairment to extremity: change in color, persistent  numbness, tingling, coldness or increase pain  Any questions        These are general instructions for a healthy lifestyle:    No smoking/ No tobacco products/ Avoid exposure to second hand smoke  Surgeon General's Warning:  Quitting smoking now greatly reduces serious risk to your health.    Obesity, smoking, and sedentary lifestyle greatly increases your risk for illness    A healthy diet, regular physical exercise & weight monitoring are important for maintaining a healthy lifestyle    You may be retaining fluid if you have a history of heart failure or if you experience any of the following symptoms:  Weight gain of 3 pounds or more overnight or 5 pounds in a week, increased swelling in our hands or feet or shortness of breath while lying flat in bed.  Please call your doctor as soon as you notice any of these symptoms; do not wait until your next office visit.        The discharge information has been reviewed with the patient and spouse.  The patient and spouse verbalized

## 2024-11-26 NOTE — ANESTHESIA POSTPROCEDURE EVALUATION
Department of Anesthesiology  Postprocedure Note    Patient: Gigi Mcneal  MRN: [id number]  YOB: 1960  Date of evaluation: 11/26/2024    Procedure Summary       Date: 11/26/24 Room / Location: [facility] MAIN 04 / [facility] MAIN OR    Anesthesia Start: 1053 Anesthesia Stop: 1143    Procedure: Mediport insertion; C-ARM (Right: Chest) Diagnosis:       Malignant neoplasm of sigmoid colon (HCC)      (Malignant neoplasm of sigmoid colon (HCC) [C18.7])    Surgeons: Candido Banuelos MD Responsible Provider: Barrera Olivier MD    Anesthesia Type: MAC ASA Status: 3            Anesthesia Type: No value filed.    Sona Phase I: Sona Score: 10    Sona Phase II:      Anesthesia Post Evaluation    Patient location during evaluation: bedside  Patient participation: complete - patient participated  Airway patency: patent  Cardiovascular status: hemodynamically stable  Respiratory status: acceptable  Hydration status: stable    No notable events documented.

## 2024-11-26 NOTE — ANESTHESIA PRE PROCEDURE
Department of Anesthesiology  Preprocedure Note       Name:  Gigi Mcneal   Age:  64 y.o.  :  1960                                          MRN:  [id number]         Date:  2024      Surgeon: Surgeon(s):  Candido Banuelos MD    Procedure: Procedure(s):  Mediport insertion; C-ARM    Medications prior to admission:   Prior to Admission medications    Medication Sig Start Date End Date Taking? Authorizing Provider   metoprolol succinate (TOPROL XL) 25 MG extended release tablet Take 1 tablet by mouth daily 24  Yes Cathy Justin MD   atorvastatin (LIPITOR) 40 MG tablet Take 1 tablet by mouth nightly 24  Yes Cathy Justin MD   pantoprazole (PROTONIX) 40 MG tablet Take 1 tablet by mouth daily 8/15/24  Yes Cathy Justin MD   aspirin 81 MG chewable tablet Take 1 tablet by mouth daily 24   Cathy Justin MD   clopidogrel (PLAVIX) 75 MG tablet Take 1 tablet by mouth daily 24   Cathy Justin MD   ferrous sulfate (IRON 325) 325 (65 Fe) MG tablet Take 1 tablet by mouth daily (with breakfast) 24   Kamlesh Biggs MD       Current medications:    Current Facility-Administered Medications   Medication Dose Route Frequency Provider Last Rate Last Admin   • sodium chloride flush 0.9 % injection 5-40 mL  5-40 mL IntraVENous 2 times per day Monique Alfaro APRN - CRNA       • sodium chloride flush 0.9 % injection 5-40 mL  5-40 mL IntraVENous PRN Monique Alfaro APRN - CRNA       • 0.9 % sodium chloride infusion   IntraVENous PRN Monique Alfaro APRN - CRNA       • lidocaine PF 1 % injection 1 mL  1 mL IntraDERmal Once PRN Monique Alfaro APRN - CRNA       • glucose chewable tablet 16 g  4 tablet Oral PRN Monique Alfaro APRN - CRNA       • dextrose bolus 10% 125 mL  125 mL IntraVENous PRN Monique Alfaro APRN - CRNA        Or   • dextrose bolus 10% 250 mL  250 mL IntraVENous PRN Monique Alfaro APRN - CRNA       • glucagon injection 1 mg  1 mg SubCUTAneous PRN

## 2024-11-26 NOTE — PERIOP NOTE
Patient /Family /Designee has been informed that [facility] is not responsible for patient belongings per policy and the signed [facility] Patient Agreement document.  Personal items should be sent home or checked in with security.  Patient /Family /Designee selected the following action:                            [x]  Send personal items home with a family member or friend                                                 []  Check in personal items with security, excluding clothing                            []  Maintain personal items at the bedside, against recommendation                                 by Basim Smith [facility]

## 2024-11-26 NOTE — OP NOTE
We then fed the catheter through the Peel-Away sheath and peeled the sheath away.  We withdrew the catheter under fluoroscopic guidance until the tip appeared at the atriocaval junction.  We then trimmed the catheter size and fastened it to the MediPort device with overlocking snap.  We sewed this into the MediPort pocket with 3-0 Vicryl suture.  The MediPort device was accessed.  It withdrew blood easily and was flushed with heparinized saline.  After re-confirming proper placement with the tip at the atriocaval junction, both the MediPort pocket incision as well as the needle insertion site incisions were closed with 4-0 Monocryl subcuticular suture. Dermabond was applied.     The patient tolerated the procedure well.  All instrument, sponge, and needle counts were correct at the end of the case x2.  The patient awoke from anesthesia and was transported to the PACU in stable condition.     Electronically signed by Candido Banuelos MD on 11/26/2024 at 11:29 AM

## 2024-11-26 NOTE — H&P
Spouse name: None    Number of children: None    Years of education: None    Highest education level: None   Tobacco Use    Smoking status: Former     Current packs/day: 0.00     Average packs/day: 0.3 packs/day for 19.0 years (4.8 ttl pk-yrs)     Types: Cigarettes     Start date:      Quit date:      Years since quittin.9    Smokeless tobacco: Never   Vaping Use    Vaping status: Never Used   Substance and Sexual Activity    Alcohol use: Never    Drug use: Never    Sexual activity: Yes     Partners: Female     Social Determinants of Health     Financial Resource Strain: Low Risk  (2024)    Overall Financial Resource Strain (CARDIA)     Difficulty of Paying Living Expenses: Not hard at all   Food Insecurity: No Food Insecurity (10/15/2024)    Hunger Vital Sign     Worried About Running Out of Food in the Last Year: Never true     Ran Out of Food in the Last Year: Never true   Transportation Needs: No Transportation Needs (10/15/2024)    PRAPARE - Transportation     Lack of Transportation (Medical): No     Lack of Transportation (Non-Medical): No   Housing Stability: Low Risk  (10/15/2024)    Housing Stability Vital Sign     Unable to Pay for Housing in the Last Year: No     Number of Times Moved in the Last Year: 1     Homeless in the Last Year: No       Current Outpatient Medications   Medication Instructions    aspirin 81 mg, Oral, DAILY    atorvastatin (LIPITOR) 40 mg, Oral, NIGHTLY    clopidogrel (PLAVIX) 75 mg, Oral, DAILY    ferrous sulfate (IRON 325) 325 mg, Oral, DAILY WITH BREAKFAST    metoprolol succinate (TOPROL XL) 25 mg, Oral, DAILY    pantoprazole (PROTONIX) 40 mg, Oral, DAILY        No Known Allergies    ROS: negative    There were no vitals filed for this visit.    Physical Exam  Constitutional:       Appearance: He is well-developed.   HENT:      Head: Normocephalic and atraumatic.   Cardiovascular:      Rate and Rhythm: Normal rate and regular rhythm.   Pulmonary:      Effort:

## 2024-12-06 RX ORDER — ONDANSETRON 8 MG/1
TABLET, FILM COATED ORAL
COMMUNITY
Start: 2024-11-23

## 2024-12-06 RX ORDER — DIPHENOXYLATE HYDROCHLORIDE AND ATROPINE SULFATE 2.5; .025 MG/1; MG/1
TABLET ORAL
COMMUNITY
Start: 2024-11-19

## 2024-12-06 SDOH — HEALTH STABILITY: PHYSICAL HEALTH: ON AVERAGE, HOW MANY MINUTES DO YOU ENGAGE IN EXERCISE AT THIS LEVEL?: 10 MIN

## 2024-12-06 SDOH — HEALTH STABILITY: PHYSICAL HEALTH: ON AVERAGE, HOW MANY DAYS PER WEEK DO YOU ENGAGE IN MODERATE TO STRENUOUS EXERCISE (LIKE A BRISK WALK)?: 2 DAYS

## 2024-12-06 ASSESSMENT — LIFESTYLE VARIABLES
HOW MANY STANDARD DRINKS CONTAINING ALCOHOL DO YOU HAVE ON A TYPICAL DAY: 0
HOW OFTEN DO YOU HAVE A DRINK CONTAINING ALCOHOL: 1
HOW OFTEN DO YOU HAVE SIX OR MORE DRINKS ON ONE OCCASION: 1

## 2024-12-18 ENCOUNTER — OFFICE VISIT (OUTPATIENT)
Facility: CLINIC | Age: 64
End: 2024-12-18
Payer: MEDICARE

## 2024-12-18 VITALS
HEIGHT: 73 IN | HEART RATE: 64 BPM | BODY MASS INDEX: 23.72 KG/M2 | SYSTOLIC BLOOD PRESSURE: 117 MMHG | DIASTOLIC BLOOD PRESSURE: 72 MMHG | TEMPERATURE: 96.8 F | RESPIRATION RATE: 16 BRPM | WEIGHT: 179 LBS | OXYGEN SATURATION: 100 %

## 2024-12-18 DIAGNOSIS — D50.0 IRON DEFICIENCY ANEMIA DUE TO CHRONIC BLOOD LOSS: ICD-10-CM

## 2024-12-18 DIAGNOSIS — I25.5 ISCHEMIC CARDIOMYOPATHY: ICD-10-CM

## 2024-12-18 DIAGNOSIS — Z00.00 MEDICARE ANNUAL WELLNESS VISIT, SUBSEQUENT: Primary | ICD-10-CM

## 2024-12-18 DIAGNOSIS — E11.9 TYPE 2 DIABETES MELLITUS WITHOUT COMPLICATION, WITHOUT LONG-TERM CURRENT USE OF INSULIN (HCC): ICD-10-CM

## 2024-12-18 DIAGNOSIS — C18.7 ADENOCARCINOMA OF SIGMOID COLON (HCC): ICD-10-CM

## 2024-12-18 DIAGNOSIS — R97.20 ELEVATED PSA: ICD-10-CM

## 2024-12-18 DIAGNOSIS — I25.10 CORONARY ARTERY DISEASE INVOLVING NATIVE CORONARY ARTERY OF NATIVE HEART WITHOUT ANGINA PECTORIS: ICD-10-CM

## 2024-12-18 PROBLEM — K92.2 GIB (GASTROINTESTINAL BLEEDING): Status: RESOLVED | Noted: 2024-09-23 | Resolved: 2024-12-18

## 2024-12-18 PROBLEM — D64.9 SEVERE ANEMIA: Status: RESOLVED | Noted: 2024-09-22 | Resolved: 2024-12-18

## 2024-12-18 PROBLEM — Z87.19 HISTORY OF GI BLEED: Status: RESOLVED | Noted: 2024-08-03 | Resolved: 2024-12-18

## 2024-12-18 PROCEDURE — 99214 OFFICE O/P EST MOD 30 MIN: CPT | Performed by: STUDENT IN AN ORGANIZED HEALTH CARE EDUCATION/TRAINING PROGRAM

## 2024-12-18 PROCEDURE — 1159F MED LIST DOCD IN RCRD: CPT | Performed by: STUDENT IN AN ORGANIZED HEALTH CARE EDUCATION/TRAINING PROGRAM

## 2024-12-18 PROCEDURE — 1123F ACP DISCUSS/DSCN MKR DOCD: CPT | Performed by: STUDENT IN AN ORGANIZED HEALTH CARE EDUCATION/TRAINING PROGRAM

## 2024-12-18 PROCEDURE — G0439 PPPS, SUBSEQ VISIT: HCPCS | Performed by: STUDENT IN AN ORGANIZED HEALTH CARE EDUCATION/TRAINING PROGRAM

## 2024-12-18 PROCEDURE — 1160F RVW MEDS BY RX/DR IN RCRD: CPT | Performed by: STUDENT IN AN ORGANIZED HEALTH CARE EDUCATION/TRAINING PROGRAM

## 2024-12-18 RX ORDER — CAPECITABINE 500 MG/1
TABLET, FILM COATED ORAL
COMMUNITY
Start: 2024-11-26

## 2024-12-18 RX ORDER — LIDOCAINE/PRILOCAINE 2.5 %-2.5%
CREAM (GRAM) TOPICAL
COMMUNITY
Start: 2024-12-06

## 2024-12-18 RX ORDER — CAPECITABINE 150 MG/1
TABLET, FILM COATED ORAL
COMMUNITY
Start: 2024-11-26

## 2024-12-18 NOTE — PROGRESS NOTES
Chronic Disease Follow up    Gigi Mcneal (: 1960) is a 64 y.o. male here for evaluation of the following chief concerns(s):  No chief complaint on file.       ASSESSMENT/PLAN:  1. Medicare annual wellness visit, subsequent  2. Type 2 diabetes mellitus without complication, without long-term current use of insulin (HCC)  3. Adenocarcinoma of sigmoid colon (HCC)  4. Coronary artery disease involving native coronary artery of native heart without angina pectoris  5. Elevated PSA  6. Iron deficiency anemia due to chronic blood loss  7. Ischemic cardiomyopathy    No orders of the defined types were placed in this encounter.    Adenocarcinoma of sigmoid colon- stable. S/P surgical removal. Doing chemotherapy currently and tolerating well. Will follow along.     Diabetes-stable.  Diet controlled    CAD, ischemic cardiomyopathy-stable.  Following with cardiology.  On DAPT, beta-blocker, high intensity statin. Lipids at goal (59) 10/2024    Iron deficiency anemia-stable overall- improving per review of last labs drawn from oncology 2024- Continue iron supplements    Elevated PSA- I highly recommend urology evaluation---- discussed with patient again today. Referral has been placed and I had given him the phone number at last visit.     Return in about 3 months (around 3/18/2025).    Patient agrees with plan as above and has no additional questions at this time.     SUBJECTIVE/OBJECTIVE:    Patient presents for follow up chronic disease he feels well today.  No concerns or complaints.  He has been doing chemotherapy for 1 month and will continue into January.  He is tolerating it well without any nausea or vomiting.  His appetite is still good.  He is back on his DAPT without any bleeding.  No chest pain or shortness of breath.    Colon cancer  Diagnosed 2024- admitted to hospital for GIB  S/P sigmoidectomy 10/2024  Following with CARMELITA Russell   + chemotherapy    Diabetes  Hemoglobin A1C   Date Value Ref Range 
Medicare Annual Wellness Visit    Gigi Mcneal is here for No chief complaint on file.    Assessment & Plan   Medicare annual wellness visit, subsequent  Recommendations for Preventive Services Due: see orders and patient instructions/AVS.  Recommended screening schedule for the next 5-10 years is provided to the patient in written form: see Patient Instructions/AVS.     No follow-ups on file.     Subjective       Patient's complete Health Risk Assessment and screening values have been reviewed and are found in Flowsheets. The following problems were reviewed today and where indicated follow up appointments were made and/or referrals ordered.    Positive Risk Factor Screenings with Interventions:              Inactivity:  On average, how many days per week do you engage in moderate to strenuous exercise (like a brisk walk)?: 2 days (!) Abnormal  On average, how many minutes do you engage in exercise at this level?: 10 min  Interventions:  See AVS for additional education material      Dentist Screen:  Have you seen the dentist within the past year?: (!) No    Intervention:  Advised to schedule with their dentist     Vision Screen:  Do you have difficulty driving, watching TV, or doing any of your daily activities because of your eyesight?: No  Have you had an eye exam within the past year?: (!) No  Interventions:   Patient encouraged to make appointment with their eye specialist      Advanced Directives:  Do you have a Living Will?: (!) No    Intervention:  has NO advanced directive - information provided                     Objective   Vitals:    12/18/24 1231   BP: 117/72   Site: Right Upper Arm   Position: Sitting   Cuff Size: Medium Adult   Pulse: 64   Resp: 16   Temp: 96.8 °F (36 °C)   TempSrc: Temporal   SpO2: 100%   Weight: 81.2 kg (179 lb)   Height: 1.854 m (6' 1\")      Body mass index is 23.62 kg/m².                  No Known Allergies  Prior to Visit Medications    Medication Sig Taking? Authorizing 
of the following everyday activities: Eating, dressing, grooming, bathing, toileting, or walking/balance?: No  In the past 7 days, did you need help from others to take care of any of the following: Laundry, housekeeping, banking/finances, shopping, telephone use, food preparation, transportation, or taking medications?: No    Health Maintenance reviewed and discussed and ordered per Provider.    Health Maintenance Due   Topic Date Due    Pneumococcal 65+ years Vaccine (1 of 2 - PCV) Never done    Diabetic foot exam  Never done    Diabetic Alb to Cr ratio (uACR) test  Never done    Diabetic retinal exam  Never done    DTaP/Tdap/Td vaccine (1 - Tdap) Never done    Shingles vaccine (1 of 2) Never done    Respiratory Syncytial Virus (RSV) Pregnant or age 60 yrs+ (1 - Risk 60-74 years 1-dose series) Never done    Flu vaccine (1) Never done    Annual Wellness Visit (Medicare)  Never done    COVID-19 Vaccine (1 - 2023-24 season) Never done        \"Have you been to the ER, urgent care clinic since your last visit?  Hospitalized since your last visit?\"    NO    “Have you seen or consulted any other health care providers outside our system since your last visit?”    NO      “Have you had a diabetic eye exam?”    NO     No diabetic eye exam on file

## 2025-01-30 DIAGNOSIS — D50.8 IRON DEFICIENCY ANEMIA SECONDARY TO INADEQUATE DIETARY IRON INTAKE: ICD-10-CM

## 2025-01-30 DIAGNOSIS — I25.10 CORONARY ARTERY DISEASE INVOLVING NATIVE CORONARY ARTERY OF NATIVE HEART WITHOUT ANGINA PECTORIS: ICD-10-CM

## 2025-01-30 RX ORDER — METOPROLOL SUCCINATE 25 MG/1
25 TABLET, EXTENDED RELEASE ORAL DAILY
Qty: 90 TABLET | Refills: 3 | Status: SHIPPED | OUTPATIENT
Start: 2025-01-30

## 2025-01-30 RX ORDER — PANTOPRAZOLE SODIUM 40 MG/1
40 TABLET, DELAYED RELEASE ORAL DAILY
Qty: 90 TABLET | Refills: 0 | Status: SHIPPED | OUTPATIENT
Start: 2025-01-30

## 2025-01-30 RX ORDER — FERROUS SULFATE 325(65) MG
325 TABLET ORAL
Qty: 90 TABLET | Refills: 3 | Status: SHIPPED | OUTPATIENT
Start: 2025-01-30

## 2025-01-30 NOTE — TELEPHONE ENCOUNTER
Please refill to [facility] pharmacy here in [facility]. Patient had said he tried to refill with the pharmacy and they are saying there is an issue with them. Please advise

## 2025-02-12 ENCOUNTER — OFFICE VISIT (OUTPATIENT)
Age: 65
End: 2025-02-12

## 2025-02-12 VITALS
OXYGEN SATURATION: 99 % | BODY MASS INDEX: 25.58 KG/M2 | SYSTOLIC BLOOD PRESSURE: 152 MMHG | HEART RATE: 56 BPM | HEIGHT: 73 IN | WEIGHT: 193 LBS | DIASTOLIC BLOOD PRESSURE: 74 MMHG

## 2025-02-12 DIAGNOSIS — I42.9 CARDIOMYOPATHY, UNSPECIFIED TYPE (HCC): ICD-10-CM

## 2025-02-12 DIAGNOSIS — I25.10 CORONARY ARTERY DISEASE INVOLVING NATIVE CORONARY ARTERY OF NATIVE HEART WITHOUT ANGINA PECTORIS: ICD-10-CM

## 2025-02-12 DIAGNOSIS — I35.0 NONRHEUMATIC AORTIC VALVE STENOSIS: Primary | ICD-10-CM

## 2025-02-12 DIAGNOSIS — D64.9 ANEMIA, UNSPECIFIED TYPE: ICD-10-CM

## 2025-02-12 DIAGNOSIS — I10 ESSENTIAL (PRIMARY) HYPERTENSION: ICD-10-CM

## 2025-02-12 DIAGNOSIS — E78.5 HYPERLIPIDEMIA, UNSPECIFIED HYPERLIPIDEMIA TYPE: ICD-10-CM

## 2025-02-12 PROCEDURE — 93000 ELECTROCARDIOGRAM COMPLETE: CPT | Performed by: INTERNAL MEDICINE

## 2025-02-12 PROCEDURE — 1123F ACP DISCUSS/DSCN MKR DOCD: CPT | Performed by: INTERNAL MEDICINE

## 2025-02-12 PROCEDURE — 3077F SYST BP >= 140 MM HG: CPT | Performed by: INTERNAL MEDICINE

## 2025-02-12 PROCEDURE — 99214 OFFICE O/P EST MOD 30 MIN: CPT | Performed by: INTERNAL MEDICINE

## 2025-02-12 PROCEDURE — 3078F DIAST BP <80 MM HG: CPT | Performed by: INTERNAL MEDICINE

## 2025-02-12 ASSESSMENT — PATIENT HEALTH QUESTIONNAIRE - PHQ9
SUM OF ALL RESPONSES TO PHQ QUESTIONS 1-9: 0
1. LITTLE INTEREST OR PLEASURE IN DOING THINGS: NOT AT ALL
2. FEELING DOWN, DEPRESSED OR HOPELESS: NOT AT ALL
SUM OF ALL RESPONSES TO PHQ QUESTIONS 1-9: 0
SUM OF ALL RESPONSES TO PHQ QUESTIONS 1-9: 0
SUM OF ALL RESPONSES TO PHQ9 QUESTIONS 1 & 2: 0
SUM OF ALL RESPONSES TO PHQ QUESTIONS 1-9: 0

## 2025-02-12 NOTE — PROGRESS NOTES
Gigi Mcneal presents today for   Chief Complaint   Patient presents with    Follow-up       Gigi Mcneal preferred language for health care discussion is english/other.    Is someone accompanying this pt? no    Is the patient using any DME equipment during OV? no    Depression Screening:  Depression: Not at risk (2/12/2025)    PHQ-2     PHQ-2 Score: 0        Learning Assessment:  Who is the primary learner? Patient    What is the preferred language for health care of the primary learner? ENGLISH    How does the primary learner prefer to learn new concepts? DEMONSTRATION    Answered By patient    Relationship to Learner SELF           Pt currently taking Anticoagulant therapy? no    Pt currently taking Antiplatelet therapy ? Aspirin  plavix      Coordination of Care:  1. Have you been to the ER, urgent care clinic since your last visit? Hospitalized since your last visit? no    2. Have you seen or consulted any other health care providers outside of the [facility] since your last visit? Include any pap smears or colon screening. no

## 2025-02-12 NOTE — PROGRESS NOTES
HISTORY OF PRESENT ILLNESS  Gigi Mcneal  65 y.o. male     Chief Complaint   Patient presents with    Follow-up       ASSESSMENT and PLAN    The primary encounter diagnosis was Nonrheumatic aortic valve stenosis. Diagnoses of Essential (primary) hypertension, Cardiomyopathy, unspecified type (HCC), Coronary artery disease involving native coronary artery of native heart without angina pectoris, Anemia, unspecified type, and Hyperlipidemia, unspecified hyperlipidemia type were also pertinent to this visit.    Mr. Gigi Mcneal presented to [facility] in March 2024 with respiratory failure, septic shock secondary to multifocal pneumonia.  He was noted to have GI bleed and significant anemia.  He also ruled in for NSTEMI.  He has history of tobacco use.  His echocardiogram at that time showed EF 35-40% with mild to moderate aortic stenosis, mean gradient 25 mmHg.  He also underwent coronary angiography which showed RCA  with distal collaterals, mid LAD 95% and first diagonal 99% lesions.  Both the LAD and diagonal lesions were stented using 2.5 mm HARISH to residual 0%.  In June 2024, he was admitted to [facility] with lower GI bleed.  His echocardiogram showed EF 50-55% with moderate aortic stenosis, mean gradient 31 mmHg, JAIR 0.9 cm².  RVSP was 21 mmHg.  He was diagnosed with colon cancer with sigmoid mass noted.  He had colon surgery in October 2024.    Medication regimen reviewed and current cardiac regimen will be continued.  All new testing since the last office visit was independently reviewed by me.    CAD:    Clinically stable.  AS:   Echocardiogram in June 2024 showed moderate AS with mean gradient 31 mmHg.  Again, we discussed natural history.  Repeat echocardiogram will be checked around June 2025.  HTN:    His blood pressure is mildly elevated at 152/74.  However, with ongoing chemotherapy, upper normal to mildly elevated is acceptable.  Continue current BP regimen.  Rhythm:

## 2025-02-26 RX ORDER — CLOPIDOGREL BISULFATE 75 MG/1
75 TABLET ORAL DAILY
Qty: 90 TABLET | Refills: 0 | Status: SHIPPED | OUTPATIENT
Start: 2025-02-26

## 2025-03-25 ENCOUNTER — HOSPITAL ENCOUNTER (OUTPATIENT)
Age: 65
Setting detail: SPECIMEN
Discharge: HOME | End: 2025-03-28

## 2025-03-25 ENCOUNTER — OFFICE VISIT (OUTPATIENT)
Facility: CLINIC | Age: 65
End: 2025-03-25
Payer: MEDICARE

## 2025-03-25 VITALS
TEMPERATURE: 97.8 F | OXYGEN SATURATION: 99 % | WEIGHT: 198.2 LBS | HEIGHT: 73 IN | SYSTOLIC BLOOD PRESSURE: 152 MMHG | BODY MASS INDEX: 26.27 KG/M2 | DIASTOLIC BLOOD PRESSURE: 78 MMHG | RESPIRATION RATE: 16 BRPM | HEART RATE: 60 BPM

## 2025-03-25 DIAGNOSIS — I35.0 NONRHEUMATIC AORTIC VALVE STENOSIS: ICD-10-CM

## 2025-03-25 DIAGNOSIS — I25.10 CORONARY ARTERY DISEASE INVOLVING NATIVE CORONARY ARTERY OF NATIVE HEART WITHOUT ANGINA PECTORIS: ICD-10-CM

## 2025-03-25 DIAGNOSIS — E11.9 TYPE 2 DIABETES MELLITUS WITHOUT COMPLICATION, WITHOUT LONG-TERM CURRENT USE OF INSULIN: ICD-10-CM

## 2025-03-25 DIAGNOSIS — R97.20 ELEVATED PSA: ICD-10-CM

## 2025-03-25 DIAGNOSIS — D50.0 IRON DEFICIENCY ANEMIA DUE TO CHRONIC BLOOD LOSS: ICD-10-CM

## 2025-03-25 DIAGNOSIS — R97.20 ELEVATED PSA: Primary | ICD-10-CM

## 2025-03-25 DIAGNOSIS — I25.5 ISCHEMIC CARDIOMYOPATHY: ICD-10-CM

## 2025-03-25 DIAGNOSIS — C18.7 ADENOCARCINOMA OF SIGMOID COLON (HCC): ICD-10-CM

## 2025-03-25 DIAGNOSIS — E11.59 TYPE 2 DIABETES MELLITUS WITH OTHER CIRCULATORY COMPLICATION, WITHOUT LONG-TERM CURRENT USE OF INSULIN: ICD-10-CM

## 2025-03-25 LAB — LABCORP DRAW FEE: NORMAL

## 2025-03-25 PROCEDURE — 1159F MED LIST DOCD IN RCRD: CPT | Performed by: STUDENT IN AN ORGANIZED HEALTH CARE EDUCATION/TRAINING PROGRAM

## 2025-03-25 PROCEDURE — 99214 OFFICE O/P EST MOD 30 MIN: CPT | Performed by: STUDENT IN AN ORGANIZED HEALTH CARE EDUCATION/TRAINING PROGRAM

## 2025-03-25 PROCEDURE — 1123F ACP DISCUSS/DSCN MKR DOCD: CPT | Performed by: STUDENT IN AN ORGANIZED HEALTH CARE EDUCATION/TRAINING PROGRAM

## 2025-03-25 PROCEDURE — 99001 SPECIMEN HANDLING PT-LAB: CPT

## 2025-03-25 RX ORDER — PALONOSETRON 0.05 MG/ML
0.25 INJECTION, SOLUTION INTRAVENOUS
COMMUNITY
Start: 2025-03-31

## 2025-03-25 RX ORDER — APREPITANT 130 MG/18ML
130 INJECTION, EMULSION INTRAVENOUS
COMMUNITY
Start: 2025-03-31

## 2025-03-25 NOTE — PROGRESS NOTES
Gigi Mcneal presents today for   Chief Complaint   Patient presents with    3 Month Follow-Up    discuss blood pressure       Is someone accompanying this pt? no    Is the patient using any DME equipment during OV? no    Health Maintenance Due   Topic Date Due    Diabetic foot exam  Never done    Diabetic Alb to Cr ratio (uACR) test  Never done    Diabetic retinal exam  Never done    DTaP/Tdap/Td vaccine (1 - Tdap) Never done    Pneumococcal 50+ years Vaccine (1 of 2 - PCV) Never done    Shingles vaccine (1 of 2) Never done    Respiratory Syncytial Virus (RSV) Pregnant or age 60 yrs+ (1 - Risk 60-74 years 1-dose series) Never done    Flu vaccine (1) Never done    COVID-19 Vaccine (1 - 2024-25 season) Never done    Annual Wellness Visit (Medicare Advantage)  01/01/2025         \"Have you been to the ER, urgent care clinic since your last visit?  Hospitalized since your last visit?\"    NO    “Have you seen or consulted any other health care providers outside our system since your last visit?”    NO      “Have you had a diabetic eye exam?”    NO     No diabetic eye exam on file

## 2025-03-25 NOTE — PROGRESS NOTES
Chronic Disease Follow up    Gigi Mcneal (: 1960) is a 65 y.o. male here for evaluation of the following chief concerns(s):  3 Month Follow-Up and discuss blood pressure       ASSESSMENT/PLAN:  1. Elevated PSA  -     PSA, Diagnostic; Future  2. Type 2 diabetes mellitus without complication, without long-term current use of insulin (HCC)  -     Hemoglobin A1C; Future  3. Adenocarcinoma of sigmoid colon (HCC)  4. Type 2 diabetes mellitus with other circulatory complication, without long-term current use of insulin (HCC)  -     Basic Metabolic Panel; Future  -     Albumin/Creatinine Ratio, Urine; Future  5. Coronary artery disease involving native coronary artery of native heart without angina pectoris  6. Iron deficiency anemia due to chronic blood loss  7. Nonrheumatic aortic valve stenosis  8. Ischemic cardiomyopathy    Orders Placed This Encounter   Procedures    Hemoglobin A1C     Standing Status:   Future     Expected Date:   3/25/2025     Expiration Date:   3/25/2026    Basic Metabolic Panel     Standing Status:   Future     Expected Date:   3/25/2025     Expiration Date:   3/25/2026    PSA, Diagnostic     Standing Status:   Future     Expected Date:   3/25/2025     Expiration Date:   3/25/2026    Albumin/Creatinine Ratio, Urine     Standing Status:   Future     Expected Date:   3/25/2025     Expiration Date:   3/25/2026     Adenocarcinoma of sigmoid colon- stable. S/P surgical removal. Doing chemotherapy currently and tolerating well. Will follow along.  Reviewed last labs and oncology note-kidney function stable, blood counts are much improved.    Diabetes-stable.  Diet controlled.  Recheck A1c with labs.    CAD, ischemic cardiomyopathy-stable.  Following with cardiology.  On DAPT, beta-blocker, high intensity statin. Lipids at goal (59) 10/2024    Hypertension-blood pressure is mildly elevated today and at home.  Per cardiology note from last month, expected for blood pressure to be mildly elevated

## 2025-03-26 LAB
ALBUMIN/CREAT UR: 11 MG/G CREAT (ref 0–29)
BUN SERPL-MCNC: 11 MG/DL (ref 8–27)
BUN/CREAT SERPL: 13 (ref 10–24)
CALCIUM SERPL-MCNC: 9.7 MG/DL (ref 8.6–10.2)
CHLORIDE SERPL-SCNC: 106 MMOL/L (ref 96–106)
CO2 SERPL-SCNC: 24 MMOL/L (ref 20–29)
CREAT SERPL-MCNC: 0.86 MG/DL (ref 0.76–1.27)
CREAT UR-MCNC: 141.2 MG/DL
EGFRCR SERPLBLD CKD-EPI 2021: 95 ML/MIN/1.73
GLUCOSE SERPL-MCNC: 122 MG/DL (ref 70–99)
HBA1C MFR BLD: 6.7 % (ref 4.8–5.6)
MICROALBUMIN UR-MCNC: 15.8 UG/ML
POTASSIUM SERPL-SCNC: 4.3 MMOL/L (ref 3.5–5.2)
PSA SERPL-MCNC: 64.1 NG/ML (ref 0–4)
SODIUM SERPL-SCNC: 145 MMOL/L (ref 134–144)

## 2025-03-27 ENCOUNTER — RESULTS FOLLOW-UP (OUTPATIENT)
Facility: CLINIC | Age: 65
End: 2025-03-27

## 2025-03-27 NOTE — TELEPHONE ENCOUNTER
----- Message from Dr. Cathy Justin MD sent at 3/27/2025  7:29 AM EDT -----  Can you call patient and let him know his PSA is still very high- I still think he should be seen by urology. Can you give him the number to [facility]? I placed the referral back in October.     Thanks!  Cathy

## 2025-03-27 NOTE — TELEPHONE ENCOUNTER
Called patient to speak about message from Cathy Justin MD. Confirmed name and date of birth. Spoke with patient about message. Patient expressed understanding. Gave patient the number to [facility] to call and schedule an appointment.

## 2025-05-30 ENCOUNTER — HOSPITAL ENCOUNTER (OUTPATIENT)
Facility: HOSPITAL | Age: 65
Setting detail: SPECIMEN
Discharge: HOME | End: 2025-06-02

## 2025-06-02 ENCOUNTER — TRANSCRIBE ORDERS (OUTPATIENT)
Facility: HOSPITAL | Age: 65
End: 2025-06-02

## 2025-06-02 DIAGNOSIS — C18.7 MALIGNANT NEOPLASM OF SIGMOID COLON (HCC): Primary | ICD-10-CM

## 2025-06-04 ENCOUNTER — HOSPITAL ENCOUNTER (OUTPATIENT)
Age: 65
Discharge: HOME | End: 2025-06-07
Payer: MEDICARE

## 2025-06-04 DIAGNOSIS — C18.7 MALIGNANT NEOPLASM OF SIGMOID COLON (HCC): ICD-10-CM

## 2025-06-04 PROCEDURE — 74177 CT ABD & PELVIS W/CONTRAST: CPT

## 2025-06-04 PROCEDURE — 6360000004 HC RX CONTRAST MEDICATION: Performed by: INTERNAL MEDICINE

## 2025-06-04 PROCEDURE — 2500000003 HC RX 250 WO HCPCS: Performed by: INTERNAL MEDICINE

## 2025-06-04 RX ORDER — IOPAMIDOL 612 MG/ML
100 INJECTION, SOLUTION INTRAVASCULAR
Status: COMPLETED | OUTPATIENT
Start: 2025-06-04 | End: 2025-06-04

## 2025-06-04 RX ADMIN — BARIUM SULFATE 900 ML: 20 SUSPENSION ORAL at 12:32

## 2025-06-04 RX ADMIN — IOPAMIDOL 100 ML: 612 INJECTION, SOLUTION INTRAVENOUS at 14:04

## 2025-06-13 NOTE — PATIENT INSTRUCTIONS
Continue present medication regimen  Follow-up with Dr. Guaman as scheduled and as needed     Uncontrolled hypertension Uncontrolled hypertension

## 2025-07-09 ENCOUNTER — OFFICE VISIT (OUTPATIENT)
Facility: CLINIC | Age: 65
End: 2025-07-09
Payer: MEDICARE

## 2025-07-09 VITALS
SYSTOLIC BLOOD PRESSURE: 128 MMHG | DIASTOLIC BLOOD PRESSURE: 78 MMHG | OXYGEN SATURATION: 100 % | TEMPERATURE: 98 F | HEART RATE: 50 BPM | HEIGHT: 73 IN | BODY MASS INDEX: 28.26 KG/M2 | WEIGHT: 213.2 LBS | RESPIRATION RATE: 17 BRPM

## 2025-07-09 DIAGNOSIS — E11.9 TYPE 2 DIABETES MELLITUS WITHOUT COMPLICATION, WITHOUT LONG-TERM CURRENT USE OF INSULIN (HCC): Primary | ICD-10-CM

## 2025-07-09 DIAGNOSIS — Z85.038 HISTORY OF COLON CANCER: ICD-10-CM

## 2025-07-09 DIAGNOSIS — I25.10 CORONARY ARTERY DISEASE INVOLVING NATIVE CORONARY ARTERY OF NATIVE HEART WITHOUT ANGINA PECTORIS: ICD-10-CM

## 2025-07-09 DIAGNOSIS — D50.0 IRON DEFICIENCY ANEMIA DUE TO CHRONIC BLOOD LOSS: ICD-10-CM

## 2025-07-09 DIAGNOSIS — R97.20 ELEVATED PSA: ICD-10-CM

## 2025-07-09 DIAGNOSIS — I25.5 ISCHEMIC CARDIOMYOPATHY: ICD-10-CM

## 2025-07-09 LAB — HBA1C MFR BLD: 8.4 %

## 2025-07-09 PROCEDURE — 99214 OFFICE O/P EST MOD 30 MIN: CPT | Performed by: STUDENT IN AN ORGANIZED HEALTH CARE EDUCATION/TRAINING PROGRAM

## 2025-07-09 PROCEDURE — G2211 COMPLEX E/M VISIT ADD ON: HCPCS | Performed by: STUDENT IN AN ORGANIZED HEALTH CARE EDUCATION/TRAINING PROGRAM

## 2025-07-09 PROCEDURE — 99401 PREV MED CNSL INDIV APPRX 15: CPT | Performed by: STUDENT IN AN ORGANIZED HEALTH CARE EDUCATION/TRAINING PROGRAM

## 2025-07-09 PROCEDURE — 3052F HG A1C>EQUAL 8.0%<EQUAL 9.0%: CPT | Performed by: STUDENT IN AN ORGANIZED HEALTH CARE EDUCATION/TRAINING PROGRAM

## 2025-07-09 PROCEDURE — 1123F ACP DISCUSS/DSCN MKR DOCD: CPT | Performed by: STUDENT IN AN ORGANIZED HEALTH CARE EDUCATION/TRAINING PROGRAM

## 2025-07-09 PROCEDURE — 83036 HEMOGLOBIN GLYCOSYLATED A1C: CPT | Performed by: STUDENT IN AN ORGANIZED HEALTH CARE EDUCATION/TRAINING PROGRAM

## 2025-07-09 NOTE — PROGRESS NOTES
Gigi Mcneal presents today for   Chief Complaint   Patient presents with    Follow-up     3m follow up        Is someone accompanying this pt? no    Is the patient using any DME equipment during OV? no    Health Maintenance Due   Topic Date Due    Diabetic foot exam  Never done    Diabetic retinal exam  Never done    DTaP/Tdap/Td vaccine (1 - Tdap) Never done    Pneumococcal 50+ years Vaccine (1 of 2 - PCV) Never done    Shingles vaccine (1 of 2) Never done    Respiratory Syncytial Virus (RSV) Pregnant or age 60 yrs+ (1 - Risk 60-74 years 1-dose series) Never done    COVID-19 Vaccine (1 - 2024-25 season) Never done         \"Have you been to the ER, urgent care clinic since your last visit?  Hospitalized since your last visit?\"    NO    “Have you seen or consulted any other health care providers outside our system since your last visit?”    NO

## 2025-07-09 NOTE — PROGRESS NOTES
Chronic Disease Follow up    Gigi Mcneal (: 1960) is a 65 y.o. male here for evaluation of the following chief concerns(s):  Follow-up (3m follow up )       ASSESSMENT/PLAN:  1. Type 2 diabetes mellitus without complication, without long-term current use of insulin (HCC)  -     AMB POC HEMOGLOBIN A1C  -     COLLECTION CAPILLARY BLOOD SPECIMEN  2. Ischemic cardiomyopathy  3. Coronary artery disease involving native coronary artery of native heart without angina pectoris  4. History of colon cancer  5. Elevated PSA  6. Iron deficiency anemia due to chronic blood loss    Orders Placed This Encounter   Procedures    COLLECTION CAPILLARY BLOOD SPECIMEN    AMB POC HEMOGLOBIN A1C     H/O colon cancer- in remission- S/P sigmoidectomy and chemotherapy. Recent CT scan with no evidence of metastatic disease. Patient is still following with oncology and colorectal surgery    Diabetes-stable. Uncontrolled. Offered trial of Metformin or Jardiance but patient declines. He wants to work on diet. We had detailed discussion today about cutting down on carbohydrates, replacing with fruits and vegetables and lean protein. Stop Ensure supplements- no need. Regular exercise 150 minutes/week. Offered nutrition consult but he declined. Additional resources on ADA website. Time spent on preventative medicine and risk factor reduction ~15 minutes    CAD, ischemic cardiomyopathy-stable.  Following with cardiology.  On DAPT, beta-blocker, high intensity statin. Lipids at goal (59) 10/2024    Hypertension - stable. Continue Metoprolol. Bradycardia today with HR in 50's- patient states he has been feeling just fine without any dizziness, syncope, fatigue- will defer changes to cardiology.     Iron deficiency anemia - improved based on last labs from oncology. No longer on iron    Elevated PSA- reviewed urology consult note. Pending biopsy. Will follow along. Number given to contact [facility] today to set up his apt.     Return in 3

## 2025-07-16 NOTE — PLAN OF CARE
Problem: Discharge Planning  Goal: Discharge to home or other facility with appropriate resources  Outcome: Progressing     Problem: Pain  Goal: Verbalizes/displays adequate comfort level or baseline comfort level  Outcome: Progressing     Problem: Safety - Adult  Goal: Free from fall injury  Outcome: Progressing     Problem: Nutrition Deficit:  Goal: Optimize nutritional status  Outcome: Progressing  Flowsheets (Taken 3/28/2024 1144 by Dockweiler, Megan, TRISH)  Nutrient intake appropriate for improving, restoring, or maintaining nutritional needs:   Monitor oral intake, labs, and treatment plans   Assess nutritional status and recommend course of action   Recommend appropriate diets, oral nutritional supplements, and vitamin/mineral supplements      Report given to Susy BULLOCK

## 2025-08-04 ENCOUNTER — TELEPHONE (OUTPATIENT)
Age: 65
End: 2025-08-04

## 2025-08-06 ENCOUNTER — RESULTS FOLLOW-UP (OUTPATIENT)
Age: 65
End: 2025-08-06

## 2025-08-19 RX ORDER — CLOPIDOGREL BISULFATE 75 MG/1
75 TABLET ORAL DAILY
Qty: 90 TABLET | Refills: 0 | Status: SHIPPED | OUTPATIENT
Start: 2025-08-19

## (undated) DEVICE — DEVICE INFLATION ANALG 35 ATM 30 ML PHD BASIXTOUCH IN8852

## (undated) DEVICE — SUTURE MONOCRYL SZ 4-0 L18IN ABSRB UD L19MM PS-2 3/8 CIR PRIM Y496G

## (undated) DEVICE — SUTURE VICRYL SZ 3-0 L27IN ABSRB VLT L26MM SH 1/2 CIR J316H

## (undated) DEVICE — HI-TORQUE BALANCE MIDDLEWEIGHT GUIDE WIRE W/HYDROCOAT .014 STRAIGHT TIP 3.0 CM X 190 CM: Brand: HI-TORQUE BALANCE MIDDLEWEIGHT

## (undated) DEVICE — ELECTRODE PT RET AD L9FT HI MOIST COND ADH HYDRGEL CORDED

## (undated) DEVICE — Device: Brand: SPOT EX ENDOSCOPIC TATTOO

## (undated) DEVICE — SCISSORS ENDOSCP DIA5MM CRV MPLR CAUT W/ RATCH HNDL

## (undated) DEVICE — UNDERPAD INCONT W23XL36IN STD BLU POLYPR BK FLUF SFT

## (undated) DEVICE — CATHETER GUID AD 6FR L100CM COR PERIPH GRN NYL PTFE XB L 3

## (undated) DEVICE — CATHETER GUID EBU3.5 6 FRX100 CM VISTA BRT TIP

## (undated) DEVICE — SUREFORM 45 RELOAD BLUE: Brand: SUREFORM

## (undated) DEVICE — BANDAGE COMPR W6INXL5YD WHT BGE POLY COT M E WRP WV HK AND

## (undated) DEVICE — SUREFORM 45: Brand: SUREFORM

## (undated) DEVICE — CANNULA NSL AD TBNG L14FT STD PVC O2 CRV CONN NONFLARED NSL

## (undated) DEVICE — TRAY,URINE METER,100% SILICONE,16FR10ML: Brand: MEDLINE

## (undated) DEVICE — STAPLER INT DIA29MM CLS STPL H1.5-2.2MM OPN LEG L5.2MM 26

## (undated) DEVICE — YANKAUER,SMOOTH HANDLE,HIGH CAPACITY: Brand: MEDLINE INDUSTRIES, INC.

## (undated) DEVICE — SYRINGE MED 50ML LUERSLIP TIP

## (undated) DEVICE — MEDI-VAC NON-CONDUCTIVE SUCTION TUBING: Brand: CARDINAL HEALTH

## (undated) DEVICE — SOLUTION ANTIFOG VIS SYS CLEARIFY LAPSCP

## (undated) DEVICE — CATHETER SUCT TR FL TIP 14FR W/ O CTRL

## (undated) DEVICE — VESSEL SEALER EXTEND: Brand: ENDOWRIST

## (undated) DEVICE — 3M™ TEGADERM™ TRANSPARENT FILM DRESSING FRAME STYLE, 1626W, 4 IN X 4-3/4 IN (10 CM X 12 CM), 50/CT 4CT/CASE: Brand: 3M™ TEGADERM™

## (undated) DEVICE — CATHETER SCLERO L240CM NDL 25GA L4MM SHTH DIA2.3MM CNTRST

## (undated) DEVICE — GLIDESHEATH SLENDER STAINLESS STEEL KIT: Brand: GLIDESHEATH SLENDER

## (undated) DEVICE — SUTURE VICRYL SZ 0 L18IN ABSRB UD POLYGLACTIN 910 BRAID TIE J912G

## (undated) DEVICE — PROCEDURE KIT FLUID MGMT 10 FR CUST MAINFOLD

## (undated) DEVICE — SEAL

## (undated) DEVICE — CANNULA ORIG TL CLR W FOAM CUSHIONS AND 14FT SUPL TB 3 CHN

## (undated) DEVICE — ENDOSCOPY PUMP TUBING/ CAP SET: Brand: ERBE

## (undated) DEVICE — DRAPE C ARM UNIV W41XL74IN CLR PLAS XR VELC CLSR POLY STRP

## (undated) DEVICE — ARM DRAPE

## (undated) DEVICE — ELECTRO LUBE IS A SINGLE PATIENT USE DEVICE THAT IS INTENDED TO BE USED ON ELECTROSURGICAL ELECTRODES TO REDUCE STICKING.: Brand: KEY SURGICAL ELECTRO LUBE

## (undated) DEVICE — JELLY LUBRICATING 2.7GM H2O SOL GREASELESS E-Z

## (undated) DEVICE — LINER SUCT CANSTR 3000CC PLAS SFT PRE ASSEMB W/OUT TBNG W/

## (undated) DEVICE — GUIDEWIRE VASC L260CM DIA0035IN TIP L3MM PTFE J STD TAPR FIX

## (undated) DEVICE — REDUCER: Brand: ENDOWRIST

## (undated) DEVICE — TIP COVER ACCESSORY

## (undated) DEVICE — GAUZE,SPONGE,4"X4",12PLY,STERILE,LF,2'S: Brand: MEDLINE

## (undated) DEVICE — KIT,ANTI FOG,W/SPONGE & FLUID,SOFT PACK: Brand: MEDLINE

## (undated) DEVICE — DRESSING,GAUZE,XEROFORM,CURAD,1"X8",ST: Brand: CURAD

## (undated) DEVICE — CLEAN UP KIT: Brand: MEDLINE INDUSTRIES, INC.

## (undated) DEVICE — LAPAROSCOPIC ACCESS SYSTEM: Brand: ALEXIS LAPAROSCOPIC SYSTEM

## (undated) DEVICE — SYRINGE 20ML LL S/C 50

## (undated) DEVICE — DRAPE,ANGIO,BRACH,STERILE,38X44: Brand: MEDLINE

## (undated) DEVICE — BLADE,STAINLESS-STEEL,11,STRL,DISPOSABLE: Brand: MEDLINE

## (undated) DEVICE — SUTURE VICRYL + SZ 3-0 L27IN ABSRB UD L26MM SH 1/2 CIR VCP416H

## (undated) DEVICE — INSUFFLATION NEEDLE TO ESTABLISH PNEUMOPERITONEUM.: Brand: INSUFFLATION NEEDLE

## (undated) DEVICE — TAPE,CLOTH/SILK,CURAD,3"X10YD,LF,40/CS: Brand: CURAD

## (undated) DEVICE — BITE BLOCK ENDOSCP UNIV AD 6 TO 9.4 MM

## (undated) DEVICE — SYRINGE MED 10ML LUERLOCK TIP W/O SFTY DISP

## (undated) DEVICE — BLADELESS OBTURATOR: Brand: WECK VISTA

## (undated) DEVICE — INTENDED FOR TISSUE SEPARATION, AND OTHER PROCEDURES THAT REQUIRE A SHARP SURGICAL BLADE TO PUNCTURE OR CUT.: Brand: BARD-PARKER SAFETY BLADES SIZE 15, STERILE

## (undated) DEVICE — GOWN,SIRUS,NONRNF,SETINSLV,XL,20/CS: Brand: MEDLINE

## (undated) DEVICE — THREE-QUARTER SHEET: Brand: CONVERTORS

## (undated) DEVICE — Device

## (undated) DEVICE — SUTURE VICRYL SZ 0 L27IN ABSRB UD L36MM CT-1 1/2 CIR J260H

## (undated) DEVICE — 3M™ STERI-DRAPE™ INSTRUMENT POUCH 1018L: Brand: STERI-DRAPE™

## (undated) DEVICE — SUTURE PDS II SZ 1 L36IN ABSRB VLT CTXB L48MM 1/2 CIR BLNT ZB371

## (undated) DEVICE — STERILE POLYISOPRENE POWDER-FREE SURGICAL GLOVES: Brand: PROTEXIS

## (undated) DEVICE — Z DISCONTINUED USE 2132791 NEEDLE 25GA X 1.5 IN ECLIPSE

## (undated) DEVICE — SOLUTION IRRIG 1000ML H2O STRL BLT

## (undated) DEVICE — 40580 - THE PINK PAD - ADVANCED TRENDELENBURG POSITIONING KIT: Brand: 40580 - THE PINK PAD - ADVANCED TRENDELENBURG POSITIONING KIT

## (undated) DEVICE — Device: Brand: D-STAT® RADIAL TOPICAL HEMOSTAT

## (undated) DEVICE — FORCEPS BX L240CM JAW DIA2.8MM L CAP W/ NDL MIC MESH TOOTH

## (undated) DEVICE — SOLUTION PREP PAINT POV IOD FOR SKIN MUCOUS MEM

## (undated) DEVICE — 2, DISPOSABLE SUCTION/IRRIGATOR WITH DISPOSABLE TIP: Brand: STRYKEFLOW

## (undated) DEVICE — APPLICATOR MEDICATED 26 CC SOLUTION HI LT ORNG CHLORAPREP

## (undated) DEVICE — SOLUTION IRRIG 1000ML 0.9% SOD CHL USP POUR PLAS BTL

## (undated) DEVICE — FORCEPS LAP DIA5MM BCOCK FEN TIP ROT NONARTICULATING LOK

## (undated) DEVICE — STRIP,CLOSURE,WOUND,MEDI-STRIP,1/2X4: Brand: MEDLINE

## (undated) DEVICE — SHEET, T, LAPAROTOMY, STERILE: Brand: MEDLINE

## (undated) DEVICE — FORCEPS BX L240CM JAW DIA2.4MM ORNG L CAP W/ NDL DISP RAD

## (undated) DEVICE — TROCAR: Brand: KII® OPTICAL ACCESS SYSTEM

## (undated) DEVICE — SET FLD ADMIN 3 W STPCOCK FIX FEM L BOR 1IN

## (undated) DEVICE — INTENDED FOR TISSUE SEPARATION, AND OTHER PROCEDURES THAT REQUIRE A SHARP SURGICAL BLADE TO PUNCTURE OR CUT.: Brand: BARD-PARKER ®  SAFETY SCALPED

## (undated) DEVICE — GOWN PLASTIC FILM THMBHKS UNIV BLUE: Brand: CARDINAL HEALTH

## (undated) DEVICE — PRESSURE MONITORING SET: Brand: TRUWAVE

## (undated) DEVICE — 48" PROBE COVER W/GEL, ULTRASOUND, STERILE: Brand: SITE-RITE

## (undated) DEVICE — SYRINGE MED 25GA 3ML L5/8IN SUBQ PLAS W/ DETACH NDL SFTY

## (undated) DEVICE — SUCTION IRRIGATOR: Brand: ENDOWRIST

## (undated) DEVICE — BASIN EMSIS 16OZ GRAPHITE PLAS KID SHP MOLD GRAD FOR ORAL

## (undated) DEVICE — GAUZE,SPONGE,4"X4",16PLY,STRL,LF,10/TRAY: Brand: MEDLINE

## (undated) DEVICE — STAPLER EXT 65MM S STL AUTO DISP PURSTRING

## (undated) DEVICE — PACK PROCEDURE SURG VASC CATH 161 MMC LF

## (undated) DEVICE — SYRINGE MED 10ML TRNSLUC BRL PLUNG BLK MRK POLYPR CTRL

## (undated) DEVICE — COLUMN DRAPE

## (undated) DEVICE — CATH BLLN ANGIO 2.50X12MM SC EUPHORA RX

## (undated) DEVICE — RADIFOCUS OPTITORQUE ANGIOGRAPHIC CATHETER: Brand: OPTITORQUE

## (undated) DEVICE — SNARE POLYP M W27MMXL240CM OVL STIFF DISP CAPTIVATOR

## (undated) DEVICE — DECANTER BAG 9": Brand: MEDLINE INDUSTRIES, INC.

## (undated) DEVICE — TUBING, SUCTION, 3/16" X 12', STRAIGHT: Brand: MEDLINE